# Patient Record
Sex: FEMALE | Race: BLACK OR AFRICAN AMERICAN | NOT HISPANIC OR LATINO | URBAN - METROPOLITAN AREA
[De-identification: names, ages, dates, MRNs, and addresses within clinical notes are randomized per-mention and may not be internally consistent; named-entity substitution may affect disease eponyms.]

---

## 2021-01-01 ENCOUNTER — INPATIENT (INPATIENT)
Facility: HOSPITAL | Age: 56
LOS: 10 days | Discharge: NURSING, REHAB OR LONG-TERM CARE | DRG: 190 | End: 2021-09-09
Admitting: STUDENT IN AN ORGANIZED HEALTH CARE EDUCATION/TRAINING PROGRAM
Payer: MEDICARE

## 2021-01-01 ENCOUNTER — INPATIENT (INPATIENT)
Facility: HOSPITAL | Age: 56
LOS: 5 days | Discharge: NURSING, REHAB OR LONG-TERM CARE | DRG: 974 | End: 2021-10-03
Admitting: HOSPITALIST
Payer: MEDICARE

## 2021-01-01 ENCOUNTER — INPATIENT (INPATIENT)
Facility: HOSPITAL | Age: 56
LOS: 7 days | DRG: 974 | End: 2021-10-16
Attending: INTERNAL MEDICINE | Admitting: INTERNAL MEDICINE
Payer: MEDICARE

## 2021-01-01 VITALS
HEIGHT: 62 IN | HEART RATE: 74 BPM | RESPIRATION RATE: 18 BRPM | TEMPERATURE: 98 F | DIASTOLIC BLOOD PRESSURE: 61 MMHG | WEIGHT: 89.95 LBS | OXYGEN SATURATION: 98 % | SYSTOLIC BLOOD PRESSURE: 96 MMHG

## 2021-01-01 VITALS
RESPIRATION RATE: 25 BRPM | HEIGHT: 67 IN | DIASTOLIC BLOOD PRESSURE: 53 MMHG | WEIGHT: 110.01 LBS | SYSTOLIC BLOOD PRESSURE: 83 MMHG | OXYGEN SATURATION: 96 % | TEMPERATURE: 100 F | HEART RATE: 120 BPM

## 2021-01-01 VITALS
HEIGHT: 67 IN | RESPIRATION RATE: 20 BRPM | OXYGEN SATURATION: 88 % | HEART RATE: 85 BPM | SYSTOLIC BLOOD PRESSURE: 85 MMHG | WEIGHT: 89.95 LBS | DIASTOLIC BLOOD PRESSURE: 60 MMHG | TEMPERATURE: 100 F

## 2021-01-01 VITALS
HEART RATE: 70 BPM | DIASTOLIC BLOOD PRESSURE: 61 MMHG | SYSTOLIC BLOOD PRESSURE: 104 MMHG | TEMPERATURE: 98 F | OXYGEN SATURATION: 96 % | RESPIRATION RATE: 18 BRPM

## 2021-01-01 VITALS
SYSTOLIC BLOOD PRESSURE: 105 MMHG | RESPIRATION RATE: 18 BRPM | HEART RATE: 81 BPM | OXYGEN SATURATION: 98 % | DIASTOLIC BLOOD PRESSURE: 67 MMHG | TEMPERATURE: 98 F

## 2021-01-01 VITALS
RESPIRATION RATE: 17 BRPM | HEART RATE: 87 BPM | OXYGEN SATURATION: 52 % | SYSTOLIC BLOOD PRESSURE: 79 MMHG | DIASTOLIC BLOOD PRESSURE: 41 MMHG

## 2021-01-01 DIAGNOSIS — Z71.89 OTHER SPECIFIED COUNSELING: ICD-10-CM

## 2021-01-01 DIAGNOSIS — Z51.5 ENCOUNTER FOR PALLIATIVE CARE: ICD-10-CM

## 2021-01-01 DIAGNOSIS — R53.1 WEAKNESS: ICD-10-CM

## 2021-01-01 DIAGNOSIS — B20 HUMAN IMMUNODEFICIENCY VIRUS [HIV] DISEASE: ICD-10-CM

## 2021-01-01 DIAGNOSIS — F31.9 BIPOLAR DISORDER, UNSPECIFIED: ICD-10-CM

## 2021-01-01 DIAGNOSIS — Z91.89 OTHER SPECIFIED PERSONAL RISK FACTORS, NOT ELSEWHERE CLASSIFIED: ICD-10-CM

## 2021-01-01 DIAGNOSIS — F17.200 NICOTINE DEPENDENCE, UNSPECIFIED, UNCOMPLICATED: ICD-10-CM

## 2021-01-01 DIAGNOSIS — A41.9 SEPSIS, UNSPECIFIED ORGANISM: ICD-10-CM

## 2021-01-01 DIAGNOSIS — Z87.898 PERSONAL HISTORY OF OTHER SPECIFIED CONDITIONS: ICD-10-CM

## 2021-01-01 DIAGNOSIS — R26.81 UNSTEADINESS ON FEET: ICD-10-CM

## 2021-01-01 DIAGNOSIS — B37.81 CANDIDAL ESOPHAGITIS: ICD-10-CM

## 2021-01-01 DIAGNOSIS — D64.9 ANEMIA, UNSPECIFIED: ICD-10-CM

## 2021-01-01 DIAGNOSIS — B37.0 CANDIDAL STOMATITIS: ICD-10-CM

## 2021-01-01 DIAGNOSIS — I95.9 HYPOTENSION, UNSPECIFIED: ICD-10-CM

## 2021-01-01 DIAGNOSIS — R63.8 OTHER SYMPTOMS AND SIGNS CONCERNING FOOD AND FLUID INTAKE: ICD-10-CM

## 2021-01-01 DIAGNOSIS — R64 CACHEXIA: ICD-10-CM

## 2021-01-01 DIAGNOSIS — F17.210 NICOTINE DEPENDENCE, CIGARETTES, UNCOMPLICATED: ICD-10-CM

## 2021-01-01 DIAGNOSIS — N39.0 URINARY TRACT INFECTION, SITE NOT SPECIFIED: ICD-10-CM

## 2021-01-01 DIAGNOSIS — L89.153 PRESSURE ULCER OF SACRAL REGION, STAGE 3: ICD-10-CM

## 2021-01-01 DIAGNOSIS — R13.10 DYSPHAGIA, UNSPECIFIED: ICD-10-CM

## 2021-01-01 DIAGNOSIS — J96.92 RESPIRATORY FAILURE, UNSPECIFIED WITH HYPERCAPNIA: ICD-10-CM

## 2021-01-01 DIAGNOSIS — J96.01 ACUTE RESPIRATORY FAILURE WITH HYPOXIA: ICD-10-CM

## 2021-01-01 DIAGNOSIS — L89.154 PRESSURE ULCER OF SACRAL REGION, STAGE 4: ICD-10-CM

## 2021-01-01 DIAGNOSIS — E86.0 DEHYDRATION: ICD-10-CM

## 2021-01-01 DIAGNOSIS — R26.2 DIFFICULTY IN WALKING, NOT ELSEWHERE CLASSIFIED: ICD-10-CM

## 2021-01-01 DIAGNOSIS — Z85.810 PERSONAL HISTORY OF MALIGNANT NEOPLASM OF TONGUE: ICD-10-CM

## 2021-01-01 DIAGNOSIS — R62.7 ADULT FAILURE TO THRIVE: ICD-10-CM

## 2021-01-01 DIAGNOSIS — C02.9 MALIGNANT NEOPLASM OF TONGUE, UNSPECIFIED: ICD-10-CM

## 2021-01-01 DIAGNOSIS — R06.00 DYSPNEA, UNSPECIFIED: ICD-10-CM

## 2021-01-01 DIAGNOSIS — J44.1 CHRONIC OBSTRUCTIVE PULMONARY DISEASE WITH (ACUTE) EXACERBATION: ICD-10-CM

## 2021-01-01 DIAGNOSIS — J44.9 CHRONIC OBSTRUCTIVE PULMONARY DISEASE, UNSPECIFIED: ICD-10-CM

## 2021-01-01 DIAGNOSIS — B18.1 CHRONIC VIRAL HEPATITIS B WITHOUT DELTA-AGENT: ICD-10-CM

## 2021-01-01 DIAGNOSIS — Z74.01 BED CONFINEMENT STATUS: ICD-10-CM

## 2021-01-01 DIAGNOSIS — R93.89 ABNORMAL FINDINGS ON DIAGNOSTIC IMAGING OF OTHER SPECIFIED BODY STRUCTURES: ICD-10-CM

## 2021-01-01 DIAGNOSIS — L98.429 NON-PRESSURE CHRONIC ULCER OF BACK WITH UNSPECIFIED SEVERITY: ICD-10-CM

## 2021-01-01 DIAGNOSIS — F45.8 OTHER SOMATOFORM DISORDERS: ICD-10-CM

## 2021-01-01 DIAGNOSIS — D63.8 ANEMIA IN OTHER CHRONIC DISEASES CLASSIFIED ELSEWHERE: ICD-10-CM

## 2021-01-01 DIAGNOSIS — M54.9 DORSALGIA, UNSPECIFIED: ICD-10-CM

## 2021-01-01 DIAGNOSIS — F14.99 COCAINE USE, UNSPECIFIED WITH UNSPECIFIED COCAINE-INDUCED DISORDER: ICD-10-CM

## 2021-01-01 DIAGNOSIS — Z92.3 PERSONAL HISTORY OF IRRADIATION: ICD-10-CM

## 2021-01-01 DIAGNOSIS — B59 PNEUMOCYSTOSIS: ICD-10-CM

## 2021-01-01 DIAGNOSIS — G89.29 OTHER CHRONIC PAIN: ICD-10-CM

## 2021-01-01 DIAGNOSIS — L89.90 PRESSURE ULCER OF UNSPECIFIED SITE, UNSPECIFIED STAGE: ICD-10-CM

## 2021-01-01 DIAGNOSIS — J05.10 ACUTE EPIGLOTTITIS WITHOUT OBSTRUCTION: ICD-10-CM

## 2021-01-01 DIAGNOSIS — Z91.14 PATIENT'S OTHER NONCOMPLIANCE WITH MEDICATION REGIMEN: ICD-10-CM

## 2021-01-01 DIAGNOSIS — B19.10 UNSPECIFIED VIRAL HEPATITIS B WITHOUT HEPATIC COMA: ICD-10-CM

## 2021-01-01 DIAGNOSIS — B18.2 CHRONIC VIRAL HEPATITIS C: ICD-10-CM

## 2021-01-01 DIAGNOSIS — R53.2 FUNCTIONAL QUADRIPLEGIA: ICD-10-CM

## 2021-01-01 DIAGNOSIS — J18.9 PNEUMONIA, UNSPECIFIED ORGANISM: ICD-10-CM

## 2021-01-01 DIAGNOSIS — J96.91 RESPIRATORY FAILURE, UNSPECIFIED WITH HYPOXIA: ICD-10-CM

## 2021-01-01 DIAGNOSIS — C20 MALIGNANT NEOPLASM OF RECTUM: ICD-10-CM

## 2021-01-01 DIAGNOSIS — B19.20 UNSPECIFIED VIRAL HEPATITIS C WITHOUT HEPATIC COMA: ICD-10-CM

## 2021-01-01 DIAGNOSIS — Z85.048 PERSONAL HISTORY OF OTHER MALIGNANT NEOPLASM OF RECTUM, RECTOSIGMOID JUNCTION, AND ANUS: ICD-10-CM

## 2021-01-01 DIAGNOSIS — Z20.822 CONTACT WITH AND (SUSPECTED) EXPOSURE TO COVID-19: ICD-10-CM

## 2021-01-01 DIAGNOSIS — R52 PAIN, UNSPECIFIED: ICD-10-CM

## 2021-01-01 DIAGNOSIS — F32.A DEPRESSION, UNSPECIFIED: ICD-10-CM

## 2021-01-01 DIAGNOSIS — E43 UNSPECIFIED SEVERE PROTEIN-CALORIE MALNUTRITION: ICD-10-CM

## 2021-01-01 DIAGNOSIS — J43.9 EMPHYSEMA, UNSPECIFIED: ICD-10-CM

## 2021-01-01 DIAGNOSIS — L29.9 PRURITUS, UNSPECIFIED: ICD-10-CM

## 2021-01-01 DIAGNOSIS — A59.9 TRICHOMONIASIS, UNSPECIFIED: ICD-10-CM

## 2021-01-01 DIAGNOSIS — J15.6 PNEUMONIA DUE TO OTHER GRAM-NEGATIVE BACTERIA: ICD-10-CM

## 2021-01-01 LAB
-  CEFAZOLIN: SIGNIFICANT CHANGE UP
-  CLINDAMYCIN: SIGNIFICANT CHANGE UP
-  DAPTOMYCIN: SIGNIFICANT CHANGE UP
-  ERYTHROMYCIN: SIGNIFICANT CHANGE UP
-  LINEZOLID: SIGNIFICANT CHANGE UP
-  OXACILLIN: SIGNIFICANT CHANGE UP
-  RIFAMPIN: SIGNIFICANT CHANGE UP
-  TETRACYCLINE: SIGNIFICANT CHANGE UP
-  TRIMETHOPRIM/SULFAMETHOXAZOLE: SIGNIFICANT CHANGE UP
-  VANCOMYCIN: SIGNIFICANT CHANGE UP
4/8 RATIO: 0.03 RATIO — LOW (ref 0.9–3.6)
4/8 RATIO: 0.04 RATIO — LOW (ref 0.9–3.6)
A FLAVUS AB FLD QL: NEGATIVE — SIGNIFICANT CHANGE UP
A NIGER AB FLD QL: NEGATIVE — SIGNIFICANT CHANGE UP
A NIGER AB FLD QL: NEGATIVE — SIGNIFICANT CHANGE UP
A1C WITH ESTIMATED AVERAGE GLUCOSE RESULT: 6.1 % — HIGH (ref 4–5.6)
ABS CD8: 1450 /UL — HIGH (ref 142–740)
ABS CD8: 1482 /UL — HIGH (ref 142–740)
ACANTHOCYTES BLD QL SMEAR: SLIGHT — SIGNIFICANT CHANGE UP
ALBUMIN SERPL ELPH-MCNC: 1.6 G/DL — LOW (ref 3.3–5)
ALBUMIN SERPL ELPH-MCNC: 2 G/DL — LOW (ref 3.3–5)
ALBUMIN SERPL ELPH-MCNC: 2.1 G/DL — LOW (ref 3.3–5)
ALBUMIN SERPL ELPH-MCNC: 2.1 G/DL — LOW (ref 3.3–5)
ALBUMIN SERPL ELPH-MCNC: 2.2 G/DL — LOW (ref 3.3–5)
ALBUMIN SERPL ELPH-MCNC: 2.5 G/DL — LOW (ref 3.3–5)
ALBUMIN SERPL ELPH-MCNC: 2.6 G/DL — LOW (ref 3.3–5)
ALBUMIN SERPL ELPH-MCNC: 2.6 G/DL — LOW (ref 3.3–5)
ALBUMIN SERPL ELPH-MCNC: 2.7 G/DL — LOW (ref 3.3–5)
ALBUMIN SERPL ELPH-MCNC: 2.8 G/DL — LOW (ref 3.3–5)
ALBUMIN SERPL ELPH-MCNC: 3.3 G/DL — SIGNIFICANT CHANGE UP (ref 3.3–5)
ALBUMIN SERPL ELPH-MCNC: 3.4 G/DL — SIGNIFICANT CHANGE UP (ref 3.3–5)
ALBUMIN SERPL ELPH-MCNC: 3.9 G/DL — SIGNIFICANT CHANGE UP (ref 3.3–5)
ALP SERPL-CCNC: 47 U/L — SIGNIFICANT CHANGE UP (ref 40–120)
ALP SERPL-CCNC: 54 U/L — SIGNIFICANT CHANGE UP (ref 40–120)
ALP SERPL-CCNC: 55 U/L — SIGNIFICANT CHANGE UP (ref 40–120)
ALP SERPL-CCNC: 60 U/L — SIGNIFICANT CHANGE UP (ref 40–120)
ALP SERPL-CCNC: 64 U/L — SIGNIFICANT CHANGE UP (ref 40–120)
ALP SERPL-CCNC: 66 U/L — SIGNIFICANT CHANGE UP (ref 40–120)
ALP SERPL-CCNC: 67 U/L — SIGNIFICANT CHANGE UP (ref 40–120)
ALP SERPL-CCNC: 68 U/L — SIGNIFICANT CHANGE UP (ref 40–120)
ALP SERPL-CCNC: 71 U/L — SIGNIFICANT CHANGE UP (ref 40–120)
ALP SERPL-CCNC: 71 U/L — SIGNIFICANT CHANGE UP (ref 40–120)
ALP SERPL-CCNC: 73 U/L — SIGNIFICANT CHANGE UP (ref 40–120)
ALP SERPL-CCNC: 78 U/L — SIGNIFICANT CHANGE UP (ref 40–120)
ALP SERPL-CCNC: 85 U/L — SIGNIFICANT CHANGE UP (ref 40–120)
ALT FLD-CCNC: 10 U/L — SIGNIFICANT CHANGE UP (ref 10–45)
ALT FLD-CCNC: 10 U/L — SIGNIFICANT CHANGE UP (ref 10–45)
ALT FLD-CCNC: 11 U/L — SIGNIFICANT CHANGE UP (ref 10–45)
ALT FLD-CCNC: 11 U/L — SIGNIFICANT CHANGE UP (ref 10–45)
ALT FLD-CCNC: 12 U/L — SIGNIFICANT CHANGE UP (ref 10–45)
ALT FLD-CCNC: 12 U/L — SIGNIFICANT CHANGE UP (ref 10–45)
ALT FLD-CCNC: 14 U/L — SIGNIFICANT CHANGE UP (ref 10–45)
ALT FLD-CCNC: 15 U/L — SIGNIFICANT CHANGE UP (ref 10–45)
ALT FLD-CCNC: 18 U/L — SIGNIFICANT CHANGE UP (ref 10–45)
ALT FLD-CCNC: 7 U/L — LOW (ref 10–45)
ALT FLD-CCNC: 7 U/L — LOW (ref 10–45)
ALT FLD-CCNC: 8 U/L — LOW (ref 10–45)
ALT FLD-CCNC: 9 U/L — LOW (ref 10–45)
AMPHET UR-MCNC: NEGATIVE — SIGNIFICANT CHANGE UP
ANION GAP SERPL CALC-SCNC: 10 MMOL/L — SIGNIFICANT CHANGE UP (ref 5–17)
ANION GAP SERPL CALC-SCNC: 11 MMOL/L — SIGNIFICANT CHANGE UP (ref 5–17)
ANION GAP SERPL CALC-SCNC: 14 MMOL/L — SIGNIFICANT CHANGE UP (ref 5–17)
ANION GAP SERPL CALC-SCNC: 6 MMOL/L — SIGNIFICANT CHANGE UP (ref 5–17)
ANION GAP SERPL CALC-SCNC: 7 MMOL/L — SIGNIFICANT CHANGE UP (ref 5–17)
ANION GAP SERPL CALC-SCNC: 7 MMOL/L — SIGNIFICANT CHANGE UP (ref 5–17)
ANION GAP SERPL CALC-SCNC: 8 MMOL/L — SIGNIFICANT CHANGE UP (ref 5–17)
ANION GAP SERPL CALC-SCNC: 9 MMOL/L — SIGNIFICANT CHANGE UP (ref 5–17)
ANION GAP SERPL CALC-SCNC: 9 MMOL/L — SIGNIFICANT CHANGE UP (ref 5–17)
ANISOCYTOSIS BLD QL: SIGNIFICANT CHANGE UP
ANISOCYTOSIS BLD QL: SLIGHT — SIGNIFICANT CHANGE UP
APPEARANCE UR: ABNORMAL
APPEARANCE UR: CLEAR — SIGNIFICANT CHANGE UP
APTT BLD: 24.6 SEC — LOW (ref 27.5–35.5)
APTT BLD: 31.8 SEC — SIGNIFICANT CHANGE UP (ref 27.5–35.5)
APTT BLD: 32.7 SEC — SIGNIFICANT CHANGE UP (ref 27.5–35.5)
APTT BLD: 33.9 SEC — SIGNIFICANT CHANGE UP (ref 27.5–35.5)
AST SERPL-CCNC: 25 U/L — SIGNIFICANT CHANGE UP (ref 10–40)
AST SERPL-CCNC: 28 U/L — SIGNIFICANT CHANGE UP (ref 10–40)
AST SERPL-CCNC: 34 U/L — SIGNIFICANT CHANGE UP (ref 10–40)
AST SERPL-CCNC: 37 U/L — SIGNIFICANT CHANGE UP (ref 10–40)
AST SERPL-CCNC: 43 U/L — HIGH (ref 10–40)
AST SERPL-CCNC: 43 U/L — HIGH (ref 10–40)
AST SERPL-CCNC: 47 U/L — HIGH (ref 10–40)
AST SERPL-CCNC: 47 U/L — HIGH (ref 10–40)
AST SERPL-CCNC: 55 U/L — HIGH (ref 10–40)
AST SERPL-CCNC: 67 U/L — HIGH (ref 10–40)
AST SERPL-CCNC: 68 U/L — HIGH (ref 10–40)
B PERT IGG+IGM PNL SER: SIGNIFICANT CHANGE UP
BACTERIA # UR AUTO: PRESENT /HPF
BACTERIA # UR AUTO: PRESENT /HPF
BARBITURATES UR SCN-MCNC: NEGATIVE — SIGNIFICANT CHANGE UP
BASE EXCESS BLDA CALC-SCNC: -3.2 MMOL/L — LOW (ref -2–3)
BASE EXCESS BLDA CALC-SCNC: 0.2 MMOL/L — SIGNIFICANT CHANGE UP (ref -2–3)
BASE EXCESS BLDV CALC-SCNC: -2.6 MMOL/L — LOW (ref -2–3)
BASOPHILS # BLD AUTO: 0 K/UL — SIGNIFICANT CHANGE UP (ref 0–0.2)
BASOPHILS # BLD AUTO: 0.02 K/UL — SIGNIFICANT CHANGE UP (ref 0–0.2)
BASOPHILS # BLD AUTO: 0.13 K/UL — SIGNIFICANT CHANGE UP (ref 0–0.2)
BASOPHILS NFR BLD AUTO: 0 % — SIGNIFICANT CHANGE UP (ref 0–2)
BASOPHILS NFR BLD AUTO: 0.4 % — SIGNIFICANT CHANGE UP (ref 0–2)
BASOPHILS NFR BLD AUTO: 0.9 % — SIGNIFICANT CHANGE UP (ref 0–2)
BENZODIAZ UR-MCNC: NEGATIVE — SIGNIFICANT CHANGE UP
BILIRUB SERPL-MCNC: 0.2 MG/DL — SIGNIFICANT CHANGE UP (ref 0.2–1.2)
BILIRUB SERPL-MCNC: 0.3 MG/DL — SIGNIFICANT CHANGE UP (ref 0.2–1.2)
BILIRUB SERPL-MCNC: 0.4 MG/DL — SIGNIFICANT CHANGE UP (ref 0.2–1.2)
BILIRUB SERPL-MCNC: 0.5 MG/DL — SIGNIFICANT CHANGE UP (ref 0.2–1.2)
BILIRUB SERPL-MCNC: 0.6 MG/DL — SIGNIFICANT CHANGE UP (ref 0.2–1.2)
BILIRUB SERPL-MCNC: 0.6 MG/DL — SIGNIFICANT CHANGE UP (ref 0.2–1.2)
BILIRUB UR-MCNC: ABNORMAL
BILIRUB UR-MCNC: ABNORMAL
BILIRUB UR-MCNC: NEGATIVE — SIGNIFICANT CHANGE UP
BILIRUB UR-MCNC: NEGATIVE — SIGNIFICANT CHANGE UP
BLD GP AB SCN SERPL QL: NEGATIVE — SIGNIFICANT CHANGE UP
BLD GP AB SCN SERPL QL: NEGATIVE — SIGNIFICANT CHANGE UP
BUN SERPL-MCNC: 15 MG/DL — SIGNIFICANT CHANGE UP (ref 7–23)
BUN SERPL-MCNC: 16 MG/DL — SIGNIFICANT CHANGE UP (ref 7–23)
BUN SERPL-MCNC: 17 MG/DL — SIGNIFICANT CHANGE UP (ref 7–23)
BUN SERPL-MCNC: 19 MG/DL — SIGNIFICANT CHANGE UP (ref 7–23)
BUN SERPL-MCNC: 21 MG/DL — SIGNIFICANT CHANGE UP (ref 7–23)
BUN SERPL-MCNC: 27 MG/DL — HIGH (ref 7–23)
BUN SERPL-MCNC: 27 MG/DL — HIGH (ref 7–23)
BUN SERPL-MCNC: 28 MG/DL — HIGH (ref 7–23)
BUN SERPL-MCNC: 28 MG/DL — HIGH (ref 7–23)
BUN SERPL-MCNC: 29 MG/DL — HIGH (ref 7–23)
BUN SERPL-MCNC: 32 MG/DL — HIGH (ref 7–23)
BUN SERPL-MCNC: 34 MG/DL — HIGH (ref 7–23)
BUN SERPL-MCNC: 35 MG/DL — HIGH (ref 7–23)
BUN SERPL-MCNC: 5 MG/DL — LOW (ref 7–23)
BUN SERPL-MCNC: 54 MG/DL — HIGH (ref 7–23)
BUN SERPL-MCNC: 6 MG/DL — LOW (ref 7–23)
BUN SERPL-MCNC: 9 MG/DL — SIGNIFICANT CHANGE UP (ref 7–23)
BURR CELLS BLD QL SMEAR: PRESENT — SIGNIFICANT CHANGE UP
C TRACH RRNA SPEC QL NAA+PROBE: SIGNIFICANT CHANGE UP
CA-I SERPL-SCNC: 1.2 MMOL/L — SIGNIFICANT CHANGE UP (ref 1.15–1.33)
CALCIUM SERPL-MCNC: 7.8 MG/DL — LOW (ref 8.4–10.5)
CALCIUM SERPL-MCNC: 7.9 MG/DL — LOW (ref 8.4–10.5)
CALCIUM SERPL-MCNC: 8.2 MG/DL — LOW (ref 8.4–10.5)
CALCIUM SERPL-MCNC: 8.3 MG/DL — LOW (ref 8.4–10.5)
CALCIUM SERPL-MCNC: 8.3 MG/DL — LOW (ref 8.4–10.5)
CALCIUM SERPL-MCNC: 8.5 MG/DL — SIGNIFICANT CHANGE UP (ref 8.4–10.5)
CALCIUM SERPL-MCNC: 8.6 MG/DL — SIGNIFICANT CHANGE UP (ref 8.4–10.5)
CALCIUM SERPL-MCNC: 8.7 MG/DL — SIGNIFICANT CHANGE UP (ref 8.4–10.5)
CALCIUM SERPL-MCNC: 8.7 MG/DL — SIGNIFICANT CHANGE UP (ref 8.4–10.5)
CALCIUM SERPL-MCNC: 8.8 MG/DL — SIGNIFICANT CHANGE UP (ref 8.4–10.5)
CALCIUM SERPL-MCNC: 8.8 MG/DL — SIGNIFICANT CHANGE UP (ref 8.4–10.5)
CALCIUM SERPL-MCNC: 9.3 MG/DL — SIGNIFICANT CHANGE UP (ref 8.4–10.5)
CALCIUM SERPL-MCNC: 9.9 MG/DL — SIGNIFICANT CHANGE UP (ref 8.4–10.5)
CD3 BLASTS SPEC-ACNC: 1544 /UL — SIGNIFICANT CHANGE UP (ref 672–1870)
CD3 BLASTS SPEC-ACNC: 1614 /UL — SIGNIFICANT CHANGE UP (ref 672–1870)
CD3 BLASTS SPEC-ACNC: 80 % — SIGNIFICANT CHANGE UP (ref 59–83)
CD3 BLASTS SPEC-ACNC: 85 % — HIGH (ref 59–83)
CD4 %: 2 % — LOW (ref 30–62)
CD4 %: 3 % — LOW (ref 30–62)
CD8 %: 74 % — HIGH (ref 12–36)
CD8 %: 80 % — HIGH (ref 12–36)
CHLORIDE SERPL-SCNC: 100 MMOL/L — SIGNIFICANT CHANGE UP (ref 96–108)
CHLORIDE SERPL-SCNC: 101 MMOL/L — SIGNIFICANT CHANGE UP (ref 96–108)
CHLORIDE SERPL-SCNC: 102 MMOL/L — SIGNIFICANT CHANGE UP (ref 96–108)
CHLORIDE SERPL-SCNC: 102 MMOL/L — SIGNIFICANT CHANGE UP (ref 96–108)
CHLORIDE SERPL-SCNC: 103 MMOL/L — SIGNIFICANT CHANGE UP (ref 96–108)
CHLORIDE SERPL-SCNC: 103 MMOL/L — SIGNIFICANT CHANGE UP (ref 96–108)
CHLORIDE SERPL-SCNC: 104 MMOL/L — SIGNIFICANT CHANGE UP (ref 96–108)
CHLORIDE SERPL-SCNC: 105 MMOL/L — SIGNIFICANT CHANGE UP (ref 96–108)
CHLORIDE SERPL-SCNC: 107 MMOL/L — SIGNIFICANT CHANGE UP (ref 96–108)
CHLORIDE SERPL-SCNC: 110 MMOL/L — HIGH (ref 96–108)
CHLORIDE SERPL-SCNC: 111 MMOL/L — HIGH (ref 96–108)
CHLORIDE SERPL-SCNC: 113 MMOL/L — HIGH (ref 96–108)
CHLORIDE SERPL-SCNC: 116 MMOL/L — HIGH (ref 96–108)
CHLORIDE SERPL-SCNC: 97 MMOL/L — SIGNIFICANT CHANGE UP (ref 96–108)
CHLORIDE SERPL-SCNC: 99 MMOL/L — SIGNIFICANT CHANGE UP (ref 96–108)
CHOLEST SERPL-MCNC: 81 MG/DL — SIGNIFICANT CHANGE UP
CMV DNA CSF QL NAA+PROBE: SIGNIFICANT CHANGE UP
CO2 BLDA-SCNC: 25 MMOL/L — HIGH (ref 19–24)
CO2 BLDA-SCNC: 28 MMOL/L — HIGH (ref 19–24)
CO2 BLDV-SCNC: 24.5 MMOL/L — SIGNIFICANT CHANGE UP (ref 22–26)
CO2 SERPL-SCNC: 15 MMOL/L — LOW (ref 22–31)
CO2 SERPL-SCNC: 19 MMOL/L — LOW (ref 22–31)
CO2 SERPL-SCNC: 20 MMOL/L — LOW (ref 22–31)
CO2 SERPL-SCNC: 21 MMOL/L — LOW (ref 22–31)
CO2 SERPL-SCNC: 22 MMOL/L — SIGNIFICANT CHANGE UP (ref 22–31)
CO2 SERPL-SCNC: 23 MMOL/L — SIGNIFICANT CHANGE UP (ref 22–31)
CO2 SERPL-SCNC: 23 MMOL/L — SIGNIFICANT CHANGE UP (ref 22–31)
CO2 SERPL-SCNC: 24 MMOL/L — SIGNIFICANT CHANGE UP (ref 22–31)
CO2 SERPL-SCNC: 25 MMOL/L — SIGNIFICANT CHANGE UP (ref 22–31)
CO2 SERPL-SCNC: 28 MMOL/L — SIGNIFICANT CHANGE UP (ref 22–31)
COCAINE METAB.OTHER UR-MCNC: NEGATIVE — SIGNIFICANT CHANGE UP
COLOR FLD: SIGNIFICANT CHANGE UP
COLOR SPEC: YELLOW — SIGNIFICANT CHANGE UP
COMMENT - FLUIDS: SIGNIFICANT CHANGE UP
COMMENT - FLUIDS: SIGNIFICANT CHANGE UP
COMMENT - URINE: SIGNIFICANT CHANGE UP
CORTIS AM PEAK SERPL-MCNC: 8.56 UG/DL — SIGNIFICANT CHANGE UP (ref 6.02–18.4)
COVID-19 SPIKE DOMAIN AB INTERP: POSITIVE
COVID-19 SPIKE DOMAIN AB INTERP: POSITIVE
COVID-19 SPIKE DOMAIN ANTIBODY RESULT: >250 U/ML — HIGH
COVID-19 SPIKE DOMAIN ANTIBODY RESULT: >250 U/ML — HIGH
CREAT SERPL-MCNC: 0.76 MG/DL — SIGNIFICANT CHANGE UP (ref 0.5–1.3)
CREAT SERPL-MCNC: 0.8 MG/DL — SIGNIFICANT CHANGE UP (ref 0.5–1.3)
CREAT SERPL-MCNC: 0.82 MG/DL — SIGNIFICANT CHANGE UP (ref 0.5–1.3)
CREAT SERPL-MCNC: 0.86 MG/DL — SIGNIFICANT CHANGE UP (ref 0.5–1.3)
CREAT SERPL-MCNC: 0.87 MG/DL — SIGNIFICANT CHANGE UP (ref 0.5–1.3)
CREAT SERPL-MCNC: 0.88 MG/DL — SIGNIFICANT CHANGE UP (ref 0.5–1.3)
CREAT SERPL-MCNC: 0.93 MG/DL — SIGNIFICANT CHANGE UP (ref 0.5–1.3)
CREAT SERPL-MCNC: 0.99 MG/DL — SIGNIFICANT CHANGE UP (ref 0.5–1.3)
CREAT SERPL-MCNC: 1 MG/DL — SIGNIFICANT CHANGE UP (ref 0.5–1.3)
CREAT SERPL-MCNC: 1.06 MG/DL — SIGNIFICANT CHANGE UP (ref 0.5–1.3)
CREAT SERPL-MCNC: 1.07 MG/DL — SIGNIFICANT CHANGE UP (ref 0.5–1.3)
CREAT SERPL-MCNC: 1.13 MG/DL — SIGNIFICANT CHANGE UP (ref 0.5–1.3)
CREAT SERPL-MCNC: 1.15 MG/DL — SIGNIFICANT CHANGE UP (ref 0.5–1.3)
CREAT SERPL-MCNC: 1.16 MG/DL — SIGNIFICANT CHANGE UP (ref 0.5–1.3)
CREAT SERPL-MCNC: 1.22 MG/DL — SIGNIFICANT CHANGE UP (ref 0.5–1.3)
CREAT SERPL-MCNC: 1.38 MG/DL — HIGH (ref 0.5–1.3)
CREAT SERPL-MCNC: 1.56 MG/DL — HIGH (ref 0.5–1.3)
CREAT SERPL-MCNC: 2.58 MG/DL — HIGH (ref 0.5–1.3)
CRYPTOC AG FLD QL: NEGATIVE — SIGNIFICANT CHANGE UP
CULTURE RESULTS: NO GROWTH — SIGNIFICANT CHANGE UP
CULTURE RESULTS: SIGNIFICANT CHANGE UP
DACRYOCYTES BLD QL SMEAR: SLIGHT — SIGNIFICANT CHANGE UP
DIFF PNL FLD: ABNORMAL
DIFF PNL FLD: NEGATIVE — SIGNIFICANT CHANGE UP
EOSINOPHIL # BLD AUTO: 0 K/UL — SIGNIFICANT CHANGE UP (ref 0–0.5)
EOSINOPHIL # BLD AUTO: 0.01 K/UL — SIGNIFICANT CHANGE UP (ref 0–0.5)
EOSINOPHIL # BLD AUTO: 0.05 K/UL — SIGNIFICANT CHANGE UP (ref 0–0.5)
EOSINOPHIL NFR BLD AUTO: 0 % — SIGNIFICANT CHANGE UP (ref 0–6)
EOSINOPHIL NFR BLD AUTO: 0.2 % — SIGNIFICANT CHANGE UP (ref 0–6)
EOSINOPHIL NFR BLD AUTO: 0.9 % — SIGNIFICANT CHANGE UP (ref 0–6)
EPI CELLS # UR: ABNORMAL /HPF (ref 0–5)
EPI CELLS # UR: SIGNIFICANT CHANGE UP /HPF (ref 0–5)
ESTIMATED AVERAGE GLUCOSE: 128 MG/DL — HIGH (ref 68–114)
FERRITIN SERPL-MCNC: 274 NG/ML — HIGH (ref 15–150)
FLUID INTAKE SUBSTANCE CLASS: SIGNIFICANT CHANGE UP
FLUID SEGMENTED GRANULOCYTES: SIGNIFICANT CHANGE UP %
FOLATE SERPL-MCNC: 18 NG/ML — SIGNIFICANT CHANGE UP
FOLATE SERPL-MCNC: 19.6 NG/ML — SIGNIFICANT CHANGE UP
FUNGITELL: 221 PG/ML — HIGH
FUNGITELL: 92 PG/ML — HIGH
GALACTOMANNAN AG SERPL-ACNC: 0.05 INDEX — SIGNIFICANT CHANGE UP (ref 0–0.49)
GAS PNL BLDA: SIGNIFICANT CHANGE UP
GAS PNL BLDV: 130 MMOL/L — LOW (ref 136–145)
GAS PNL BLDV: SIGNIFICANT CHANGE UP
GAS PNL BLDV: SIGNIFICANT CHANGE UP
GIANT PLATELETS BLD QL SMEAR: PRESENT — SIGNIFICANT CHANGE UP
GLUCOSE BLDC GLUCOMTR-MCNC: 101 MG/DL — HIGH (ref 70–99)
GLUCOSE BLDC GLUCOMTR-MCNC: 101 MG/DL — HIGH (ref 70–99)
GLUCOSE BLDC GLUCOMTR-MCNC: 102 MG/DL — HIGH (ref 70–99)
GLUCOSE BLDC GLUCOMTR-MCNC: 103 MG/DL — HIGH (ref 70–99)
GLUCOSE BLDC GLUCOMTR-MCNC: 106 MG/DL — HIGH (ref 70–99)
GLUCOSE BLDC GLUCOMTR-MCNC: 107 MG/DL — HIGH (ref 70–99)
GLUCOSE BLDC GLUCOMTR-MCNC: 108 MG/DL — HIGH (ref 70–99)
GLUCOSE BLDC GLUCOMTR-MCNC: 109 MG/DL — HIGH (ref 70–99)
GLUCOSE BLDC GLUCOMTR-MCNC: 111 MG/DL — HIGH (ref 70–99)
GLUCOSE BLDC GLUCOMTR-MCNC: 114 MG/DL — HIGH (ref 70–99)
GLUCOSE BLDC GLUCOMTR-MCNC: 115 MG/DL — HIGH (ref 70–99)
GLUCOSE BLDC GLUCOMTR-MCNC: 116 MG/DL — HIGH (ref 70–99)
GLUCOSE BLDC GLUCOMTR-MCNC: 117 MG/DL — HIGH (ref 70–99)
GLUCOSE BLDC GLUCOMTR-MCNC: 117 MG/DL — HIGH (ref 70–99)
GLUCOSE BLDC GLUCOMTR-MCNC: 118 MG/DL — HIGH (ref 70–99)
GLUCOSE BLDC GLUCOMTR-MCNC: 118 MG/DL — HIGH (ref 70–99)
GLUCOSE BLDC GLUCOMTR-MCNC: 120 MG/DL — HIGH (ref 70–99)
GLUCOSE BLDC GLUCOMTR-MCNC: 121 MG/DL — HIGH (ref 70–99)
GLUCOSE BLDC GLUCOMTR-MCNC: 121 MG/DL — HIGH (ref 70–99)
GLUCOSE BLDC GLUCOMTR-MCNC: 122 MG/DL — HIGH (ref 70–99)
GLUCOSE BLDC GLUCOMTR-MCNC: 124 MG/DL — HIGH (ref 70–99)
GLUCOSE BLDC GLUCOMTR-MCNC: 124 MG/DL — HIGH (ref 70–99)
GLUCOSE BLDC GLUCOMTR-MCNC: 125 MG/DL — HIGH (ref 70–99)
GLUCOSE BLDC GLUCOMTR-MCNC: 125 MG/DL — HIGH (ref 70–99)
GLUCOSE BLDC GLUCOMTR-MCNC: 126 MG/DL — HIGH (ref 70–99)
GLUCOSE BLDC GLUCOMTR-MCNC: 131 MG/DL — HIGH (ref 70–99)
GLUCOSE BLDC GLUCOMTR-MCNC: 131 MG/DL — HIGH (ref 70–99)
GLUCOSE BLDC GLUCOMTR-MCNC: 132 MG/DL — HIGH (ref 70–99)
GLUCOSE BLDC GLUCOMTR-MCNC: 132 MG/DL — HIGH (ref 70–99)
GLUCOSE BLDC GLUCOMTR-MCNC: 137 MG/DL — HIGH (ref 70–99)
GLUCOSE BLDC GLUCOMTR-MCNC: 137 MG/DL — HIGH (ref 70–99)
GLUCOSE BLDC GLUCOMTR-MCNC: 140 MG/DL — HIGH (ref 70–99)
GLUCOSE BLDC GLUCOMTR-MCNC: 141 MG/DL — HIGH (ref 70–99)
GLUCOSE BLDC GLUCOMTR-MCNC: 145 MG/DL — HIGH (ref 70–99)
GLUCOSE BLDC GLUCOMTR-MCNC: 150 MG/DL — HIGH (ref 70–99)
GLUCOSE BLDC GLUCOMTR-MCNC: 151 MG/DL — HIGH (ref 70–99)
GLUCOSE BLDC GLUCOMTR-MCNC: 151 MG/DL — HIGH (ref 70–99)
GLUCOSE BLDC GLUCOMTR-MCNC: 152 MG/DL — HIGH (ref 70–99)
GLUCOSE BLDC GLUCOMTR-MCNC: 154 MG/DL — HIGH (ref 70–99)
GLUCOSE BLDC GLUCOMTR-MCNC: 156 MG/DL — HIGH (ref 70–99)
GLUCOSE BLDC GLUCOMTR-MCNC: 156 MG/DL — HIGH (ref 70–99)
GLUCOSE BLDC GLUCOMTR-MCNC: 158 MG/DL — HIGH (ref 70–99)
GLUCOSE BLDC GLUCOMTR-MCNC: 162 MG/DL — HIGH (ref 70–99)
GLUCOSE BLDC GLUCOMTR-MCNC: 169 MG/DL — HIGH (ref 70–99)
GLUCOSE BLDC GLUCOMTR-MCNC: 175 MG/DL — HIGH (ref 70–99)
GLUCOSE BLDC GLUCOMTR-MCNC: 180 MG/DL — HIGH (ref 70–99)
GLUCOSE BLDC GLUCOMTR-MCNC: 186 MG/DL — HIGH (ref 70–99)
GLUCOSE BLDC GLUCOMTR-MCNC: 219 MG/DL — HIGH (ref 70–99)
GLUCOSE BLDC GLUCOMTR-MCNC: 252 MG/DL — HIGH (ref 70–99)
GLUCOSE BLDC GLUCOMTR-MCNC: 258 MG/DL — HIGH (ref 70–99)
GLUCOSE BLDC GLUCOMTR-MCNC: 268 MG/DL — HIGH (ref 70–99)
GLUCOSE BLDC GLUCOMTR-MCNC: 42 MG/DL — CRITICAL LOW (ref 70–99)
GLUCOSE BLDC GLUCOMTR-MCNC: 437 MG/DL — HIGH (ref 70–99)
GLUCOSE BLDC GLUCOMTR-MCNC: 51 MG/DL — CRITICAL LOW (ref 70–99)
GLUCOSE BLDC GLUCOMTR-MCNC: 67 MG/DL — LOW (ref 70–99)
GLUCOSE BLDC GLUCOMTR-MCNC: 78 MG/DL — SIGNIFICANT CHANGE UP (ref 70–99)
GLUCOSE BLDC GLUCOMTR-MCNC: 79 MG/DL — SIGNIFICANT CHANGE UP (ref 70–99)
GLUCOSE BLDC GLUCOMTR-MCNC: 80 MG/DL — SIGNIFICANT CHANGE UP (ref 70–99)
GLUCOSE BLDC GLUCOMTR-MCNC: 82 MG/DL — SIGNIFICANT CHANGE UP (ref 70–99)
GLUCOSE BLDC GLUCOMTR-MCNC: 83 MG/DL — SIGNIFICANT CHANGE UP (ref 70–99)
GLUCOSE BLDC GLUCOMTR-MCNC: 90 MG/DL — SIGNIFICANT CHANGE UP (ref 70–99)
GLUCOSE BLDC GLUCOMTR-MCNC: 94 MG/DL — SIGNIFICANT CHANGE UP (ref 70–99)
GLUCOSE BLDC GLUCOMTR-MCNC: 96 MG/DL — SIGNIFICANT CHANGE UP (ref 70–99)
GLUCOSE BLDC GLUCOMTR-MCNC: 96 MG/DL — SIGNIFICANT CHANGE UP (ref 70–99)
GLUCOSE BLDC GLUCOMTR-MCNC: 97 MG/DL — SIGNIFICANT CHANGE UP (ref 70–99)
GLUCOSE SERPL-MCNC: 100 MG/DL — HIGH (ref 70–99)
GLUCOSE SERPL-MCNC: 101 MG/DL — HIGH (ref 70–99)
GLUCOSE SERPL-MCNC: 120 MG/DL — HIGH (ref 70–99)
GLUCOSE SERPL-MCNC: 122 MG/DL — HIGH (ref 70–99)
GLUCOSE SERPL-MCNC: 124 MG/DL — HIGH (ref 70–99)
GLUCOSE SERPL-MCNC: 133 MG/DL — HIGH (ref 70–99)
GLUCOSE SERPL-MCNC: 164 MG/DL — HIGH (ref 70–99)
GLUCOSE SERPL-MCNC: 174 MG/DL — HIGH (ref 70–99)
GLUCOSE SERPL-MCNC: 182 MG/DL — HIGH (ref 70–99)
GLUCOSE SERPL-MCNC: 186 MG/DL — HIGH (ref 70–99)
GLUCOSE SERPL-MCNC: 207 MG/DL — HIGH (ref 70–99)
GLUCOSE SERPL-MCNC: 254 MG/DL — HIGH (ref 70–99)
GLUCOSE SERPL-MCNC: 394 MG/DL — HIGH (ref 70–99)
GLUCOSE SERPL-MCNC: 56 MG/DL — LOW (ref 70–99)
GLUCOSE SERPL-MCNC: 60 MG/DL — LOW (ref 70–99)
GLUCOSE SERPL-MCNC: 87 MG/DL — SIGNIFICANT CHANGE UP (ref 70–99)
GLUCOSE SERPL-MCNC: 98 MG/DL — SIGNIFICANT CHANGE UP (ref 70–99)
GLUCOSE UR QL: 250
GLUCOSE UR QL: NEGATIVE — SIGNIFICANT CHANGE UP
GRAM STN FLD: SIGNIFICANT CHANGE UP
HAPTOGLOB SERPL-MCNC: 315 MG/DL — HIGH (ref 34–200)
HCO3 BLDA-SCNC: 24 MMOL/L — SIGNIFICANT CHANGE UP (ref 21–28)
HCO3 BLDA-SCNC: 27 MMOL/L — SIGNIFICANT CHANGE UP (ref 21–28)
HCO3 BLDV-SCNC: 23 MMOL/L — SIGNIFICANT CHANGE UP (ref 22–29)
HCT VFR BLD CALC: 22 % — LOW (ref 34.5–45)
HCT VFR BLD CALC: 22.7 % — LOW (ref 34.5–45)
HCT VFR BLD CALC: 24.1 % — LOW (ref 34.5–45)
HCT VFR BLD CALC: 26.3 % — LOW (ref 34.5–45)
HCT VFR BLD CALC: 27.2 % — LOW (ref 34.5–45)
HCT VFR BLD CALC: 27.2 % — LOW (ref 34.5–45)
HCT VFR BLD CALC: 27.4 % — LOW (ref 34.5–45)
HCT VFR BLD CALC: 27.4 % — LOW (ref 34.5–45)
HCT VFR BLD CALC: 28 % — LOW (ref 34.5–45)
HCT VFR BLD CALC: 28.4 % — LOW (ref 34.5–45)
HCT VFR BLD CALC: 29.1 % — LOW (ref 34.5–45)
HCT VFR BLD CALC: 29.5 % — LOW (ref 34.5–45)
HCT VFR BLD CALC: 30.1 % — LOW (ref 34.5–45)
HCT VFR BLD CALC: 30.1 % — LOW (ref 34.5–45)
HCT VFR BLD CALC: 31 % — LOW (ref 34.5–45)
HCT VFR BLD CALC: 31.1 % — LOW (ref 34.5–45)
HCT VFR BLD CALC: 35.7 % — SIGNIFICANT CHANGE UP (ref 34.5–45)
HCT VFR BLD CALC: 39.5 % — SIGNIFICANT CHANGE UP (ref 34.5–45)
HCV AB S/CO SERPL IA: 56.16 S/CO — HIGH
HCV AB SERPL-IMP: REACTIVE
HCV RNA FLD QL NAA+PROBE: SIGNIFICANT CHANGE UP
HDLC SERPL-MCNC: 19 MG/DL — LOW
HGB BLD-MCNC: 10.2 G/DL — LOW (ref 11.5–15.5)
HGB BLD-MCNC: 11.8 G/DL — SIGNIFICANT CHANGE UP (ref 11.5–15.5)
HGB BLD-MCNC: 6.8 G/DL — CRITICAL LOW (ref 11.5–15.5)
HGB BLD-MCNC: 7.1 G/DL — LOW (ref 11.5–15.5)
HGB BLD-MCNC: 7.4 G/DL — LOW (ref 11.5–15.5)
HGB BLD-MCNC: 8.3 G/DL — LOW (ref 11.5–15.5)
HGB BLD-MCNC: 8.5 G/DL — LOW (ref 11.5–15.5)
HGB BLD-MCNC: 8.6 G/DL — LOW (ref 11.5–15.5)
HGB BLD-MCNC: 8.7 G/DL — LOW (ref 11.5–15.5)
HGB BLD-MCNC: 8.7 G/DL — LOW (ref 11.5–15.5)
HGB BLD-MCNC: 8.8 G/DL — LOW (ref 11.5–15.5)
HGB BLD-MCNC: 8.9 G/DL — LOW (ref 11.5–15.5)
HGB BLD-MCNC: 9 G/DL — LOW (ref 11.5–15.5)
HGB BLD-MCNC: 9 G/DL — LOW (ref 11.5–15.5)
HGB BLD-MCNC: 9.1 G/DL — LOW (ref 11.5–15.5)
HGB BLD-MCNC: 9.2 G/DL — LOW (ref 11.5–15.5)
HGB BLD-MCNC: 9.3 G/DL — LOW (ref 11.5–15.5)
HGB BLD-MCNC: 9.4 G/DL — LOW (ref 11.5–15.5)
HIV-1 VIRAL LOAD RESULT: ABNORMAL
HIV-1 VIRAL LOAD RESULT: ABNORMAL
HIV1 RNA # SERPL NAA+PROBE: 142 — SIGNIFICANT CHANGE UP
HIV1 RNA # SERPL NAA+PROBE: SIGNIFICANT CHANGE UP
HIV1 RNA SER-IMP: SIGNIFICANT CHANGE UP
HIV1 RNA SER-IMP: SIGNIFICANT CHANGE UP
HIV1 RNA SERPL NAA+PROBE-ACNC: ABNORMAL
HIV1 RNA SERPL NAA+PROBE-ACNC: ABNORMAL
HIV1 RNA SERPL NAA+PROBE-LOG#: 2.15 — SIGNIFICANT CHANGE UP
HIV1 RNA SERPL NAA+PROBE-LOG#: 5.3 — SIGNIFICANT CHANGE UP
HYPOCHROMIA BLD QL: SIGNIFICANT CHANGE UP
HYPOCHROMIA BLD QL: SIGNIFICANT CHANGE UP
HYPOCHROMIA BLD QL: SLIGHT — SIGNIFICANT CHANGE UP
IMM GRANULOCYTES NFR BLD AUTO: 5.3 % — HIGH (ref 0–1.5)
INR BLD: 0.83 — LOW (ref 0.88–1.16)
INR BLD: 1 — SIGNIFICANT CHANGE UP (ref 0.88–1.16)
INR BLD: 1.15 — SIGNIFICANT CHANGE UP (ref 0.88–1.16)
INR BLD: 1.24 — HIGH (ref 0.88–1.16)
INR BLD: 1.27 — HIGH (ref 0.88–1.16)
IRON SATN MFR SERPL: 17 % — SIGNIFICANT CHANGE UP (ref 14–50)
IRON SATN MFR SERPL: 29 UG/DL — LOW (ref 30–160)
KETONES UR-MCNC: ABNORMAL MG/DL
KETONES UR-MCNC: NEGATIVE — SIGNIFICANT CHANGE UP
LACTATE SERPL-SCNC: 1.7 MMOL/L — SIGNIFICANT CHANGE UP (ref 0.5–2)
LACTATE SERPL-SCNC: 1.7 MMOL/L — SIGNIFICANT CHANGE UP (ref 0.5–2)
LACTATE SERPL-SCNC: 1.9 MMOL/L — SIGNIFICANT CHANGE UP (ref 0.5–2)
LDH SERPL L TO P-CCNC: 396 U/L — HIGH (ref 50–242)
LEGIONELLA AG UR QL: NEGATIVE — SIGNIFICANT CHANGE UP
LEUKOCYTE ESTERASE UR-ACNC: ABNORMAL
LEUKOCYTE ESTERASE UR-ACNC: ABNORMAL
LEUKOCYTE ESTERASE UR-ACNC: NEGATIVE — SIGNIFICANT CHANGE UP
LEUKOCYTE ESTERASE UR-ACNC: NEGATIVE — SIGNIFICANT CHANGE UP
LG PLATELETS BLD QL AUTO: SLIGHT — SIGNIFICANT CHANGE UP
LIPID PNL WITH DIRECT LDL SERPL: 31 MG/DL — SIGNIFICANT CHANGE UP
LYMPHOCYTES # BLD AUTO: 0.14 K/UL — LOW (ref 1–3.3)
LYMPHOCYTES # BLD AUTO: 0.29 K/UL — LOW (ref 1–3.3)
LYMPHOCYTES # BLD AUTO: 0.37 K/UL — LOW (ref 1–3.3)
LYMPHOCYTES # BLD AUTO: 0.45 K/UL — LOW (ref 1–3.3)
LYMPHOCYTES # BLD AUTO: 0.53 K/UL — LOW (ref 1–3.3)
LYMPHOCYTES # BLD AUTO: 0.7 K/UL — LOW (ref 1–3.3)
LYMPHOCYTES # BLD AUTO: 0.97 K/UL — LOW (ref 1–3.3)
LYMPHOCYTES # BLD AUTO: 1.07 K/UL — SIGNIFICANT CHANGE UP (ref 1–3.3)
LYMPHOCYTES # BLD AUTO: 1.28 K/UL — SIGNIFICANT CHANGE UP (ref 1–3.3)
LYMPHOCYTES # BLD AUTO: 1.53 K/UL — SIGNIFICANT CHANGE UP (ref 1–3.3)
LYMPHOCYTES # BLD AUTO: 1.64 K/UL — SIGNIFICANT CHANGE UP (ref 1–3.3)
LYMPHOCYTES # BLD AUTO: 10.5 % — LOW (ref 13–44)
LYMPHOCYTES # BLD AUTO: 10.5 % — LOW (ref 13–44)
LYMPHOCYTES # BLD AUTO: 10.7 % — LOW (ref 13–44)
LYMPHOCYTES # BLD AUTO: 13 % — SIGNIFICANT CHANGE UP (ref 13–44)
LYMPHOCYTES # BLD AUTO: 13.1 % — SIGNIFICANT CHANGE UP (ref 13–44)
LYMPHOCYTES # BLD AUTO: 16.4 % — SIGNIFICANT CHANGE UP (ref 13–44)
LYMPHOCYTES # BLD AUTO: 2.19 K/UL — SIGNIFICANT CHANGE UP (ref 1–3.3)
LYMPHOCYTES # BLD AUTO: 2.28 K/UL — SIGNIFICANT CHANGE UP (ref 1–3.3)
LYMPHOCYTES # BLD AUTO: 2.47 K/UL — SIGNIFICANT CHANGE UP (ref 1–3.3)
LYMPHOCYTES # BLD AUTO: 2.77 K/UL — SIGNIFICANT CHANGE UP (ref 1–3.3)
LYMPHOCYTES # BLD AUTO: 2.8 % — LOW (ref 13–44)
LYMPHOCYTES # BLD AUTO: 21.7 % — SIGNIFICANT CHANGE UP (ref 13–44)
LYMPHOCYTES # BLD AUTO: 4.4 % — LOW (ref 13–44)
LYMPHOCYTES # BLD AUTO: 4.4 % — LOW (ref 13–44)
LYMPHOCYTES # BLD AUTO: 43.6 % — SIGNIFICANT CHANGE UP (ref 13–44)
LYMPHOCYTES # BLD AUTO: 45.4 % — HIGH (ref 13–44)
LYMPHOCYTES # BLD AUTO: 6.2 % — LOW (ref 13–44)
LYMPHOCYTES # BLD AUTO: 8 % — LOW (ref 13–44)
LYMPHOCYTES # BLD AUTO: 9.8 % — LOW (ref 13–44)
MACROCYTES BLD QL: SLIGHT — SIGNIFICANT CHANGE UP
MAGNESIUM SERPL-MCNC: 1.5 MG/DL — LOW (ref 1.6–2.6)
MAGNESIUM SERPL-MCNC: 1.6 MG/DL — SIGNIFICANT CHANGE UP (ref 1.6–2.6)
MAGNESIUM SERPL-MCNC: 1.9 MG/DL — SIGNIFICANT CHANGE UP (ref 1.6–2.6)
MAGNESIUM SERPL-MCNC: 2 MG/DL — SIGNIFICANT CHANGE UP (ref 1.6–2.6)
MAGNESIUM SERPL-MCNC: 2 MG/DL — SIGNIFICANT CHANGE UP (ref 1.6–2.6)
MAGNESIUM SERPL-MCNC: 2.1 MG/DL — SIGNIFICANT CHANGE UP (ref 1.6–2.6)
MAGNESIUM SERPL-MCNC: 2.2 MG/DL — SIGNIFICANT CHANGE UP (ref 1.6–2.6)
MAGNESIUM SERPL-MCNC: 2.4 MG/DL — SIGNIFICANT CHANGE UP (ref 1.6–2.6)
MAGNESIUM SERPL-MCNC: 2.4 MG/DL — SIGNIFICANT CHANGE UP (ref 1.6–2.6)
MAGNESIUM SERPL-MCNC: 2.8 MG/DL — HIGH (ref 1.6–2.6)
MANUAL SMEAR VERIFICATION: SIGNIFICANT CHANGE UP
MCHC RBC-ENTMCNC: 27 PG — SIGNIFICANT CHANGE UP (ref 27–34)
MCHC RBC-ENTMCNC: 27.3 PG — SIGNIFICANT CHANGE UP (ref 27–34)
MCHC RBC-ENTMCNC: 27.4 PG — SIGNIFICANT CHANGE UP (ref 27–34)
MCHC RBC-ENTMCNC: 27.4 PG — SIGNIFICANT CHANGE UP (ref 27–34)
MCHC RBC-ENTMCNC: 27.6 PG — SIGNIFICANT CHANGE UP (ref 27–34)
MCHC RBC-ENTMCNC: 27.7 PG — SIGNIFICANT CHANGE UP (ref 27–34)
MCHC RBC-ENTMCNC: 27.8 PG — SIGNIFICANT CHANGE UP (ref 27–34)
MCHC RBC-ENTMCNC: 27.9 PG — SIGNIFICANT CHANGE UP (ref 27–34)
MCHC RBC-ENTMCNC: 28 PG — SIGNIFICANT CHANGE UP (ref 27–34)
MCHC RBC-ENTMCNC: 28.1 PG — SIGNIFICANT CHANGE UP (ref 27–34)
MCHC RBC-ENTMCNC: 28.3 PG — SIGNIFICANT CHANGE UP (ref 27–34)
MCHC RBC-ENTMCNC: 28.3 PG — SIGNIFICANT CHANGE UP (ref 27–34)
MCHC RBC-ENTMCNC: 28.6 GM/DL — LOW (ref 32–36)
MCHC RBC-ENTMCNC: 28.6 PG — SIGNIFICANT CHANGE UP (ref 27–34)
MCHC RBC-ENTMCNC: 29.6 GM/DL — LOW (ref 32–36)
MCHC RBC-ENTMCNC: 29.9 GM/DL — LOW (ref 32–36)
MCHC RBC-ENTMCNC: 30 GM/DL — LOW (ref 32–36)
MCHC RBC-ENTMCNC: 30.2 GM/DL — LOW (ref 32–36)
MCHC RBC-ENTMCNC: 30.2 GM/DL — LOW (ref 32–36)
MCHC RBC-ENTMCNC: 30.3 GM/DL — LOW (ref 32–36)
MCHC RBC-ENTMCNC: 30.7 GM/DL — LOW (ref 32–36)
MCHC RBC-ENTMCNC: 30.9 GM/DL — LOW (ref 32–36)
MCHC RBC-ENTMCNC: 30.9 GM/DL — LOW (ref 32–36)
MCHC RBC-ENTMCNC: 31 GM/DL — LOW (ref 32–36)
MCHC RBC-ENTMCNC: 31.2 GM/DL — LOW (ref 32–36)
MCHC RBC-ENTMCNC: 31.3 GM/DL — LOW (ref 32–36)
MCHC RBC-ENTMCNC: 31.3 GM/DL — LOW (ref 32–36)
MCHC RBC-ENTMCNC: 31.4 GM/DL — LOW (ref 32–36)
MCHC RBC-ENTMCNC: 32 GM/DL — SIGNIFICANT CHANGE UP (ref 32–36)
MCHC RBC-ENTMCNC: 32.1 GM/DL — SIGNIFICANT CHANGE UP (ref 32–36)
MCHC RBC-ENTMCNC: 33.1 GM/DL — SIGNIFICANT CHANGE UP (ref 32–36)
MCV RBC AUTO: 86.4 FL — SIGNIFICANT CHANGE UP (ref 80–100)
MCV RBC AUTO: 86.5 FL — SIGNIFICANT CHANGE UP (ref 80–100)
MCV RBC AUTO: 87.5 FL — SIGNIFICANT CHANGE UP (ref 80–100)
MCV RBC AUTO: 88.5 FL — SIGNIFICANT CHANGE UP (ref 80–100)
MCV RBC AUTO: 88.6 FL — SIGNIFICANT CHANGE UP (ref 80–100)
MCV RBC AUTO: 88.7 FL — SIGNIFICANT CHANGE UP (ref 80–100)
MCV RBC AUTO: 88.7 FL — SIGNIFICANT CHANGE UP (ref 80–100)
MCV RBC AUTO: 88.9 FL — SIGNIFICANT CHANGE UP (ref 80–100)
MCV RBC AUTO: 89.9 FL — SIGNIFICANT CHANGE UP (ref 80–100)
MCV RBC AUTO: 89.9 FL — SIGNIFICANT CHANGE UP (ref 80–100)
MCV RBC AUTO: 90.5 FL — SIGNIFICANT CHANGE UP (ref 80–100)
MCV RBC AUTO: 90.7 FL — SIGNIFICANT CHANGE UP (ref 80–100)
MCV RBC AUTO: 90.9 FL — SIGNIFICANT CHANGE UP (ref 80–100)
MCV RBC AUTO: 91.9 FL — SIGNIFICANT CHANGE UP (ref 80–100)
MCV RBC AUTO: 92 FL — SIGNIFICANT CHANGE UP (ref 80–100)
MCV RBC AUTO: 93.8 FL — SIGNIFICANT CHANGE UP (ref 80–100)
MCV RBC AUTO: 94.7 FL — SIGNIFICANT CHANGE UP (ref 80–100)
MCV RBC AUTO: 97 FL — SIGNIFICANT CHANGE UP (ref 80–100)
MELD SCORE WITH DIALYSIS: 22 POINTS — SIGNIFICANT CHANGE UP
MELD SCORE WITHOUT DIALYSIS: 8 POINTS — SIGNIFICANT CHANGE UP
METAMYELOCYTES # FLD: 0.9 % — HIGH (ref 0–0)
METAMYELOCYTES # FLD: 0.9 % — HIGH (ref 0–0)
METAMYELOCYTES # FLD: 1.8 % — HIGH (ref 0–0)
METAMYELOCYTES # FLD: 2.7 % — HIGH (ref 0–0)
METAMYELOCYTES # FLD: 3 % — HIGH (ref 0–0)
METAMYELOCYTES # FLD: 5.3 % — HIGH (ref 0–0)
METAMYELOCYTES # FLD: 5.7 % — HIGH (ref 0–0)
METAMYELOCYTES # FLD: 7 % — HIGH (ref 0–0)
METAMYELOCYTES # FLD: 9.8 % — HIGH (ref 0–0)
METHADONE UR-MCNC: NEGATIVE — SIGNIFICANT CHANGE UP
METHOD TYPE: SIGNIFICANT CHANGE UP
MICROCYTES BLD QL: SLIGHT — SIGNIFICANT CHANGE UP
MONOCYTES # BLD AUTO: 0.04 K/UL — SIGNIFICANT CHANGE UP (ref 0–0.9)
MONOCYTES # BLD AUTO: 0.14 K/UL — SIGNIFICANT CHANGE UP (ref 0–0.9)
MONOCYTES # BLD AUTO: 0.17 K/UL — SIGNIFICANT CHANGE UP (ref 0–0.9)
MONOCYTES # BLD AUTO: 0.21 K/UL — SIGNIFICANT CHANGE UP (ref 0–0.9)
MONOCYTES # BLD AUTO: 0.29 K/UL — SIGNIFICANT CHANGE UP (ref 0–0.9)
MONOCYTES # BLD AUTO: 0.36 K/UL — SIGNIFICANT CHANGE UP (ref 0–0.9)
MONOCYTES # BLD AUTO: 0.45 K/UL — SIGNIFICANT CHANGE UP (ref 0–0.9)
MONOCYTES # BLD AUTO: 0.45 K/UL — SIGNIFICANT CHANGE UP (ref 0–0.9)
MONOCYTES # BLD AUTO: 0.46 K/UL — SIGNIFICANT CHANGE UP (ref 0–0.9)
MONOCYTES # BLD AUTO: 0.49 K/UL — SIGNIFICANT CHANGE UP (ref 0–0.9)
MONOCYTES # BLD AUTO: 0.49 K/UL — SIGNIFICANT CHANGE UP (ref 0–0.9)
MONOCYTES # BLD AUTO: 0.71 K/UL — SIGNIFICANT CHANGE UP (ref 0–0.9)
MONOCYTES # BLD AUTO: 0.85 K/UL — SIGNIFICANT CHANGE UP (ref 0–0.9)
MONOCYTES # BLD AUTO: 1.08 K/UL — HIGH (ref 0–0.9)
MONOCYTES # BLD AUTO: 1.66 K/UL — HIGH (ref 0–0.9)
MONOCYTES NFR BLD AUTO: 0.9 % — LOW (ref 2–14)
MONOCYTES NFR BLD AUTO: 0.9 % — LOW (ref 2–14)
MONOCYTES NFR BLD AUTO: 11.4 % — SIGNIFICANT CHANGE UP (ref 2–14)
MONOCYTES NFR BLD AUTO: 12.4 % — SIGNIFICANT CHANGE UP (ref 2–14)
MONOCYTES NFR BLD AUTO: 14 % — SIGNIFICANT CHANGE UP (ref 2–14)
MONOCYTES NFR BLD AUTO: 2.7 % — SIGNIFICANT CHANGE UP (ref 2–14)
MONOCYTES NFR BLD AUTO: 2.7 % — SIGNIFICANT CHANGE UP (ref 2–14)
MONOCYTES NFR BLD AUTO: 3.6 % — SIGNIFICANT CHANGE UP (ref 2–14)
MONOCYTES NFR BLD AUTO: 3.7 % — SIGNIFICANT CHANGE UP (ref 2–14)
MONOCYTES NFR BLD AUTO: 4.5 % — SIGNIFICANT CHANGE UP (ref 2–14)
MONOCYTES NFR BLD AUTO: 4.7 % — SIGNIFICANT CHANGE UP (ref 2–14)
MONOCYTES NFR BLD AUTO: 5 % — SIGNIFICANT CHANGE UP (ref 2–14)
MONOCYTES NFR BLD AUTO: 6.4 % — SIGNIFICANT CHANGE UP (ref 2–14)
MONOCYTES NFR BLD AUTO: 7.4 % — SIGNIFICANT CHANGE UP (ref 2–14)
MONOCYTES NFR BLD AUTO: 9.6 % — SIGNIFICANT CHANGE UP (ref 2–14)
MRSA PCR RESULT.: POSITIVE
MRSA SPEC QL CULT: SIGNIFICANT CHANGE UP
MYELOCYTES NFR BLD: 0.9 % — HIGH (ref 0–0)
MYELOCYTES NFR BLD: 2.6 % — HIGH (ref 0–0)
MYELOCYTES NFR BLD: 4.5 % — HIGH (ref 0–0)
MYELOCYTES NFR BLD: 7 % — HIGH (ref 0–0)
N GONORRHOEA RRNA SPEC QL NAA+PROBE: SIGNIFICANT CHANGE UP
NEUTROPHILS # BLD AUTO: 12.31 K/UL — HIGH (ref 1.8–7.4)
NEUTROPHILS # BLD AUTO: 12.34 K/UL — HIGH (ref 1.8–7.4)
NEUTROPHILS # BLD AUTO: 2.37 K/UL — SIGNIFICANT CHANGE UP (ref 1.8–7.4)
NEUTROPHILS # BLD AUTO: 2.66 K/UL — SIGNIFICANT CHANGE UP (ref 1.8–7.4)
NEUTROPHILS # BLD AUTO: 2.77 K/UL — SIGNIFICANT CHANGE UP (ref 1.8–7.4)
NEUTROPHILS # BLD AUTO: 3.82 K/UL — SIGNIFICANT CHANGE UP (ref 1.8–7.4)
NEUTROPHILS # BLD AUTO: 4.2 K/UL — SIGNIFICANT CHANGE UP (ref 1.8–7.4)
NEUTROPHILS # BLD AUTO: 5.58 K/UL — SIGNIFICANT CHANGE UP (ref 1.8–7.4)
NEUTROPHILS # BLD AUTO: 6.49 K/UL — SIGNIFICANT CHANGE UP (ref 1.8–7.4)
NEUTROPHILS # BLD AUTO: 6.83 K/UL — SIGNIFICANT CHANGE UP (ref 1.8–7.4)
NEUTROPHILS # BLD AUTO: 7.1 K/UL — SIGNIFICANT CHANGE UP (ref 1.8–7.4)
NEUTROPHILS # BLD AUTO: 9.08 K/UL — HIGH (ref 1.8–7.4)
NEUTROPHILS # BLD AUTO: 9.22 K/UL — HIGH (ref 1.8–7.4)
NEUTROPHILS # BLD AUTO: 9.55 K/UL — HIGH (ref 1.8–7.4)
NEUTROPHILS # BLD AUTO: 9.94 K/UL — HIGH (ref 1.8–7.4)
NEUTROPHILS NFR BLD AUTO: 45.4 % — SIGNIFICANT CHANGE UP (ref 43–77)
NEUTROPHILS NFR BLD AUTO: 46.8 % — SIGNIFICANT CHANGE UP (ref 43–77)
NEUTROPHILS NFR BLD AUTO: 57 % — SIGNIFICANT CHANGE UP (ref 43–77)
NEUTROPHILS NFR BLD AUTO: 61.6 % — SIGNIFICANT CHANGE UP (ref 43–77)
NEUTROPHILS NFR BLD AUTO: 64.2 % — SIGNIFICANT CHANGE UP (ref 43–77)
NEUTROPHILS NFR BLD AUTO: 66.7 % — SIGNIFICANT CHANGE UP (ref 43–77)
NEUTROPHILS NFR BLD AUTO: 67 % — SIGNIFICANT CHANGE UP (ref 43–77)
NEUTROPHILS NFR BLD AUTO: 69.1 % — SIGNIFICANT CHANGE UP (ref 43–77)
NEUTROPHILS NFR BLD AUTO: 71.7 % — SIGNIFICANT CHANGE UP (ref 43–77)
NEUTROPHILS NFR BLD AUTO: 74.6 % — SIGNIFICANT CHANGE UP (ref 43–77)
NEUTROPHILS NFR BLD AUTO: 77.2 % — HIGH (ref 43–77)
NEUTROPHILS NFR BLD AUTO: 84.8 % — HIGH (ref 43–77)
NEUTROPHILS NFR BLD AUTO: 85.7 % — HIGH (ref 43–77)
NEUTROPHILS NFR BLD AUTO: 87.5 % — HIGH (ref 43–77)
NEUTROPHILS NFR BLD AUTO: 91.8 % — HIGH (ref 43–77)
NEUTS BAND # BLD: 0.9 % — SIGNIFICANT CHANGE UP (ref 0–8)
NEUTS BAND # BLD: 0.9 % — SIGNIFICANT CHANGE UP (ref 0–8)
NEUTS BAND # BLD: 1.7 % — SIGNIFICANT CHANGE UP (ref 0–8)
NEUTS BAND # BLD: 1.8 % — SIGNIFICANT CHANGE UP (ref 0–8)
NEUTS BAND # BLD: 16.1 % — HIGH (ref 0–8)
NEUTS BAND # BLD: 17.6 % — HIGH (ref 0–8)
NEUTS BAND # BLD: 2.6 % — SIGNIFICANT CHANGE UP (ref 0–8)
NEUTS BAND # BLD: 3.6 % — SIGNIFICANT CHANGE UP (ref 0–8)
NEUTS BAND # BLD: 4.5 % — SIGNIFICANT CHANGE UP (ref 0–8)
NEUTS BAND # BLD: 5 % — SIGNIFICANT CHANGE UP (ref 0–8)
NIGHT BLUE STAIN TISS: SIGNIFICANT CHANGE UP
NITRITE UR-MCNC: NEGATIVE — SIGNIFICANT CHANGE UP
NON HDL CHOLESTEROL: 62 MG/DL — SIGNIFICANT CHANGE UP
NRBC # BLD: 0 /100 WBCS — SIGNIFICANT CHANGE UP (ref 0–0)
NRBC # BLD: 0 /100 — SIGNIFICANT CHANGE UP (ref 0–0)
NRBC # BLD: 1 /100 — HIGH (ref 0–0)
NRBC # BLD: 1 /100 — HIGH (ref 0–0)
NRBC # BLD: 2 /100 — HIGH (ref 0–0)
NRBC # BLD: 2 /100 — HIGH (ref 0–0)
NRBC # BLD: SIGNIFICANT CHANGE UP /100 WBCS (ref 0–0)
NT-PROBNP SERPL-SCNC: 3930 PG/ML — HIGH (ref 0–300)
OPIATES UR-MCNC: POSITIVE
ORGANISM # SPEC MICROSCOPIC CNT: SIGNIFICANT CHANGE UP
OVALOCYTES BLD QL SMEAR: SLIGHT — SIGNIFICANT CHANGE UP
P JIROVECII DNA L RESP QL NAA+NON-PROBE: NEGATIVE — SIGNIFICANT CHANGE UP
PCO2 BLDA: 48 MMHG — HIGH (ref 32–35)
PCO2 BLDA: 51 MMHG — HIGH (ref 32–35)
PCO2 BLDV: 43 MMHG — HIGH (ref 39–42)
PCP SPEC-MCNC: SIGNIFICANT CHANGE UP
PCP UR-MCNC: NEGATIVE — SIGNIFICANT CHANGE UP
PH BLDA: 7.3 — LOW (ref 7.35–7.45)
PH BLDA: 7.33 — LOW (ref 7.35–7.45)
PH BLDV: 7.34 — SIGNIFICANT CHANGE UP (ref 7.32–7.43)
PH UR: 5.5 — SIGNIFICANT CHANGE UP (ref 5–8)
PH UR: 6.5 — SIGNIFICANT CHANGE UP (ref 5–8)
PH UR: 7 — SIGNIFICANT CHANGE UP (ref 5–8)
PH UR: 7 — SIGNIFICANT CHANGE UP (ref 5–8)
PHOSPHATE SERPL-MCNC: 2.4 MG/DL — LOW (ref 2.5–4.5)
PHOSPHATE SERPL-MCNC: 2.4 MG/DL — LOW (ref 2.5–4.5)
PHOSPHATE SERPL-MCNC: 2.6 MG/DL — SIGNIFICANT CHANGE UP (ref 2.5–4.5)
PHOSPHATE SERPL-MCNC: 3.1 MG/DL — SIGNIFICANT CHANGE UP (ref 2.5–4.5)
PHOSPHATE SERPL-MCNC: 3.1 MG/DL — SIGNIFICANT CHANGE UP (ref 2.5–4.5)
PHOSPHATE SERPL-MCNC: 3.7 MG/DL — SIGNIFICANT CHANGE UP (ref 2.5–4.5)
PHOSPHATE SERPL-MCNC: 3.7 MG/DL — SIGNIFICANT CHANGE UP (ref 2.5–4.5)
PHOSPHATE SERPL-MCNC: 4 MG/DL — SIGNIFICANT CHANGE UP (ref 2.5–4.5)
PHOSPHATE SERPL-MCNC: 9 MG/DL — HIGH (ref 2.5–4.5)
PLAT MORPH BLD: ABNORMAL
PLATELET # BLD AUTO: 106 K/UL — LOW (ref 150–400)
PLATELET # BLD AUTO: 112 K/UL — LOW (ref 150–400)
PLATELET # BLD AUTO: 115 K/UL — LOW (ref 150–400)
PLATELET # BLD AUTO: 115 K/UL — LOW (ref 150–400)
PLATELET # BLD AUTO: 122 K/UL — LOW (ref 150–400)
PLATELET # BLD AUTO: 153 K/UL — SIGNIFICANT CHANGE UP (ref 150–400)
PLATELET # BLD AUTO: 170 K/UL — SIGNIFICANT CHANGE UP (ref 150–400)
PLATELET # BLD AUTO: 195 K/UL — SIGNIFICANT CHANGE UP (ref 150–400)
PLATELET # BLD AUTO: 32 K/UL — LOW (ref 150–400)
PLATELET # BLD AUTO: 35 K/UL — LOW (ref 150–400)
PLATELET # BLD AUTO: 42 K/UL — LOW (ref 150–400)
PLATELET # BLD AUTO: 51 K/UL — LOW (ref 150–400)
PLATELET # BLD AUTO: 51 K/UL — LOW (ref 150–400)
PLATELET # BLD AUTO: 63 K/UL — LOW (ref 150–400)
PLATELET # BLD AUTO: 70 K/UL — LOW (ref 150–400)
PLATELET # BLD AUTO: 76 K/UL — LOW (ref 150–400)
PLATELET # BLD AUTO: 81 K/UL — LOW (ref 150–400)
PLATELET # BLD AUTO: 86 K/UL — LOW (ref 150–400)
PO2 BLDA: 106 MMHG — SIGNIFICANT CHANGE UP (ref 83–108)
PO2 BLDA: 72 MMHG — LOW (ref 83–108)
PO2 BLDV: 167 MMHG — HIGH (ref 25–45)
POIKILOCYTOSIS BLD QL AUTO: SIGNIFICANT CHANGE UP
POIKILOCYTOSIS BLD QL AUTO: SIGNIFICANT CHANGE UP
POIKILOCYTOSIS BLD QL AUTO: SLIGHT — SIGNIFICANT CHANGE UP
POLYCHROMASIA BLD QL SMEAR: SLIGHT — SIGNIFICANT CHANGE UP
POTASSIUM BLDV-SCNC: 4.8 MMOL/L — SIGNIFICANT CHANGE UP (ref 3.5–5.1)
POTASSIUM SERPL-MCNC: 3.8 MMOL/L — SIGNIFICANT CHANGE UP (ref 3.5–5.3)
POTASSIUM SERPL-MCNC: 3.9 MMOL/L — SIGNIFICANT CHANGE UP (ref 3.5–5.3)
POTASSIUM SERPL-MCNC: 4 MMOL/L — SIGNIFICANT CHANGE UP (ref 3.5–5.3)
POTASSIUM SERPL-MCNC: 4 MMOL/L — SIGNIFICANT CHANGE UP (ref 3.5–5.3)
POTASSIUM SERPL-MCNC: 4.1 MMOL/L — SIGNIFICANT CHANGE UP (ref 3.5–5.3)
POTASSIUM SERPL-MCNC: 4.2 MMOL/L — SIGNIFICANT CHANGE UP (ref 3.5–5.3)
POTASSIUM SERPL-MCNC: 4.3 MMOL/L — SIGNIFICANT CHANGE UP (ref 3.5–5.3)
POTASSIUM SERPL-MCNC: 4.3 MMOL/L — SIGNIFICANT CHANGE UP (ref 3.5–5.3)
POTASSIUM SERPL-MCNC: 4.4 MMOL/L — SIGNIFICANT CHANGE UP (ref 3.5–5.3)
POTASSIUM SERPL-MCNC: 4.4 MMOL/L — SIGNIFICANT CHANGE UP (ref 3.5–5.3)
POTASSIUM SERPL-MCNC: 4.6 MMOL/L — SIGNIFICANT CHANGE UP (ref 3.5–5.3)
POTASSIUM SERPL-MCNC: 4.8 MMOL/L — SIGNIFICANT CHANGE UP (ref 3.5–5.3)
POTASSIUM SERPL-MCNC: 4.8 MMOL/L — SIGNIFICANT CHANGE UP (ref 3.5–5.3)
POTASSIUM SERPL-MCNC: 4.9 MMOL/L — SIGNIFICANT CHANGE UP (ref 3.5–5.3)
POTASSIUM SERPL-MCNC: 4.9 MMOL/L — SIGNIFICANT CHANGE UP (ref 3.5–5.3)
POTASSIUM SERPL-MCNC: 5.1 MMOL/L — SIGNIFICANT CHANGE UP (ref 3.5–5.3)
POTASSIUM SERPL-MCNC: 5.8 MMOL/L — HIGH (ref 3.5–5.3)
POTASSIUM SERPL-SCNC: 3.8 MMOL/L — SIGNIFICANT CHANGE UP (ref 3.5–5.3)
POTASSIUM SERPL-SCNC: 3.9 MMOL/L — SIGNIFICANT CHANGE UP (ref 3.5–5.3)
POTASSIUM SERPL-SCNC: 4 MMOL/L — SIGNIFICANT CHANGE UP (ref 3.5–5.3)
POTASSIUM SERPL-SCNC: 4 MMOL/L — SIGNIFICANT CHANGE UP (ref 3.5–5.3)
POTASSIUM SERPL-SCNC: 4.1 MMOL/L — SIGNIFICANT CHANGE UP (ref 3.5–5.3)
POTASSIUM SERPL-SCNC: 4.2 MMOL/L — SIGNIFICANT CHANGE UP (ref 3.5–5.3)
POTASSIUM SERPL-SCNC: 4.3 MMOL/L — SIGNIFICANT CHANGE UP (ref 3.5–5.3)
POTASSIUM SERPL-SCNC: 4.3 MMOL/L — SIGNIFICANT CHANGE UP (ref 3.5–5.3)
POTASSIUM SERPL-SCNC: 4.4 MMOL/L — SIGNIFICANT CHANGE UP (ref 3.5–5.3)
POTASSIUM SERPL-SCNC: 4.4 MMOL/L — SIGNIFICANT CHANGE UP (ref 3.5–5.3)
POTASSIUM SERPL-SCNC: 4.6 MMOL/L — SIGNIFICANT CHANGE UP (ref 3.5–5.3)
POTASSIUM SERPL-SCNC: 4.8 MMOL/L — SIGNIFICANT CHANGE UP (ref 3.5–5.3)
POTASSIUM SERPL-SCNC: 4.8 MMOL/L — SIGNIFICANT CHANGE UP (ref 3.5–5.3)
POTASSIUM SERPL-SCNC: 4.9 MMOL/L — SIGNIFICANT CHANGE UP (ref 3.5–5.3)
POTASSIUM SERPL-SCNC: 4.9 MMOL/L — SIGNIFICANT CHANGE UP (ref 3.5–5.3)
POTASSIUM SERPL-SCNC: 5.1 MMOL/L — SIGNIFICANT CHANGE UP (ref 3.5–5.3)
POTASSIUM SERPL-SCNC: 5.8 MMOL/L — HIGH (ref 3.5–5.3)
PROCALCITONIN SERPL-MCNC: 0.02 NG/ML — SIGNIFICANT CHANGE UP (ref 0.02–0.1)
PROMYELOCYTES # FLD: 0.9 % — HIGH (ref 0–0)
PROMYELOCYTES # FLD: 0.9 % — HIGH (ref 0–0)
PROT SERPL-MCNC: 6.7 G/DL — SIGNIFICANT CHANGE UP (ref 6–8.3)
PROT SERPL-MCNC: 7.1 G/DL — SIGNIFICANT CHANGE UP (ref 6–8.3)
PROT SERPL-MCNC: 7.2 G/DL — SIGNIFICANT CHANGE UP (ref 6–8.3)
PROT SERPL-MCNC: 7.4 G/DL — SIGNIFICANT CHANGE UP (ref 6–8.3)
PROT SERPL-MCNC: 7.5 G/DL — SIGNIFICANT CHANGE UP (ref 6–8.3)
PROT SERPL-MCNC: 7.6 G/DL — SIGNIFICANT CHANGE UP (ref 6–8.3)
PROT SERPL-MCNC: 7.7 G/DL — SIGNIFICANT CHANGE UP (ref 6–8.3)
PROT SERPL-MCNC: 7.8 G/DL — SIGNIFICANT CHANGE UP (ref 6–8.3)
PROT SERPL-MCNC: 8 G/DL — SIGNIFICANT CHANGE UP (ref 6–8.3)
PROT SERPL-MCNC: 8.5 G/DL — HIGH (ref 6–8.3)
PROT UR-MCNC: 100 MG/DL
PROT UR-MCNC: 100 MG/DL
PROT UR-MCNC: ABNORMAL MG/DL
PROT UR-MCNC: ABNORMAL MG/DL
PROTHROM AB SERPL-ACNC: 10 SEC — LOW (ref 10.6–13.6)
PROTHROM AB SERPL-ACNC: 12 SEC — SIGNIFICANT CHANGE UP (ref 10.6–13.6)
PROTHROM AB SERPL-ACNC: 13.7 SEC — HIGH (ref 10.6–13.6)
PROTHROM AB SERPL-ACNC: 14.7 SEC — HIGH (ref 10.6–13.6)
PROTHROM AB SERPL-ACNC: 15.1 SEC — HIGH (ref 10.6–13.6)
RAPID RVP RESULT: SIGNIFICANT CHANGE UP
RBC # BLD: 2.43 M/UL — LOW (ref 3.8–5.2)
RBC # BLD: 2.56 M/UL — LOW (ref 3.8–5.2)
RBC # BLD: 2.68 M/UL — LOW (ref 3.8–5.2)
RBC # BLD: 2.68 M/UL — LOW (ref 3.8–5.2)
RBC # BLD: 2.98 M/UL — LOW (ref 3.8–5.2)
RBC # BLD: 3.04 M/UL — LOW (ref 3.8–5.2)
RBC # BLD: 3.06 M/UL — LOW (ref 3.8–5.2)
RBC # BLD: 3.07 M/UL — LOW (ref 3.8–5.2)
RBC # BLD: 3.09 M/UL — LOW (ref 3.8–5.2)
RBC # BLD: 3.2 M/UL — LOW (ref 3.8–5.2)
RBC # BLD: 3.21 M/UL — LOW (ref 3.8–5.2)
RBC # BLD: 3.21 M/UL — LOW (ref 3.8–5.2)
RBC # BLD: 3.24 M/UL — LOW (ref 3.8–5.2)
RBC # BLD: 3.27 M/UL — LOW (ref 3.8–5.2)
RBC # BLD: 3.37 M/UL — LOW (ref 3.8–5.2)
RBC # BLD: 3.43 M/UL — LOW (ref 3.8–5.2)
RBC # BLD: 3.45 M/UL — LOW (ref 3.8–5.2)
RBC # BLD: 3.68 M/UL — LOW (ref 3.8–5.2)
RBC # BLD: 4.17 M/UL — SIGNIFICANT CHANGE UP (ref 3.8–5.2)
RBC # FLD: 16.9 % — HIGH (ref 10.3–14.5)
RBC # FLD: 17 % — HIGH (ref 10.3–14.5)
RBC # FLD: 17.2 % — HIGH (ref 10.3–14.5)
RBC # FLD: 17.2 % — HIGH (ref 10.3–14.5)
RBC # FLD: 17.3 % — HIGH (ref 10.3–14.5)
RBC # FLD: 17.6 % — HIGH (ref 10.3–14.5)
RBC # FLD: 17.7 % — HIGH (ref 10.3–14.5)
RBC # FLD: 17.7 % — HIGH (ref 10.3–14.5)
RBC # FLD: 17.8 % — HIGH (ref 10.3–14.5)
RBC # FLD: 17.8 % — HIGH (ref 10.3–14.5)
RBC # FLD: 17.9 % — HIGH (ref 10.3–14.5)
RBC # FLD: 18 % — HIGH (ref 10.3–14.5)
RBC # FLD: 18.2 % — HIGH (ref 10.3–14.5)
RBC # FLD: 18.6 % — HIGH (ref 10.3–14.5)
RBC BLD AUTO: ABNORMAL
RBC CASTS # UR COMP ASSIST: < 5 /HPF — SIGNIFICANT CHANGE UP
RBC CASTS # UR COMP ASSIST: < 5 /HPF — SIGNIFICANT CHANGE UP
RCV VOL RI: 389 /UL — HIGH (ref 0–0)
RETICS #: 28.4 K/UL — SIGNIFICANT CHANGE UP (ref 25–125)
RETICS/RBC NFR: 1.1 % — SIGNIFICANT CHANGE UP (ref 0.5–2.5)
RH IG SCN BLD-IMP: POSITIVE — SIGNIFICANT CHANGE UP
RH IG SCN BLD-IMP: POSITIVE — SIGNIFICANT CHANGE UP
S AUREUS DNA NOSE QL NAA+PROBE: POSITIVE
SAO2 % BLDA: 95.4 % — SIGNIFICANT CHANGE UP (ref 94–98)
SAO2 % BLDA: 99.6 % — HIGH (ref 94–98)
SAO2 % BLDV: 98.9 % — HIGH (ref 67–88)
SARS-COV-2 IGG+IGM SERPL QL IA: >250 U/ML — HIGH
SARS-COV-2 IGG+IGM SERPL QL IA: >250 U/ML — HIGH
SARS-COV-2 IGG+IGM SERPL QL IA: POSITIVE
SARS-COV-2 IGG+IGM SERPL QL IA: POSITIVE
SARS-COV-2 RNA SPEC QL NAA+PROBE: NEGATIVE — SIGNIFICANT CHANGE UP
SARS-COV-2 RNA SPEC QL NAA+PROBE: SIGNIFICANT CHANGE UP
SCHISTOCYTES BLD QL AUTO: SLIGHT — SIGNIFICANT CHANGE UP
SMUDGE CELLS # BLD: PRESENT — SIGNIFICANT CHANGE UP
SODIUM SERPL-SCNC: 131 MMOL/L — LOW (ref 135–145)
SODIUM SERPL-SCNC: 131 MMOL/L — LOW (ref 135–145)
SODIUM SERPL-SCNC: 132 MMOL/L — LOW (ref 135–145)
SODIUM SERPL-SCNC: 133 MMOL/L — LOW (ref 135–145)
SODIUM SERPL-SCNC: 134 MMOL/L — LOW (ref 135–145)
SODIUM SERPL-SCNC: 134 MMOL/L — LOW (ref 135–145)
SODIUM SERPL-SCNC: 135 MMOL/L — SIGNIFICANT CHANGE UP (ref 135–145)
SODIUM SERPL-SCNC: 136 MMOL/L — SIGNIFICANT CHANGE UP (ref 135–145)
SODIUM SERPL-SCNC: 138 MMOL/L — SIGNIFICANT CHANGE UP (ref 135–145)
SODIUM SERPL-SCNC: 139 MMOL/L — SIGNIFICANT CHANGE UP (ref 135–145)
SODIUM SERPL-SCNC: 140 MMOL/L — SIGNIFICANT CHANGE UP (ref 135–145)
SODIUM SERPL-SCNC: 142 MMOL/L — SIGNIFICANT CHANGE UP (ref 135–145)
SODIUM SERPL-SCNC: 142 MMOL/L — SIGNIFICANT CHANGE UP (ref 135–145)
SODIUM SERPL-SCNC: 144 MMOL/L — SIGNIFICANT CHANGE UP (ref 135–145)
SODIUM SERPL-SCNC: 145 MMOL/L — SIGNIFICANT CHANGE UP (ref 135–145)
SODIUM SERPL-SCNC: 146 MMOL/L — HIGH (ref 135–145)
SP GR SPEC: 1.01 — SIGNIFICANT CHANGE UP (ref 1–1.03)
SP GR SPEC: 1.02 — SIGNIFICANT CHANGE UP (ref 1–1.03)
SPECIMEN SOURCE FLD: SIGNIFICANT CHANGE UP
SPECIMEN SOURCE: SIGNIFICANT CHANGE UP
SPHEROCYTES BLD QL SMEAR: SLIGHT — SIGNIFICANT CHANGE UP
T-CELL CD4 SUBSET PNL BLD: 37 /UL — LOW (ref 489–1457)
T-CELL CD4 SUBSET PNL BLD: 59 /UL — LOW (ref 489–1457)
THC UR QL: NEGATIVE — SIGNIFICANT CHANGE UP
TIBC SERPL-MCNC: 166 UG/DL — LOW (ref 220–430)
TOTAL NUCLEATED CELL COUNT, BODY FLUID: 123 /UL — SIGNIFICANT CHANGE UP
TRANSFERRIN SERPL-MCNC: 140 MG/DL — LOW (ref 200–360)
TRIGL SERPL-MCNC: 154 MG/DL — HIGH
TROPONIN T SERPL-MCNC: 0.01 NG/ML — SIGNIFICANT CHANGE UP (ref 0–0.01)
TUBE TYPE: SIGNIFICANT CHANGE UP
UIBC SERPL-MCNC: 137 UG/DL — SIGNIFICANT CHANGE UP (ref 110–370)
UROBILINOGEN FLD QL: 0.2 E.U./DL — SIGNIFICANT CHANGE UP
UROBILINOGEN FLD QL: 2 E.U./DL
UROBILINOGEN FLD QL: 4 E.U./DL
UROBILINOGEN FLD QL: 4 E.U./DL
VANCOMYCIN FLD-MCNC: 13.7 UG/ML — SIGNIFICANT CHANGE UP
VANCOMYCIN FLD-MCNC: 25.6 UG/ML
VANCOMYCIN TROUGH SERPL-MCNC: 13.7 UG/ML — SIGNIFICANT CHANGE UP (ref 10–20)
VANCOMYCIN TROUGH SERPL-MCNC: 22.4 UG/ML — HIGH (ref 10–20)
VANCOMYCIN TROUGH SERPL-MCNC: 5.8 UG/ML — LOW (ref 10–20)
VANCOMYCIN TROUGH SERPL-MCNC: 7.1 UG/ML — LOW (ref 10–20)
VANCOMYCIN TROUGH SERPL-MCNC: 7.7 UG/ML — LOW (ref 10–20)
VARIANT LYMPHS # BLD: 0.9 % — SIGNIFICANT CHANGE UP (ref 0–6)
VARIANT LYMPHS # BLD: 0.9 % — SIGNIFICANT CHANGE UP (ref 0–6)
VARIANT LYMPHS # BLD: 1.8 % — SIGNIFICANT CHANGE UP (ref 0–6)
VARIANT LYMPHS # BLD: 2.6 % — SIGNIFICANT CHANGE UP (ref 0–6)
VARIANT LYMPHS # BLD: 2.8 % — SIGNIFICANT CHANGE UP (ref 0–6)
VIT B12 SERPL-MCNC: 1700 PG/ML — HIGH (ref 232–1245)
VIT B12 SERPL-MCNC: >2000 PG/ML — HIGH (ref 232–1245)
WBC # BLD: 10.17 K/UL — SIGNIFICANT CHANGE UP (ref 3.8–10.5)
WBC # BLD: 10.49 K/UL — SIGNIFICANT CHANGE UP (ref 3.8–10.5)
WBC # BLD: 10.91 K/UL — HIGH (ref 3.8–10.5)
WBC # BLD: 13.18 K/UL — HIGH (ref 3.8–10.5)
WBC # BLD: 13.34 K/UL — HIGH (ref 3.8–10.5)
WBC # BLD: 14.53 K/UL — HIGH (ref 3.8–10.5)
WBC # BLD: 15.6 K/UL — HIGH (ref 3.8–10.5)
WBC # BLD: 3.18 K/UL — LOW (ref 3.8–10.5)
WBC # BLD: 3.65 K/UL — LOW (ref 3.8–10.5)
WBC # BLD: 4.7 K/UL — SIGNIFICANT CHANGE UP (ref 3.8–10.5)
WBC # BLD: 4.92 K/UL — SIGNIFICANT CHANGE UP (ref 3.8–10.5)
WBC # BLD: 5.19 K/UL — SIGNIFICANT CHANGE UP (ref 3.8–10.5)
WBC # BLD: 5.67 K/UL — SIGNIFICANT CHANGE UP (ref 3.8–10.5)
WBC # BLD: 6.1 K/UL — SIGNIFICANT CHANGE UP (ref 3.8–10.5)
WBC # BLD: 7.39 K/UL — SIGNIFICANT CHANGE UP (ref 3.8–10.5)
WBC # BLD: 8.74 K/UL — SIGNIFICANT CHANGE UP (ref 3.8–10.5)
WBC # BLD: 9 K/UL — SIGNIFICANT CHANGE UP (ref 3.8–10.5)
WBC # BLD: 9.86 K/UL — SIGNIFICANT CHANGE UP (ref 3.8–10.5)
WBC # FLD AUTO: 10.17 K/UL — SIGNIFICANT CHANGE UP (ref 3.8–10.5)
WBC # FLD AUTO: 10.49 K/UL — SIGNIFICANT CHANGE UP (ref 3.8–10.5)
WBC # FLD AUTO: 10.91 K/UL — HIGH (ref 3.8–10.5)
WBC # FLD AUTO: 13.18 K/UL — HIGH (ref 3.8–10.5)
WBC # FLD AUTO: 13.34 K/UL — HIGH (ref 3.8–10.5)
WBC # FLD AUTO: 14.53 K/UL — HIGH (ref 3.8–10.5)
WBC # FLD AUTO: 15.6 K/UL — HIGH (ref 3.8–10.5)
WBC # FLD AUTO: 3.18 K/UL — LOW (ref 3.8–10.5)
WBC # FLD AUTO: 3.65 K/UL — LOW (ref 3.8–10.5)
WBC # FLD AUTO: 4.7 K/UL — SIGNIFICANT CHANGE UP (ref 3.8–10.5)
WBC # FLD AUTO: 4.92 K/UL — SIGNIFICANT CHANGE UP (ref 3.8–10.5)
WBC # FLD AUTO: 5.19 K/UL — SIGNIFICANT CHANGE UP (ref 3.8–10.5)
WBC # FLD AUTO: 5.67 K/UL — SIGNIFICANT CHANGE UP (ref 3.8–10.5)
WBC # FLD AUTO: 6.1 K/UL — SIGNIFICANT CHANGE UP (ref 3.8–10.5)
WBC # FLD AUTO: 7.39 K/UL — SIGNIFICANT CHANGE UP (ref 3.8–10.5)
WBC # FLD AUTO: 8.74 K/UL — SIGNIFICANT CHANGE UP (ref 3.8–10.5)
WBC # FLD AUTO: 9 K/UL — SIGNIFICANT CHANGE UP (ref 3.8–10.5)
WBC # FLD AUTO: 9.86 K/UL — SIGNIFICANT CHANGE UP (ref 3.8–10.5)
WBC UR QL: < 5 /HPF — SIGNIFICANT CHANGE UP
WBC UR QL: > 10 /HPF

## 2021-01-01 PROCEDURE — 76856 US EXAM PELVIC COMPLETE: CPT

## 2021-01-01 PROCEDURE — 87305 ASPERGILLUS AG IA: CPT

## 2021-01-01 PROCEDURE — 99239 HOSP IP/OBS DSCHRG MGMT >30: CPT

## 2021-01-01 PROCEDURE — 71045 X-RAY EXAM CHEST 1 VIEW: CPT

## 2021-01-01 PROCEDURE — 83605 ASSAY OF LACTIC ACID: CPT

## 2021-01-01 PROCEDURE — 92610 EVALUATE SWALLOWING FUNCTION: CPT

## 2021-01-01 PROCEDURE — 84145 PROCALCITONIN (PCT): CPT

## 2021-01-01 PROCEDURE — 93312 ECHO TRANSESOPHAGEAL: CPT | Mod: 26

## 2021-01-01 PROCEDURE — 71045 X-RAY EXAM CHEST 1 VIEW: CPT | Mod: 26

## 2021-01-01 PROCEDURE — 87040 BLOOD CULTURE FOR BACTERIA: CPT

## 2021-01-01 PROCEDURE — 99223 1ST HOSP IP/OBS HIGH 75: CPT

## 2021-01-01 PROCEDURE — 93005 ELECTROCARDIOGRAM TRACING: CPT

## 2021-01-01 PROCEDURE — 96374 THER/PROPH/DIAG INJ IV PUSH: CPT

## 2021-01-01 PROCEDURE — 83735 ASSAY OF MAGNESIUM: CPT

## 2021-01-01 PROCEDURE — 99285 EMERGENCY DEPT VISIT HI MDM: CPT | Mod: 25

## 2021-01-01 PROCEDURE — 96365 THER/PROPH/DIAG IV INF INIT: CPT

## 2021-01-01 PROCEDURE — 76856 US EXAM PELVIC COMPLETE: CPT | Mod: 26

## 2021-01-01 PROCEDURE — 87070 CULTURE OTHR SPECIMN AEROBIC: CPT

## 2021-01-01 PROCEDURE — 99232 SBSQ HOSP IP/OBS MODERATE 35: CPT

## 2021-01-01 PROCEDURE — 71045 X-RAY EXAM CHEST 1 VIEW: CPT | Mod: 26,77,76

## 2021-01-01 PROCEDURE — 99233 SBSQ HOSP IP/OBS HIGH 50: CPT | Mod: GC

## 2021-01-01 PROCEDURE — 99291 CRITICAL CARE FIRST HOUR: CPT

## 2021-01-01 PROCEDURE — 80307 DRUG TEST PRSMV CHEM ANLYZR: CPT

## 2021-01-01 PROCEDURE — 93320 DOPPLER ECHO COMPLETE: CPT | Mod: 26

## 2021-01-01 PROCEDURE — 87640 STAPH A DNA AMP PROBE: CPT

## 2021-01-01 PROCEDURE — 97530 THERAPEUTIC ACTIVITIES: CPT

## 2021-01-01 PROCEDURE — 94640 AIRWAY INHALATION TREATMENT: CPT

## 2021-01-01 PROCEDURE — 84484 ASSAY OF TROPONIN QUANT: CPT

## 2021-01-01 PROCEDURE — 82728 ASSAY OF FERRITIN: CPT

## 2021-01-01 PROCEDURE — 99497 ADVNCD CARE PLAN 30 MIN: CPT | Mod: 25

## 2021-01-01 PROCEDURE — 87635 SARS-COV-2 COVID-19 AMP PRB: CPT

## 2021-01-01 PROCEDURE — 31624 DX BRONCHOSCOPE/LAVAGE: CPT | Mod: GC

## 2021-01-01 PROCEDURE — 86403 PARTICLE AGGLUT ANTBDY SCRN: CPT

## 2021-01-01 PROCEDURE — 86360 T CELL ABSOLUTE COUNT/RATIO: CPT

## 2021-01-01 PROCEDURE — 99232 SBSQ HOSP IP/OBS MODERATE 35: CPT | Mod: GC

## 2021-01-01 PROCEDURE — 99222 1ST HOSP IP/OBS MODERATE 55: CPT

## 2021-01-01 PROCEDURE — 93010 ELECTROCARDIOGRAM REPORT: CPT

## 2021-01-01 PROCEDURE — 85610 PROTHROMBIN TIME: CPT

## 2021-01-01 PROCEDURE — 82803 BLOOD GASES ANY COMBINATION: CPT

## 2021-01-01 PROCEDURE — 74177 CT ABD & PELVIS W/CONTRAST: CPT | Mod: 26,MA

## 2021-01-01 PROCEDURE — 92612 ENDOSCOPY SWALLOW (FEES) VID: CPT

## 2021-01-01 PROCEDURE — U0003: CPT

## 2021-01-01 PROCEDURE — 97110 THERAPEUTIC EXERCISES: CPT

## 2021-01-01 PROCEDURE — 80202 ASSAY OF VANCOMYCIN: CPT

## 2021-01-01 PROCEDURE — 84466 ASSAY OF TRANSFERRIN: CPT

## 2021-01-01 PROCEDURE — 99358 PROLONG SERVICE W/O CONTACT: CPT

## 2021-01-01 PROCEDURE — 99221 1ST HOSP IP/OBS SF/LOW 40: CPT

## 2021-01-01 PROCEDURE — 87449 NOS EACH ORGANISM AG IA: CPT

## 2021-01-01 PROCEDURE — 83540 ASSAY OF IRON: CPT

## 2021-01-01 PROCEDURE — 96366 THER/PROPH/DIAG IV INF ADDON: CPT

## 2021-01-01 PROCEDURE — U0005: CPT

## 2021-01-01 PROCEDURE — 97116 GAIT TRAINING THERAPY: CPT

## 2021-01-01 PROCEDURE — 87491 CHLMYD TRACH DNA AMP PROBE: CPT

## 2021-01-01 PROCEDURE — 99223 1ST HOSP IP/OBS HIGH 75: CPT | Mod: GC

## 2021-01-01 PROCEDURE — 85730 THROMBOPLASTIN TIME PARTIAL: CPT

## 2021-01-01 PROCEDURE — 82533 TOTAL CORTISOL: CPT

## 2021-01-01 PROCEDURE — 81001 URINALYSIS AUTO W/SCOPE: CPT

## 2021-01-01 PROCEDURE — 0225U NFCT DS DNA&RNA 21 SARSCOV2: CPT

## 2021-01-01 PROCEDURE — 83550 IRON BINDING TEST: CPT

## 2021-01-01 PROCEDURE — 87086 URINE CULTURE/COLONY COUNT: CPT

## 2021-01-01 PROCEDURE — 96375 TX/PRO/DX INJ NEW DRUG ADDON: CPT

## 2021-01-01 PROCEDURE — 99233 SBSQ HOSP IP/OBS HIGH 50: CPT

## 2021-01-01 PROCEDURE — 99291 CRITICAL CARE FIRST HOUR: CPT | Mod: CS

## 2021-01-01 PROCEDURE — 86606 ASPERGILLUS ANTIBODY: CPT

## 2021-01-01 PROCEDURE — 76376 3D RENDER W/INTRP POSTPROCES: CPT | Mod: 26

## 2021-01-01 PROCEDURE — 99223 1ST HOSP IP/OBS HIGH 75: CPT | Mod: GC,25

## 2021-01-01 PROCEDURE — 87521 HEPATITIS C PROBE&RVRS TRNSC: CPT

## 2021-01-01 PROCEDURE — 80048 BASIC METABOLIC PNL TOTAL CA: CPT

## 2021-01-01 PROCEDURE — 86803 HEPATITIS C AB TEST: CPT

## 2021-01-01 PROCEDURE — 82746 ASSAY OF FOLIC ACID SERUM: CPT

## 2021-01-01 PROCEDURE — 36415 COLL VENOUS BLD VENIPUNCTURE: CPT

## 2021-01-01 PROCEDURE — 87641 MR-STAPH DNA AMP PROBE: CPT

## 2021-01-01 PROCEDURE — 82607 VITAMIN B-12: CPT

## 2021-01-01 PROCEDURE — 87536 HIV-1 QUANT&REVRSE TRNSCRPJ: CPT

## 2021-01-01 PROCEDURE — 80053 COMPREHEN METABOLIC PANEL: CPT

## 2021-01-01 PROCEDURE — 86769 SARS-COV-2 COVID-19 ANTIBODY: CPT

## 2021-01-01 PROCEDURE — 82553 CREATINE MB FRACTION: CPT

## 2021-01-01 PROCEDURE — 36556 INSERT NON-TUNNEL CV CATH: CPT | Mod: GC

## 2021-01-01 PROCEDURE — 84132 ASSAY OF SERUM POTASSIUM: CPT

## 2021-01-01 PROCEDURE — 92611 MOTION FLUOROSCOPY/SWALLOW: CPT

## 2021-01-01 PROCEDURE — 76830 TRANSVAGINAL US NON-OB: CPT | Mod: 26

## 2021-01-01 PROCEDURE — 85025 COMPLETE CBC W/AUTO DIFF WBC: CPT

## 2021-01-01 PROCEDURE — 97161 PT EVAL LOW COMPLEX 20 MIN: CPT

## 2021-01-01 PROCEDURE — 82330 ASSAY OF CALCIUM: CPT

## 2021-01-01 PROCEDURE — 74230 X-RAY XM SWLNG FUNCJ C+: CPT

## 2021-01-01 PROCEDURE — 71250 CT THORAX DX C-: CPT | Mod: 26

## 2021-01-01 PROCEDURE — 96376 TX/PRO/DX INJ SAME DRUG ADON: CPT

## 2021-01-01 PROCEDURE — 82962 GLUCOSE BLOOD TEST: CPT

## 2021-01-01 PROCEDURE — 99221 1ST HOSP IP/OBS SF/LOW 40: CPT | Mod: GC

## 2021-01-01 PROCEDURE — 36000 PLACE NEEDLE IN VEIN: CPT

## 2021-01-01 PROCEDURE — 87591 N.GONORRHOEAE DNA AMP PROB: CPT

## 2021-01-01 PROCEDURE — 84295 ASSAY OF SERUM SODIUM: CPT

## 2021-01-01 PROCEDURE — 74177 CT ABD & PELVIS W/CONTRAST: CPT | Mod: MA

## 2021-01-01 PROCEDURE — 82550 ASSAY OF CK (CPK): CPT

## 2021-01-01 PROCEDURE — 93970 EXTREMITY STUDY: CPT | Mod: 26

## 2021-01-01 PROCEDURE — 86359 T CELLS TOTAL COUNT: CPT

## 2021-01-01 PROCEDURE — 93306 TTE W/DOPPLER COMPLETE: CPT | Mod: 26

## 2021-01-01 PROCEDURE — 71275 CT ANGIOGRAPHY CHEST: CPT | Mod: 26,MA

## 2021-01-01 PROCEDURE — 74230 X-RAY XM SWLNG FUNCJ C+: CPT | Mod: 26

## 2021-01-01 PROCEDURE — 71275 CT ANGIOGRAPHY CHEST: CPT | Mod: MA

## 2021-01-01 PROCEDURE — 76830 TRANSVAGINAL US NON-OB: CPT

## 2021-01-01 RX ORDER — SODIUM CHLORIDE 9 MG/ML
1000 INJECTION INTRAMUSCULAR; INTRAVENOUS; SUBCUTANEOUS ONCE
Refills: 0 | Status: COMPLETED | OUTPATIENT
Start: 2021-01-01 | End: 2021-01-01

## 2021-01-01 RX ORDER — TIOTROPIUM BROMIDE AND OLODATEROL 3.124; 2.736 UG/1; UG/1
2 SPRAY, METERED RESPIRATORY (INHALATION) DAILY
Refills: 0 | Status: DISCONTINUED | OUTPATIENT
Start: 2021-01-01 | End: 2021-01-01

## 2021-01-01 RX ORDER — VANCOMYCIN HCL 1 G
1000 VIAL (EA) INTRAVENOUS ONCE
Refills: 0 | Status: DISCONTINUED | OUTPATIENT
Start: 2021-01-01 | End: 2021-01-01

## 2021-01-01 RX ORDER — ACETAMINOPHEN 500 MG
650 TABLET ORAL EVERY 6 HOURS
Refills: 0 | Status: DISCONTINUED | OUTPATIENT
Start: 2021-01-01 | End: 2021-01-01

## 2021-01-01 RX ORDER — LANOLIN ALCOHOL/MO/W.PET/CERES
1 CREAM (GRAM) TOPICAL
Qty: 0 | Refills: 0 | DISCHARGE
Start: 2021-01-01

## 2021-01-01 RX ORDER — DEXTROSE 50 % IN WATER 50 %
12.5 SYRINGE (ML) INTRAVENOUS ONCE
Refills: 0 | Status: DISCONTINUED | OUTPATIENT
Start: 2021-01-01 | End: 2021-01-01

## 2021-01-01 RX ORDER — METRONIDAZOLE 500 MG
500 TABLET ORAL EVERY 12 HOURS
Refills: 0 | Status: DISCONTINUED | OUTPATIENT
Start: 2021-01-01 | End: 2021-01-01

## 2021-01-01 RX ORDER — MAGNESIUM SULFATE 500 MG/ML
2 VIAL (ML) INJECTION ONCE
Refills: 0 | Status: COMPLETED | OUTPATIENT
Start: 2021-01-01 | End: 2021-01-01

## 2021-01-01 RX ORDER — PANTOPRAZOLE SODIUM 20 MG/1
40 TABLET, DELAYED RELEASE ORAL DAILY
Refills: 0 | Status: DISCONTINUED | OUTPATIENT
Start: 2021-01-01 | End: 2021-01-01

## 2021-01-01 RX ORDER — FLUCONAZOLE 150 MG/1
200 TABLET ORAL EVERY 24 HOURS
Refills: 0 | Status: DISCONTINUED | OUTPATIENT
Start: 2021-01-01 | End: 2021-01-01

## 2021-01-01 RX ORDER — DEXTROSE 50 % IN WATER 50 %
50 SYRINGE (ML) INTRAVENOUS ONCE
Refills: 0 | Status: COMPLETED | OUTPATIENT
Start: 2021-01-01 | End: 2021-01-01

## 2021-01-01 RX ORDER — FLUCONAZOLE 150 MG/1
1 TABLET ORAL
Qty: 0 | Refills: 0 | DISCHARGE
Start: 2021-01-01

## 2021-01-01 RX ORDER — DEXTROSE 50 % IN WATER 50 %
25 SYRINGE (ML) INTRAVENOUS ONCE
Refills: 0 | Status: DISCONTINUED | OUTPATIENT
Start: 2021-01-01 | End: 2021-01-01

## 2021-01-01 RX ORDER — MIRTAZAPINE 45 MG/1
1 TABLET, ORALLY DISINTEGRATING ORAL
Qty: 0 | Refills: 0 | DISCHARGE

## 2021-01-01 RX ORDER — SODIUM CHLORIDE 9 MG/ML
1000 INJECTION, SOLUTION INTRAVENOUS
Refills: 0 | Status: DISCONTINUED | OUTPATIENT
Start: 2021-01-01 | End: 2021-01-01

## 2021-01-01 RX ORDER — GLUCAGON INJECTION, SOLUTION 0.5 MG/.1ML
1 INJECTION, SOLUTION SUBCUTANEOUS ONCE
Refills: 0 | Status: DISCONTINUED | OUTPATIENT
Start: 2021-01-01 | End: 2021-01-01

## 2021-01-01 RX ORDER — HUMAN INSULIN 100 [IU]/ML
10 INJECTION, SUSPENSION SUBCUTANEOUS ONCE
Refills: 0 | Status: COMPLETED | OUTPATIENT
Start: 2021-01-01 | End: 2021-01-01

## 2021-01-01 RX ORDER — OXYCODONE HYDROCHLORIDE 5 MG/1
1 TABLET ORAL
Qty: 0 | Refills: 0 | DISCHARGE

## 2021-01-01 RX ORDER — VANCOMYCIN HCL 1 G
1000 VIAL (EA) INTRAVENOUS ONCE
Refills: 0 | Status: COMPLETED | OUTPATIENT
Start: 2021-01-01 | End: 2021-01-01

## 2021-01-01 RX ORDER — LANOLIN ALCOHOL/MO/W.PET/CERES
5 CREAM (GRAM) TOPICAL AT BEDTIME
Refills: 0 | Status: DISCONTINUED | OUTPATIENT
Start: 2021-01-01 | End: 2021-01-01

## 2021-01-01 RX ORDER — DEXTROSE 50 % IN WATER 50 %
15 SYRINGE (ML) INTRAVENOUS ONCE
Refills: 0 | Status: DISCONTINUED | OUTPATIENT
Start: 2021-01-01 | End: 2021-01-01

## 2021-01-01 RX ORDER — BUDESONIDE AND FORMOTEROL FUMARATE DIHYDRATE 160; 4.5 UG/1; UG/1
2 AEROSOL RESPIRATORY (INHALATION)
Refills: 0 | Status: DISCONTINUED | OUTPATIENT
Start: 2021-01-01 | End: 2021-01-01

## 2021-01-01 RX ORDER — ATORVASTATIN CALCIUM 80 MG/1
40 TABLET, FILM COATED ORAL AT BEDTIME
Refills: 0 | Status: DISCONTINUED | OUTPATIENT
Start: 2021-01-01 | End: 2021-01-01

## 2021-01-01 RX ORDER — CALCIUM CARBONATE 500(1250)
1 TABLET ORAL ONCE
Refills: 0 | Status: COMPLETED | OUTPATIENT
Start: 2021-01-01 | End: 2021-01-01

## 2021-01-01 RX ORDER — PHENYLEPHRINE HYDROCHLORIDE 10 MG/ML
50 INJECTION INTRAVENOUS ONCE
Refills: 0 | Status: COMPLETED | OUTPATIENT
Start: 2021-01-01 | End: 2021-01-01

## 2021-01-01 RX ORDER — SODIUM CHLORIDE 9 MG/ML
1250 INJECTION INTRAMUSCULAR; INTRAVENOUS; SUBCUTANEOUS ONCE
Refills: 0 | Status: COMPLETED | OUTPATIENT
Start: 2021-01-01 | End: 2021-01-01

## 2021-01-01 RX ORDER — PIPERACILLIN AND TAZOBACTAM 4; .5 G/20ML; G/20ML
3.38 INJECTION, POWDER, LYOPHILIZED, FOR SOLUTION INTRAVENOUS EVERY 6 HOURS
Refills: 0 | Status: DISCONTINUED | OUTPATIENT
Start: 2021-01-01 | End: 2021-01-01

## 2021-01-01 RX ORDER — HEPARIN SODIUM 5000 [USP'U]/ML
5000 INJECTION INTRAVENOUS; SUBCUTANEOUS EVERY 8 HOURS
Refills: 0 | Status: DISCONTINUED | OUTPATIENT
Start: 2021-01-01 | End: 2021-01-01

## 2021-01-01 RX ORDER — BICTEGRAVIR SODIUM, EMTRICITABINE, AND TENOFOVIR ALAFENAMIDE FUMARATE 30; 120; 15 MG/1; MG/1; MG/1
1 TABLET ORAL DAILY
Refills: 0 | Status: DISCONTINUED | OUTPATIENT
Start: 2021-01-01 | End: 2021-01-01

## 2021-01-01 RX ORDER — VANCOMYCIN HCL 1 G
750 VIAL (EA) INTRAVENOUS ONCE
Refills: 0 | Status: DISCONTINUED | OUTPATIENT
Start: 2021-01-01 | End: 2021-01-01

## 2021-01-01 RX ORDER — VANCOMYCIN HCL 1 G
750 VIAL (EA) INTRAVENOUS EVERY 24 HOURS
Refills: 0 | Status: COMPLETED | OUTPATIENT
Start: 2021-01-01 | End: 2021-01-01

## 2021-01-01 RX ORDER — ETOMIDATE 2 MG/ML
15 INJECTION INTRAVENOUS ONCE
Refills: 0 | Status: COMPLETED | OUTPATIENT
Start: 2021-01-01 | End: 2021-01-01

## 2021-01-01 RX ORDER — CHLORHEXIDINE GLUCONATE 213 G/1000ML
1 SOLUTION TOPICAL DAILY
Refills: 0 | Status: DISCONTINUED | OUTPATIENT
Start: 2021-01-01 | End: 2021-01-01

## 2021-01-01 RX ORDER — IPRATROPIUM/ALBUTEROL SULFATE 18-103MCG
3 AEROSOL WITH ADAPTER (GRAM) INHALATION EVERY 6 HOURS
Refills: 0 | Status: DISCONTINUED | OUTPATIENT
Start: 2021-01-01 | End: 2021-01-01

## 2021-01-01 RX ORDER — OXYCODONE HYDROCHLORIDE 5 MG/1
10 TABLET ORAL EVERY 6 HOURS
Refills: 0 | Status: DISCONTINUED | OUTPATIENT
Start: 2021-01-01 | End: 2021-01-01

## 2021-01-01 RX ORDER — FENTANYL CITRATE 50 UG/ML
50 INJECTION INTRAVENOUS ONCE
Refills: 0 | Status: DISCONTINUED | OUTPATIENT
Start: 2021-01-01 | End: 2021-01-01

## 2021-01-01 RX ORDER — PROPOFOL 10 MG/ML
10 INJECTION, EMULSION INTRAVENOUS
Qty: 1000 | Refills: 0 | Status: DISCONTINUED | OUTPATIENT
Start: 2021-01-01 | End: 2021-01-01

## 2021-01-01 RX ORDER — ENOXAPARIN SODIUM 100 MG/ML
30 INJECTION SUBCUTANEOUS EVERY 24 HOURS
Refills: 0 | Status: DISCONTINUED | OUTPATIENT
Start: 2021-01-01 | End: 2021-01-01

## 2021-01-01 RX ORDER — OLANZAPINE 15 MG/1
5 TABLET, FILM COATED ORAL DAILY
Refills: 0 | Status: DISCONTINUED | OUTPATIENT
Start: 2021-01-01 | End: 2021-01-01

## 2021-01-01 RX ORDER — OXYCODONE HYDROCHLORIDE 5 MG/1
5 TABLET ORAL ONCE
Refills: 0 | Status: DISCONTINUED | OUTPATIENT
Start: 2021-01-01 | End: 2021-01-01

## 2021-01-01 RX ORDER — MORPHINE SULFATE 50 MG/1
4 CAPSULE, EXTENDED RELEASE ORAL ONCE
Refills: 0 | Status: DISCONTINUED | OUTPATIENT
Start: 2021-01-01 | End: 2021-01-01

## 2021-01-01 RX ORDER — ENOXAPARIN SODIUM 100 MG/ML
40 INJECTION SUBCUTANEOUS EVERY 24 HOURS
Refills: 0 | Status: DISCONTINUED | OUTPATIENT
Start: 2021-01-01 | End: 2021-01-01

## 2021-01-01 RX ORDER — FENTANYL CITRATE 50 UG/ML
0.5 INJECTION INTRAVENOUS
Qty: 2500 | Refills: 0 | Status: DISCONTINUED | OUTPATIENT
Start: 2021-01-01 | End: 2021-01-01

## 2021-01-01 RX ORDER — INSULIN HUMAN 100 [IU]/ML
10 INJECTION, SOLUTION SUBCUTANEOUS ONCE
Refills: 0 | Status: COMPLETED | OUTPATIENT
Start: 2021-01-01 | End: 2021-01-01

## 2021-01-01 RX ORDER — HYDROMORPHONE HYDROCHLORIDE 2 MG/ML
1 INJECTION INTRAMUSCULAR; INTRAVENOUS; SUBCUTANEOUS EVERY 6 HOURS
Refills: 0 | Status: DISCONTINUED | OUTPATIENT
Start: 2021-01-01 | End: 2021-01-01

## 2021-01-01 RX ORDER — ENOXAPARIN SODIUM 100 MG/ML
40 INJECTION SUBCUTANEOUS DAILY
Refills: 0 | Status: DISCONTINUED | OUTPATIENT
Start: 2021-01-01 | End: 2021-01-01

## 2021-01-01 RX ORDER — FLUCONAZOLE 150 MG/1
400 TABLET ORAL EVERY 24 HOURS
Refills: 0 | Status: DISCONTINUED | OUTPATIENT
Start: 2021-01-01 | End: 2021-01-01

## 2021-01-01 RX ORDER — NICOTINE POLACRILEX 2 MG
1 GUM BUCCAL DAILY
Refills: 0 | Status: DISCONTINUED | OUTPATIENT
Start: 2021-01-01 | End: 2021-01-01

## 2021-01-01 RX ORDER — MORPHINE SULFATE 50 MG/1
2 CAPSULE, EXTENDED RELEASE ORAL ONCE
Refills: 0 | Status: DISCONTINUED | OUTPATIENT
Start: 2021-01-01 | End: 2021-01-01

## 2021-01-01 RX ORDER — VANCOMYCIN HCL 1 G
750 VIAL (EA) INTRAVENOUS ONCE
Refills: 0 | Status: COMPLETED | OUTPATIENT
Start: 2021-01-01 | End: 2021-01-01

## 2021-01-01 RX ORDER — CEFTRIAXONE 500 MG/1
1000 INJECTION, POWDER, FOR SOLUTION INTRAMUSCULAR; INTRAVENOUS ONCE
Refills: 0 | Status: COMPLETED | OUTPATIENT
Start: 2021-01-01 | End: 2021-01-01

## 2021-01-01 RX ORDER — NICOTINE POLACRILEX 2 MG
7 GUM BUCCAL
Qty: 0 | Refills: 0 | DISCHARGE
Start: 2021-01-01

## 2021-01-01 RX ORDER — MIRTAZAPINE 45 MG/1
15 TABLET, ORALLY DISINTEGRATING ORAL AT BEDTIME
Refills: 0 | Status: DISCONTINUED | OUTPATIENT
Start: 2021-01-01 | End: 2021-01-01

## 2021-01-01 RX ORDER — DIPHENHYDRAMINE HCL 50 MG
25 CAPSULE ORAL EVERY 4 HOURS
Refills: 0 | Status: DISCONTINUED | OUTPATIENT
Start: 2021-01-01 | End: 2021-01-01

## 2021-01-01 RX ORDER — VANCOMYCIN HCL 1 G
1250 VIAL (EA) INTRAVENOUS EVERY 12 HOURS
Refills: 0 | Status: COMPLETED | OUTPATIENT
Start: 2021-01-01 | End: 2021-01-01

## 2021-01-01 RX ORDER — LANOLIN ALCOHOL/MO/W.PET/CERES
3 CREAM (GRAM) TOPICAL AT BEDTIME
Refills: 0 | Status: DISCONTINUED | OUTPATIENT
Start: 2021-01-01 | End: 2021-01-01

## 2021-01-01 RX ORDER — SENNA PLUS 8.6 MG/1
2 TABLET ORAL AT BEDTIME
Refills: 0 | Status: DISCONTINUED | OUTPATIENT
Start: 2021-01-01 | End: 2021-01-01

## 2021-01-01 RX ORDER — OLANZAPINE 15 MG/1
1 TABLET, FILM COATED ORAL
Qty: 0 | Refills: 0 | DISCHARGE

## 2021-01-01 RX ORDER — INSULIN HUMAN 100 [IU]/ML
5 INJECTION, SOLUTION SUBCUTANEOUS EVERY 6 HOURS
Refills: 0 | Status: DISCONTINUED | OUTPATIENT
Start: 2021-01-01 | End: 2021-01-01

## 2021-01-01 RX ORDER — NOREPINEPHRINE BITARTRATE/D5W 8 MG/250ML
0.05 PLASTIC BAG, INJECTION (ML) INTRAVENOUS
Qty: 32 | Refills: 0 | Status: DISCONTINUED | OUTPATIENT
Start: 2021-01-01 | End: 2021-01-01

## 2021-01-01 RX ORDER — ENOXAPARIN SODIUM 100 MG/ML
0 INJECTION SUBCUTANEOUS
Qty: 0 | Refills: 0 | DISCHARGE
Start: 2021-01-01

## 2021-01-01 RX ORDER — SUCCINYLCHOLINE CHLORIDE 100 MG/5ML
75 SYRINGE (ML) INTRAVENOUS ONCE
Refills: 0 | Status: COMPLETED | OUTPATIENT
Start: 2021-01-01 | End: 2021-01-01

## 2021-01-01 RX ORDER — INSULIN LISPRO 100/ML
VIAL (ML) SUBCUTANEOUS EVERY 6 HOURS
Refills: 0 | Status: DISCONTINUED | OUTPATIENT
Start: 2021-01-01 | End: 2021-01-01

## 2021-01-01 RX ORDER — TIOTROPIUM BROMIDE AND OLODATEROL 3.124; 2.736 UG/1; UG/1
2 SPRAY, METERED RESPIRATORY (INHALATION)
Qty: 0 | Refills: 0 | DISCHARGE
Start: 2021-01-01

## 2021-01-01 RX ORDER — PIPERACILLIN AND TAZOBACTAM 4; .5 G/20ML; G/20ML
2.25 INJECTION, POWDER, LYOPHILIZED, FOR SOLUTION INTRAVENOUS EVERY 8 HOURS
Refills: 0 | Status: DISCONTINUED | OUTPATIENT
Start: 2021-01-01 | End: 2021-01-01

## 2021-01-01 RX ORDER — BICTEGRAVIR SODIUM, EMTRICITABINE, AND TENOFOVIR ALAFENAMIDE FUMARATE 30; 120; 15 MG/1; MG/1; MG/1
1 TABLET ORAL
Qty: 0 | Refills: 0 | DISCHARGE

## 2021-01-01 RX ORDER — PIPERACILLIN AND TAZOBACTAM 4; .5 G/20ML; G/20ML
3.38 INJECTION, POWDER, LYOPHILIZED, FOR SOLUTION INTRAVENOUS ONCE
Refills: 0 | Status: COMPLETED | OUTPATIENT
Start: 2021-01-01 | End: 2021-01-01

## 2021-01-01 RX ORDER — POLYETHYLENE GLYCOL 3350 17 G/17G
17 POWDER, FOR SOLUTION ORAL DAILY
Refills: 0 | Status: DISCONTINUED | OUTPATIENT
Start: 2021-01-01 | End: 2021-01-01

## 2021-01-01 RX ORDER — INSULIN LISPRO 100/ML
VIAL (ML) SUBCUTANEOUS
Refills: 0 | Status: DISCONTINUED | OUTPATIENT
Start: 2021-01-01 | End: 2021-01-01

## 2021-01-01 RX ORDER — OLANZAPINE 15 MG/1
5 TABLET, FILM COATED ORAL EVERY 24 HOURS
Refills: 0 | Status: DISCONTINUED | OUTPATIENT
Start: 2021-01-01 | End: 2021-01-01

## 2021-01-01 RX ORDER — ACETAMINOPHEN 500 MG
750 TABLET ORAL ONCE
Refills: 0 | Status: COMPLETED | OUTPATIENT
Start: 2021-01-01 | End: 2021-01-01

## 2021-01-01 RX ORDER — VANCOMYCIN HCL 1 G
750 VIAL (EA) INTRAVENOUS EVERY 24 HOURS
Refills: 0 | Status: DISCONTINUED | OUTPATIENT
Start: 2021-01-01 | End: 2021-01-01

## 2021-01-01 RX ORDER — HYDROMORPHONE HYDROCHLORIDE 2 MG/ML
4 INJECTION INTRAMUSCULAR; INTRAVENOUS; SUBCUTANEOUS EVERY 6 HOURS
Refills: 0 | Status: DISCONTINUED | OUTPATIENT
Start: 2021-01-01 | End: 2021-01-01

## 2021-01-01 RX ORDER — NOREPINEPHRINE BITARTRATE/D5W 8 MG/250ML
0.05 PLASTIC BAG, INJECTION (ML) INTRAVENOUS
Qty: 8 | Refills: 0 | Status: DISCONTINUED | OUTPATIENT
Start: 2021-01-01 | End: 2021-01-01

## 2021-01-01 RX ORDER — MIDODRINE HYDROCHLORIDE 2.5 MG/1
5 TABLET ORAL ONCE
Refills: 0 | Status: DISCONTINUED | OUTPATIENT
Start: 2021-01-01 | End: 2021-01-01

## 2021-01-01 RX ORDER — ATORVASTATIN CALCIUM 80 MG/1
1 TABLET, FILM COATED ORAL
Qty: 0 | Refills: 0 | DISCHARGE

## 2021-01-01 RX ORDER — METRONIDAZOLE 500 MG
1 TABLET ORAL
Qty: 0 | Refills: 0 | DISCHARGE
Start: 2021-01-01

## 2021-01-01 RX ORDER — DIPHENHYDRAMINE HCL 50 MG
1 CAPSULE ORAL
Qty: 0 | Refills: 0 | DISCHARGE
Start: 2021-01-01

## 2021-01-01 RX ORDER — SODIUM CHLORIDE 9 MG/ML
500 INJECTION INTRAMUSCULAR; INTRAVENOUS; SUBCUTANEOUS ONCE
Refills: 0 | Status: COMPLETED | OUTPATIENT
Start: 2021-01-01 | End: 2021-01-01

## 2021-01-01 RX ORDER — CHLORHEXIDINE GLUCONATE 213 G/1000ML
15 SOLUTION TOPICAL EVERY 12 HOURS
Refills: 0 | Status: DISCONTINUED | OUTPATIENT
Start: 2021-01-01 | End: 2021-01-01

## 2021-01-01 RX ORDER — DEXMEDETOMIDINE HYDROCHLORIDE IN 0.9% SODIUM CHLORIDE 4 UG/ML
0.2 INJECTION INTRAVENOUS
Qty: 200 | Refills: 0 | Status: DISCONTINUED | OUTPATIENT
Start: 2021-01-01 | End: 2021-01-01

## 2021-01-01 RX ORDER — DEXMEDETOMIDINE HYDROCHLORIDE IN 0.9% SODIUM CHLORIDE 4 UG/ML
0.2 INJECTION INTRAVENOUS
Qty: 400 | Refills: 0 | Status: DISCONTINUED | OUTPATIENT
Start: 2021-01-01 | End: 2021-01-01

## 2021-01-01 RX ORDER — PIPERACILLIN AND TAZOBACTAM 4; .5 G/20ML; G/20ML
2.25 INJECTION, POWDER, LYOPHILIZED, FOR SOLUTION INTRAVENOUS ONCE
Refills: 0 | Status: COMPLETED | OUTPATIENT
Start: 2021-01-01 | End: 2021-01-01

## 2021-01-01 RX ORDER — DEXTROSE 50 % IN WATER 50 %
25 SYRINGE (ML) INTRAVENOUS ONCE
Refills: 0 | Status: COMPLETED | OUTPATIENT
Start: 2021-01-01 | End: 2021-01-01

## 2021-01-01 RX ORDER — FENTANYL CITRATE 50 UG/ML
0.5 INJECTION INTRAVENOUS
Qty: 5000 | Refills: 0 | Status: DISCONTINUED | OUTPATIENT
Start: 2021-01-01 | End: 2021-01-01

## 2021-01-01 RX ORDER — SODIUM POLYSTYRENE SULFONATE 4.1 MEQ/G
30 POWDER, FOR SUSPENSION ORAL ONCE
Refills: 0 | Status: COMPLETED | OUTPATIENT
Start: 2021-01-01 | End: 2021-01-01

## 2021-01-01 RX ORDER — DIPHENHYDRAMINE HCL 50 MG
25 CAPSULE ORAL ONCE
Refills: 0 | Status: COMPLETED | OUTPATIENT
Start: 2021-01-01 | End: 2021-01-01

## 2021-01-01 RX ORDER — ALBUTEROL 90 UG/1
2.5 AEROSOL, METERED ORAL EVERY 6 HOURS
Refills: 0 | Status: DISCONTINUED | OUTPATIENT
Start: 2021-01-01 | End: 2021-01-01

## 2021-01-01 RX ORDER — NYSTATIN 500MM UNIT
500000 POWDER (EA) MISCELLANEOUS EVERY 6 HOURS
Refills: 0 | Status: DISCONTINUED | OUTPATIENT
Start: 2021-01-01 | End: 2021-01-01

## 2021-01-01 RX ORDER — BUDESONIDE AND FORMOTEROL FUMARATE DIHYDRATE 160; 4.5 UG/1; UG/1
2 AEROSOL RESPIRATORY (INHALATION)
Qty: 0 | Refills: 0 | DISCHARGE

## 2021-01-01 RX ORDER — VANCOMYCIN HCL 1 G
750 VIAL (EA) INTRAVENOUS EVERY 12 HOURS
Refills: 0 | Status: COMPLETED | OUTPATIENT
Start: 2021-01-01 | End: 2021-01-01

## 2021-01-01 RX ORDER — POLYETHYLENE GLYCOL 3350 17 G/17G
17 POWDER, FOR SOLUTION ORAL
Qty: 0 | Refills: 0 | DISCHARGE
Start: 2021-01-01

## 2021-01-01 RX ORDER — ACETAMINOPHEN 500 MG
650 TABLET ORAL ONCE
Refills: 0 | Status: COMPLETED | OUTPATIENT
Start: 2021-01-01 | End: 2021-01-01

## 2021-01-01 RX ORDER — MIDAZOLAM HYDROCHLORIDE 1 MG/ML
2 INJECTION, SOLUTION INTRAMUSCULAR; INTRAVENOUS ONCE
Refills: 0 | Status: DISCONTINUED | OUTPATIENT
Start: 2021-01-01 | End: 2021-01-01

## 2021-01-01 RX ORDER — OXYCODONE HYDROCHLORIDE 5 MG/1
15 TABLET ORAL EVERY 6 HOURS
Refills: 0 | Status: DISCONTINUED | OUTPATIENT
Start: 2021-01-01 | End: 2021-01-01

## 2021-01-01 RX ORDER — POTASSIUM PHOSPHATE, MONOBASIC POTASSIUM PHOSPHATE, DIBASIC 236; 224 MG/ML; MG/ML
30 INJECTION, SOLUTION INTRAVENOUS ONCE
Refills: 0 | Status: COMPLETED | OUTPATIENT
Start: 2021-01-01 | End: 2021-01-01

## 2021-01-01 RX ORDER — ACETAMINOPHEN 500 MG
1000 TABLET ORAL ONCE
Refills: 0 | Status: DISCONTINUED | OUTPATIENT
Start: 2021-01-01 | End: 2021-01-01

## 2021-01-01 RX ORDER — ALBUTEROL 90 UG/1
2 AEROSOL, METERED ORAL ONCE
Refills: 0 | Status: COMPLETED | OUTPATIENT
Start: 2021-01-01 | End: 2021-01-01

## 2021-01-01 RX ORDER — ENOXAPARIN SODIUM 100 MG/ML
40 INJECTION SUBCUTANEOUS
Qty: 0 | Refills: 0 | DISCHARGE
Start: 2021-01-01

## 2021-01-01 RX ORDER — SODIUM CHLORIDE 9 MG/ML
3 INJECTION INTRAMUSCULAR; INTRAVENOUS; SUBCUTANEOUS EVERY 6 HOURS
Refills: 0 | Status: DISCONTINUED | OUTPATIENT
Start: 2021-01-01 | End: 2021-01-01

## 2021-01-01 RX ORDER — SODIUM POLYSTYRENE SULFONATE 4.1 MEQ/G
30 POWDER, FOR SUSPENSION ORAL ONCE
Refills: 0 | Status: DISCONTINUED | OUTPATIENT
Start: 2021-01-01 | End: 2021-01-01

## 2021-01-01 RX ORDER — ALBUTEROL 90 UG/1
2 AEROSOL, METERED ORAL
Qty: 0 | Refills: 0 | DISCHARGE

## 2021-01-01 RX ORDER — DEXMEDETOMIDINE HYDROCHLORIDE IN 0.9% SODIUM CHLORIDE 4 UG/ML
0.08 INJECTION INTRAVENOUS
Qty: 400 | Refills: 0 | Status: DISCONTINUED | OUTPATIENT
Start: 2021-01-01 | End: 2021-01-01

## 2021-01-01 RX ORDER — AZITHROMYCIN 500 MG/1
500 TABLET, FILM COATED ORAL ONCE
Refills: 0 | Status: DISCONTINUED | OUTPATIENT
Start: 2021-01-01 | End: 2021-01-01

## 2021-01-01 RX ORDER — HYDROMORPHONE HYDROCHLORIDE 2 MG/ML
0.5 INJECTION INTRAMUSCULAR; INTRAVENOUS; SUBCUTANEOUS ONCE
Refills: 0 | Status: DISCONTINUED | OUTPATIENT
Start: 2021-01-01 | End: 2021-01-01

## 2021-01-01 RX ORDER — SENNA PLUS 8.6 MG/1
2 TABLET ORAL
Qty: 0 | Refills: 0 | DISCHARGE
Start: 2021-01-01

## 2021-01-01 RX ORDER — CEFTRIAXONE 500 MG/1
1000 INJECTION, POWDER, FOR SOLUTION INTRAMUSCULAR; INTRAVENOUS ONCE
Refills: 0 | Status: DISCONTINUED | OUTPATIENT
Start: 2021-01-01 | End: 2021-01-01

## 2021-01-01 RX ORDER — IPRATROPIUM/ALBUTEROL SULFATE 18-103MCG
3 AEROSOL WITH ADAPTER (GRAM) INHALATION
Refills: 0 | Status: COMPLETED | OUTPATIENT
Start: 2021-01-01 | End: 2021-01-01

## 2021-01-01 RX ORDER — FLUCONAZOLE 150 MG/1
400 TABLET ORAL ONCE
Refills: 0 | Status: COMPLETED | OUTPATIENT
Start: 2021-01-01 | End: 2021-01-01

## 2021-01-01 RX ORDER — ACETAMINOPHEN 500 MG
500 TABLET ORAL EVERY 4 HOURS
Refills: 0 | Status: DISCONTINUED | OUTPATIENT
Start: 2021-01-01 | End: 2021-01-01

## 2021-01-01 RX ORDER — PROPOFOL 10 MG/ML
5 INJECTION, EMULSION INTRAVENOUS
Qty: 1000 | Refills: 0 | Status: DISCONTINUED | OUTPATIENT
Start: 2021-01-01 | End: 2021-01-01

## 2021-01-01 RX ORDER — CHLORHEXIDINE GLUCONATE 213 G/1000ML
1 SOLUTION TOPICAL
Refills: 0 | Status: DISCONTINUED | OUTPATIENT
Start: 2021-01-01 | End: 2021-01-01

## 2021-01-01 RX ORDER — MIDODRINE HYDROCHLORIDE 2.5 MG/1
1 TABLET ORAL
Qty: 0 | Refills: 0 | DISCHARGE

## 2021-01-01 RX ADMIN — HYDROMORPHONE HYDROCHLORIDE 4 MILLIGRAM(S): 2 INJECTION INTRAMUSCULAR; INTRAVENOUS; SUBCUTANEOUS at 23:23

## 2021-01-01 RX ADMIN — MORPHINE SULFATE 4 MILLIGRAM(S): 50 CAPSULE, EXTENDED RELEASE ORAL at 15:23

## 2021-01-01 RX ADMIN — Medication 1 TABLET(S): at 11:51

## 2021-01-01 RX ADMIN — OXYCODONE HYDROCHLORIDE 15 MILLIGRAM(S): 5 TABLET ORAL at 09:57

## 2021-01-01 RX ADMIN — PIPERACILLIN AND TAZOBACTAM 200 GRAM(S): 4; .5 INJECTION, POWDER, LYOPHILIZED, FOR SOLUTION INTRAVENOUS at 23:24

## 2021-01-01 RX ADMIN — HYDROMORPHONE HYDROCHLORIDE 1 MILLIGRAM(S): 2 INJECTION INTRAMUSCULAR; INTRAVENOUS; SUBCUTANEOUS at 19:43

## 2021-01-01 RX ADMIN — Medication 1 PATCH: at 12:25

## 2021-01-01 RX ADMIN — MIRTAZAPINE 15 MILLIGRAM(S): 45 TABLET, ORALLY DISINTEGRATING ORAL at 21:15

## 2021-01-01 RX ADMIN — ATORVASTATIN CALCIUM 40 MILLIGRAM(S): 80 TABLET, FILM COATED ORAL at 21:57

## 2021-01-01 RX ADMIN — Medication 250 MILLIGRAM(S): at 21:45

## 2021-01-01 RX ADMIN — BUDESONIDE AND FORMOTEROL FUMARATE DIHYDRATE 2 PUFF(S): 160; 4.5 AEROSOL RESPIRATORY (INHALATION) at 09:50

## 2021-01-01 RX ADMIN — Medication 1 PATCH: at 09:51

## 2021-01-01 RX ADMIN — Medication 1 PATCH: at 12:06

## 2021-01-01 RX ADMIN — Medication 25 MILLIGRAM(S): at 10:10

## 2021-01-01 RX ADMIN — Medication 1 PATCH: at 11:02

## 2021-01-01 RX ADMIN — Medication 1 TABLET(S): at 11:26

## 2021-01-01 RX ADMIN — Medication 4.68 MICROGRAM(S)/KG/MIN: at 21:47

## 2021-01-01 RX ADMIN — PIPERACILLIN AND TAZOBACTAM 200 GRAM(S): 4; .5 INJECTION, POWDER, LYOPHILIZED, FOR SOLUTION INTRAVENOUS at 12:41

## 2021-01-01 RX ADMIN — PIPERACILLIN AND TAZOBACTAM 200 GRAM(S): 4; .5 INJECTION, POWDER, LYOPHILIZED, FOR SOLUTION INTRAVENOUS at 23:48

## 2021-01-01 RX ADMIN — HEPARIN SODIUM 5000 UNIT(S): 5000 INJECTION INTRAVENOUS; SUBCUTANEOUS at 21:24

## 2021-01-01 RX ADMIN — Medication 4.68 MICROGRAM(S)/KG/MIN: at 16:10

## 2021-01-01 RX ADMIN — HYDROMORPHONE HYDROCHLORIDE 4 MILLIGRAM(S): 2 INJECTION INTRAMUSCULAR; INTRAVENOUS; SUBCUTANEOUS at 06:49

## 2021-01-01 RX ADMIN — PROPOFOL 1.5 MICROGRAM(S)/KG/MIN: 10 INJECTION, EMULSION INTRAVENOUS at 17:57

## 2021-01-01 RX ADMIN — Medication 341.67 MILLIGRAM(S): at 06:01

## 2021-01-01 RX ADMIN — Medication 650 MILLIGRAM(S): at 09:44

## 2021-01-01 RX ADMIN — OXYCODONE HYDROCHLORIDE 10 MILLIGRAM(S): 5 TABLET ORAL at 11:22

## 2021-01-01 RX ADMIN — OXYCODONE HYDROCHLORIDE 15 MILLIGRAM(S): 5 TABLET ORAL at 14:42

## 2021-01-01 RX ADMIN — Medication 250 MILLIGRAM(S): at 17:11

## 2021-01-01 RX ADMIN — Medication 3 MILLILITER(S): at 11:34

## 2021-01-01 RX ADMIN — OXYCODONE HYDROCHLORIDE 15 MILLIGRAM(S): 5 TABLET ORAL at 10:37

## 2021-01-01 RX ADMIN — BUDESONIDE AND FORMOTEROL FUMARATE DIHYDRATE 2 PUFF(S): 160; 4.5 AEROSOL RESPIRATORY (INHALATION) at 21:33

## 2021-01-01 RX ADMIN — Medication 25 MILLIGRAM(S): at 09:36

## 2021-01-01 RX ADMIN — BICTEGRAVIR SODIUM, EMTRICITABINE, AND TENOFOVIR ALAFENAMIDE FUMARATE 1 TABLET(S): 30; 120; 15 TABLET ORAL at 17:47

## 2021-01-01 RX ADMIN — Medication 1 PATCH: at 08:27

## 2021-01-01 RX ADMIN — OXYCODONE HYDROCHLORIDE 15 MILLIGRAM(S): 5 TABLET ORAL at 04:30

## 2021-01-01 RX ADMIN — Medication 6: at 06:24

## 2021-01-01 RX ADMIN — FENTANYL CITRATE 50 MICROGRAM(S): 50 INJECTION INTRAVENOUS at 08:59

## 2021-01-01 RX ADMIN — MORPHINE SULFATE 2 MILLIGRAM(S): 50 CAPSULE, EXTENDED RELEASE ORAL at 17:18

## 2021-01-01 RX ADMIN — Medication 50 GRAM(S): at 12:08

## 2021-01-01 RX ADMIN — OXYCODONE HYDROCHLORIDE 10 MILLIGRAM(S): 5 TABLET ORAL at 17:38

## 2021-01-01 RX ADMIN — OXYCODONE HYDROCHLORIDE 10 MILLIGRAM(S): 5 TABLET ORAL at 21:08

## 2021-01-01 RX ADMIN — OXYCODONE HYDROCHLORIDE 15 MILLIGRAM(S): 5 TABLET ORAL at 09:03

## 2021-01-01 RX ADMIN — OLANZAPINE 5 MILLIGRAM(S): 15 TABLET, FILM COATED ORAL at 22:47

## 2021-01-01 RX ADMIN — Medication 100 MILLIGRAM(S): at 05:35

## 2021-01-01 RX ADMIN — Medication 250 MILLIGRAM(S): at 18:52

## 2021-01-01 RX ADMIN — BICTEGRAVIR SODIUM, EMTRICITABINE, AND TENOFOVIR ALAFENAMIDE FUMARATE 1 TABLET(S): 30; 120; 15 TABLET ORAL at 12:45

## 2021-01-01 RX ADMIN — Medication 3 MILLILITER(S): at 17:38

## 2021-01-01 RX ADMIN — PIPERACILLIN AND TAZOBACTAM 200 GRAM(S): 4; .5 INJECTION, POWDER, LYOPHILIZED, FOR SOLUTION INTRAVENOUS at 22:53

## 2021-01-01 RX ADMIN — Medication 2: at 06:29

## 2021-01-01 RX ADMIN — PANTOPRAZOLE SODIUM 40 MILLIGRAM(S): 20 TABLET, DELAYED RELEASE ORAL at 11:22

## 2021-01-01 RX ADMIN — HYDROMORPHONE HYDROCHLORIDE 1 MILLIGRAM(S): 2 INJECTION INTRAMUSCULAR; INTRAVENOUS; SUBCUTANEOUS at 19:16

## 2021-01-01 RX ADMIN — BUDESONIDE AND FORMOTEROL FUMARATE DIHYDRATE 2 PUFF(S): 160; 4.5 AEROSOL RESPIRATORY (INHALATION) at 00:59

## 2021-01-01 RX ADMIN — CHLORHEXIDINE GLUCONATE 1 APPLICATION(S): 213 SOLUTION TOPICAL at 12:10

## 2021-01-01 RX ADMIN — OXYCODONE HYDROCHLORIDE 15 MILLIGRAM(S): 5 TABLET ORAL at 10:10

## 2021-01-01 RX ADMIN — BICTEGRAVIR SODIUM, EMTRICITABINE, AND TENOFOVIR ALAFENAMIDE FUMARATE 1 TABLET(S): 30; 120; 15 TABLET ORAL at 11:56

## 2021-01-01 RX ADMIN — OXYCODONE HYDROCHLORIDE 15 MILLIGRAM(S): 5 TABLET ORAL at 17:20

## 2021-01-01 RX ADMIN — OXYCODONE HYDROCHLORIDE 15 MILLIGRAM(S): 5 TABLET ORAL at 06:36

## 2021-01-01 RX ADMIN — Medication 650 MILLIGRAM(S): at 04:36

## 2021-01-01 RX ADMIN — PIPERACILLIN AND TAZOBACTAM 200 GRAM(S): 4; .5 INJECTION, POWDER, LYOPHILIZED, FOR SOLUTION INTRAVENOUS at 15:45

## 2021-01-01 RX ADMIN — Medication 1 DROP(S): at 06:01

## 2021-01-01 RX ADMIN — BUDESONIDE AND FORMOTEROL FUMARATE DIHYDRATE 2 PUFF(S): 160; 4.5 AEROSOL RESPIRATORY (INHALATION) at 11:44

## 2021-01-01 RX ADMIN — FENTANYL CITRATE 2.5 MICROGRAM(S)/KG/HR: 50 INJECTION INTRAVENOUS at 18:53

## 2021-01-01 RX ADMIN — ENOXAPARIN SODIUM 40 MILLIGRAM(S): 100 INJECTION SUBCUTANEOUS at 09:03

## 2021-01-01 RX ADMIN — ENOXAPARIN SODIUM 40 MILLIGRAM(S): 100 INJECTION SUBCUTANEOUS at 09:08

## 2021-01-01 RX ADMIN — Medication 650 MILLIGRAM(S): at 11:41

## 2021-01-01 RX ADMIN — Medication 2.34 MICROGRAM(S)/KG/MIN: at 23:39

## 2021-01-01 RX ADMIN — Medication 1 PATCH: at 19:34

## 2021-01-01 RX ADMIN — OXYCODONE HYDROCHLORIDE 15 MILLIGRAM(S): 5 TABLET ORAL at 15:40

## 2021-01-01 RX ADMIN — OXYCODONE HYDROCHLORIDE 15 MILLIGRAM(S): 5 TABLET ORAL at 21:40

## 2021-01-01 RX ADMIN — OXYCODONE HYDROCHLORIDE 10 MILLIGRAM(S): 5 TABLET ORAL at 05:08

## 2021-01-01 RX ADMIN — Medication 1 TABLET(S): at 12:43

## 2021-01-01 RX ADMIN — ATORVASTATIN CALCIUM 40 MILLIGRAM(S): 80 TABLET, FILM COATED ORAL at 22:46

## 2021-01-01 RX ADMIN — Medication 1 PATCH: at 19:02

## 2021-01-01 RX ADMIN — HYDROMORPHONE HYDROCHLORIDE 4 MILLIGRAM(S): 2 INJECTION INTRAMUSCULAR; INTRAVENOUS; SUBCUTANEOUS at 04:00

## 2021-01-01 RX ADMIN — Medication 1 DROP(S): at 10:29

## 2021-01-01 RX ADMIN — Medication 650 MILLIGRAM(S): at 20:30

## 2021-01-01 RX ADMIN — HEPARIN SODIUM 5000 UNIT(S): 5000 INJECTION INTRAVENOUS; SUBCUTANEOUS at 13:50

## 2021-01-01 RX ADMIN — OXYCODONE HYDROCHLORIDE 10 MILLIGRAM(S): 5 TABLET ORAL at 09:30

## 2021-01-01 RX ADMIN — Medication 40 MILLIGRAM(S): at 21:30

## 2021-01-01 RX ADMIN — Medication 100 MILLIGRAM(S): at 17:30

## 2021-01-01 RX ADMIN — Medication 25 MILLIGRAM(S): at 12:44

## 2021-01-01 RX ADMIN — Medication 260 MILLIGRAM(S): at 06:31

## 2021-01-01 RX ADMIN — ENOXAPARIN SODIUM 40 MILLIGRAM(S): 100 INJECTION SUBCUTANEOUS at 09:23

## 2021-01-01 RX ADMIN — SODIUM CHLORIDE 1250 MILLILITER(S): 9 INJECTION INTRAMUSCULAR; INTRAVENOUS; SUBCUTANEOUS at 22:14

## 2021-01-01 RX ADMIN — BUDESONIDE AND FORMOTEROL FUMARATE DIHYDRATE 2 PUFF(S): 160; 4.5 AEROSOL RESPIRATORY (INHALATION) at 09:24

## 2021-01-01 RX ADMIN — Medication 1 DROP(S): at 18:26

## 2021-01-01 RX ADMIN — OLANZAPINE 5 MILLIGRAM(S): 15 TABLET, FILM COATED ORAL at 22:17

## 2021-01-01 RX ADMIN — ENOXAPARIN SODIUM 40 MILLIGRAM(S): 100 INJECTION SUBCUTANEOUS at 09:01

## 2021-01-01 RX ADMIN — CHLORHEXIDINE GLUCONATE 15 MILLILITER(S): 213 SOLUTION TOPICAL at 18:48

## 2021-01-01 RX ADMIN — HYDROMORPHONE HYDROCHLORIDE 1 MILLIGRAM(S): 2 INJECTION INTRAMUSCULAR; INTRAVENOUS; SUBCUTANEOUS at 12:45

## 2021-01-01 RX ADMIN — Medication 3 MILLILITER(S): at 23:21

## 2021-01-01 RX ADMIN — Medication 1 TABLET(S): at 11:36

## 2021-01-01 RX ADMIN — PIPERACILLIN AND TAZOBACTAM 200 GRAM(S): 4; .5 INJECTION, POWDER, LYOPHILIZED, FOR SOLUTION INTRAVENOUS at 17:31

## 2021-01-01 RX ADMIN — Medication 1 PATCH: at 11:55

## 2021-01-01 RX ADMIN — PIPERACILLIN AND TAZOBACTAM 200 GRAM(S): 4; .5 INJECTION, POWDER, LYOPHILIZED, FOR SOLUTION INTRAVENOUS at 14:07

## 2021-01-01 RX ADMIN — BUDESONIDE AND FORMOTEROL FUMARATE DIHYDRATE 2 PUFF(S): 160; 4.5 AEROSOL RESPIRATORY (INHALATION) at 21:09

## 2021-01-01 RX ADMIN — BICTEGRAVIR SODIUM, EMTRICITABINE, AND TENOFOVIR ALAFENAMIDE FUMARATE 1 TABLET(S): 30; 120; 15 TABLET ORAL at 12:49

## 2021-01-01 RX ADMIN — BICTEGRAVIR SODIUM, EMTRICITABINE, AND TENOFOVIR ALAFENAMIDE FUMARATE 1 TABLET(S): 30; 120; 15 TABLET ORAL at 11:51

## 2021-01-01 RX ADMIN — FENTANYL CITRATE 2.5 MICROGRAM(S)/KG/HR: 50 INJECTION INTRAVENOUS at 12:30

## 2021-01-01 RX ADMIN — FLUCONAZOLE 100 MILLIGRAM(S): 150 TABLET ORAL at 22:54

## 2021-01-01 RX ADMIN — Medication 5 MILLIGRAM(S): at 22:05

## 2021-01-01 RX ADMIN — OXYCODONE HYDROCHLORIDE 15 MILLIGRAM(S): 5 TABLET ORAL at 01:18

## 2021-01-01 RX ADMIN — CEFTRIAXONE 100 MILLIGRAM(S): 500 INJECTION, POWDER, FOR SOLUTION INTRAMUSCULAR; INTRAVENOUS at 00:10

## 2021-01-01 RX ADMIN — Medication 100 MILLIGRAM(S): at 17:49

## 2021-01-01 RX ADMIN — MIRTAZAPINE 15 MILLIGRAM(S): 45 TABLET, ORALLY DISINTEGRATING ORAL at 21:37

## 2021-01-01 RX ADMIN — HYDROMORPHONE HYDROCHLORIDE 1 MILLIGRAM(S): 2 INJECTION INTRAMUSCULAR; INTRAVENOUS; SUBCUTANEOUS at 15:12

## 2021-01-01 RX ADMIN — Medication 25 MILLIGRAM(S): at 17:47

## 2021-01-01 RX ADMIN — ENOXAPARIN SODIUM 30 MILLIGRAM(S): 100 INJECTION SUBCUTANEOUS at 21:50

## 2021-01-01 RX ADMIN — CHLORHEXIDINE GLUCONATE 15 MILLILITER(S): 213 SOLUTION TOPICAL at 06:04

## 2021-01-01 RX ADMIN — OXYCODONE HYDROCHLORIDE 15 MILLIGRAM(S): 5 TABLET ORAL at 10:11

## 2021-01-01 RX ADMIN — ENOXAPARIN SODIUM 30 MILLIGRAM(S): 100 INJECTION SUBCUTANEOUS at 21:45

## 2021-01-01 RX ADMIN — OLANZAPINE 5 MILLIGRAM(S): 15 TABLET, FILM COATED ORAL at 21:52

## 2021-01-01 RX ADMIN — Medication 100 MILLIGRAM(S): at 06:35

## 2021-01-01 RX ADMIN — ENOXAPARIN SODIUM 30 MILLIGRAM(S): 100 INJECTION SUBCUTANEOUS at 22:56

## 2021-01-01 RX ADMIN — INSULIN HUMAN 10 UNIT(S): 100 INJECTION, SOLUTION SUBCUTANEOUS at 11:51

## 2021-01-01 RX ADMIN — OXYCODONE HYDROCHLORIDE 15 MILLIGRAM(S): 5 TABLET ORAL at 07:56

## 2021-01-01 RX ADMIN — ENOXAPARIN SODIUM 40 MILLIGRAM(S): 100 INJECTION SUBCUTANEOUS at 09:50

## 2021-01-01 RX ADMIN — HYDROMORPHONE HYDROCHLORIDE 4 MILLIGRAM(S): 2 INJECTION INTRAMUSCULAR; INTRAVENOUS; SUBCUTANEOUS at 18:10

## 2021-01-01 RX ADMIN — CHLORHEXIDINE GLUCONATE 1 APPLICATION(S): 213 SOLUTION TOPICAL at 12:39

## 2021-01-01 RX ADMIN — Medication 1 PATCH: at 17:39

## 2021-01-01 RX ADMIN — Medication 25 MILLIGRAM(S): at 17:52

## 2021-01-01 RX ADMIN — PIPERACILLIN AND TAZOBACTAM 200 GRAM(S): 4; .5 INJECTION, POWDER, LYOPHILIZED, FOR SOLUTION INTRAVENOUS at 17:19

## 2021-01-01 RX ADMIN — HEPARIN SODIUM 5000 UNIT(S): 5000 INJECTION INTRAVENOUS; SUBCUTANEOUS at 06:32

## 2021-01-01 RX ADMIN — MORPHINE SULFATE 4 MILLIGRAM(S): 50 CAPSULE, EXTENDED RELEASE ORAL at 04:22

## 2021-01-01 RX ADMIN — Medication 25 MILLIGRAM(S): at 08:59

## 2021-01-01 RX ADMIN — Medication 2: at 12:26

## 2021-01-01 RX ADMIN — Medication 25 MILLIGRAM(S): at 04:36

## 2021-01-01 RX ADMIN — ENOXAPARIN SODIUM 40 MILLIGRAM(S): 100 INJECTION SUBCUTANEOUS at 17:31

## 2021-01-01 RX ADMIN — Medication 1 PATCH: at 11:26

## 2021-01-01 RX ADMIN — PANTOPRAZOLE SODIUM 40 MILLIGRAM(S): 20 TABLET, DELAYED RELEASE ORAL at 12:09

## 2021-01-01 RX ADMIN — Medication 250 MILLIGRAM(S): at 08:48

## 2021-01-01 RX ADMIN — PANTOPRAZOLE SODIUM 40 MILLIGRAM(S): 20 TABLET, DELAYED RELEASE ORAL at 12:23

## 2021-01-01 RX ADMIN — PIPERACILLIN AND TAZOBACTAM 200 GRAM(S): 4; .5 INJECTION, POWDER, LYOPHILIZED, FOR SOLUTION INTRAVENOUS at 17:17

## 2021-01-01 RX ADMIN — Medication 1 PATCH: at 07:05

## 2021-01-01 RX ADMIN — Medication 25 MILLIGRAM(S): at 06:36

## 2021-01-01 RX ADMIN — ATORVASTATIN CALCIUM 40 MILLIGRAM(S): 80 TABLET, FILM COATED ORAL at 21:05

## 2021-01-01 RX ADMIN — Medication 1 PATCH: at 06:43

## 2021-01-01 RX ADMIN — Medication 1 TABLET(S): at 12:09

## 2021-01-01 RX ADMIN — Medication 40 MILLIGRAM(S): at 21:16

## 2021-01-01 RX ADMIN — ENOXAPARIN SODIUM 40 MILLIGRAM(S): 100 INJECTION SUBCUTANEOUS at 13:28

## 2021-01-01 RX ADMIN — OXYCODONE HYDROCHLORIDE 10 MILLIGRAM(S): 5 TABLET ORAL at 13:47

## 2021-01-01 RX ADMIN — PROPOFOL 2.99 MICROGRAM(S)/KG/MIN: 10 INJECTION, EMULSION INTRAVENOUS at 21:48

## 2021-01-01 RX ADMIN — OXYCODONE HYDROCHLORIDE 15 MILLIGRAM(S): 5 TABLET ORAL at 19:36

## 2021-01-01 RX ADMIN — ATORVASTATIN CALCIUM 40 MILLIGRAM(S): 80 TABLET, FILM COATED ORAL at 21:09

## 2021-01-01 RX ADMIN — BUDESONIDE AND FORMOTEROL FUMARATE DIHYDRATE 2 PUFF(S): 160; 4.5 AEROSOL RESPIRATORY (INHALATION) at 10:03

## 2021-01-01 RX ADMIN — Medication 1 TABLET(S): at 12:12

## 2021-01-01 RX ADMIN — TIOTROPIUM BROMIDE AND OLODATEROL 2 PUFF(S): 3.124; 2.736 SPRAY, METERED RESPIRATORY (INHALATION) at 11:00

## 2021-01-01 RX ADMIN — Medication 1 PATCH: at 06:20

## 2021-01-01 RX ADMIN — PIPERACILLIN AND TAZOBACTAM 200 GRAM(S): 4; .5 INJECTION, POWDER, LYOPHILIZED, FOR SOLUTION INTRAVENOUS at 05:54

## 2021-01-01 RX ADMIN — Medication 3 MILLILITER(S): at 12:10

## 2021-01-01 RX ADMIN — OXYCODONE HYDROCHLORIDE 10 MILLIGRAM(S): 5 TABLET ORAL at 04:11

## 2021-01-01 RX ADMIN — HYDROMORPHONE HYDROCHLORIDE 1 MILLIGRAM(S): 2 INJECTION INTRAMUSCULAR; INTRAVENOUS; SUBCUTANEOUS at 14:11

## 2021-01-01 RX ADMIN — PIPERACILLIN AND TAZOBACTAM 200 GRAM(S): 4; .5 INJECTION, POWDER, LYOPHILIZED, FOR SOLUTION INTRAVENOUS at 12:06

## 2021-01-01 RX ADMIN — OXYCODONE HYDROCHLORIDE 10 MILLIGRAM(S): 5 TABLET ORAL at 10:22

## 2021-01-01 RX ADMIN — Medication 3 MILLILITER(S): at 00:15

## 2021-01-01 RX ADMIN — BUDESONIDE AND FORMOTEROL FUMARATE DIHYDRATE 2 PUFF(S): 160; 4.5 AEROSOL RESPIRATORY (INHALATION) at 03:32

## 2021-01-01 RX ADMIN — Medication 3 MILLILITER(S): at 11:56

## 2021-01-01 RX ADMIN — BICTEGRAVIR SODIUM, EMTRICITABINE, AND TENOFOVIR ALAFENAMIDE FUMARATE 1 TABLET(S): 30; 120; 15 TABLET ORAL at 11:05

## 2021-01-01 RX ADMIN — Medication 100 MILLIGRAM(S): at 05:55

## 2021-01-01 RX ADMIN — FLUCONAZOLE 100 MILLIGRAM(S): 150 TABLET ORAL at 21:48

## 2021-01-01 RX ADMIN — HYDROMORPHONE HYDROCHLORIDE 1 MILLIGRAM(S): 2 INJECTION INTRAMUSCULAR; INTRAVENOUS; SUBCUTANEOUS at 00:00

## 2021-01-01 RX ADMIN — ETOMIDATE 15 MILLIGRAM(S): 2 INJECTION INTRAVENOUS at 17:21

## 2021-01-01 RX ADMIN — Medication 3 MILLILITER(S): at 05:59

## 2021-01-01 RX ADMIN — Medication 650 MILLIGRAM(S): at 09:08

## 2021-01-01 RX ADMIN — TIOTROPIUM BROMIDE AND OLODATEROL 2 PUFF(S): 3.124; 2.736 SPRAY, METERED RESPIRATORY (INHALATION) at 12:10

## 2021-01-01 RX ADMIN — Medication 1 TABLET(S): at 12:33

## 2021-01-01 RX ADMIN — SENNA PLUS 2 TABLET(S): 8.6 TABLET ORAL at 21:08

## 2021-01-01 RX ADMIN — SENNA PLUS 2 TABLET(S): 8.6 TABLET ORAL at 22:45

## 2021-01-01 RX ADMIN — HYDROMORPHONE HYDROCHLORIDE 4 MILLIGRAM(S): 2 INJECTION INTRAMUSCULAR; INTRAVENOUS; SUBCUTANEOUS at 03:39

## 2021-01-01 RX ADMIN — BUDESONIDE AND FORMOTEROL FUMARATE DIHYDRATE 2 PUFF(S): 160; 4.5 AEROSOL RESPIRATORY (INHALATION) at 21:36

## 2021-01-01 RX ADMIN — OXYCODONE HYDROCHLORIDE 10 MILLIGRAM(S): 5 TABLET ORAL at 23:50

## 2021-01-01 RX ADMIN — OXYCODONE HYDROCHLORIDE 15 MILLIGRAM(S): 5 TABLET ORAL at 05:30

## 2021-01-01 RX ADMIN — BICTEGRAVIR SODIUM, EMTRICITABINE, AND TENOFOVIR ALAFENAMIDE FUMARATE 1 TABLET(S): 30; 120; 15 TABLET ORAL at 11:14

## 2021-01-01 RX ADMIN — HYDROMORPHONE HYDROCHLORIDE 4 MILLIGRAM(S): 2 INJECTION INTRAMUSCULAR; INTRAVENOUS; SUBCUTANEOUS at 09:34

## 2021-01-01 RX ADMIN — OLANZAPINE 5 MILLIGRAM(S): 15 TABLET, FILM COATED ORAL at 20:56

## 2021-01-01 RX ADMIN — Medication 1 PATCH: at 13:31

## 2021-01-01 RX ADMIN — Medication 1 PATCH: at 11:27

## 2021-01-01 RX ADMIN — OLANZAPINE 5 MILLIGRAM(S): 15 TABLET, FILM COATED ORAL at 21:17

## 2021-01-01 RX ADMIN — Medication 1 PATCH: at 07:23

## 2021-01-01 RX ADMIN — OXYCODONE HYDROCHLORIDE 10 MILLIGRAM(S): 5 TABLET ORAL at 23:45

## 2021-01-01 RX ADMIN — Medication 32 MILLIGRAM(S): at 14:49

## 2021-01-01 RX ADMIN — Medication 25 MILLIGRAM(S): at 03:59

## 2021-01-01 RX ADMIN — Medication 1 DROP(S): at 17:18

## 2021-01-01 RX ADMIN — PIPERACILLIN AND TAZOBACTAM 200 GRAM(S): 4; .5 INJECTION, POWDER, LYOPHILIZED, FOR SOLUTION INTRAVENOUS at 21:10

## 2021-01-01 RX ADMIN — Medication 50 MILLILITER(S): at 11:52

## 2021-01-01 RX ADMIN — PIPERACILLIN AND TAZOBACTAM 200 GRAM(S): 4; .5 INJECTION, POWDER, LYOPHILIZED, FOR SOLUTION INTRAVENOUS at 17:20

## 2021-01-01 RX ADMIN — OLANZAPINE 5 MILLIGRAM(S): 15 TABLET, FILM COATED ORAL at 21:08

## 2021-01-01 RX ADMIN — OXYCODONE HYDROCHLORIDE 10 MILLIGRAM(S): 5 TABLET ORAL at 15:10

## 2021-01-01 RX ADMIN — OXYCODONE HYDROCHLORIDE 15 MILLIGRAM(S): 5 TABLET ORAL at 13:42

## 2021-01-01 RX ADMIN — HYDROMORPHONE HYDROCHLORIDE 1 MILLIGRAM(S): 2 INJECTION INTRAMUSCULAR; INTRAVENOUS; SUBCUTANEOUS at 01:44

## 2021-01-01 RX ADMIN — POLYETHYLENE GLYCOL 3350 17 GRAM(S): 17 POWDER, FOR SOLUTION ORAL at 10:18

## 2021-01-01 RX ADMIN — BICTEGRAVIR SODIUM, EMTRICITABINE, AND TENOFOVIR ALAFENAMIDE FUMARATE 1 TABLET(S): 30; 120; 15 TABLET ORAL at 11:55

## 2021-01-01 RX ADMIN — Medication 2: at 05:33

## 2021-01-01 RX ADMIN — Medication 1 PATCH: at 11:28

## 2021-01-01 RX ADMIN — Medication 25 MILLIGRAM(S): at 21:07

## 2021-01-01 RX ADMIN — Medication 25 MILLIGRAM(S): at 04:18

## 2021-01-01 RX ADMIN — Medication 1 PATCH: at 08:23

## 2021-01-01 RX ADMIN — OXYCODONE HYDROCHLORIDE 15 MILLIGRAM(S): 5 TABLET ORAL at 15:18

## 2021-01-01 RX ADMIN — OXYCODONE HYDROCHLORIDE 10 MILLIGRAM(S): 5 TABLET ORAL at 10:05

## 2021-01-01 RX ADMIN — CHLORHEXIDINE GLUCONATE 15 MILLILITER(S): 213 SOLUTION TOPICAL at 18:33

## 2021-01-01 RX ADMIN — Medication 650 MILLIGRAM(S): at 17:46

## 2021-01-01 RX ADMIN — Medication 1 PATCH: at 06:58

## 2021-01-01 RX ADMIN — Medication 1 DROP(S): at 05:34

## 2021-01-01 RX ADMIN — Medication 650 MILLIGRAM(S): at 15:50

## 2021-01-01 RX ADMIN — FLUCONAZOLE 100 MILLIGRAM(S): 150 TABLET ORAL at 21:45

## 2021-01-01 RX ADMIN — OXYCODONE HYDROCHLORIDE 15 MILLIGRAM(S): 5 TABLET ORAL at 16:00

## 2021-01-01 RX ADMIN — DEXMEDETOMIDINE HYDROCHLORIDE IN 0.9% SODIUM CHLORIDE 2.5 MICROGRAM(S)/KG/HR: 4 INJECTION INTRAVENOUS at 08:47

## 2021-01-01 RX ADMIN — Medication 1 PATCH: at 12:12

## 2021-01-01 RX ADMIN — OXYCODONE HYDROCHLORIDE 10 MILLIGRAM(S): 5 TABLET ORAL at 21:16

## 2021-01-01 RX ADMIN — Medication 3 MILLILITER(S): at 03:59

## 2021-01-01 RX ADMIN — BICTEGRAVIR SODIUM, EMTRICITABINE, AND TENOFOVIR ALAFENAMIDE FUMARATE 1 TABLET(S): 30; 120; 15 TABLET ORAL at 12:03

## 2021-01-01 RX ADMIN — Medication 1 TABLET(S): at 17:48

## 2021-01-01 RX ADMIN — Medication 100 MILLIGRAM(S): at 18:05

## 2021-01-01 RX ADMIN — HYDROMORPHONE HYDROCHLORIDE 1 MILLIGRAM(S): 2 INJECTION INTRAMUSCULAR; INTRAVENOUS; SUBCUTANEOUS at 13:20

## 2021-01-01 RX ADMIN — OLANZAPINE 5 MILLIGRAM(S): 15 TABLET, FILM COATED ORAL at 19:05

## 2021-01-01 RX ADMIN — HEPARIN SODIUM 5000 UNIT(S): 5000 INJECTION INTRAVENOUS; SUBCUTANEOUS at 21:54

## 2021-01-01 RX ADMIN — HYDROMORPHONE HYDROCHLORIDE 1 MILLIGRAM(S): 2 INJECTION INTRAMUSCULAR; INTRAVENOUS; SUBCUTANEOUS at 07:53

## 2021-01-01 RX ADMIN — OXYCODONE HYDROCHLORIDE 15 MILLIGRAM(S): 5 TABLET ORAL at 21:57

## 2021-01-01 RX ADMIN — Medication 250 MILLIGRAM(S): at 21:17

## 2021-01-01 RX ADMIN — DEXMEDETOMIDINE HYDROCHLORIDE IN 0.9% SODIUM CHLORIDE 1 MICROGRAM(S)/KG/HR: 4 INJECTION INTRAVENOUS at 13:50

## 2021-01-01 RX ADMIN — BUDESONIDE AND FORMOTEROL FUMARATE DIHYDRATE 2 PUFF(S): 160; 4.5 AEROSOL RESPIRATORY (INHALATION) at 21:59

## 2021-01-01 RX ADMIN — Medication 100 MILLIGRAM(S): at 19:08

## 2021-01-01 RX ADMIN — DEXMEDETOMIDINE HYDROCHLORIDE IN 0.9% SODIUM CHLORIDE 2.5 MICROGRAM(S)/KG/HR: 4 INJECTION INTRAVENOUS at 06:30

## 2021-01-01 RX ADMIN — BICTEGRAVIR SODIUM, EMTRICITABINE, AND TENOFOVIR ALAFENAMIDE FUMARATE 1 TABLET(S): 30; 120; 15 TABLET ORAL at 12:10

## 2021-01-01 RX ADMIN — Medication 25 MILLIGRAM(S): at 09:41

## 2021-01-01 RX ADMIN — BICTEGRAVIR SODIUM, EMTRICITABINE, AND TENOFOVIR ALAFENAMIDE FUMARATE 1 TABLET(S): 30; 120; 15 TABLET ORAL at 12:43

## 2021-01-01 RX ADMIN — Medication 1 PATCH: at 11:06

## 2021-01-01 RX ADMIN — Medication 1 TABLET(S): at 23:04

## 2021-01-01 RX ADMIN — OXYCODONE HYDROCHLORIDE 10 MILLIGRAM(S): 5 TABLET ORAL at 11:10

## 2021-01-01 RX ADMIN — PROPOFOL 1.5 MICROGRAM(S)/KG/MIN: 10 INJECTION, EMULSION INTRAVENOUS at 02:52

## 2021-01-01 RX ADMIN — PROPOFOL 1.5 MICROGRAM(S)/KG/MIN: 10 INJECTION, EMULSION INTRAVENOUS at 00:45

## 2021-01-01 RX ADMIN — FENTANYL CITRATE 50 MICROGRAM(S): 50 INJECTION INTRAVENOUS at 08:58

## 2021-01-01 RX ADMIN — PIPERACILLIN AND TAZOBACTAM 200 GRAM(S): 4; .5 INJECTION, POWDER, LYOPHILIZED, FOR SOLUTION INTRAVENOUS at 00:25

## 2021-01-01 RX ADMIN — TIOTROPIUM BROMIDE AND OLODATEROL 2 PUFF(S): 3.124; 2.736 SPRAY, METERED RESPIRATORY (INHALATION) at 17:59

## 2021-01-01 RX ADMIN — OXYCODONE HYDROCHLORIDE 10 MILLIGRAM(S): 5 TABLET ORAL at 22:15

## 2021-01-01 RX ADMIN — HEPARIN SODIUM 5000 UNIT(S): 5000 INJECTION INTRAVENOUS; SUBCUTANEOUS at 05:13

## 2021-01-01 RX ADMIN — Medication 4.68 MICROGRAM(S)/KG/MIN: at 13:49

## 2021-01-01 RX ADMIN — Medication 1 TABLET(S): at 21:59

## 2021-01-01 RX ADMIN — CEFTRIAXONE 1000 MILLIGRAM(S): 500 INJECTION, POWDER, FOR SOLUTION INTRAMUSCULAR; INTRAVENOUS at 01:47

## 2021-01-01 RX ADMIN — INSULIN HUMAN 5 UNIT(S): 100 INJECTION, SOLUTION SUBCUTANEOUS at 23:31

## 2021-01-01 RX ADMIN — Medication 3 MILLILITER(S): at 00:49

## 2021-01-01 RX ADMIN — POLYETHYLENE GLYCOL 3350 17 GRAM(S): 17 POWDER, FOR SOLUTION ORAL at 12:09

## 2021-01-01 RX ADMIN — OXYCODONE HYDROCHLORIDE 10 MILLIGRAM(S): 5 TABLET ORAL at 14:31

## 2021-01-01 RX ADMIN — Medication 265.62 MILLIGRAM(S): at 21:37

## 2021-01-01 RX ADMIN — Medication 1 DROP(S): at 18:32

## 2021-01-01 RX ADMIN — Medication 650 MILLIGRAM(S): at 18:23

## 2021-01-01 RX ADMIN — Medication 1 PATCH: at 11:36

## 2021-01-01 RX ADMIN — SENNA PLUS 2 TABLET(S): 8.6 TABLET ORAL at 22:55

## 2021-01-01 RX ADMIN — Medication 1 PATCH: at 18:07

## 2021-01-01 RX ADMIN — OXYCODONE HYDROCHLORIDE 15 MILLIGRAM(S): 5 TABLET ORAL at 22:43

## 2021-01-01 RX ADMIN — Medication 1 PATCH: at 08:24

## 2021-01-01 RX ADMIN — Medication 40 MILLIGRAM(S): at 21:58

## 2021-01-01 RX ADMIN — PROPOFOL 2.99 MICROGRAM(S)/KG/MIN: 10 INJECTION, EMULSION INTRAVENOUS at 21:47

## 2021-01-01 RX ADMIN — Medication 1 TABLET(S): at 05:14

## 2021-01-01 RX ADMIN — SODIUM CHLORIDE 3 MILLILITER(S): 9 INJECTION INTRAMUSCULAR; INTRAVENOUS; SUBCUTANEOUS at 11:53

## 2021-01-01 RX ADMIN — Medication 250 MILLIGRAM(S): at 16:16

## 2021-01-01 RX ADMIN — OXYCODONE HYDROCHLORIDE 15 MILLIGRAM(S): 5 TABLET ORAL at 09:45

## 2021-01-01 RX ADMIN — FENTANYL CITRATE 50 MICROGRAM(S): 50 INJECTION INTRAVENOUS at 18:35

## 2021-01-01 RX ADMIN — HYDROMORPHONE HYDROCHLORIDE 1 MILLIGRAM(S): 2 INJECTION INTRAMUSCULAR; INTRAVENOUS; SUBCUTANEOUS at 00:09

## 2021-01-01 RX ADMIN — Medication 265.62 MILLIGRAM(S): at 06:02

## 2021-01-01 RX ADMIN — TIOTROPIUM BROMIDE AND OLODATEROL 2 PUFF(S): 3.124; 2.736 SPRAY, METERED RESPIRATORY (INHALATION) at 10:20

## 2021-01-01 RX ADMIN — Medication 4.68 MICROGRAM(S)/KG/MIN: at 14:41

## 2021-01-01 RX ADMIN — Medication 40 MILLIGRAM(S): at 22:46

## 2021-01-01 RX ADMIN — HYDROMORPHONE HYDROCHLORIDE 1 MILLIGRAM(S): 2 INJECTION INTRAMUSCULAR; INTRAVENOUS; SUBCUTANEOUS at 21:44

## 2021-01-01 RX ADMIN — CHLORHEXIDINE GLUCONATE 1 APPLICATION(S): 213 SOLUTION TOPICAL at 11:52

## 2021-01-01 RX ADMIN — Medication 650 MILLIGRAM(S): at 14:48

## 2021-01-01 RX ADMIN — CHLORHEXIDINE GLUCONATE 15 MILLILITER(S): 213 SOLUTION TOPICAL at 05:09

## 2021-01-01 RX ADMIN — MORPHINE SULFATE 2 MILLIGRAM(S): 50 CAPSULE, EXTENDED RELEASE ORAL at 21:30

## 2021-01-01 RX ADMIN — FENTANYL CITRATE 50 MICROGRAM(S): 50 INJECTION INTRAVENOUS at 17:28

## 2021-01-01 RX ADMIN — OLANZAPINE 5 MILLIGRAM(S): 15 TABLET, FILM COATED ORAL at 21:37

## 2021-01-01 RX ADMIN — SENNA PLUS 2 TABLET(S): 8.6 TABLET ORAL at 21:07

## 2021-01-01 RX ADMIN — BUDESONIDE AND FORMOTEROL FUMARATE DIHYDRATE 2 PUFF(S): 160; 4.5 AEROSOL RESPIRATORY (INHALATION) at 09:43

## 2021-01-01 RX ADMIN — OXYCODONE HYDROCHLORIDE 10 MILLIGRAM(S): 5 TABLET ORAL at 22:49

## 2021-01-01 RX ADMIN — Medication 1 PATCH: at 08:02

## 2021-01-01 RX ADMIN — HYDROMORPHONE HYDROCHLORIDE 4 MILLIGRAM(S): 2 INJECTION INTRAMUSCULAR; INTRAVENOUS; SUBCUTANEOUS at 05:14

## 2021-01-01 RX ADMIN — Medication 1 TABLET(S): at 22:34

## 2021-01-01 RX ADMIN — OLANZAPINE 5 MILLIGRAM(S): 15 TABLET, FILM COATED ORAL at 11:36

## 2021-01-01 RX ADMIN — Medication 2: at 12:51

## 2021-01-01 RX ADMIN — Medication 3 MILLILITER(S): at 18:04

## 2021-01-01 RX ADMIN — Medication 1 PATCH: at 17:44

## 2021-01-01 RX ADMIN — Medication 25 MILLIGRAM(S): at 23:10

## 2021-01-01 RX ADMIN — Medication 3 MILLILITER(S): at 10:02

## 2021-01-01 RX ADMIN — Medication 25 MILLIGRAM(S): at 04:10

## 2021-01-01 RX ADMIN — CHLORHEXIDINE GLUCONATE 15 MILLILITER(S): 213 SOLUTION TOPICAL at 05:34

## 2021-01-01 RX ADMIN — OXYCODONE HYDROCHLORIDE 10 MILLIGRAM(S): 5 TABLET ORAL at 12:57

## 2021-01-01 RX ADMIN — ATORVASTATIN CALCIUM 40 MILLIGRAM(S): 80 TABLET, FILM COATED ORAL at 21:13

## 2021-01-01 RX ADMIN — HYDROMORPHONE HYDROCHLORIDE 4 MILLIGRAM(S): 2 INJECTION INTRAMUSCULAR; INTRAVENOUS; SUBCUTANEOUS at 22:17

## 2021-01-01 RX ADMIN — OXYCODONE HYDROCHLORIDE 15 MILLIGRAM(S): 5 TABLET ORAL at 10:45

## 2021-01-01 RX ADMIN — Medication 1 PATCH: at 11:45

## 2021-01-01 RX ADMIN — Medication 1 TABLET(S): at 12:23

## 2021-01-01 RX ADMIN — Medication 1 PATCH: at 07:04

## 2021-01-01 RX ADMIN — Medication 3 MILLILITER(S): at 12:32

## 2021-01-01 RX ADMIN — BUDESONIDE AND FORMOTEROL FUMARATE DIHYDRATE 2 PUFF(S): 160; 4.5 AEROSOL RESPIRATORY (INHALATION) at 09:02

## 2021-01-01 RX ADMIN — PROPOFOL 1.5 MICROGRAM(S)/KG/MIN: 10 INJECTION, EMULSION INTRAVENOUS at 23:25

## 2021-01-01 RX ADMIN — Medication 3 MILLILITER(S): at 06:38

## 2021-01-01 RX ADMIN — ATORVASTATIN CALCIUM 40 MILLIGRAM(S): 80 TABLET, FILM COATED ORAL at 22:17

## 2021-01-01 RX ADMIN — MIRTAZAPINE 15 MILLIGRAM(S): 45 TABLET, ORALLY DISINTEGRATING ORAL at 22:56

## 2021-01-01 RX ADMIN — BICTEGRAVIR SODIUM, EMTRICITABINE, AND TENOFOVIR ALAFENAMIDE FUMARATE 1 TABLET(S): 30; 120; 15 TABLET ORAL at 11:26

## 2021-01-01 RX ADMIN — HYDROMORPHONE HYDROCHLORIDE 1 MILLIGRAM(S): 2 INJECTION INTRAMUSCULAR; INTRAVENOUS; SUBCUTANEOUS at 18:59

## 2021-01-01 RX ADMIN — HYDROMORPHONE HYDROCHLORIDE 1 MILLIGRAM(S): 2 INJECTION INTRAMUSCULAR; INTRAVENOUS; SUBCUTANEOUS at 02:00

## 2021-01-01 RX ADMIN — MIRTAZAPINE 15 MILLIGRAM(S): 45 TABLET, ORALLY DISINTEGRATING ORAL at 22:47

## 2021-01-01 RX ADMIN — Medication 1 PATCH: at 20:29

## 2021-01-01 RX ADMIN — Medication 3 MILLILITER(S): at 07:02

## 2021-01-01 RX ADMIN — Medication 1 PATCH: at 10:00

## 2021-01-01 RX ADMIN — MIRTAZAPINE 15 MILLIGRAM(S): 45 TABLET, ORALLY DISINTEGRATING ORAL at 22:37

## 2021-01-01 RX ADMIN — Medication 265.62 MILLIGRAM(S): at 14:15

## 2021-01-01 RX ADMIN — Medication 25 MILLIGRAM(S): at 23:20

## 2021-01-01 RX ADMIN — MIRTAZAPINE 15 MILLIGRAM(S): 45 TABLET, ORALLY DISINTEGRATING ORAL at 21:17

## 2021-01-01 RX ADMIN — Medication 25 MILLIGRAM(S): at 21:08

## 2021-01-01 RX ADMIN — OXYCODONE HYDROCHLORIDE 15 MILLIGRAM(S): 5 TABLET ORAL at 22:30

## 2021-01-01 RX ADMIN — Medication 1 PATCH: at 11:00

## 2021-01-01 RX ADMIN — SENNA PLUS 2 TABLET(S): 8.6 TABLET ORAL at 21:16

## 2021-01-01 RX ADMIN — Medication 1 DROP(S): at 17:32

## 2021-01-01 RX ADMIN — ENOXAPARIN SODIUM 40 MILLIGRAM(S): 100 INJECTION SUBCUTANEOUS at 10:28

## 2021-01-01 RX ADMIN — PANTOPRAZOLE SODIUM 40 MILLIGRAM(S): 20 TABLET, DELAYED RELEASE ORAL at 12:03

## 2021-01-01 RX ADMIN — Medication 1 PATCH: at 10:20

## 2021-01-01 RX ADMIN — ALBUTEROL 2 PUFF(S): 90 AEROSOL, METERED ORAL at 21:30

## 2021-01-01 RX ADMIN — Medication 4.68 MICROGRAM(S)/KG/MIN: at 18:24

## 2021-01-01 RX ADMIN — BUDESONIDE AND FORMOTEROL FUMARATE DIHYDRATE 2 PUFF(S): 160; 4.5 AEROSOL RESPIRATORY (INHALATION) at 10:10

## 2021-01-01 RX ADMIN — Medication 3 MILLILITER(S): at 23:05

## 2021-01-01 RX ADMIN — OXYCODONE HYDROCHLORIDE 10 MILLIGRAM(S): 5 TABLET ORAL at 04:54

## 2021-01-01 RX ADMIN — Medication 1 PATCH: at 12:09

## 2021-01-01 RX ADMIN — Medication 1 PATCH: at 11:54

## 2021-01-01 RX ADMIN — OXYCODONE HYDROCHLORIDE 10 MILLIGRAM(S): 5 TABLET ORAL at 22:34

## 2021-01-01 RX ADMIN — PANTOPRAZOLE SODIUM 40 MILLIGRAM(S): 20 TABLET, DELAYED RELEASE ORAL at 12:26

## 2021-01-01 RX ADMIN — Medication 1 PATCH: at 18:14

## 2021-01-01 RX ADMIN — MORPHINE SULFATE 4 MILLIGRAM(S): 50 CAPSULE, EXTENDED RELEASE ORAL at 17:54

## 2021-01-01 RX ADMIN — OXYCODONE HYDROCHLORIDE 15 MILLIGRAM(S): 5 TABLET ORAL at 17:43

## 2021-01-01 RX ADMIN — PROPOFOL 1.5 MICROGRAM(S)/KG/MIN: 10 INJECTION, EMULSION INTRAVENOUS at 14:41

## 2021-01-01 RX ADMIN — OXYCODONE HYDROCHLORIDE 10 MILLIGRAM(S): 5 TABLET ORAL at 05:57

## 2021-01-01 RX ADMIN — OXYCODONE HYDROCHLORIDE 15 MILLIGRAM(S): 5 TABLET ORAL at 03:26

## 2021-01-01 RX ADMIN — SENNA PLUS 2 TABLET(S): 8.6 TABLET ORAL at 21:36

## 2021-01-01 RX ADMIN — Medication 1 PATCH: at 18:03

## 2021-01-01 RX ADMIN — ENOXAPARIN SODIUM 40 MILLIGRAM(S): 100 INJECTION SUBCUTANEOUS at 09:59

## 2021-01-01 RX ADMIN — Medication 75 MILLIGRAM(S): at 17:22

## 2021-01-01 RX ADMIN — ATORVASTATIN CALCIUM 40 MILLIGRAM(S): 80 TABLET, FILM COATED ORAL at 21:07

## 2021-01-01 RX ADMIN — PIPERACILLIN AND TAZOBACTAM 200 GRAM(S): 4; .5 INJECTION, POWDER, LYOPHILIZED, FOR SOLUTION INTRAVENOUS at 05:43

## 2021-01-01 RX ADMIN — Medication 4.68 MICROGRAM(S)/KG/MIN: at 20:39

## 2021-01-01 RX ADMIN — OXYCODONE HYDROCHLORIDE 15 MILLIGRAM(S): 5 TABLET ORAL at 19:37

## 2021-01-01 RX ADMIN — OXYCODONE HYDROCHLORIDE 15 MILLIGRAM(S): 5 TABLET ORAL at 19:08

## 2021-01-01 RX ADMIN — Medication 1 PATCH: at 19:14

## 2021-01-01 RX ADMIN — PIPERACILLIN AND TAZOBACTAM 200 GRAM(S): 4; .5 INJECTION, POWDER, LYOPHILIZED, FOR SOLUTION INTRAVENOUS at 01:48

## 2021-01-01 RX ADMIN — PANTOPRAZOLE SODIUM 40 MILLIGRAM(S): 20 TABLET, DELAYED RELEASE ORAL at 12:49

## 2021-01-01 RX ADMIN — Medication 4.68 MICROGRAM(S)/KG/MIN: at 00:31

## 2021-01-01 RX ADMIN — FENTANYL CITRATE 50 MICROGRAM(S): 50 INJECTION INTRAVENOUS at 17:56

## 2021-01-01 RX ADMIN — HYDROMORPHONE HYDROCHLORIDE 4 MILLIGRAM(S): 2 INJECTION INTRAMUSCULAR; INTRAVENOUS; SUBCUTANEOUS at 11:55

## 2021-01-01 RX ADMIN — HYDROMORPHONE HYDROCHLORIDE 1 MILLIGRAM(S): 2 INJECTION INTRAMUSCULAR; INTRAVENOUS; SUBCUTANEOUS at 08:10

## 2021-01-01 RX ADMIN — OXYCODONE HYDROCHLORIDE 10 MILLIGRAM(S): 5 TABLET ORAL at 23:34

## 2021-01-01 RX ADMIN — OXYCODONE HYDROCHLORIDE 15 MILLIGRAM(S): 5 TABLET ORAL at 03:00

## 2021-01-01 RX ADMIN — OXYCODONE HYDROCHLORIDE 15 MILLIGRAM(S): 5 TABLET ORAL at 17:52

## 2021-01-01 RX ADMIN — OXYCODONE HYDROCHLORIDE 15 MILLIGRAM(S): 5 TABLET ORAL at 09:41

## 2021-01-01 RX ADMIN — Medication 25 MILLIGRAM(S): at 09:57

## 2021-01-01 RX ADMIN — PIPERACILLIN AND TAZOBACTAM 200 GRAM(S): 4; .5 INJECTION, POWDER, LYOPHILIZED, FOR SOLUTION INTRAVENOUS at 07:21

## 2021-01-01 RX ADMIN — Medication 1 PATCH: at 06:21

## 2021-01-01 RX ADMIN — MIRTAZAPINE 15 MILLIGRAM(S): 45 TABLET, ORALLY DISINTEGRATING ORAL at 22:02

## 2021-01-01 RX ADMIN — SODIUM CHLORIDE 1000 MILLILITER(S): 9 INJECTION INTRAMUSCULAR; INTRAVENOUS; SUBCUTANEOUS at 16:01

## 2021-01-01 RX ADMIN — Medication 1 PATCH: at 18:43

## 2021-01-01 RX ADMIN — Medication 1 DROP(S): at 05:11

## 2021-01-01 RX ADMIN — Medication 250 MILLIGRAM(S): at 06:29

## 2021-01-01 RX ADMIN — Medication 650 MILLIGRAM(S): at 16:46

## 2021-01-01 RX ADMIN — Medication 5 MILLIGRAM(S): at 22:36

## 2021-01-01 RX ADMIN — BUDESONIDE AND FORMOTEROL FUMARATE DIHYDRATE 2 PUFF(S): 160; 4.5 AEROSOL RESPIRATORY (INHALATION) at 09:03

## 2021-01-01 RX ADMIN — PIPERACILLIN AND TAZOBACTAM 200 GRAM(S): 4; .5 INJECTION, POWDER, LYOPHILIZED, FOR SOLUTION INTRAVENOUS at 05:00

## 2021-01-01 RX ADMIN — OXYCODONE HYDROCHLORIDE 10 MILLIGRAM(S): 5 TABLET ORAL at 22:04

## 2021-01-01 RX ADMIN — BUDESONIDE AND FORMOTEROL FUMARATE DIHYDRATE 2 PUFF(S): 160; 4.5 AEROSOL RESPIRATORY (INHALATION) at 09:55

## 2021-01-01 RX ADMIN — MIRTAZAPINE 15 MILLIGRAM(S): 45 TABLET, ORALLY DISINTEGRATING ORAL at 21:12

## 2021-01-01 RX ADMIN — ENOXAPARIN SODIUM 40 MILLIGRAM(S): 100 INJECTION SUBCUTANEOUS at 11:06

## 2021-01-01 RX ADMIN — Medication 1 TABLET(S): at 11:14

## 2021-01-01 RX ADMIN — OXYCODONE HYDROCHLORIDE 10 MILLIGRAM(S): 5 TABLET ORAL at 23:00

## 2021-01-01 RX ADMIN — PANTOPRAZOLE SODIUM 40 MILLIGRAM(S): 20 TABLET, DELAYED RELEASE ORAL at 12:08

## 2021-01-01 RX ADMIN — INSULIN HUMAN 5 UNIT(S): 100 INJECTION, SOLUTION SUBCUTANEOUS at 12:38

## 2021-01-01 RX ADMIN — MIRTAZAPINE 15 MILLIGRAM(S): 45 TABLET, ORALLY DISINTEGRATING ORAL at 21:58

## 2021-01-01 RX ADMIN — BICTEGRAVIR SODIUM, EMTRICITABINE, AND TENOFOVIR ALAFENAMIDE FUMARATE 1 TABLET(S): 30; 120; 15 TABLET ORAL at 12:23

## 2021-01-01 RX ADMIN — OXYCODONE HYDROCHLORIDE 15 MILLIGRAM(S): 5 TABLET ORAL at 10:03

## 2021-01-01 RX ADMIN — Medication 750 MILLIGRAM(S): at 14:00

## 2021-01-01 RX ADMIN — Medication 1 DROP(S): at 18:47

## 2021-01-01 RX ADMIN — Medication 100 MILLIGRAM(S): at 06:31

## 2021-01-01 RX ADMIN — HYDROMORPHONE HYDROCHLORIDE 1 MILLIGRAM(S): 2 INJECTION INTRAMUSCULAR; INTRAVENOUS; SUBCUTANEOUS at 14:02

## 2021-01-01 RX ADMIN — SENNA PLUS 2 TABLET(S): 8.6 TABLET ORAL at 22:06

## 2021-01-01 RX ADMIN — ENOXAPARIN SODIUM 40 MILLIGRAM(S): 100 INJECTION SUBCUTANEOUS at 10:11

## 2021-01-01 RX ADMIN — SODIUM CHLORIDE 1250 MILLILITER(S): 9 INJECTION INTRAMUSCULAR; INTRAVENOUS; SUBCUTANEOUS at 01:47

## 2021-01-01 RX ADMIN — ATORVASTATIN CALCIUM 40 MILLIGRAM(S): 80 TABLET, FILM COATED ORAL at 21:37

## 2021-01-01 RX ADMIN — Medication 1 TABLET(S): at 12:25

## 2021-01-01 RX ADMIN — Medication 1 DROP(S): at 05:46

## 2021-01-01 RX ADMIN — ENOXAPARIN SODIUM 40 MILLIGRAM(S): 100 INJECTION SUBCUTANEOUS at 14:07

## 2021-01-01 RX ADMIN — CHLORHEXIDINE GLUCONATE 15 MILLILITER(S): 213 SOLUTION TOPICAL at 05:14

## 2021-01-01 RX ADMIN — Medication 25 MILLIGRAM(S): at 21:54

## 2021-01-01 RX ADMIN — Medication 4.68 MICROGRAM(S)/KG/MIN: at 03:02

## 2021-01-01 RX ADMIN — FLUCONAZOLE 200 MILLIGRAM(S): 150 TABLET ORAL at 22:18

## 2021-01-01 RX ADMIN — Medication 1 PATCH: at 11:05

## 2021-01-01 RX ADMIN — SODIUM CHLORIDE 1000 MILLILITER(S): 9 INJECTION INTRAMUSCULAR; INTRAVENOUS; SUBCUTANEOUS at 14:44

## 2021-01-01 RX ADMIN — Medication 650 MILLIGRAM(S): at 19:23

## 2021-01-01 RX ADMIN — PIPERACILLIN AND TAZOBACTAM 200 GRAM(S): 4; .5 INJECTION, POWDER, LYOPHILIZED, FOR SOLUTION INTRAVENOUS at 23:44

## 2021-01-01 RX ADMIN — POLYETHYLENE GLYCOL 3350 17 GRAM(S): 17 POWDER, FOR SOLUTION ORAL at 12:32

## 2021-01-01 RX ADMIN — ENOXAPARIN SODIUM 40 MILLIGRAM(S): 100 INJECTION SUBCUTANEOUS at 09:55

## 2021-01-01 RX ADMIN — Medication 25 MILLILITER(S): at 06:46

## 2021-01-01 RX ADMIN — Medication 25 MILLIGRAM(S): at 18:25

## 2021-01-01 RX ADMIN — SODIUM CHLORIDE 80 MILLILITER(S): 9 INJECTION, SOLUTION INTRAVENOUS at 21:54

## 2021-01-01 RX ADMIN — OXYCODONE HYDROCHLORIDE 15 MILLIGRAM(S): 5 TABLET ORAL at 16:05

## 2021-01-01 RX ADMIN — OLANZAPINE 5 MILLIGRAM(S): 15 TABLET, FILM COATED ORAL at 21:58

## 2021-01-01 RX ADMIN — BUDESONIDE AND FORMOTEROL FUMARATE DIHYDRATE 2 PUFF(S): 160; 4.5 AEROSOL RESPIRATORY (INHALATION) at 22:57

## 2021-01-01 RX ADMIN — SODIUM CHLORIDE 500 MILLILITER(S): 9 INJECTION INTRAMUSCULAR; INTRAVENOUS; SUBCUTANEOUS at 17:20

## 2021-01-01 RX ADMIN — TIOTROPIUM BROMIDE AND OLODATEROL 2 PUFF(S): 3.124; 2.736 SPRAY, METERED RESPIRATORY (INHALATION) at 13:21

## 2021-01-01 RX ADMIN — Medication 1 TABLET(S): at 04:11

## 2021-01-01 RX ADMIN — ATORVASTATIN CALCIUM 40 MILLIGRAM(S): 80 TABLET, FILM COATED ORAL at 22:55

## 2021-01-01 RX ADMIN — Medication 1 PATCH: at 18:20

## 2021-01-01 RX ADMIN — OXYCODONE HYDROCHLORIDE 15 MILLIGRAM(S): 5 TABLET ORAL at 05:53

## 2021-01-01 RX ADMIN — OXYCODONE HYDROCHLORIDE 15 MILLIGRAM(S): 5 TABLET ORAL at 03:27

## 2021-01-01 RX ADMIN — CHLORHEXIDINE GLUCONATE 15 MILLILITER(S): 213 SOLUTION TOPICAL at 05:55

## 2021-01-01 RX ADMIN — Medication 4.68 MICROGRAM(S)/KG/MIN: at 17:34

## 2021-01-01 RX ADMIN — BUDESONIDE AND FORMOTEROL FUMARATE DIHYDRATE 2 PUFF(S): 160; 4.5 AEROSOL RESPIRATORY (INHALATION) at 11:56

## 2021-01-01 RX ADMIN — MORPHINE SULFATE 2 MILLIGRAM(S): 50 CAPSULE, EXTENDED RELEASE ORAL at 21:47

## 2021-01-01 RX ADMIN — Medication 1 PATCH: at 10:18

## 2021-01-01 RX ADMIN — OXYCODONE HYDROCHLORIDE 15 MILLIGRAM(S): 5 TABLET ORAL at 16:35

## 2021-01-01 RX ADMIN — CHLORHEXIDINE GLUCONATE 15 MILLILITER(S): 213 SOLUTION TOPICAL at 17:18

## 2021-01-01 RX ADMIN — Medication 2: at 08:51

## 2021-01-01 RX ADMIN — Medication 250 MILLIGRAM(S): at 18:48

## 2021-01-01 RX ADMIN — OLANZAPINE 5 MILLIGRAM(S): 15 TABLET, FILM COATED ORAL at 17:30

## 2021-01-01 RX ADMIN — ATORVASTATIN CALCIUM 40 MILLIGRAM(S): 80 TABLET, FILM COATED ORAL at 21:15

## 2021-01-01 RX ADMIN — OXYCODONE HYDROCHLORIDE 15 MILLIGRAM(S): 5 TABLET ORAL at 09:00

## 2021-01-01 RX ADMIN — FENTANYL CITRATE 2.5 MICROGRAM(S)/KG/HR: 50 INJECTION INTRAVENOUS at 11:26

## 2021-01-01 RX ADMIN — OXYCODONE HYDROCHLORIDE 15 MILLIGRAM(S): 5 TABLET ORAL at 04:20

## 2021-01-01 RX ADMIN — Medication 2: at 12:08

## 2021-01-01 RX ADMIN — Medication 3 MILLILITER(S): at 10:01

## 2021-01-01 RX ADMIN — MIRTAZAPINE 15 MILLIGRAM(S): 45 TABLET, ORALLY DISINTEGRATING ORAL at 22:16

## 2021-01-01 RX ADMIN — FLUCONAZOLE 100 MILLIGRAM(S): 150 TABLET ORAL at 00:50

## 2021-01-01 RX ADMIN — OLANZAPINE 5 MILLIGRAM(S): 15 TABLET, FILM COATED ORAL at 21:15

## 2021-01-01 RX ADMIN — OXYCODONE HYDROCHLORIDE 15 MILLIGRAM(S): 5 TABLET ORAL at 00:48

## 2021-01-01 RX ADMIN — Medication 1 TABLET(S): at 12:03

## 2021-01-01 RX ADMIN — HYDROMORPHONE HYDROCHLORIDE 4 MILLIGRAM(S): 2 INJECTION INTRAMUSCULAR; INTRAVENOUS; SUBCUTANEOUS at 22:37

## 2021-01-01 RX ADMIN — SODIUM CHLORIDE 80 MILLILITER(S): 9 INJECTION, SOLUTION INTRAVENOUS at 03:46

## 2021-01-01 RX ADMIN — ENOXAPARIN SODIUM 40 MILLIGRAM(S): 100 INJECTION SUBCUTANEOUS at 13:35

## 2021-01-01 RX ADMIN — PIPERACILLIN AND TAZOBACTAM 200 GRAM(S): 4; .5 INJECTION, POWDER, LYOPHILIZED, FOR SOLUTION INTRAVENOUS at 05:34

## 2021-01-01 RX ADMIN — Medication 1 DROP(S): at 21:42

## 2021-01-01 RX ADMIN — OXYCODONE HYDROCHLORIDE 15 MILLIGRAM(S): 5 TABLET ORAL at 17:47

## 2021-01-01 RX ADMIN — Medication 1 TABLET(S): at 12:49

## 2021-01-01 RX ADMIN — Medication 4.68 MICROGRAM(S)/KG/MIN: at 10:12

## 2021-01-01 RX ADMIN — Medication 100 GRAM(S): at 17:03

## 2021-01-01 RX ADMIN — Medication 650 MILLIGRAM(S): at 09:01

## 2021-01-01 RX ADMIN — FENTANYL CITRATE 2.5 MICROGRAM(S)/KG/HR: 50 INJECTION INTRAVENOUS at 19:17

## 2021-01-01 RX ADMIN — Medication 1 PATCH: at 11:13

## 2021-01-01 RX ADMIN — OXYCODONE HYDROCHLORIDE 15 MILLIGRAM(S): 5 TABLET ORAL at 00:00

## 2021-01-01 RX ADMIN — Medication 125 MILLIGRAM(S): at 15:03

## 2021-01-01 RX ADMIN — OXYCODONE HYDROCHLORIDE 15 MILLIGRAM(S): 5 TABLET ORAL at 06:00

## 2021-01-01 RX ADMIN — PIPERACILLIN AND TAZOBACTAM 200 GRAM(S): 4; .5 INJECTION, POWDER, LYOPHILIZED, FOR SOLUTION INTRAVENOUS at 11:56

## 2021-01-01 RX ADMIN — PROPOFOL 2.99 MICROGRAM(S)/KG/MIN: 10 INJECTION, EMULSION INTRAVENOUS at 23:30

## 2021-01-01 RX ADMIN — HYDROMORPHONE HYDROCHLORIDE 1 MILLIGRAM(S): 2 INJECTION INTRAMUSCULAR; INTRAVENOUS; SUBCUTANEOUS at 12:15

## 2021-01-01 RX ADMIN — POTASSIUM PHOSPHATE, MONOBASIC POTASSIUM PHOSPHATE, DIBASIC 83.33 MILLIMOLE(S): 236; 224 INJECTION, SOLUTION INTRAVENOUS at 09:26

## 2021-01-01 RX ADMIN — BUDESONIDE AND FORMOTEROL FUMARATE DIHYDRATE 2 PUFF(S): 160; 4.5 AEROSOL RESPIRATORY (INHALATION) at 08:28

## 2021-01-01 RX ADMIN — Medication 300 MILLIGRAM(S): at 13:47

## 2021-01-01 RX ADMIN — Medication 1 PATCH: at 12:32

## 2021-01-01 RX ADMIN — CHLORHEXIDINE GLUCONATE 15 MILLILITER(S): 213 SOLUTION TOPICAL at 05:32

## 2021-01-01 RX ADMIN — HYDROMORPHONE HYDROCHLORIDE 1 MILLIGRAM(S): 2 INJECTION INTRAMUSCULAR; INTRAVENOUS; SUBCUTANEOUS at 22:00

## 2021-01-01 RX ADMIN — OXYCODONE HYDROCHLORIDE 10 MILLIGRAM(S): 5 TABLET ORAL at 06:55

## 2021-01-01 RX ADMIN — Medication 50 MILLILITER(S): at 12:54

## 2021-01-01 RX ADMIN — Medication 1 PATCH: at 07:00

## 2021-01-01 RX ADMIN — POLYETHYLENE GLYCOL 3350 17 GRAM(S): 17 POWDER, FOR SOLUTION ORAL at 11:14

## 2021-01-01 RX ADMIN — CHLORHEXIDINE GLUCONATE 1 APPLICATION(S): 213 SOLUTION TOPICAL at 11:23

## 2021-01-01 RX ADMIN — SENNA PLUS 2 TABLET(S): 8.6 TABLET ORAL at 22:16

## 2021-01-01 RX ADMIN — Medication 3 MILLILITER(S): at 12:34

## 2021-01-01 RX ADMIN — PIPERACILLIN AND TAZOBACTAM 200 GRAM(S): 4; .5 INJECTION, POWDER, LYOPHILIZED, FOR SOLUTION INTRAVENOUS at 03:00

## 2021-01-01 RX ADMIN — Medication 2: at 17:39

## 2021-01-01 RX ADMIN — Medication 12: at 23:57

## 2021-01-01 RX ADMIN — HYDROMORPHONE HYDROCHLORIDE 4 MILLIGRAM(S): 2 INJECTION INTRAMUSCULAR; INTRAVENOUS; SUBCUTANEOUS at 12:25

## 2021-01-01 RX ADMIN — Medication 650 MILLIGRAM(S): at 05:06

## 2021-01-01 RX ADMIN — Medication 3 MILLILITER(S): at 05:33

## 2021-01-01 RX ADMIN — Medication 3 MILLILITER(S): at 06:44

## 2021-01-01 RX ADMIN — OXYCODONE HYDROCHLORIDE 15 MILLIGRAM(S): 5 TABLET ORAL at 23:20

## 2021-01-01 RX ADMIN — HYDROMORPHONE HYDROCHLORIDE 4 MILLIGRAM(S): 2 INJECTION INTRAMUSCULAR; INTRAVENOUS; SUBCUTANEOUS at 22:00

## 2021-01-01 RX ADMIN — Medication 250 MILLIGRAM(S): at 19:23

## 2021-01-01 RX ADMIN — Medication 1 TABLET(S): at 05:58

## 2021-01-01 RX ADMIN — Medication 3 MILLIGRAM(S): at 03:27

## 2021-01-01 RX ADMIN — PIPERACILLIN AND TAZOBACTAM 200 GRAM(S): 4; .5 INJECTION, POWDER, LYOPHILIZED, FOR SOLUTION INTRAVENOUS at 18:28

## 2021-01-01 RX ADMIN — OXYCODONE HYDROCHLORIDE 15 MILLIGRAM(S): 5 TABLET ORAL at 02:29

## 2021-01-01 RX ADMIN — Medication 3 MILLILITER(S): at 00:59

## 2021-01-01 RX ADMIN — Medication 1 PATCH: at 12:33

## 2021-01-01 RX ADMIN — OXYCODONE HYDROCHLORIDE 15 MILLIGRAM(S): 5 TABLET ORAL at 03:58

## 2021-01-01 RX ADMIN — OXYCODONE HYDROCHLORIDE 15 MILLIGRAM(S): 5 TABLET ORAL at 04:47

## 2021-01-01 RX ADMIN — BUDESONIDE AND FORMOTEROL FUMARATE DIHYDRATE 2 PUFF(S): 160; 4.5 AEROSOL RESPIRATORY (INHALATION) at 09:08

## 2021-01-01 RX ADMIN — BUDESONIDE AND FORMOTEROL FUMARATE DIHYDRATE 2 PUFF(S): 160; 4.5 AEROSOL RESPIRATORY (INHALATION) at 21:58

## 2021-01-01 RX ADMIN — Medication 250 MILLIGRAM(S): at 02:55

## 2021-01-01 RX ADMIN — Medication 1 PATCH: at 11:40

## 2021-01-01 RX ADMIN — OXYCODONE HYDROCHLORIDE 15 MILLIGRAM(S): 5 TABLET ORAL at 20:56

## 2021-01-01 RX ADMIN — HYDROMORPHONE HYDROCHLORIDE 1 MILLIGRAM(S): 2 INJECTION INTRAMUSCULAR; INTRAVENOUS; SUBCUTANEOUS at 06:57

## 2021-01-01 RX ADMIN — MIRTAZAPINE 15 MILLIGRAM(S): 45 TABLET, ORALLY DISINTEGRATING ORAL at 21:08

## 2021-01-01 RX ADMIN — Medication 1 TABLET(S): at 12:32

## 2021-01-01 RX ADMIN — PIPERACILLIN AND TAZOBACTAM 200 GRAM(S): 4; .5 INJECTION, POWDER, LYOPHILIZED, FOR SOLUTION INTRAVENOUS at 11:00

## 2021-01-01 RX ADMIN — Medication 650 MILLIGRAM(S): at 21:30

## 2021-01-01 RX ADMIN — Medication 250 MILLIGRAM(S): at 06:32

## 2021-01-01 RX ADMIN — OXYCODONE HYDROCHLORIDE 15 MILLIGRAM(S): 5 TABLET ORAL at 10:15

## 2021-01-01 RX ADMIN — PANTOPRAZOLE SODIUM 40 MILLIGRAM(S): 20 TABLET, DELAYED RELEASE ORAL at 11:32

## 2021-01-01 RX ADMIN — BICTEGRAVIR SODIUM, EMTRICITABINE, AND TENOFOVIR ALAFENAMIDE FUMARATE 1 TABLET(S): 30; 120; 15 TABLET ORAL at 12:33

## 2021-01-01 RX ADMIN — ATORVASTATIN CALCIUM 40 MILLIGRAM(S): 80 TABLET, FILM COATED ORAL at 22:36

## 2021-01-01 RX ADMIN — Medication 250 MILLIGRAM(S): at 02:19

## 2021-01-01 RX ADMIN — Medication 1 TABLET(S): at 11:40

## 2021-01-01 RX ADMIN — PROPOFOL 1.5 MICROGRAM(S)/KG/MIN: 10 INJECTION, EMULSION INTRAVENOUS at 13:49

## 2021-01-01 RX ADMIN — OXYCODONE HYDROCHLORIDE 15 MILLIGRAM(S): 5 TABLET ORAL at 07:58

## 2021-01-01 RX ADMIN — ENOXAPARIN SODIUM 30 MILLIGRAM(S): 100 INJECTION SUBCUTANEOUS at 22:05

## 2021-01-01 RX ADMIN — CHLORHEXIDINE GLUCONATE 1 APPLICATION(S): 213 SOLUTION TOPICAL at 12:50

## 2021-01-01 RX ADMIN — Medication 5 MILLIGRAM(S): at 22:02

## 2021-01-01 RX ADMIN — ENOXAPARIN SODIUM 30 MILLIGRAM(S): 100 INJECTION SUBCUTANEOUS at 22:15

## 2021-01-01 RX ADMIN — MIDAZOLAM HYDROCHLORIDE 2 MILLIGRAM(S): 1 INJECTION, SOLUTION INTRAMUSCULAR; INTRAVENOUS at 16:01

## 2021-01-01 RX ADMIN — OXYCODONE HYDROCHLORIDE 15 MILLIGRAM(S): 5 TABLET ORAL at 20:30

## 2021-01-01 RX ADMIN — BUDESONIDE AND FORMOTEROL FUMARATE DIHYDRATE 2 PUFF(S): 160; 4.5 AEROSOL RESPIRATORY (INHALATION) at 23:31

## 2021-01-01 RX ADMIN — Medication 1 TABLET(S): at 12:10

## 2021-01-01 RX ADMIN — HUMAN INSULIN 10 UNIT(S): 100 INJECTION, SUSPENSION SUBCUTANEOUS at 00:27

## 2021-01-01 RX ADMIN — PIPERACILLIN AND TAZOBACTAM 200 GRAM(S): 4; .5 INJECTION, POWDER, LYOPHILIZED, FOR SOLUTION INTRAVENOUS at 08:31

## 2021-01-01 RX ADMIN — Medication 100 GRAM(S): at 05:34

## 2021-01-01 RX ADMIN — PIPERACILLIN AND TAZOBACTAM 200 GRAM(S): 4; .5 INJECTION, POWDER, LYOPHILIZED, FOR SOLUTION INTRAVENOUS at 06:31

## 2021-01-01 RX ADMIN — BICTEGRAVIR SODIUM, EMTRICITABINE, AND TENOFOVIR ALAFENAMIDE FUMARATE 1 TABLET(S): 30; 120; 15 TABLET ORAL at 12:12

## 2021-01-01 RX ADMIN — OXYCODONE HYDROCHLORIDE 10 MILLIGRAM(S): 5 TABLET ORAL at 14:47

## 2021-01-01 RX ADMIN — Medication 1 DROP(S): at 05:14

## 2021-01-01 RX ADMIN — OLANZAPINE 5 MILLIGRAM(S): 15 TABLET, FILM COATED ORAL at 21:13

## 2021-01-01 RX ADMIN — ENOXAPARIN SODIUM 40 MILLIGRAM(S): 100 INJECTION SUBCUTANEOUS at 11:13

## 2021-01-01 RX ADMIN — Medication 1 DROP(S): at 18:18

## 2021-01-01 RX ADMIN — Medication 6: at 17:17

## 2021-01-01 RX ADMIN — Medication 1 TABLET(S): at 11:05

## 2021-01-01 RX ADMIN — Medication 1 PATCH: at 11:56

## 2021-01-01 RX ADMIN — INSULIN HUMAN 5 UNIT(S): 100 INJECTION, SOLUTION SUBCUTANEOUS at 18:06

## 2021-01-01 RX ADMIN — MORPHINE SULFATE 4 MILLIGRAM(S): 50 CAPSULE, EXTENDED RELEASE ORAL at 03:48

## 2021-01-01 RX ADMIN — PROPOFOL 1.5 MICROGRAM(S)/KG/MIN: 10 INJECTION, EMULSION INTRAVENOUS at 03:01

## 2021-01-01 RX ADMIN — CHLORHEXIDINE GLUCONATE 15 MILLILITER(S): 213 SOLUTION TOPICAL at 18:51

## 2021-01-01 RX ADMIN — DEXMEDETOMIDINE HYDROCHLORIDE IN 0.9% SODIUM CHLORIDE 2.5 MICROGRAM(S)/KG/HR: 4 INJECTION INTRAVENOUS at 17:35

## 2021-01-01 RX ADMIN — Medication 1 PATCH: at 12:30

## 2021-01-01 RX ADMIN — BICTEGRAVIR SODIUM, EMTRICITABINE, AND TENOFOVIR ALAFENAMIDE FUMARATE 1 TABLET(S): 30; 120; 15 TABLET ORAL at 12:07

## 2021-01-01 RX ADMIN — Medication 1 TABLET(S): at 23:27

## 2021-01-01 RX ADMIN — OXYCODONE HYDROCHLORIDE 10 MILLIGRAM(S): 5 TABLET ORAL at 07:15

## 2021-01-01 RX ADMIN — OXYCODONE HYDROCHLORIDE 15 MILLIGRAM(S): 5 TABLET ORAL at 16:43

## 2021-01-01 RX ADMIN — CHLORHEXIDINE GLUCONATE 15 MILLILITER(S): 213 SOLUTION TOPICAL at 18:32

## 2021-01-01 RX ADMIN — Medication 260 MILLIGRAM(S): at 06:04

## 2021-01-01 RX ADMIN — FENTANYL CITRATE 2.5 MICROGRAM(S)/KG/HR: 50 INJECTION INTRAVENOUS at 07:28

## 2021-01-01 RX ADMIN — Medication 1 PATCH: at 18:06

## 2021-01-01 RX ADMIN — ATORVASTATIN CALCIUM 40 MILLIGRAM(S): 80 TABLET, FILM COATED ORAL at 22:02

## 2021-01-01 RX ADMIN — CHLORHEXIDINE GLUCONATE 1 APPLICATION(S): 213 SOLUTION TOPICAL at 12:04

## 2021-01-01 RX ADMIN — MORPHINE SULFATE 2 MILLIGRAM(S): 50 CAPSULE, EXTENDED RELEASE ORAL at 14:58

## 2021-01-01 RX ADMIN — SODIUM POLYSTYRENE SULFONATE 30 GRAM(S): 4.1 POWDER, FOR SUSPENSION ORAL at 11:52

## 2021-01-01 RX ADMIN — Medication 3 MILLILITER(S): at 22:47

## 2021-01-01 RX ADMIN — HYDROMORPHONE HYDROCHLORIDE 1 MILLIGRAM(S): 2 INJECTION INTRAMUSCULAR; INTRAVENOUS; SUBCUTANEOUS at 13:05

## 2021-01-01 RX ADMIN — POLYETHYLENE GLYCOL 3350 17 GRAM(S): 17 POWDER, FOR SOLUTION ORAL at 12:33

## 2021-01-01 RX ADMIN — OLANZAPINE 5 MILLIGRAM(S): 15 TABLET, FILM COATED ORAL at 21:07

## 2021-01-01 RX ADMIN — Medication 1 PATCH: at 11:14

## 2021-01-01 RX ADMIN — OXYCODONE HYDROCHLORIDE 10 MILLIGRAM(S): 5 TABLET ORAL at 04:45

## 2021-01-01 RX ADMIN — SODIUM CHLORIDE 1000 MILLILITER(S): 9 INJECTION INTRAMUSCULAR; INTRAVENOUS; SUBCUTANEOUS at 14:58

## 2021-01-01 RX ADMIN — OXYCODONE HYDROCHLORIDE 10 MILLIGRAM(S): 5 TABLET ORAL at 22:54

## 2021-01-01 RX ADMIN — Medication 250 MILLIGRAM(S): at 20:21

## 2021-01-01 RX ADMIN — OXYCODONE HYDROCHLORIDE 10 MILLIGRAM(S): 5 TABLET ORAL at 21:35

## 2021-01-01 RX ADMIN — DEXMEDETOMIDINE HYDROCHLORIDE IN 0.9% SODIUM CHLORIDE 1 MICROGRAM(S)/KG/HR: 4 INJECTION INTRAVENOUS at 22:41

## 2021-01-01 RX ADMIN — MIRTAZAPINE 15 MILLIGRAM(S): 45 TABLET, ORALLY DISINTEGRATING ORAL at 21:04

## 2021-01-01 RX ADMIN — HYDROMORPHONE HYDROCHLORIDE 4 MILLIGRAM(S): 2 INJECTION INTRAMUSCULAR; INTRAVENOUS; SUBCUTANEOUS at 10:00

## 2021-01-01 RX ADMIN — Medication 250 MILLIGRAM(S): at 14:17

## 2021-01-01 RX ADMIN — Medication 1 TABLET(S): at 00:07

## 2021-01-01 RX ADMIN — POLYETHYLENE GLYCOL 3350 17 GRAM(S): 17 POWDER, FOR SOLUTION ORAL at 11:40

## 2021-01-01 RX ADMIN — HYDROMORPHONE HYDROCHLORIDE 1 MILLIGRAM(S): 2 INJECTION INTRAMUSCULAR; INTRAVENOUS; SUBCUTANEOUS at 19:15

## 2021-01-01 RX ADMIN — PHENYLEPHRINE HYDROCHLORIDE 50 MICROGRAM(S): 10 INJECTION INTRAVENOUS at 17:19

## 2021-01-01 RX ADMIN — HYDROMORPHONE HYDROCHLORIDE 0.5 MILLIGRAM(S): 2 INJECTION INTRAMUSCULAR; INTRAVENOUS; SUBCUTANEOUS at 15:44

## 2021-01-01 RX ADMIN — Medication 1 DROP(S): at 05:09

## 2021-01-01 RX ADMIN — OXYCODONE HYDROCHLORIDE 15 MILLIGRAM(S): 5 TABLET ORAL at 23:50

## 2021-01-01 RX ADMIN — OXYCODONE HYDROCHLORIDE 15 MILLIGRAM(S): 5 TABLET ORAL at 14:51

## 2021-01-01 RX ADMIN — Medication 25 MILLIGRAM(S): at 05:59

## 2021-01-01 RX ADMIN — OXYCODONE HYDROCHLORIDE 15 MILLIGRAM(S): 5 TABLET ORAL at 07:15

## 2021-01-01 RX ADMIN — Medication 3 MILLILITER(S): at 16:59

## 2021-01-01 RX ADMIN — ENOXAPARIN SODIUM 30 MILLIGRAM(S): 100 INJECTION SUBCUTANEOUS at 22:37

## 2021-01-01 RX ADMIN — Medication 1 DROP(S): at 18:53

## 2021-01-01 RX ADMIN — Medication 1 TABLET(S): at 11:56

## 2021-01-01 RX ADMIN — OXYCODONE HYDROCHLORIDE 15 MILLIGRAM(S): 5 TABLET ORAL at 05:19

## 2021-01-01 RX ADMIN — PROPOFOL 1.5 MICROGRAM(S)/KG/MIN: 10 INJECTION, EMULSION INTRAVENOUS at 20:31

## 2021-01-01 RX ADMIN — Medication 260 MILLIGRAM(S): at 07:58

## 2021-01-01 RX ADMIN — HYDROMORPHONE HYDROCHLORIDE 0.5 MILLIGRAM(S): 2 INJECTION INTRAMUSCULAR; INTRAVENOUS; SUBCUTANEOUS at 16:00

## 2021-01-01 RX ADMIN — PIPERACILLIN AND TAZOBACTAM 200 GRAM(S): 4; .5 INJECTION, POWDER, LYOPHILIZED, FOR SOLUTION INTRAVENOUS at 17:29

## 2021-01-01 RX ADMIN — SODIUM CHLORIDE 80 MILLILITER(S): 9 INJECTION, SOLUTION INTRAVENOUS at 13:40

## 2021-01-01 RX ADMIN — Medication 2: at 11:22

## 2021-01-01 RX ADMIN — BICTEGRAVIR SODIUM, EMTRICITABINE, AND TENOFOVIR ALAFENAMIDE FUMARATE 1 TABLET(S): 30; 120; 15 TABLET ORAL at 11:39

## 2021-01-01 RX ADMIN — Medication 40 MILLIGRAM(S): at 22:02

## 2021-01-01 RX ADMIN — OXYCODONE HYDROCHLORIDE 10 MILLIGRAM(S): 5 TABLET ORAL at 10:30

## 2021-01-01 RX ADMIN — ATORVASTATIN CALCIUM 40 MILLIGRAM(S): 80 TABLET, FILM COATED ORAL at 21:54

## 2021-01-01 RX ADMIN — Medication 50 MILLILITER(S): at 11:33

## 2021-01-01 RX ADMIN — ENOXAPARIN SODIUM 40 MILLIGRAM(S): 100 INJECTION SUBCUTANEOUS at 14:45

## 2021-01-01 RX ADMIN — OXYCODONE HYDROCHLORIDE 15 MILLIGRAM(S): 5 TABLET ORAL at 16:49

## 2021-01-01 RX ADMIN — ENOXAPARIN SODIUM 40 MILLIGRAM(S): 100 INJECTION SUBCUTANEOUS at 09:04

## 2021-01-01 RX ADMIN — Medication 3 MILLILITER(S): at 09:55

## 2021-01-01 RX ADMIN — SODIUM CHLORIDE 1000 MILLILITER(S): 9 INJECTION INTRAMUSCULAR; INTRAVENOUS; SUBCUTANEOUS at 17:45

## 2021-01-01 RX ADMIN — Medication 500 MILLIGRAM(S): at 05:01

## 2021-01-01 RX ADMIN — FENTANYL CITRATE 2.5 MICROGRAM(S)/KG/HR: 50 INJECTION INTRAVENOUS at 16:10

## 2021-01-01 RX ADMIN — OXYCODONE HYDROCHLORIDE 10 MILLIGRAM(S): 5 TABLET ORAL at 05:30

## 2021-01-01 RX ADMIN — MIRTAZAPINE 15 MILLIGRAM(S): 45 TABLET, ORALLY DISINTEGRATING ORAL at 23:19

## 2021-01-01 RX ADMIN — OXYCODONE HYDROCHLORIDE 15 MILLIGRAM(S): 5 TABLET ORAL at 08:40

## 2021-01-01 RX ADMIN — Medication 1 DROP(S): at 05:20

## 2021-01-01 RX ADMIN — Medication 250 MILLIGRAM(S): at 06:24

## 2021-01-01 RX ADMIN — BUDESONIDE AND FORMOTEROL FUMARATE DIHYDRATE 2 PUFF(S): 160; 4.5 AEROSOL RESPIRATORY (INHALATION) at 22:03

## 2021-01-01 RX ADMIN — PIPERACILLIN AND TAZOBACTAM 200 GRAM(S): 4; .5 INJECTION, POWDER, LYOPHILIZED, FOR SOLUTION INTRAVENOUS at 13:28

## 2021-01-01 RX ADMIN — BUDESONIDE AND FORMOTEROL FUMARATE DIHYDRATE 2 PUFF(S): 160; 4.5 AEROSOL RESPIRATORY (INHALATION) at 10:01

## 2021-01-01 RX ADMIN — OXYCODONE HYDROCHLORIDE 15 MILLIGRAM(S): 5 TABLET ORAL at 18:39

## 2021-01-01 RX ADMIN — Medication 5 MILLIGRAM(S): at 22:16

## 2021-01-01 RX ADMIN — Medication 1 PATCH: at 11:34

## 2021-01-01 RX ADMIN — DEXMEDETOMIDINE HYDROCHLORIDE IN 0.9% SODIUM CHLORIDE 2.5 MICROGRAM(S)/KG/HR: 4 INJECTION INTRAVENOUS at 10:48

## 2021-01-01 RX ADMIN — Medication 25 MILLIGRAM(S): at 00:06

## 2021-01-01 RX ADMIN — Medication 3 MILLILITER(S): at 21:58

## 2021-01-01 RX ADMIN — Medication 650 MILLIGRAM(S): at 21:09

## 2021-01-01 RX ADMIN — MIRTAZAPINE 15 MILLIGRAM(S): 45 TABLET, ORALLY DISINTEGRATING ORAL at 21:07

## 2021-01-01 RX ADMIN — OXYCODONE HYDROCHLORIDE 10 MILLIGRAM(S): 5 TABLET ORAL at 16:38

## 2021-01-01 RX ADMIN — Medication 3 MILLILITER(S): at 16:53

## 2021-01-01 RX ADMIN — CHLORHEXIDINE GLUCONATE 15 MILLILITER(S): 213 SOLUTION TOPICAL at 18:20

## 2021-01-01 RX ADMIN — Medication 1 PATCH: at 18:13

## 2021-01-01 RX ADMIN — Medication 3 MILLILITER(S): at 15:46

## 2021-01-01 RX ADMIN — HYDROMORPHONE HYDROCHLORIDE 1 MILLIGRAM(S): 2 INJECTION INTRAMUSCULAR; INTRAVENOUS; SUBCUTANEOUS at 14:25

## 2021-01-01 RX ADMIN — Medication 650 MILLIGRAM(S): at 18:57

## 2021-01-01 RX ADMIN — Medication 1 PATCH: at 07:32

## 2021-01-01 RX ADMIN — BUDESONIDE AND FORMOTEROL FUMARATE DIHYDRATE 2 PUFF(S): 160; 4.5 AEROSOL RESPIRATORY (INHALATION) at 23:18

## 2021-01-01 RX ADMIN — Medication 1 TABLET(S): at 21:37

## 2021-01-01 RX ADMIN — Medication 500 MILLIGRAM(S): at 09:11

## 2021-01-01 RX ADMIN — OXYCODONE HYDROCHLORIDE 15 MILLIGRAM(S): 5 TABLET ORAL at 18:00

## 2021-01-01 RX ADMIN — Medication 1 PATCH: at 12:43

## 2021-01-01 RX ADMIN — Medication 1 PATCH: at 10:26

## 2021-01-01 RX ADMIN — CHLORHEXIDINE GLUCONATE 15 MILLILITER(S): 213 SOLUTION TOPICAL at 10:29

## 2021-01-01 RX ADMIN — CHLORHEXIDINE GLUCONATE 15 MILLILITER(S): 213 SOLUTION TOPICAL at 17:31

## 2021-08-28 NOTE — ED PROVIDER NOTE - CARE PLAN
1 Principal Discharge DX:	COPD exacerbation  Secondary Diagnosis:	Pressure sore  Secondary Diagnosis:	Abdominal pain   Principal Discharge DX:	COPD exacerbation  Secondary Diagnosis:	Pressure sore  Secondary Diagnosis:	Abdominal pain  Secondary Diagnosis:	Urinary tract infection

## 2021-08-28 NOTE — ED PROVIDER NOTE - NSICDXPASTMEDICALHX_GEN_ALL_CORE_FT
PAST MEDICAL HISTORY:  Advanced COPD     Hepatitis B     Hepatitis C     HIV disease     Rectal cancer     Tongue cancer

## 2021-08-28 NOTE — ED PROVIDER NOTE - MUSCULOSKELETAL, MLM
Spine appears normal, range of motion is not limited, no muscle or joint tenderness. 3cm stage 2 pressure sore without drainage or surrounding erythema

## 2021-08-28 NOTE — ED PROVIDER NOTE - CLINICAL SUMMARY MEDICAL DECISION MAKING FREE TEXT BOX
aids/cancer/ copd/ multiple comorbidities, with weakness, cough/ sob, abd pain, decreased po intake. appears chronically ill. has pressure sore on sacrum that doesn't appear infected. bp low on arrival, afebrile. labs/cultures/lactate done to eval infection, dehydration, anemia, acs, electrolyte abnormality, abdominal pathology. cxr to eval pneumonia. given albuterol/ solumedrol for suspected component of copd exacerbation. ct a/p ordered to eval reported abdominal pain, history of cancer. given ivf for dehydration, morphine for pain. signed out to Dr. Anne/ JEWELS Guo pending results and reassessment. anticipate admit aids/cancer/ copd/ multiple comorbidities, with weakness, cough/ sob, abd pain, decreased po intake. appears chronically ill. has pressure sore on sacrum that doesn't appear infected. bp low on arrival, afebrile. labs/cultures/lactate done to eval infection, dehydration, anemia, acs, electrolyte abnormality, abdominal pathology. cxr to eval pneumonia. given albuterol/ solumedrol for suspected component of copd exacerbation. ct a/p ordered to eval reported abdominal pain, history of cancer. cta chest to r/o pe given sob/ pain/ cancer history/ recently bed bound. given ivf for dehydration, morphine for pain. signed out to Dr. Anne/ JEWELS Guo pending results and reassessment. anticipate admit

## 2021-08-28 NOTE — ED ADULT NURSE NOTE - NURSING SKIN WOUND TYPE #1
observed beefy red colored outer vaginal lips, tender to touch. Pt states complications secondary to radiation for rectal CA

## 2021-08-28 NOTE — ED PROVIDER NOTE - OBJECTIVE STATEMENT
history of aids (unsure of vl/ cd4), hep b/c, copd, rectal cancer treated with radiation complicated by chronic vaginal pain, tongue cancer treated with surgery, here with generalized weakness, cough/ shortness of breath, sore on buttock, abdominal pain. Reports has been going on a while but worse recently. [place] and having difficulty caring for self, eating, has been losing weight. Notes some chills, denies fever. Pain in buttock where pressure sore is located.

## 2021-08-28 NOTE — ED PROVIDER NOTE - CONSTITUTIONAL, MLM
normal... thin appearing, awake, alert, oriented to person, place, time/situation and in no apparent distress.

## 2021-08-28 NOTE — ED ADULT NURSE NOTE - NSIMPLEMENTINTERV_GEN_ALL_ED
Implemented All Fall Risk Interventions:  [place] to call system. Call bell, personal items and telephone within reach. Instruct patient to call for assistance. Room bathroom lighting operational. Non-slip footwear when patient is off stretcher. Physically safe environment: no spills, clutter or unnecessary equipment. Stretcher in lowest position, wheels locked, appropriate side rails in place. Provide visual cue, wrist band, yellow gown, etc. Monitor gait and stability. Monitor for mental status changes and reorient to person, place, and time. Review medications for side effects contributing to fall risk. Reinforce activity limits and safety measures with patient and family.

## 2021-08-29 NOTE — H&P ADULT - HISTORY OF PRESENT ILLNESS
55F PMH AIDS (nonadherent with Biktarvy and Bactrim, unknown CD4 and VL), hep B/C, COPD, active smoker, crack use, rectal Ca s/p RT, tongue ca s/p resection, BPD presenting with inability to ambulate for 2 weeks. Pt is chronically ill, with cough productive of green sputum with EDOUARD that makes it so she is only able to walk half a block for the past several months. No acute worsening. Pt also reports that she has been unsteady on her feet for the past several months and feels like she will fall over. Pt has been spending most of the past 2 weeks in bed, only getting out of bed to use the bathroom or to grab something out of the fridge. Pt reports good appetite but says that she is unable to cook due to her [place] only having a microwave. Reports 50lb weight loss over the past several years. Denies fevers, chills, chest pain, nausea, vomiting, diarrhea, constipation. 55F PMH AIDS (nonadherent with Biktarvy and Bactrim, unknown CD4 and VL), hep B/C, COPD, active smoker, crack use, rectal Ca s/p RT, tongue ca s/p resection, BPD presenting with worsening shortness of breath. Pt is chronically ill at baseline, with cough productive of green sputum, EDOUARD that makes it so she is only able to walk half a block, and unsteadiness on her feet that makes it so that she spends most of her time in bed, only getting out of bed to use the bathroom or to grab something out of the fridge. Recently pt has been feeling more short of breath, with occasional wheezing. She felt like it was becoming difficult to talk and so she came to the ED. No worsening of her cough. Pt reports that she is not very adherent to her multiple medications, including inhalers. Pt is a current smoker, smoking 4 cigarettes per day. Pt reports good appetite but says that she is unable to cook due to her [place] only having a microwave. Reports 50lb weight loss over the past several years. Denies fevers, chills, chest pain, nausea, vomiting, diarrhea, constipation, dysuria.    ED Course:  VS: T 99.3, HR 74, BP 96/61, O2 98% on 2L, 96% on RA  Labs s/f:  UA with LEs and >10 WBCs  CT PE: no PE, chronic airspace disease and/or atelectasis  CT AP: ?gastritis and colitis vs nonobstructing mass c/w rectal ca hx. endometrial thickening vs fluid  Treatment: Ceftriaxone 1g x1, solumedrol 40mg, albuterol, IV morphine 2mg, IV morphine 4mg, 1.25L NS 55F PMH AIDS (nonadherent with Biktarvy and Bactrim, unknown CD4 and VL), hep B/C, COPD, active smoker, crack use, rectal Ca s/p RT, tongue ca s/p resection, BPD presenting with worsening shortness of breath for 2 days. Pt is chronically ill at baseline, with cough productive of green sputum, EDOUARD that makes it so she is only able to walk half a block, and unsteadiness on her feet that makes it so that she spends most of her time in bed, only getting out of bed to use the bathroom or to grab something out of the fridge. Pt has been feeling more short of breath for 2 days, with occasional wheezing. She felt like it was becoming difficult to talk and so she came to the ED. No worsening of her chronic cough, no change in sputum, no rhinorrhea or sore throat. Pt reports that she is not very adherent to her multiple medications, including inhalers. Pt is a current smoker, smoking 4 cigarettes per day. Pt reports good appetite but says that she is unable to cook due to her [place] only having a microwave. Reports 50lb weight loss over the past several years. Denies fevers, chills, chest pain, nausea, vomiting, diarrhea, constipation, dysuria.    ED Course:  VS: T 99.3, HR 74, BP 96/61, O2 98% on 2L, 96% on RA  Labs s/f:  UA with LEs and >10 WBCs  CT PE: no PE, chronic airspace disease and/or atelectasis  CT AP: ?gastritis and colitis vs nonobstructing mass c/w rectal ca hx. endometrial thickening vs fluid  Treatment: Ceftriaxone 1g x1, solumedrol 40mg, albuterol, IV morphine 2mg, IV morphine 4mg, 1.25L NS

## 2021-08-29 NOTE — H&P ADULT - PROBLEM SELECTOR PLAN 1
Pt with hx of COPD with chronic cough productive of green sputum. EDOUARD so that she can only walk .5 a block before stopping. For the past 2 days has been having worsening shortness of breath and difficulty talking.  -current smoker  -not compliant with home symbicort and albuterol  -denies URI sx such as fever, rhinorrhea, sore throat  Plan:  -duonebs prn  -prednisone 40mg x5 days  -not currently requiring O2 Pt with hx of COPD with chronic cough productive of green sputum. EDOUARD so that she can only walk .5 a block before stopping. For the past 2 days has been having worsening shortness of breath and difficulty talking.  -current smoker  -not compliant with home symbicort and albuterol  -denies URI sx such as fever, rhinorrhea, sore throat  -likely 2/2 smoking and poor medication compliance  Plan:  -duonebs prn  -prednisone 40mg x5 days (8/28-)  -cont home symbicort 80-4.5 2 puffs BID  -not currently requiring O2 Pt with hx of COPD with chronic cough productive of green sputum. EDOUARD so that she can only walk .5 a block before stopping. For the past 2 days has been having worsening shortness of breath and difficulty talking.  -current smoker  -not compliant with home symbicort and albuterol  -denies URI sx such as fever, rhinorrhea, sore throat  -likely 2/2 smoking and poor medication compliance  Plan:  -duonebs q6 standing  -prednisone 40mg x5 days (8/28-)  -cont home symbicort 80-4.5 2 puffs BID  -not currently requiring O2  -hold off on Abx as no change in chronic cough or sputum  -ISS while on steroids  -f/u procal and RVP

## 2021-08-29 NOTE — H&P ADULT - NSHPPHYSICALEXAM_GEN_ALL_CORE
VITAL SIGNS:  T(C): 37.4 (08-28-21 @ 20:53), Max: 37.4 (08-28-21 @ 20:53)  T(F): 99.3 (08-28-21 @ 20:53), Max: 99.3 (08-28-21 @ 20:53)  HR: 70 (08-29-21 @ 03:49) (65 - 74)  BP: 100/57 (08-29-21 @ 03:49) (96/61 - 111/64)  BP(mean): --  RR: 18 (08-29-21 @ 03:49) (18 - 18)  SpO2: 96% (08-29-21 @ 03:49) (96% - 98%)  Wt(kg): --    PHYSICAL EXAM:    Constitutional: NAD  Head: NC/AT  ENT: no nasal discharge; MMM  Neck: supple  Respiratory: CTA B/L; no W/R/R, no retractions  Cardiac: +S1/S2; RRR; no M/R/G; PMI non-displaced  Gastrointestinal: soft, NT/ND; no rebound or guarding; +BSx4  Extremities: WWP, no clubbing or cyanosis; no peripheral edema  Musculoskeletal: NROM x4; no joint swelling, tenderness or erythema  Vascular: 2+ radial, DP/PT pulses B/L  Dermatologic: stage 3 sacral ulcer without purulent discharge  Neurologic: AAOx3; CN II-12 intact; strength 5/5 x4, light sensation intact x4  Psychiatric: affect and characteristics of appearance, verbalizations, behaviors are appropriate VITAL SIGNS:  T(C): 37.4 (08-28-21 @ 20:53), Max: 37.4 (08-28-21 @ 20:53)  T(F): 99.3 (08-28-21 @ 20:53), Max: 99.3 (08-28-21 @ 20:53)  HR: 70 (08-29-21 @ 03:49) (65 - 74)  BP: 100/57 (08-29-21 @ 03:49) (96/61 - 111/64)  BP(mean): --  RR: 18 (08-29-21 @ 03:49) (18 - 18)  SpO2: 96% (08-29-21 @ 03:49) (96% - 98%)  Wt(kg): --    PHYSICAL EXAM:    Constitutional: NAD  Head: NC/AT  ENT: no nasal discharge; MMM  Neck: supple  Respiratory: CTA B/L; no W/R/R, no retractions  Cardiac: +S1/S2; RRR; no M/R/G; PMI non-displaced  Gastrointestinal: soft, NT/ND; no rebound or guarding; +BSx4  Extremities: WWP, no clubbing or cyanosis; no peripheral edema  Musculoskeletal: NROM x4; no joint swelling, tenderness or erythema  Vascular: 2+ radial, DP/PT pulses B/L  Dermatologic: stage 3 sacral ulcer without purulent discharge  Neurologic: AAOx3; CN II-12 intact; strength 5/5 x4, light sensation intact x4; coordination intact on finger to nose and heel to shin b/l  Psychiatric: affect and characteristics of appearance, verbalizations, behaviors are appropriate

## 2021-08-29 NOTE — H&P ADULT - NSHPLABSRESULTS_GEN_ALL_CORE
.  LABS:                         11.8   6.10  )-----------( 153      ( 28 Aug 2021 21:29 )             39.5         146<H>  |  111<H>  |  15  ----------------------------<  87  4.3   |  25  |  1.00    Ca    9.3      28 Aug 2021 21:29    TPro  8.0  /  Alb  3.9  /  TBili  0.2  /  DBili  x   /  AST  28  /  ALT  14  /  AlkPhos  78      PT/INR - ( 28 Aug 2021 21:29 )   PT: 12.0 sec;   INR: 1.00          PTT - ( 28 Aug 2021 21:29 )  PTT:32.7 sec  Urinalysis Basic - ( 28 Aug 2021 22:28 )    Color: Yellow / Appearance: SL Cloudy / S.025 / pH: x  Gluc: x / Ketone: NEGATIVE  / Bili: Small / Urobili: 4.0 E.U./dL   Blood: x / Protein: 100 mg/dL / Nitrite: NEGATIVE   Leuk Esterase: Moderate / RBC: 5-10 /HPF / WBC > 10 /HPF   Sq Epi: x / Non Sq Epi: 0-5 /HPF / Bacteria: Present /HPF      CARDIAC MARKERS ( 28 Aug 2021 21:29 )  x     / 0.01 ng/mL / 44 U/L / x     / 1.3 ng/mL        Lactate, Blood: 1.7 mmol/L ( @ 21:31)      RADIOLOGY, EKG & ADDITIONAL TESTS: Reviewed.

## 2021-08-29 NOTE — H&P ADULT - ASSESSMENT
55F PMH AIDS (nonadherent with Biktarvy and Bactrim, unknown CD4 and VL), hep B/C, COPD, active smoker, crack use, rectal Ca s/p RT, tongue ca s/p resection, BPD presenting with worsening shortness of breath for 2 days a/f COPD exacerbation in the setting of current smoker and medication noncompliance.

## 2021-08-29 NOTE — H&P ADULT - PROBLEM SELECTOR PLAN 3
Hx of rectal ca s/p RT in 2002 Hx of AIDS with unknown VL or CD4. Not compliant with home biktarvy or bactrim. Follows with Dr. Gavin at [facility]:  -HIV consult in AM  -f/u CD4 and VL Hx of AIDS with unknown VL or CD4. Not compliant with home biktarvy or bactrim. Follows with Dr. Gavin at [facility]:  -HIV consult in AM  -f/u CD4 and VL  -cont bactrim 1 normal strength tab OD for ppx Hx of AIDS with unknown VL or CD4. Not compliant with home biktarvy or bactrim. Follows with Dr. Gavin at [facility]:  -HIV consult on Monday  -f/u CD4 and VL  -cont bactrim 1 normal strength tab OD for ppx

## 2021-08-29 NOTE — PHYSICAL THERAPY INITIAL EVALUATION ADULT - ADDITIONAL COMMENTS
[place] a [place], denies stairs. Prior to admission, pt ambulated independently without AD. Pt stated that she only tolerates to walk for short distance due to poor endurance.

## 2021-08-29 NOTE — H&P ADULT - PROBLEM SELECTOR PLAN 4
Hx of rectal ca s/p RT in 2002.   -CT AP: Wall thickening with surrounding inflammatory change at the sigmoid and rectosigmoid possibly a nonspecific colitis. Nonobstructing mass is also a consideration given history of neoplasm.  -no change in bowel habits recently  -f/u with o/p provider Hx of rectal ca s/p RT in 2002.   -CT AP: Wall thickening with surrounding inflammatory change at the sigmoid and rectosigmoid possibly a nonspecific colitis. Nonobstructing mass is also a consideration given history of neoplasm.  -no change in bowel habits recently  -pt reports that she takes oxycodone 20mg for pain relief, which is seen on outpatient med list  Plan:  -f/u ISTOP  -f/u with o/p provider CT AP: Low-attenuation at the uterus suggestive of endometrial thickening versus fluid at the endometrial canal, up to 12 mm thick. Further evaluation with ultrasound recommended.  -no vaginal discharge recently but chronic vaginal pain since radiation therapy years ago  Plan:  -f/u gyn o/p

## 2021-08-29 NOTE — PHYSICAL THERAPY INITIAL EVALUATION ADULT - GENERAL OBSERVATIONS, REHAB EVAL
Pt received semi supine in bed with +hep-lock, NAD. Pt left as found, NAD, call brand in reach, GEORGINA hills.

## 2021-08-29 NOTE — PHYSICAL THERAPY INITIAL EVALUATION ADULT - BRACE/ORTHOTICS
1 standing break (10 seconds)secondary pt complains feeling mild SOB, SpO2:100 % on room air post ambulation.

## 2021-08-29 NOTE — H&P ADULT - PROBLEM SELECTOR PLAN 2
Hx of AIDS with unknown VL or CD4. Not compliant with home biktarvy or bactrim. Follows with Dr. Gavin at [facility]:  -HIV consult in AM  -f/u CD4 and VL Pt reports unsteadiness on feet for months, causing her to be mostly bedbound except to go to the bathroom or get something to eat  -50lb weight loss over the past several years  -neuro exam with full strength, sensation, and coordination  -likely 2/2 multiple comorbidities   Plan:  -f/u PT  -palliative care consult on Monday Pt reports unsteadiness on feet for months, causing her to be mostly bedbound except to go to the bathroom or get something to eat  -stage 3 sacral ulcer on exam  -50lb weight loss over the past several years  -neuro exam with full strength, sensation, and coordination  -likely 2/2 multiple comorbidities   Plan:  -f/u PT  -f/u woundcarect  -palliative care consult on Monday Pt reports unsteadiness on feet for months, causing her to be mostly bedbound except to go to the bathroom or get something to eat  -stage 3 sacral ulcer on exam  -50lb weight loss over the past several years  -neuro exam with full strength, sensation, and coordination  -likely 2/2 multiple comorbidities   Plan:  -f/u PT  -f/u wound care  -palliative care consult on Monday

## 2021-08-29 NOTE — ED ADULT NURSE REASSESSMENT NOTE - COMFORT CARE
Pericare given after each void./assisted to bedpan/darkened lights/meal provided/plan of care explained/po fluids offered/warm blanket provided

## 2021-08-29 NOTE — PHYSICAL THERAPY INITIAL EVALUATION ADULT - SITTING BALANCE: STATIC
Last appointment 6/17/2021  Next appointment   Future Appointments   Date Time Provider Jenny Ana Maria   7/14/2021  5:40 PM Candy Wilhelm, PT [facility] PT [facility]   8/19/2021 10:00 AM MISAEL Christensen CNP [facility]      Last refill 7/7/2021  DOS: 04/29/2021       Patient called in requesting refill of HYDROcodone-acetaminophen (Jona Bush) 7.5-325 MG per tablet MISAEL Christensen - CNP]     Preferred pharmacy: [facility] [phone] - F [phone] good balance

## 2021-08-29 NOTE — PHYSICAL THERAPY INITIAL EVALUATION ADULT - PERTINENT HX OF CURRENT PROBLEM, REHAB EVAL
Pt is a 56 yo female presenting with worsening shortness of breath for 2 days a/f COPD exacerbation in the setting of current smoker and medication noncompliance.

## 2021-08-29 NOTE — H&P ADULT - PROBLEM SELECTOR PLAN 7
F: none  E: replete prn  N: regular diet    DVT ppx: lovenox    Code status: full code -cont home zyprexa 5mg OD, remeron 15mg qhs

## 2021-08-29 NOTE — PHYSICAL THERAPY INITIAL EVALUATION ADULT - GAIT DEVIATIONS NOTED, PT EVAL
fairly steady gait, no lose of balance, decreased heel strike and hip flexion bilaterally,/decreased baltazar/increased time in double stance/decreased weight-shifting ability

## 2021-08-29 NOTE — H&P ADULT - PROBLEM SELECTOR PLAN 6
-cont home zyprexa 5mg OD, remeron 15mg qhs -s/p resection in 2014  -pt wishes to have regular diet and acknowledges risk of aspiration

## 2021-08-29 NOTE — H&P ADULT - PROBLEM SELECTOR PLAN 5
-s/p resection in 2014  -pt wishes to have regular diet and acknowledges risk of aspiration Hx of rectal ca s/p RT in 2002.   -CT AP: Wall thickening with surrounding inflammatory change at the sigmoid and rectosigmoid possibly a nonspecific colitis. Nonobstructing mass is also a consideration given history of neoplasm.  -no change in bowel habits recently  -pt reports that she takes oxycodone 20mg for pain relief, which is seen on outpatient med list  Plan:  -f/u ISTOP  -f/u with o/p provider

## 2021-08-29 NOTE — H&P ADULT - NSHPSOCIALHISTORY_GEN_ALL_CORE
Lives in [facility] and is unsatisfied with living condition. Currently smokes 4 cigs/day, smokes crack. Hx of remote IVDU but not in years. Denies alcohol.

## 2021-08-29 NOTE — H&P ADULT - ATTENDING COMMENTS
55F with HIV, COPD, previous cancers who presented with SOB. Outpatient scripts reveal recent scripts for prednisone and azithromycin. She states she is poorly adherent to home regimen given number of medications she must take. Also very cachetic on exam, stage IV pressure ulcer. Consulting Wound Care, Nutrition and providing pain control. While patient prognosis not poor given extent of medical comorbidities she may also benefit from palliative consult. Will attempt to provide as few pills as possible to patient at time of discharge aiming for as many combination pills as possible.

## 2021-08-30 NOTE — DIETITIAN INITIAL EVALUATION ADULT. - MALNUTRITION
Suspect severe PCM Per ASPEN guidelines, pt meets criteria for severe PCM 2/2 NFPE findings, wt loss

## 2021-08-30 NOTE — CHART NOTE - NSCHARTNOTEFT_GEN_A_CORE
CHIEF COMPLAINT:  Patient is a 55y old  Female who presents with a chief complaint of inability to ambulate (30 Aug 2021 10:44)    Admission H&P, Progress Notes and Consultant Notes reviewed in detail.    HPI:  INEZ WOLF is a 55y Female with Hx of   55F PMH AIDS (nonadherent with Biktarvy and Bactrim, unknown CD4 and VL), hep B/C, COPD, active smoker, crack use, rectal Ca s/p RT, tongue ca s/p resection, BPD presenting with acute of chronic shortness of breath for 2 days. Patient has an extensive smoking history and continues to smoke 4 cigarettes a day. She is on inhalers at home and states to use them; however, she states that they do not resolve her symptoms. She has been short of breath for the last few months, and now comes to [facility] due to acutely worsening SOB that made it difficult for her to speak. HIV team consulted for management of AIDS. On ROS, the patient states to mildly sob but with significant improvement relative to her presentation. She denies headaches, vision changes, CP, palpitations, n/v/d/c, fever/chills.       REVIEW OF SYSTEMS: As above    Outpatient HIV Provider: Dr. Gavin  Year of HIV Diagnosis:   T cell joanna: Unknown, but current level at 37  Highest Viral Load: unknown, pending   Current ARV regimen: biktarvy   ARV adherence: has not been taking  Previous ARV regimens: patient unsure   Hx of Past Oppurtunistic Infections: unknown     PAST MEDICAL & SURGICAL HISTORY:  HIV disease    Advanced COPD    Tongue cancer    Rectal cancer    Hepatitis B    Hepatitis C    No significant past surgical history        Social Hx:    Sexual History: Last sexual activity in   Sexual Activity: Since   Number of Current Partners: unknown  Number of Lifetime Partners: Unknown     STI Hx: Unknown     PHYSICAL EXAM:    Vital Signs Last 24 Hrs  T(C): 36.4 (30 Aug 2021 12:09), Max: 36.6 (29 Aug 2021 21:01)  T(F): 97.6 (30 Aug 2021 12:09), Max: 97.9 (29 Aug 2021 21:01)  HR: 57 (30 Aug 2021 12:09) (57 - 70)  BP: 94/55 (30 Aug 2021 12:09) (94/55 - 110/66)  BP(mean): 68 (29 Aug 2021 21:01) (68 - 68)  RR: 17 (30 Aug 2021 12:09) (14 - 17)  SpO2: 99% (30 Aug 2021 12:09) (98% - 100%)    Constitutional: NAD, comfortable in bed.  HEENT: NC/AT, PERRLA, EOMI, no conjunctival pallor or scleral icterus, MMM; surgical intervention noted in tongue and jaw  Neck: Supple, no JVD  Respiratory: CTA B/L. No w/r/r.   Cardiovascular: RRR, normal S1 and S2, no m/r/g.   Gastrointestinal: +BS, soft NTND, no guarding or rebound tenderness, no palpable masses   Extremities: wwp; no cyanosis, clubbing or edema.   Vascular: Pulses equal and strong throughout.   Neurological: AAOx3, no CN deficits, strength and sensation intact throughout.     MEDICATIONS  (STANDING):  albuterol/ipratropium for Nebulization 3 milliLiter(s) Nebulizer every 6 hours  atorvastatin 40 milliGRAM(s) Oral at bedtime  budesonide  80 MICROgram(s)/formoterol 4.5 MICROgram(s) Inhaler 2 Puff(s) Inhalation two times a day  dextrose 40% Gel 15 Gram(s) Oral once  dextrose 5%. 1000 milliLiter(s) (50 mL/Hr) IV Continuous <Continuous>  dextrose 5%. 1000 milliLiter(s) (100 mL/Hr) IV Continuous <Continuous>  dextrose 50% Injectable 25 Gram(s) IV Push once  dextrose 50% Injectable 12.5 Gram(s) IV Push once  dextrose 50% Injectable 25 Gram(s) IV Push once  enoxaparin Injectable 40 milliGRAM(s) SubCutaneous every 24 hours  glucagon  Injectable 1 milliGRAM(s) IntraMuscular once  insulin lispro (ADMELOG) corrective regimen sliding scale   SubCutaneous Before meals and at bedtime  midodrine. 5 milliGRAM(s) Oral once  mirtazapine 15 milliGRAM(s) Oral at bedtime  nicotine -   7 mG/24Hr(s) Patch 1 Patch Transdermal daily  OLANZapine 5 milliGRAM(s) Oral daily  predniSONE   Tablet 40 milliGRAM(s) Oral every 24 hours  trimethoprim   80 mG/sulfamethoxazole 400 mG 1 Tablet(s) Oral daily    MEDICATIONS  (PRN):  acetaminophen   Tablet .. 650 milliGRAM(s) Oral every 6 hours PRN Temp greater or equal to 38.5C (101.3F), Mild Pain (1 - 3)  melatonin 3 milliGRAM(s) Oral at bedtime PRN Insomnia  oxyCODONE    IR 15 milliGRAM(s) Oral every 6 hours PRN Severe Pain (7 - 10)  oxyCODONE    IR 10 milliGRAM(s) Oral every 6 hours PRN Moderate Pain (4 - 6)      Allergies    No Known Allergies    Intolerances      LABS: REVIEWED                        10.2     )-----------( 115      ( 29 Aug 2021 10:21 )             35.7                           10.2   .  )-----------( 115      ( 29 Aug 2021 10:21 )             35.7     Neutrophils:46.8   Bands:--   Lymphocytes:43.6   Monocytes:3.7   Eosinophils:0.2   Basophils:0.4    @ 10:21        140  |  110<H>  |  16  ----------------------------<  182<H>  4.0   |  19<L>  |  0.82    Ca    8.6      29 Aug 2021 10:21    TPro  7.1  /  Alb  3.4  /  TBili  0.2  /  DBili  x   /  AST  25  /  ALT  11  /  AlkPhos  64      PT/INR - ( 28 Aug 2021 21:29 )   PT: 12.0 sec;   INR: 1.00          PTT - ( 28 Aug 2021 21:29 )  PTT:32.7 sec  Urinalysis Basic - ( 28 Aug 2021 22:28 )    Color: Yellow / Appearance: SL Cloudy / S.025 / pH: x  Gluc: x / Ketone: NEGATIVE  / Bili: Small / Urobili: 4.0 E.U./dL   Blood: x / Protein: 100 mg/dL / Nitrite: NEGATIVE   Leuk Esterase: Moderate / RBC: 5-10 /HPF / WBC > 10 /HPF   Sq Epi: x / Non Sq Epi: 0-5 /HPF / Bacteria: Present /HPF        2 %  37 /uL      MICROBIOLOGY: REVIEWED    RADIOLOGY: REVIEWED    Assessment and Plan:  Incomplete note     55F PMH AIDS (nonadherent with Biktarvy and Bactrim, unknown CD4 and VL), hep B/C, COPD, active smoker, crack use, rectal Ca s/p RT, tongue ca s/p resection, BPD presenting with acute of chronic shortness of breath for 2 days. Admitted for COPD exacerbation.     #AIDS. Patient with cd4 count of 37 with VL pending, though most likely high in the setting of of medication noncompliance. She was diagnosed with HIV in  and has been on several medications for HIV management. She follows at [facility] and was recently transitioned to biktarvy and bactrim. She also states to need azithromycin but does not take.   - will need collateral from outpatient HIV provider at [facility]   - c/w bactrim for time being   - will need to follow-up with VL  - CHIEF COMPLAINT:  Patient is a 55y old  Female who presents with a chief complaint of inability to ambulate (30 Aug 2021 10:44)    Admission H&P, Progress Notes and Consultant Notes reviewed in detail.    HPI:  INEZ WOLF is a 55y Female with Hx of   55F PMH AIDS (nonadherent with Biktarvy and Bactrim, unknown CD4 and VL), hep B/C, COPD, active smoker, crack use, rectal Ca s/p RT, tongue ca s/p resection, BPD presenting with acute of chronic shortness of breath for 2 days. Patient has an extensive smoking history and continues to smoke 4 cigarettes a day. She is on inhalers at home and states to use them; however, she states that they do not resolve her symptoms. She has been short of breath for the last few months, and now comes to [facility] due to acutely worsening SOB that made it difficult for her to speak. HIV team consulted for management of AIDS. On ROS, the patient states to mildly sob but with significant improvement relative to her presentation. She denies headaches, vision changes, CP, palpitations, n/v/d/c, fever/chills.       REVIEW OF SYSTEMS: As above    Outpatient HIV Provider: Dr. Gavin  Year of HIV Diagnosis:   T cell joanna: Unknown, but current level at 37  Highest Viral Load: unknown, pending   Current ARV regimen: biktarvy   ARV adherence: has not been taking  Previous ARV regimens: patient unsure   Hx of Past Oppurtunistic Infections: unknown     PAST MEDICAL & SURGICAL HISTORY:  HIV disease    Advanced COPD    Tongue cancer    Rectal cancer    Hepatitis B    Hepatitis C    No significant past surgical history        Social Hx:    Sexual History: Last sexual activity in   Sexual Activity: Since   Number of Current Partners: unknown  Number of Lifetime Partners: Unknown     STI Hx: Unknown     PHYSICAL EXAM:    Vital Signs Last 24 Hrs  T(C): 36.4 (30 Aug 2021 12:09), Max: 36.6 (29 Aug 2021 21:01)  T(F): 97.6 (30 Aug 2021 12:09), Max: 97.9 (29 Aug 2021 21:01)  HR: 57 (30 Aug 2021 12:09) (57 - 70)  BP: 94/55 (30 Aug 2021 12:09) (94/55 - 110/66)  BP(mean): 68 (29 Aug 2021 21:01) (68 - 68)  RR: 17 (30 Aug 2021 12:09) (14 - 17)  SpO2: 99% (30 Aug 2021 12:09) (98% - 100%)    Constitutional: NAD, comfortable in bed.  HEENT: NC/AT, PERRLA, EOMI, no conjunctival pallor or scleral icterus, MMM; surgical intervention noted in tongue and jaw  Neck: Supple, no JVD  Respiratory: CTA B/L. No w/r/r.   Cardiovascular: RRR, normal S1 and S2, no m/r/g.   Gastrointestinal: +BS, soft NTND, no guarding or rebound tenderness, no palpable masses   Extremities: wwp; no cyanosis, clubbing or edema.   Vascular: Pulses equal and strong throughout.   Neurological: AAOx3, no CN deficits, strength and sensation intact throughout.     MEDICATIONS  (STANDING):  albuterol/ipratropium for Nebulization 3 milliLiter(s) Nebulizer every 6 hours  atorvastatin 40 milliGRAM(s) Oral at bedtime  budesonide  80 MICROgram(s)/formoterol 4.5 MICROgram(s) Inhaler 2 Puff(s) Inhalation two times a day  dextrose 40% Gel 15 Gram(s) Oral once  dextrose 5%. 1000 milliLiter(s) (50 mL/Hr) IV Continuous <Continuous>  dextrose 5%. 1000 milliLiter(s) (100 mL/Hr) IV Continuous <Continuous>  dextrose 50% Injectable 25 Gram(s) IV Push once  dextrose 50% Injectable 12.5 Gram(s) IV Push once  dextrose 50% Injectable 25 Gram(s) IV Push once  enoxaparin Injectable 40 milliGRAM(s) SubCutaneous every 24 hours  glucagon  Injectable 1 milliGRAM(s) IntraMuscular once  insulin lispro (ADMELOG) corrective regimen sliding scale   SubCutaneous Before meals and at bedtime  midodrine. 5 milliGRAM(s) Oral once  mirtazapine 15 milliGRAM(s) Oral at bedtime  nicotine -   7 mG/24Hr(s) Patch 1 Patch Transdermal daily  OLANZapine 5 milliGRAM(s) Oral daily  predniSONE   Tablet 40 milliGRAM(s) Oral every 24 hours  trimethoprim   80 mG/sulfamethoxazole 400 mG 1 Tablet(s) Oral daily    MEDICATIONS  (PRN):  acetaminophen   Tablet .. 650 milliGRAM(s) Oral every 6 hours PRN Temp greater or equal to 38.5C (101.3F), Mild Pain (1 - 3)  melatonin 3 milliGRAM(s) Oral at bedtime PRN Insomnia  oxyCODONE    IR 15 milliGRAM(s) Oral every 6 hours PRN Severe Pain (7 - 10)  oxyCODONE    IR 10 milliGRAM(s) Oral every 6 hours PRN Moderate Pain (4 - 6)      Allergies    No Known Allergies    Intolerances      LABS: REVIEWED                        10.2     )-----------( 115      ( 29 Aug 2021 10:21 )             35.7                           10.2   .  )-----------( 115      ( 29 Aug 2021 10:21 )             35.7     Neutrophils:46.8   Bands:--   Lymphocytes:43.6   Monocytes:3.7   Eosinophils:0.2   Basophils:0.4    @ 10:21        140  |  110<H>  |  16  ----------------------------<  182<H>  4.0   |  19<L>  |  0.82    Ca    8.6      29 Aug 2021 10:21    TPro  7.1  /  Alb  3.4  /  TBili  0.2  /  DBili  x   /  AST  25  /  ALT  11  /  AlkPhos  64      PT/INR - ( 28 Aug 2021 21:29 )   PT: 12.0 sec;   INR: 1.00          PTT - ( 28 Aug 2021 21:29 )  PTT:32.7 sec  Urinalysis Basic - ( 28 Aug 2021 22:28 )    Color: Yellow / Appearance: SL Cloudy / S.025 / pH: x  Gluc: x / Ketone: NEGATIVE  / Bili: Small / Urobili: 4.0 E.U./dL   Blood: x / Protein: 100 mg/dL / Nitrite: NEGATIVE   Leuk Esterase: Moderate / RBC: 5-10 /HPF / WBC > 10 /HPF   Sq Epi: x / Non Sq Epi: 0-5 /HPF / Bacteria: Present /HPF        2 %  37 /uL      MICROBIOLOGY: REVIEWED    RADIOLOGY: REVIEWED    Assessment and Plan:  Incomplete note     55F PMH AIDS (nonadherent with Biktarvy and Bactrim, unknown CD4 and VL), hep B/C, COPD, active smoker, crack use, rectal Ca s/p RT, tongue ca s/p resection, BPD presenting with acute of chronic shortness of breath for 2 days. Admitted for COPD exacerbation.     #AIDS. Patient with cd4 count of 37 with VL pending, though most likely high in the setting of of medication noncompliance. She was diagnosed with HIV in  and has been on several medications for HIV management. She follows at [facility] and was recently transitioned to biktarvy and bactrim. She also states to need azithromycin but does not take.   - will need collateral from outpatient HIV provider at [facility]   - follow-up VL  - hold ARV treatment for now, will need to see viral load before restarting   - in setting of AIDS, will recommend sending galactomannin, fungitell, CMV pcr, and cryptococcal ag   - obtain AFB and fungal cultures   - For PCP ppx, would recommend increasing to bactrim DS daily, if using to treat for UTI would need BID dosing     HIV team will continue to follow CHIEF COMPLAINT:  Patient is a 55y old  Female who presents with a chief complaint of inability to ambulate (30 Aug 2021 10:44)    Admission H&P, Progress Notes and Consultant Notes reviewed in detail.    HPI:  INEZ WOLF is a 55y Female with Hx of   55F PMH AIDS (nonadherent with Biktarvy and Bactrim, unknown CD4 and VL), hep B/C, COPD, active smoker, crack use, rectal Ca s/p RT, tongue ca s/p resection, BPD presenting with acute of chronic shortness of breath for 2 days. Patient has an extensive smoking history and continues to smoke 4 cigarettes a day. She is on inhalers at home and states to use them; however, she states that they do not resolve her symptoms. She has been short of breath for the last few months, and now comes to [facility] due to acutely worsening SOB that made it difficult for her to speak. HIV team consulted for management of AIDS. On ROS, the patient states to mildly sob but with significant improvement relative to her presentation. She denies headaches, vision changes, CP, palpitations, n/v/d/c, fever/chills.       REVIEW OF SYSTEMS: As above    Outpatient HIV Provider: Dr. Gavin  Year of HIV Diagnosis:   T cell joanna: Unknown, but current level at 37  Highest Viral Load: unknown, pending   Current ARV regimen: biktarvy   ARV adherence: has not been taking  Previous ARV regimens: patient unsure   Hx of Past Oppurtunistic Infections: unknown     PAST MEDICAL & SURGICAL HISTORY:  HIV disease    Advanced COPD    Tongue cancer    Rectal cancer    Hepatitis B    Hepatitis C    No significant past surgical history        Social Hx:    Sexual History: Last sexual activity in   Sexual Activity: Since   Number of Current Partners: unknown  Number of Lifetime Partners: Unknown     STI Hx: Unknown     PHYSICAL EXAM:    Vital Signs Last 24 Hrs  T(C): 36.4 (30 Aug 2021 12:09), Max: 36.6 (29 Aug 2021 21:01)  T(F): 97.6 (30 Aug 2021 12:09), Max: 97.9 (29 Aug 2021 21:01)  HR: 57 (30 Aug 2021 12:09) (57 - 70)  BP: 94/55 (30 Aug 2021 12:09) (94/55 - 110/66)  BP(mean): 68 (29 Aug 2021 21:01) (68 - 68)  RR: 17 (30 Aug 2021 12:09) (14 - 17)  SpO2: 99% (30 Aug 2021 12:09) (98% - 100%)    Constitutional: NAD, comfortable in bed.  HEENT: NC/AT, PERRLA, EOMI, no conjunctival pallor or scleral icterus, MMM; surgical intervention noted in tongue and jaw  Neck: Supple, no JVD  Respiratory: CTA B/L. No w/r/r.   Cardiovascular: RRR, normal S1 and S2, no m/r/g.   Gastrointestinal: +BS, soft NTND, no guarding or rebound tenderness, no palpable masses   Extremities: wwp; no cyanosis, clubbing or edema.   Vascular: Pulses equal and strong throughout.   Neurological: AAOx3, no CN deficits, strength and sensation intact throughout.     MEDICATIONS  (STANDING):  albuterol/ipratropium for Nebulization 3 milliLiter(s) Nebulizer every 6 hours  atorvastatin 40 milliGRAM(s) Oral at bedtime  budesonide  80 MICROgram(s)/formoterol 4.5 MICROgram(s) Inhaler 2 Puff(s) Inhalation two times a day  dextrose 40% Gel 15 Gram(s) Oral once  dextrose 5%. 1000 milliLiter(s) (50 mL/Hr) IV Continuous <Continuous>  dextrose 5%. 1000 milliLiter(s) (100 mL/Hr) IV Continuous <Continuous>  dextrose 50% Injectable 25 Gram(s) IV Push once  dextrose 50% Injectable 12.5 Gram(s) IV Push once  dextrose 50% Injectable 25 Gram(s) IV Push once  enoxaparin Injectable 40 milliGRAM(s) SubCutaneous every 24 hours  glucagon  Injectable 1 milliGRAM(s) IntraMuscular once  insulin lispro (ADMELOG) corrective regimen sliding scale   SubCutaneous Before meals and at bedtime  midodrine. 5 milliGRAM(s) Oral once  mirtazapine 15 milliGRAM(s) Oral at bedtime  nicotine -   7 mG/24Hr(s) Patch 1 Patch Transdermal daily  OLANZapine 5 milliGRAM(s) Oral daily  predniSONE   Tablet 40 milliGRAM(s) Oral every 24 hours  trimethoprim   80 mG/sulfamethoxazole 400 mG 1 Tablet(s) Oral daily    MEDICATIONS  (PRN):  acetaminophen   Tablet .. 650 milliGRAM(s) Oral every 6 hours PRN Temp greater or equal to 38.5C (101.3F), Mild Pain (1 - 3)  melatonin 3 milliGRAM(s) Oral at bedtime PRN Insomnia  oxyCODONE    IR 15 milliGRAM(s) Oral every 6 hours PRN Severe Pain (7 - 10)  oxyCODONE    IR 10 milliGRAM(s) Oral every 6 hours PRN Moderate Pain (4 - 6)      Allergies    No Known Allergies    Intolerances      LABS: REVIEWED                        10.2     )-----------( 115      ( 29 Aug 2021 10:21 )             35.7                           10.2   .  )-----------( 115      ( 29 Aug 2021 10:21 )             35.7     Neutrophils:46.8   Bands:--   Lymphocytes:43.6   Monocytes:3.7   Eosinophils:0.2   Basophils:0.4    @ 10:21        140  |  110<H>  |  16  ----------------------------<  182<H>  4.0   |  19<L>  |  0.82    Ca    8.6      29 Aug 2021 10:21    TPro  7.1  /  Alb  3.4  /  TBili  0.2  /  DBili  x   /  AST  25  /  ALT  11  /  AlkPhos  64      PT/INR - ( 28 Aug 2021 21:29 )   PT: 12.0 sec;   INR: 1.00          PTT - ( 28 Aug 2021 21:29 )  PTT:32.7 sec  Urinalysis Basic - ( 28 Aug 2021 22:28 )    Color: Yellow / Appearance: SL Cloudy / S.025 / pH: x  Gluc: x / Ketone: NEGATIVE  / Bili: Small / Urobili: 4.0 E.U./dL   Blood: x / Protein: 100 mg/dL / Nitrite: NEGATIVE   Leuk Esterase: Moderate / RBC: 5-10 /HPF / WBC > 10 /HPF   Sq Epi: x / Non Sq Epi: 0-5 /HPF / Bacteria: Present /HPF        2 %  37 /uL      MICROBIOLOGY: REVIEWED    RADIOLOGY: REVIEWED    Assessment and Plan:  Incomplete note     55F PMH AIDS (nonadherent with Biktarvy and Bactrim, unknown CD4 and VL), hep B/C, COPD, active smoker, crack use, rectal Ca s/p RT, tongue ca s/p resection, BPD presenting with acute of chronic shortness of breath for 2 days. Admitted for COPD exacerbation.     #AIDS. Patient with cd4 count of 37 with VL pending, though most likely high in the setting of of medication noncompliance. She was diagnosed with HIV in  and has been on several medications for HIV management. She follows at [facility] and was recently transitioned to biktarvy and bactrim. She also states to need azithromycin but does not take.   - will need collateral from outpatient HIV provider at [facility]   - follow-up VL  - hold ARV treatment for now, will need to see viral load before restarting   - in setting of AIDS, will recommend sending galactomannin, fungitell, CMV pcr, and cryptococcal ag   - obtain AFB and fungal cultures   - For PCP ppx, would recommend increasing to bactrim DS daily, if using to treat for UTI would need BID dosing of bactrim DS    HIV team will continue to follow CHIEF COMPLAINT:  Patient is a 55y old  Female who presents with a chief complaint of inability to ambulate (30 Aug 2021 10:44)    Admission H&P, Progress Notes and Consultant Notes reviewed in detail.    HPI:  INEZ WOLF is a 55y Female with Hx of   55F PMH AIDS (nonadherent with Biktarvy and Bactrim, unknown CD4 and VL), hep B/C, COPD, active smoker, crack use, rectal Ca s/p RT, tongue ca s/p resection, BPD presenting with acute of chronic shortness of breath for 2 days. Patient has an extensive smoking history and continues to smoke 4 cigarettes a day. She is on inhalers at home and states to use them; however, she states that they do not resolve her symptoms. She has been short of breath for the last few months, and now comes to [facility] due to acutely worsening SOB that made it difficult for her to speak. HIV team consulted for management of AIDS. On ROS, the patient states to mildly sob but with significant improvement relative to her presentation. She denies headaches, vision changes, CP, palpitations, n/v/d/c, fever/chills.       REVIEW OF SYSTEMS: As above    Outpatient HIV Provider: Dr. Gavin  Year of HIV Diagnosis:   T cell joanna: Unknown, but current level at 37  Highest Viral Load: unknown, pending   Current ARV regimen: biktarvy   ARV adherence: has not been taking  Previous ARV regimens: patient unsure   Hx of Past Oppurtunistic Infections: unknown     PAST MEDICAL & SURGICAL HISTORY:  HIV disease    Advanced COPD    Tongue cancer    Rectal cancer    Hepatitis B    Hepatitis C    No significant past surgical history        Social Hx:    Sexual History: Last sexual activity in   Sexual Activity: Since   Number of Current Partners: unknown  Number of Lifetime Partners: Unknown     STI Hx: Unknown     PHYSICAL EXAM:    Vital Signs Last 24 Hrs  T(C): 36.4 (30 Aug 2021 12:09), Max: 36.6 (29 Aug 2021 21:01)  T(F): 97.6 (30 Aug 2021 12:09), Max: 97.9 (29 Aug 2021 21:01)  HR: 57 (30 Aug 2021 12:09) (57 - 70)  BP: 94/55 (30 Aug 2021 12:09) (94/55 - 110/66)  BP(mean): 68 (29 Aug 2021 21:01) (68 - 68)  RR: 17 (30 Aug 2021 12:09) (14 - 17)  SpO2: 99% (30 Aug 2021 12:09) (98% - 100%)    Constitutional: NAD, comfortable in bed.  HEENT: NC/AT, PERRLA, EOMI, no conjunctival pallor or scleral icterus, MMM; surgical intervention noted in tongue and jaw  Neck: Supple, no JVD  Respiratory: CTA B/L. No w/r/r.   Cardiovascular: RRR, normal S1 and S2, no m/r/g.   Gastrointestinal: +BS, soft NTND, no guarding or rebound tenderness, no palpable masses   Extremities: wwp; no cyanosis, clubbing or edema.   Vascular: Pulses equal and strong throughout.   Neurological: AAOx3, no CN deficits, strength and sensation intact throughout.     MEDICATIONS  (STANDING):  albuterol/ipratropium for Nebulization 3 milliLiter(s) Nebulizer every 6 hours  atorvastatin 40 milliGRAM(s) Oral at bedtime  budesonide  80 MICROgram(s)/formoterol 4.5 MICROgram(s) Inhaler 2 Puff(s) Inhalation two times a day  dextrose 40% Gel 15 Gram(s) Oral once  dextrose 5%. 1000 milliLiter(s) (50 mL/Hr) IV Continuous <Continuous>  dextrose 5%. 1000 milliLiter(s) (100 mL/Hr) IV Continuous <Continuous>  dextrose 50% Injectable 25 Gram(s) IV Push once  dextrose 50% Injectable 12.5 Gram(s) IV Push once  dextrose 50% Injectable 25 Gram(s) IV Push once  enoxaparin Injectable 40 milliGRAM(s) SubCutaneous every 24 hours  glucagon  Injectable 1 milliGRAM(s) IntraMuscular once  insulin lispro (ADMELOG) corrective regimen sliding scale   SubCutaneous Before meals and at bedtime  midodrine. 5 milliGRAM(s) Oral once  mirtazapine 15 milliGRAM(s) Oral at bedtime  nicotine -   7 mG/24Hr(s) Patch 1 Patch Transdermal daily  OLANZapine 5 milliGRAM(s) Oral daily  predniSONE   Tablet 40 milliGRAM(s) Oral every 24 hours  trimethoprim   80 mG/sulfamethoxazole 400 mG 1 Tablet(s) Oral daily    MEDICATIONS  (PRN):  acetaminophen   Tablet .. 650 milliGRAM(s) Oral every 6 hours PRN Temp greater or equal to 38.5C (101.3F), Mild Pain (1 - 3)  melatonin 3 milliGRAM(s) Oral at bedtime PRN Insomnia  oxyCODONE    IR 15 milliGRAM(s) Oral every 6 hours PRN Severe Pain (7 - 10)  oxyCODONE    IR 10 milliGRAM(s) Oral every 6 hours PRN Moderate Pain (4 - 6)      Allergies    No Known Allergies    Intolerances      LABS: REVIEWED                        10.2     )-----------( 115      ( 29 Aug 2021 10:21 )             35.7                           10.2   .  )-----------( 115      ( 29 Aug 2021 10:21 )             35.7     Neutrophils:46.8   Bands:--   Lymphocytes:43.6   Monocytes:3.7   Eosinophils:0.2   Basophils:0.4    @ 10:21        140  |  110<H>  |  16  ----------------------------<  182<H>  4.0   |  19<L>  |  0.82    Ca    8.6      29 Aug 2021 10:21    TPro  7.1  /  Alb  3.4  /  TBili  0.2  /  DBili  x   /  AST  25  /  ALT  11  /  AlkPhos  64      PT/INR - ( 28 Aug 2021 21:29 )   PT: 12.0 sec;   INR: 1.00          PTT - ( 28 Aug 2021 21:29 )  PTT:32.7 sec  Urinalysis Basic - ( 28 Aug 2021 22:28 )    Color: Yellow / Appearance: SL Cloudy / S.025 / pH: x  Gluc: x / Ketone: NEGATIVE  / Bili: Small / Urobili: 4.0 E.U./dL   Blood: x / Protein: 100 mg/dL / Nitrite: NEGATIVE   Leuk Esterase: Moderate / RBC: 5-10 /HPF / WBC > 10 /HPF   Sq Epi: x / Non Sq Epi: 0-5 /HPF / Bacteria: Present /HPF        2 %  37 /uL      MICROBIOLOGY: REVIEWED    RADIOLOGY: REVIEWED    Assessment and Plan:  Incomplete note     55F PMH AIDS (nonadherent with Biktarvy and Bactrim, unknown CD4 and VL), hep B/C, COPD, active smoker, crack use, rectal Ca s/p RT, tongue ca s/p resection, BPD presenting with acute of chronic shortness of breath for 2 days. Admitted for COPD exacerbation.     #AIDS. Patient with cd4 count of 37 with VL pending, though most likely high in the setting of of medication noncompliance. She was diagnosed with HIV in  and has been on several medications for HIV management. She follows at [facility] and was recently transitioned to biktarvy and bactrim. She also states to need azithromycin but does not take.   - will need collateral from outpatient HIV provider at [facility]   - follow-up VL  - hold ARV treatment for now, will need to see viral load before restarting   - in setting of AIDS, will recommend sending galactomannin, fungitell, CMV pcr, and cryptococcal ag   - obtain AFB and fungal cultures   - For PCP ppx, would recommend increasing to bactrim DS daily, if using to treat for UTI would need BID dosing of bactrim DS    HIV team will continue to follow    Attending Attestation:  Patient w/ COPD exacerbation.  Now on isolation due to COVID exposure.   SOB unlikely due to OI.  Patient is intermittently adherent to Bikarvy which put her at risk for resistances.  Last saw provider at [place] 2 weeks ago.    Skin lesions on back are small but could be crypt / KS.  Agree w/ above OI screening.  PCP ppx.  Will plan to restart ARVs pending OI w/u.    HIV team will continue to follow.

## 2021-08-30 NOTE — DIETITIAN INITIAL EVALUATION ADULT. - PROBLEM SELECTOR PLAN 4
CT AP: Low-attenuation at the uterus suggestive of endometrial thickening versus fluid at the endometrial canal, up to 12 mm thick. Further evaluation with ultrasound recommended.  -no vaginal discharge recently but chronic vaginal pain since radiation therapy years ago  Plan:  -f/u gyn o/p

## 2021-08-30 NOTE — DIETITIAN INITIAL EVALUATION ADULT. - PROBLEM SELECTOR PLAN 3
Hx of AIDS with unknown VL or CD4. Not compliant with home biktarvy or bactrim. Follows with Dr. Gavin at [facility]:  -HIV consult on Monday  -f/u CD4 and VL  -cont bactrim 1 normal strength tab OD for ppx

## 2021-08-30 NOTE — DIETITIAN INITIAL EVALUATION ADULT. - PROBLEM SELECTOR PLAN 5
Hx of rectal ca s/p RT in 2002.   -CT AP: Wall thickening with surrounding inflammatory change at the sigmoid and rectosigmoid possibly a nonspecific colitis. Nonobstructing mass is also a consideration given history of neoplasm.  -no change in bowel habits recently  -pt reports that she takes oxycodone 20mg for pain relief, which is seen on outpatient med list  Plan:  -f/u ISTOP  -f/u with o/p provider

## 2021-08-30 NOTE — PROGRESS NOTE ADULT - SUBJECTIVE AND OBJECTIVE BOX
INTERVAL HPI/OVERNIGHT EVENTS:  Patient was seen and examined at bedside. OVernight, the patient endorsed pruritis shortly after receiving her oxycodone, says she is still prurutic in the morning but it isnt really bothering her. Patient denies: fever, chills, dizziness, weakness, HA, Changes in vision, CP, palpitations, SOB, cough, N/V/D/C, dysuria, changes in bowel movements, LE edema. ROS otherwise negative.    VITAL SIGNS:  T(F): 97.3 (21 @ 05:27)  HR: 69 (21 @ 05:27)  BP: 103/69 (21 @ 05:27)  RR: 16 (21 @ 05:27)  SpO2: 98% (21 @ :27)  Wt(kg): --    PHYSICAL EXAM:    Constitutional: NAD  Head: NC/AT  ENT: no nasal discharge; MMM  Neck: supple  Respiratory: CTA B/L; no W/R/R, no retractions  Cardiac: +S1/S2; RRR; no M/R/G; PMI non-displaced  Gastrointestinal: soft, NT/ND; no rebound or guarding; +BSx4  Extremities: WWP, no clubbing or cyanosis; no peripheral edema  Musculoskeletal: NROM x4; no joint swelling, tenderness or erythema  Vascular: 2+ radial, DP/PT pulses B/L  Dermatologic: stage 3 sacral ulcer without purulent discharge  Neurologic: AAOx3; CN II-12 intact; strength 5/5 x4, light sensation intact x4; coordination intact on finger to nose and heel to shin b/l  Psychiatric: affect and characteristics of appearance, verbalizations, behaviors are appropriate    MEDICATIONS  (STANDING):  albuterol/ipratropium for Nebulization 3 milliLiter(s) Nebulizer every 6 hours  atorvastatin 40 milliGRAM(s) Oral at bedtime  budesonide  80 MICROgram(s)/formoterol 4.5 MICROgram(s) Inhaler 2 Puff(s) Inhalation two times a day  dextrose 40% Gel 15 Gram(s) Oral once  dextrose 5%. 1000 milliLiter(s) (50 mL/Hr) IV Continuous <Continuous>  dextrose 5%. 1000 milliLiter(s) (100 mL/Hr) IV Continuous <Continuous>  dextrose 50% Injectable 25 Gram(s) IV Push once  dextrose 50% Injectable 12.5 Gram(s) IV Push once  dextrose 50% Injectable 25 Gram(s) IV Push once  enoxaparin Injectable 40 milliGRAM(s) SubCutaneous every 24 hours  glucagon  Injectable 1 milliGRAM(s) IntraMuscular once  insulin lispro (ADMELOG) corrective regimen sliding scale   SubCutaneous Before meals and at bedtime  midodrine. 5 milliGRAM(s) Oral once  mirtazapine 15 milliGRAM(s) Oral at bedtime  nicotine -   7 mG/24Hr(s) Patch 1 Patch Transdermal daily  OLANZapine 5 milliGRAM(s) Oral daily  predniSONE   Tablet 40 milliGRAM(s) Oral every 24 hours  trimethoprim   80 mG/sulfamethoxazole 400 mG 1 Tablet(s) Oral daily    MEDICATIONS  (PRN):  acetaminophen   Tablet .. 650 milliGRAM(s) Oral every 6 hours PRN Temp greater or equal to 38.5C (101.3F), Mild Pain (1 - 3)  melatonin 3 milliGRAM(s) Oral at bedtime PRN Insomnia  oxyCODONE    IR 15 milliGRAM(s) Oral every 6 hours PRN Severe Pain (7 - 10)  oxyCODONE    IR 10 milliGRAM(s) Oral every 6 hours PRN Moderate Pain (4 - 6)      Allergies    No Known Allergies    Intolerances        LABS:                        10.2   5.67  )-----------( 115      ( 29 Aug 2021 10:21 )             35.7     08-    140  |  110<H>  |  16  ----------------------------<  182<H>  4.0   |  19<L>  |  0.82    Ca    8.6      29 Aug 2021 10:21    TPro  7.1  /  Alb  3.4  /  TBili  0.2  /  DBili  x   /  AST  25  /  ALT  11  /  AlkPhos  64      PT/INR - ( 28 Aug 2021 21:29 )   PT: 12.0 sec;   INR: 1.00          PTT - ( 28 Aug 2021 21:29 )  PTT:32.7 sec  Urinalysis Basic - ( 28 Aug 2021 22:28 )    Color: Yellow / Appearance: SL Cloudy / S.025 / pH: x  Gluc: x / Ketone: NEGATIVE  / Bili: Small / Urobili: 4.0 E.U./dL   Blood: x / Protein: 100 mg/dL / Nitrite: NEGATIVE   Leuk Esterase: Moderate / RBC: 5-10 /HPF / WBC > 10 /HPF   Sq Epi: x / Non Sq Epi: 0-5 /HPF / Bacteria: Present /HPF        RADIOLOGY & ADDITIONAL TESTS:  Reviewed

## 2021-08-30 NOTE — PROGRESS NOTE ADULT - PROBLEM SELECTOR PLAN 5
CT AP: Low-attenuation at the uterus suggestive of endometrial thickening versus fluid at the endometrial canal, up to 12 mm thick. Further evaluation with ultrasound recommended.  -no vaginal discharge recently but chronic vaginal pain since radiation therapy years ago  Plan:  -TVUS today Stage 3-4 ulcer on sacrum. No purulent discharge, does not appear infected. Wound care consulted  -f/u wound care consult

## 2021-08-30 NOTE — CONSULT NOTE ADULT - SUBJECTIVE AND OBJECTIVE BOX
HPI:  55F PMH AIDS (nonadherent with Biktarvy and Bactrim, unknown CD4 and VL), hep B/C, COPD, active smoker, crack use, rectal Ca s/p RT, tongue ca s/p resection, BPD presenting with worsening shortness of breath for 2 days. Pt is chronically ill at baseline, with cough productive of green sputum, EDOUARD that makes it so she is only able to walk half a block, and unsteadiness on her feet that makes it so that she spends most of her time in bed, only getting out of bed to use the bathroom or to grab something out of the fridge. Pt has been feeling more short of breath for 2 days, with occasional wheezing. She felt like it was becoming difficult to talk and so she came to the ED. No worsening of her chronic cough, no change in sputum, no rhinorrhea or sore throat. Pt reports that she is not very adherent to her multiple medications, including inhalers. Pt is a current smoker, smoking 4 cigarettes per day. Pt reports good appetite but says that she is unable to cook due to her [facility] only having a microwave. Reports 50lb weight loss over the past several years. Denies fevers, chills, chest pain, nausea, vomiting, diarrhea, constipation, dysuria.    ED Course:  VS: T 99.3, HR 74, BP 96/61, O2 98% on 2L, 96% on RA  Labs s/f:  UA with LEs and >10 WBCs  CT PE: no PE, chronic airspace disease and/or atelectasis  CT AP: ?gastritis and colitis vs nonobstructing mass c/w rectal ca hx. endometrial thickening vs fluid  Treatment: Ceftriaxone 1g x1, solumedrol 40mg, albuterol, IV morphine 2mg, IV morphine 4mg, 1.25L NS (29 Aug 2021 05:04)      PERTINENT PMH REVIEWED:  [ ] YES [ ] NO           SOCIAL HISTORY:  Significant other/partner:  [ ] YES  [ ] NO            Children:  [ ] YES  [ ] NO                   Scientologist/Spirituality:  Substance hx:  [ ] YES   [ ] NO           Tobacco hx:  [ ] YES  [ ] NO             Alcohol hx: [ ] YES  [ ] NO        Home Opioid hx:  [ ] YES  [ ] NO   Living Situation: [ ] Home  [ ] Long term care  [ ] Rehab    REFERRALS:   [ ] Chaplaincy  [ ] Hospice  [ ] Child Life  [ ] Social Work  [ ] Case management [ ] Holistic Therapy     FAMILY HISTORY:    [ ] Family history non contributory     BASELINE ADLs (prior to admission):  Independent [ ] moderately [ ] fully   Dependent   [ ] moderately [ ] fully    ADVANCE DIRECTIVES:  [ ] YES [ ] NO   DNR [ ] YES [ ] NO                      MOLST  [ ] YES [ ] NO    Living Will  [ ] YES [ ] NO    Health Care Proxy [ ] YES  [ ] NO      [ ] Surrogate  [ ] HCP  [ ] Guardian:                                                                  Phone#:    Allergies    No Known Allergies    Intolerances        MEDICATIONS  (STANDING):  albuterol/ipratropium for Nebulization 3 milliLiter(s) Nebulizer every 6 hours  atorvastatin 40 milliGRAM(s) Oral at bedtime  budesonide  80 MICROgram(s)/formoterol 4.5 MICROgram(s) Inhaler 2 Puff(s) Inhalation two times a day  dextrose 40% Gel 15 Gram(s) Oral once  dextrose 5%. 1000 milliLiter(s) (50 mL/Hr) IV Continuous <Continuous>  dextrose 5%. 1000 milliLiter(s) (100 mL/Hr) IV Continuous <Continuous>  dextrose 50% Injectable 25 Gram(s) IV Push once  dextrose 50% Injectable 12.5 Gram(s) IV Push once  dextrose 50% Injectable 25 Gram(s) IV Push once  enoxaparin Injectable 40 milliGRAM(s) SubCutaneous every 24 hours  glucagon  Injectable 1 milliGRAM(s) IntraMuscular once  insulin lispro (ADMELOG) corrective regimen sliding scale   SubCutaneous Before meals and at bedtime  midodrine. 5 milliGRAM(s) Oral once  mirtazapine 15 milliGRAM(s) Oral at bedtime  nicotine -   7 mG/24Hr(s) Patch 1 Patch Transdermal daily  OLANZapine 5 milliGRAM(s) Oral daily  predniSONE   Tablet 40 milliGRAM(s) Oral every 24 hours  trimethoprim  160 mG/sulfamethoxazole 800 mG 1 Tablet(s) Oral every 12 hours    MEDICATIONS  (PRN):  acetaminophen   Tablet .. 650 milliGRAM(s) Oral every 6 hours PRN Temp greater or equal to 38.5C (101.3F), Mild Pain (1 - 3)  melatonin 3 milliGRAM(s) Oral at bedtime PRN Insomnia  oxyCODONE    IR 15 milliGRAM(s) Oral every 6 hours PRN Severe Pain (7 - 10)  oxyCODONE    IR 10 milliGRAM(s) Oral every 6 hours PRN Moderate Pain (4 - 6)      PRESENT SYMPTOMS:  Source: [ ] Patient   [ ] Family   [ ] Team     Pain: [ ] YES [ ] NO  OLDCARTS:     Dyspnea: [ ] YES [ ] NO   Anxiety: [ ] YES [ ] NO  Fatigue: [ ] YES [ ] NO   Nausea: [ ] YES [ ] NO  Loss of appetite: [ ] YES [ ] NO   Constipation: [ ] YES [ ] NO     Other Symptoms:  [ ] All other review of systems negative   [ ] Unable to obtain due to poor mentation     Karnofsky Performance Score/Palliative Performance Status Version 2:         30%  Protein Calorie Malutrition:  [ ] Mild   [ ] Moderate   [ ] Severe     Vital Signs Last 24 Hrs  T(C): 36.4 (30 Aug 2021 12:09), Max: 36.6 (29 Aug 2021 21:01)  T(F): 97.6 (30 Aug 2021 12:09), Max: 97.9 (29 Aug 2021 21:01)  HR: 57 (30 Aug 2021 12:09) (57 - 69)  BP: 94/55 (30 Aug 2021 12:09) (94/55 - 110/66)  BP(mean): 68 (29 Aug 2021 21:01) (68 - 68)  RR: 17 (30 Aug 2021 12:09) (14 - 17)  SpO2: 99% (30 Aug 2021 12:09) (98% - 99%)    Physical Exam:    General: [ ] Alert,  A&O x     [ ] lethargic   [ ] Agitated   [ ] Cachexia   HEENT: [ ] Normal   [ ] Dry mouth   [ ] ET Tube    [ ] Trach   Lungs: [ ] Clear [ ] Rhonchi  [ ] Crackles [ ] Wheezing [ ] Tachypnea  [ ] Audible excessive secretions   Cardiovascular:  [ ] Regular rate and rhythm  [ ] Irregular [ ] Tachycardia   [ ] Bradycardia   Abdomen: [ ] Soft  [ ] Distended  [ ]  [ ] +BS  [ ] Non tender [ ] Tender  [ ]PEG   [ ] NGT   Last BM:     Genitourinary: [ ] Normal [ ] Incontinent   [ ] Oliguria/Anuria   [ ] Jeter  Musculoskeletal:  [ ] Normal   [ ] Generalized weakness  [ ] Bedbound   Neurological: [ ] No focal deficits  [ ] Cognitive impairment     Skin: [ ] Normal   [ ] Pressure ulcers     LABS:                        10.2   5.67  )-----------( 115      ( 29 Aug 2021 10:21 )             35.7         140  |  110<H>  |  16  ----------------------------<  182<H>  4.0   |  19<L>  |  0.82    Ca    8.6      29 Aug 2021 10:21    TPro  7.1  /  Alb  3.4  /  TBili  0.2  /  DBili  x   /  AST  25  /  ALT  11  /  AlkPhos  64      PT/INR - ( 28 Aug 2021 21:29 )   PT: 12.0 sec;   INR: 1.00          PTT - ( 28 Aug 2021 21:29 )  PTT:32.7 sec  Urinalysis Basic - ( 28 Aug 2021 22:28 )    Color: Yellow / Appearance: SL Cloudy / S.025 / pH: x  Gluc: x / Ketone: NEGATIVE  / Bili: Small / Urobili: 4.0 E.U./dL   Blood: x / Protein: 100 mg/dL / Nitrite: NEGATIVE   Leuk Esterase: Moderate / RBC: 5-10 /HPF / WBC > 10 /HPF   Sq Epi: x / Non Sq Epi: 0-5 /HPF / Bacteria: Present /HPF      I&O's Summary      RADIOLOGY & ADDITIONAL STUDIES: HPI:  55F PMH AIDS (nonadherent with Biktarvy and Bactrim, unknown CD4 and VL), hep B/C, COPD, active smoker, crack use, rectal Ca s/p RT, tongue ca s/p resection, BPD presenting with worsening shortness of breath for 2 days. Pt is chronically ill at baseline, with cough productive of green sputum, EDOUARD that makes it so she is only able to walk half a block, and unsteadiness on her feet that makes it so that she spends most of her time in bed, only getting out of bed to use the bathroom or to grab something out of the fridge. Pt has been feeling more short of breath for 2 days, with occasional wheezing. She felt like it was becoming difficult to talk and so she came to the ED. No worsening of her chronic cough, no change in sputum, no rhinorrhea or sore throat. Pt reports that she is not very adherent to her multiple medications, including inhalers. Pt is a current smoker, smoking 4 cigarettes per day. Pt reports good appetite but says that she is unable to cook due to her [facility] only having a microwave. Reports 50lb weight loss over the past several years. Denies fevers, chills, chest pain, nausea, vomiting, diarrhea, constipation, dysuria.    ED Course:  VS: T 99.3, HR 74, BP 96/61, O2 98% on 2L, 96% on RA  Labs s/f: UA with LEs and >10 WBCs  CT PE: no PE, chronic airspace disease and/or atelectasis  CT AP: ?gastritis and colitis vs nonobstructing mass c/w rectal ca hx. endometrial thickening vs fluid  Treatment: Ceftriaxone 1g x1, solumedrol 40mg, albuterol, IV morphine 2mg, IV morphine 4mg, 1.25L NS (29 Aug 2021 05:04)      PERTINENT PMH REVIEWED:  [x] YES [ ] NO           SOCIAL HISTORY:  Significant other/partner:  [ ] YES  [ ] NO            Children:  [ ] YES  [ ] NO                   Mandaeism/Spirituality:  Substance hx:  [x] YES   [ ] NO           Tobacco hx:  [x] YES  [ ] NO             Alcohol hx: [ ] YES  [ ] NO        Home Opioid hx:  [x] YES  [ ] NO   Living Situation: [ ] Home  [ ] Long term care  [ ] Rehab [X] [facility]    REFERRALS:   [ ] Chaplaincy  [ ] Hospice  [ ] Child Life  [x] Social Work  [x] Case management [ ] Holistic Therapy     FAMILY HISTORY:    [x] Family history non contributory     BASELINE ADLs (prior to admission):  Independent [ ] moderately [ ] fully   Dependent   [ ] moderately [ ] fully    ADVANCE DIRECTIVES:  [ ] YES [x] NO   DNR [ ] YES [x] NO                      MOLST  [ ] YES [x] NO    Living Will  [ ] YES [ ] NO    Health Care Proxy [ ] YES  [ ] NO  - needs to fill out form - verbally stated son  Tere Devlin is her HCP    [ ] Surrogate  [ ] HCP  [ ] Guardian:   Tere Devlin     Phone#: [phone]    Allergies    No Known Allergies    Intolerances    MEDICATIONS  (STANDING):  albuterol/ipratropium for Nebulization 3 milliLiter(s) Nebulizer every 6 hours  atorvastatin 40 milliGRAM(s) Oral at bedtime  budesonide  80 MICROgram(s)/formoterol 4.5 MICROgram(s) Inhaler 2 Puff(s) Inhalation two times a day  dextrose 40% Gel 15 Gram(s) Oral once  dextrose 5%. 1000 milliLiter(s) (50 mL/Hr) IV Continuous <Continuous>  dextrose 5%. 1000 milliLiter(s) (100 mL/Hr) IV Continuous <Continuous>  dextrose 50% Injectable 25 Gram(s) IV Push once  dextrose 50% Injectable 12.5 Gram(s) IV Push once  dextrose 50% Injectable 25 Gram(s) IV Push once  enoxaparin Injectable 40 milliGRAM(s) SubCutaneous every 24 hours  glucagon  Injectable 1 milliGRAM(s) IntraMuscular once  insulin lispro (ADMELOG) corrective regimen sliding scale   SubCutaneous Before meals and at bedtime  midodrine. 5 milliGRAM(s) Oral once  mirtazapine 15 milliGRAM(s) Oral at bedtime  nicotine -   7 mG/24Hr(s) Patch 1 Patch Transdermal daily  OLANZapine 5 milliGRAM(s) Oral daily  predniSONE   Tablet 40 milliGRAM(s) Oral every 24 hours  trimethoprim  160 mG/sulfamethoxazole 800 mG 1 Tablet(s) Oral every 12 hours    MEDICATIONS  (PRN):  acetaminophen   Tablet .. 650 milliGRAM(s) Oral every 6 hours PRN Temp greater or equal to 38.5C (101.3F), Mild Pain (1 - 3)  melatonin 3 milliGRAM(s) Oral at bedtime PRN Insomnia  oxyCODONE    IR 15 milliGRAM(s) Oral every 6 hours PRN Severe Pain (7 - 10)  oxyCODONE    IR 10 milliGRAM(s) Oral every 6 hours PRN Moderate Pain (4 - 6)      PRESENT SYMPTOMS:  Source: [ ] Patient   [ ] Family   [ ] Team     Pain: [ ] YES [ ] NO  OLDCARTS:     Dyspnea: [ ] YES [ ] NO   Anxiety: [ ] YES [ ] NO  Fatigue: [ ] YES [ ] NO   Nausea: [ ] YES [ ] NO  Loss of appetite: [ ] YES [ ] NO   Constipation: [ ] YES [ ] NO     Other Symptoms:  [ ] All other review of systems negative   [ ] Unable to obtain due to poor mentation     Karnofsky Performance Score/Palliative Performance Status Version 2:  40-50%     Protein Calorie Malutrition:  [ ] Mild   [ ] Moderate   [ ] Severe     Vital Signs Last 24 Hrs  T(C): 36.4 (30 Aug 2021 12:09), Max: 36.6 (29 Aug 2021 21:01)  T(F): 97.6 (30 Aug 2021 12:09), Max: 97.9 (29 Aug 2021 21:01)  HR: 57 (30 Aug 2021 12:09) (57 - 69)  BP: 94/55 (30 Aug 2021 12:09) (94/55 - 110/66)  BP(mean): 68 (29 Aug 2021 21:01) (68 - 68)  RR: 17 (30 Aug 2021 12:09) (14 - 17)  SpO2: 99% (30 Aug 2021 12:09) (98% - 99%)    Physical Exam:    General: [x] Alert,  A&O x     [ ] lethargic   [ ] Agitated   [x] Cachexia   HEENT: [ ] Normal   [x] Dry mouth   [ ] ET Tube    [ ] Trach   Lungs: [x] Clear [ ] Rhonchi  [ ] Crackles [ ] Wheezing [ ] Tachypnea  [ ] Audible excessive secretions   Cardiovascular:  [x] Regular rate and rhythm  [ ] Irregular [ ] Tachycardia   [ ] Bradycardia   Abdomen: [x] Soft  [ ] Distended  [ ]  [ ] +BS  [ ] Non tender [ ] Tender  [ ]PEG   [ ] NGT   Last BM:     Genitourinary: [ ] Normal [ ] Incontinent   [ ] Oliguria/Anuria   [ ] Jeter  Musculoskeletal:  [ ] Normal   [x] Generalized weakness  [ ] Bedbound   Neurological: [x] No focal deficits  [ ] Cognitive impairment     Skin: [ ] Normal   [x] Pressure ulcers     LABS:                        10.2   5.67  )-----------( 115      ( 29 Aug 2021 10:21 )             35.7         140  |  110<H>  |  16  ----------------------------<  182<H>  4.0   |  19<L>  |  0.82    Ca    8.6      29 Aug 2021 10:21    TPro  7.1  /  Alb  3.4  /  TBili  0.2  /  DBili  x   /  AST  25  /  ALT  11  /  AlkPhos  64      PT/INR - ( 28 Aug 2021 21:29 )   PT: 12.0 sec;   INR: 1.00          PTT - ( 28 Aug 2021 21:29 )  PTT:32.7 sec  Urinalysis Basic - ( 28 Aug 2021 22:28 )    Color: Yellow / Appearance: SL Cloudy / S.025 / pH: x  Gluc: x / Ketone: NEGATIVE  / Bili: Small / Urobili: 4.0 E.U./dL   Blood: x / Protein: 100 mg/dL / Nitrite: NEGATIVE   Leuk Esterase: Moderate / RBC: 5-10 /HPF / WBC > 10 /HPF   Sq Epi: x / Non Sq Epi: 0-5 /HPF / Bacteria: Present /HPF      I&O's Summary      RADIOLOGY & ADDITIONAL STUDIES: HPI:  55F PMH AIDS (nonadherent with Biktarvy and Bactrim, unknown CD4 and VL), hep B/C, COPD, active smoker, crack use, rectal Ca s/p RT, tongue ca s/p resection, BPD presenting with worsening shortness of breath for 2 days. Pt is chronically ill at baseline, with cough productive of green sputum, EDOUARD that makes it so she is only able to walk half a block, and unsteadiness on her feet that makes it so that she spends most of her time in bed, only getting out of bed to use the bathroom or to grab something out of the fridge. Pt has been feeling more short of breath for 2 days, with occasional wheezing. She felt like it was becoming difficult to talk and so she came to the ED. No worsening of her chronic cough, no change in sputum, no rhinorrhea or sore throat. Pt reports that she is not very adherent to her multiple medications, including inhalers. Pt is a current smoker, smoking 4 cigarettes per day. Pt reports good appetite but says that she is unable to cook due to her [facility] only having a microwave. Reports 50lb weight loss over the past several years. Denies fevers, chills, chest pain, nausea, vomiting, diarrhea, constipation, dysuria. (29 Aug 2021 05:04)    Patient seen and examined at bedside. Reports pain by her pressure sore, further pain assessment noted below. Also endorses dyspnea on exertion, can only walk half a block. Feels as if she has no support.       PERTINENT PMH REVIEWED:  [x] YES [ ] NO           SOCIAL HISTORY:  Significant other/partner:  [ ] YES  [ ] NO            Children:  [ ] YES  [ ] NO                   Rastafari/Spirituality:  Substance hx:  [x] YES   [ ] NO           Tobacco hx:  [x] YES  [ ] NO             Alcohol hx: [ ] YES  [ ] NO        Home Opioid hx:  [x] YES  [ ] NO   Living Situation: [ ] Home  [ ] Long term care  [ ] Rehab [X] [facility]    REFERRALS:   [ ] Chaplaincy  [ ] Hospice  [ ] Child Life  [x] Social Work  [x] Case management [ ] Holistic Therapy     FAMILY HISTORY:  [x] Family history non contributory     BASELINE ADLs (prior to admission):  Independent [ ] moderately [ ] fully   Dependent   [ ] moderately [ ] fully    ADVANCE DIRECTIVES:  [ ] YES [x] NO   DNR [ ] YES [x] NO                      MOLST  [ ] YES [x] NO    Living Will  [ ] YES [ ] NO    Health Care Proxy [ ] YES  [ ] NO  - needs to fill out form - verbally stated son  Tere Devlin is her HCP    [ ] Surrogate  [ ] HCP  [ ] Guardian:   Tere Devlin     Phone#: [phone]    Allergies    No Known Allergies    Intolerances    MEDICATIONS  (STANDING):  albuterol/ipratropium for Nebulization 3 milliLiter(s) Nebulizer every 6 hours  atorvastatin 40 milliGRAM(s) Oral at bedtime  budesonide  80 MICROgram(s)/formoterol 4.5 MICROgram(s) Inhaler 2 Puff(s) Inhalation two times a day  dextrose 40% Gel 15 Gram(s) Oral once  dextrose 5%. 1000 milliLiter(s) (50 mL/Hr) IV Continuous <Continuous>  dextrose 5%. 1000 milliLiter(s) (100 mL/Hr) IV Continuous <Continuous>  dextrose 50% Injectable 25 Gram(s) IV Push once  dextrose 50% Injectable 12.5 Gram(s) IV Push once  dextrose 50% Injectable 25 Gram(s) IV Push once  enoxaparin Injectable 40 milliGRAM(s) SubCutaneous every 24 hours  glucagon  Injectable 1 milliGRAM(s) IntraMuscular once  insulin lispro (ADMELOG) corrective regimen sliding scale   SubCutaneous Before meals and at bedtime  midodrine. 5 milliGRAM(s) Oral once  mirtazapine 15 milliGRAM(s) Oral at bedtime  nicotine -   7 mG/24Hr(s) Patch 1 Patch Transdermal daily  OLANZapine 5 milliGRAM(s) Oral daily  predniSONE   Tablet 40 milliGRAM(s) Oral every 24 hours  trimethoprim  160 mG/sulfamethoxazole 800 mG 1 Tablet(s) Oral every 12 hours    MEDICATIONS  (PRN):  acetaminophen   Tablet .. 650 milliGRAM(s) Oral every 6 hours PRN Temp greater or equal to 38.5C (101.3F), Mild Pain (1 - 3)  melatonin 3 milliGRAM(s) Oral at bedtime PRN Insomnia  oxyCODONE    IR 15 milliGRAM(s) Oral every 6 hours PRN Severe Pain (7 - 10)  oxyCODONE    IR 10 milliGRAM(s) Oral every 6 hours PRN Moderate Pain (4 - 6)      PRESENT SYMPTOMS:  Source: [ ] Patient   [ ] Family   [ ] Team     Pain: [ ] YES [ ] NO  OLDCARTS:     Dyspnea: [ ] YES [ ] NO   Anxiety: [ ] YES [ ] NO  Fatigue: [ ] YES [ ] NO   Nausea: [ ] YES [ ] NO  Loss of appetite: [ ] YES [ ] NO   Constipation: [ ] YES [ ] NO     Other Symptoms:  [ ] All other review of systems negative   [ ] Unable to obtain due to poor mentation     Karnofsky Performance Score/Palliative Performance Status Version 2:  40-50%     Protein Calorie Malutrition:  [ ] Mild   [ ] Moderate   [ ] Severe     Vital Signs Last 24 Hrs  T(C): 36.4 (30 Aug 2021 12:09), Max: 36.6 (29 Aug 2021 21:01)  T(F): 97.6 (30 Aug 2021 12:09), Max: 97.9 (29 Aug 2021 21:01)  HR: 57 (30 Aug 2021 12:09) (57 - 69)  BP: 94/55 (30 Aug 2021 12:09) (94/55 - 110/66)  BP(mean): 68 (29 Aug 2021 21:01) (68 - 68)  RR: 17 (30 Aug 2021 12:09) (14 - 17)  SpO2: 99% (30 Aug 2021 12:09) (98% - 99%)    Physical Exam:    General: [x] Alert,  A&O x     [ ] lethargic   [ ] Agitated   [x] Cachexia   HEENT: [ ] Normal   [x] Dry mouth   [ ] ET Tube    [ ] Trach   Lungs: [x] Clear [ ] Rhonchi  [ ] Crackles [ ] Wheezing [ ] Tachypnea  [ ] Audible excessive secretions   Cardiovascular:  [x] Regular rate and rhythm  [ ] Irregular [ ] Tachycardia   [ ] Bradycardia   Abdomen: [x] Soft  [ ] Distended  [ ]  [ ] +BS  [ ] Non tender [ ] Tender  [ ]PEG   [ ] NGT   Last BM:     Genitourinary: [ ] Normal [ ] Incontinent   [ ] Oliguria/Anuria   [ ] Jeter  Musculoskeletal:  [ ] Normal   [x] Generalized weakness  [ ] Bedbound   Neurological: [x] No focal deficits  [ ] Cognitive impairment     Skin: [ ] Normal   [x] Pressure ulcers     LABS:                        10.2   5.67  )-----------( 115      ( 29 Aug 2021 10:21 )             35.7     08-29    140  |  110<H>  |  16  ----------------------------<  182<H>  4.0   |  19<L>  |  0.82    Ca    8.6      29 Aug 2021 10:21    TPro  7.1  /  Alb  3.4  /  TBili  0.2  /  DBili  x   /  AST  25  /  ALT  11  /  AlkPhos  64      PT/INR - ( 28 Aug 2021 21:29 )   PT: 12.0 sec;   INR: 1.00          PTT - ( 28 Aug 2021 21:29 )  PTT:32.7 sec  Urinalysis Basic - ( 28 Aug 2021 22:28 )    Color: Yellow / Appearance: SL Cloudy / S.025 / pH: x  Gluc: x / Ketone: NEGATIVE  / Bili: Small / Urobili: 4.0 E.U./dL   Blood: x / Protein: 100 mg/dL / Nitrite: NEGATIVE   Leuk Esterase: Moderate / RBC: 5-10 /HPF / WBC > 10 /HPF   Sq Epi: x / Non Sq Epi: 0-5 /HPF / Bacteria: Present /HPF      I&O's Summary      RADIOLOGY & ADDITIONAL STUDIES: HPI:  55F PMH AIDS (nonadherent with Biktarvy and Bactrim, unknown CD4 and VL), hep B/C, COPD, active smoker, crack use, rectal Ca s/p RT, tongue ca s/p resection, BPD presenting with worsening shortness of breath for 2 days. Pt is chronically ill at baseline, with cough productive of green sputum, EDOUARD that makes it so she is only able to walk half a block, and unsteadiness on her feet that makes it so that she spends most of her time in bed, only getting out of bed to use the bathroom or to grab something out of the fridge. Pt has been feeling more short of breath for 2 days, with occasional wheezing. She felt like it was becoming difficult to talk and so she came to the ED. No worsening of her chronic cough, no change in sputum, no rhinorrhea or sore throat. Pt reports that she is not very adherent to her multiple medications, including inhalers. Pt is a current smoker, smoking 4 cigarettes per day. Pt reports good appetite but says that she is unable to cook due to her [facility] only having a microwave. Reports 50lb weight loss over the past several years. Denies fevers, chills, chest pain, nausea, vomiting, diarrhea, constipation, dysuria. (29 Aug 2021 05:04)    Patient seen and examined at bedside. Reports pain by her pressure sore, further pain assessment noted below. Also endorses dyspnea on exertion, can only walk half a block. Feels as if she has no support.       PERTINENT PMH REVIEWED:  [x] YES [ ] NO           SOCIAL HISTORY:  Significant other/partner:  [ ] YES  [ ] NO            Children:  [ ] YES  [ ] NO                   Confucianist/Spirituality:  Substance hx:  [x] YES   [ ] NO           Tobacco hx:  [x] YES  [ ] NO             Alcohol hx: [ ] YES  [ ] NO        Home Opioid hx:  [x] YES  [ ] NO   Living Situation: [ ] Home  [ ] Long term care  [ ] Rehab [X] [facility]    REFERRALS:   [ ] Chaplaincy  [ ] Hospice  [ ] Child Life  [x] Social Work  [x] Case management [ ] Holistic Therapy     FAMILY HISTORY:  [x] Family history non contributory     BASELINE ADLs (prior to admission):  Independent [ ] moderately [ ] fully   Dependent   [x] moderately [ ] fully    ADVANCE DIRECTIVES:  [ ] YES [x] NO   DNR [ ] YES [x] NO                      MOLST  [ ] YES [x] NO    Living Will  [ ] YES [ ] NO    Health Care Proxy [ ] YES  [ ] NO  - needs to fill out form - verbally stated son  Tere Devlin is her HCP    [ ] Surrogate  [ ] HCP  [ ] Guardian:   Tere Devlin     Phone#: [phone]    Allergies  No Known Allergies    MEDICATIONS  (STANDING):  albuterol/ipratropium for Nebulization 3 milliLiter(s) Nebulizer every 6 hours  atorvastatin 40 milliGRAM(s) Oral at bedtime  budesonide  80 MICROgram(s)/formoterol 4.5 MICROgram(s) Inhaler 2 Puff(s) Inhalation two times a day  dextrose 40% Gel 15 Gram(s) Oral once  dextrose 5%. 1000 milliLiter(s) (50 mL/Hr) IV Continuous <Continuous>  dextrose 5%. 1000 milliLiter(s) (100 mL/Hr) IV Continuous <Continuous>  dextrose 50% Injectable 25 Gram(s) IV Push once  dextrose 50% Injectable 12.5 Gram(s) IV Push once  dextrose 50% Injectable 25 Gram(s) IV Push once  enoxaparin Injectable 40 milliGRAM(s) SubCutaneous every 24 hours  glucagon  Injectable 1 milliGRAM(s) IntraMuscular once  insulin lispro (ADMELOG) corrective regimen sliding scale   SubCutaneous Before meals and at bedtime  midodrine. 5 milliGRAM(s) Oral once  mirtazapine 15 milliGRAM(s) Oral at bedtime  nicotine -   7 mG/24Hr(s) Patch 1 Patch Transdermal daily  OLANZapine 5 milliGRAM(s) Oral daily  predniSONE   Tablet 40 milliGRAM(s) Oral every 24 hours  trimethoprim  160 mG/sulfamethoxazole 800 mG 1 Tablet(s) Oral every 12 hours    MEDICATIONS  (PRN):  acetaminophen   Tablet .. 650 milliGRAM(s) Oral every 6 hours PRN Temp greater or equal to 38.5C (101.3F), Mild Pain (1 - 3)  melatonin 3 milliGRAM(s) Oral at bedtime PRN Insomnia  oxyCODONE    IR 15 milliGRAM(s) Oral every 6 hours PRN Severe Pain (7 - 10)  oxyCODONE    IR 10 milliGRAM(s) Oral every 6 hours PRN Moderate Pain (4 - 6)      PRESENT SYMPTOMS:  Source: [x] Patient   [ ] Family   [ ] Team     Pain: [x] YES [ ] NO  lower back, near pressure sore, aching, constant    Dyspnea: [x] YES [ ] NO   Anxiety: [ ] YES [ ] NO  Fatigue: [x] YES [ ] NO   Nausea: [ ] YES [ ] NO  Loss of appetite: [ ] YES [ ] NO   Constipation: [ ] YES [ ] NO     Other Symptoms:  [x] All other review of systems negative   [ ] Unable to obtain due to poor mentation     Karnofsky Performance Score/Palliative Performance Status Version 2:  40-50%     Protein Calorie Malutrition:  [ ] Mild   [ ] Moderate   [x] Severe     Vital Signs Last 24 Hrs  T(C): 36.4 (30 Aug 2021 12:09), Max: 36.6 (29 Aug 2021 21:01)  T(F): 97.6 (30 Aug 2021 12:09), Max: 97.9 (29 Aug 2021 21:01)  HR: 57 (30 Aug 2021 12:09) (57 - 69)  BP: 94/55 (30 Aug 2021 12:09) (94/55 - 110/66)  BP(mean): 68 (29 Aug 2021 21:01) (68 - 68)  RR: 17 (30 Aug 2021 12:09) (14 - 17)  SpO2: 99% (30 Aug 2021 12:09) (98% - 99%)    Physical Exam:    General: [x] Alert,  A&O x3     [ ] lethargic   [ ] Agitated   [x] Cachexia   HEENT: [x] Normal   [x] Dry mouth   [ ] ET Tube    [ ] Trach   Lungs: [x] Clear [ ] Rhonchi  [ ] Crackles [ ] Wheezing [ ] Tachypnea  [ ] Audible excessive secretions   Cardiovascular:  [x] Regular rate and rhythm  [ ] Irregular [ ] Tachycardia   [ ] Bradycardia   Abdomen: [x] Soft  [ ] Distended  [ ]  [ ] +BS  [ ] Non tender [ ] Tender  [ ]PEG   [ ] NGT   Last BM:     Genitourinary: [ ] Normal [ ] Incontinent   [ ] Oliguria/Anuria   [ ] Jeter  Musculoskeletal:  [ ] Normal   [x] Generalized weakness  [ ] Bedbound   Neurological: [x] No focal deficits  [ ] Cognitive impairment     Skin: [ ] Normal   [x] Pressure ulcers     LABS:                        10.2   5.67  )-----------( 115      ( 29 Aug 2021 10:21 )             35.7         140  |  110<H>  |  16  ----------------------------<  182<H>  4.0   |  19<L>  |  0.82    Ca    8.6      29 Aug 2021 10:21    TPro  7.1  /  Alb  3.4  /  TBili  0.2  /  DBili  x   /  AST  25  /  ALT  11  /  AlkPhos  64    PT/INR - ( 28 Aug 2021 21:29 )   PT: 12.0 sec;   INR: 1.00     PTT - ( 28 Aug 2021 21:29 )  PTT:32.7 sec    Urinalysis Basic - ( 28 Aug 2021 22:28 )  Color: Yellow / Appearance: SL Cloudy / S.025 / pH: x  Gluc: x / Ketone: NEGATIVE  / Bili: Small / Urobili: 4.0 E.U./dL   Blood: x / Protein: 100 mg/dL / Nitrite: NEGATIVE   Leuk Esterase: Moderate / RBC: 5-10 /HPF / WBC > 10 /HPF   Sq Epi: x / Non Sq Epi: 0-5 /HPF / Bacteria: Present /HPF    RADIOLOGY & ADDITIONAL STUDIES:  < from: US Pelvis Complete (21 @ 11:12) >  Uterus: 7.3 x 3.5 x 3.7 cm. Within normal limits.  Endometrium: 0.5 cm.  The left ovary was visualized on transabdominal exam only.  Right ovary: Not  visualized on this exam  Left ovary: 1.8 cm x 1.2 cm x 1.6 cm, calculated volume 1.9 cc. Withinnormal limits. Normal arterial and venous waveforms.  Fluid: None in the cul-de-sac.    IMPRESSION:  Limited study -only transabdominal exam. Transvaginal exam terminated prematurely secondary to patient discomfort.  1.  No demonstrated endometrial thickening. No demonstrated hydro metria..  2.  Nonvisualization of the right ovary    PALLIATIVE MEDICINE COORDINATION OF CARE DOCUMENTATION: [x] Inpatient Consult  Non-Face-to-Face prolonged service provided that relates to (face-to-face) care that has or will occur and ongoing patient management, including one or more of the following: - Reviewed documentation from other physicians and other health care professional services - Reviewed medical records and diagnostic / radiology study results - Coordination with patient's support system  ************************************************************************  MEDICATION REVIEW:  - See Medication List Above    ISTOP REFERENCE:   - no active Rx's / see ISTOP Chart Note  - PRN usage: NO PRN'S  ------------------------------------------------------------------------  COORDINATION OF CARE:  - Palliative Care consulted for: GOC / Symptom Management  - Patient (to be) assessed:  - Patient previously seen by Palliative Care service: NO    ADVANCE CARE PLANNING  - Code status: FULL  - MOLST reviewed in chart: NONE; None found on Alpha  - HCP/Surrogate:  - GOC documents: NONE found on [place]  - HCP/Living will/Other Advanced Directives in Alpha: NONE found on Alpha  ------------------------------------------------------------------------  CARE PROVIDER DOCUMENTATION:  - SW/CM notes: Remains medically active  -   -   -     PLAN OF CARE  - Known admissions in past year:  - Current admit date:  - LOS:  - LACE score:   - Current dispo plan: TO BE DETERMINED    21 (2d)  ------------------------------------------------------------------------  - Time Spent/Chart reviewed: 31 Minutes [including time used to gather, review and transfer data]  - Start: 10:32AM  - End: 11:03AM    Prolonged services rendered, as part of this patient's care provided by Palliative Medicine, include: i. chart review for provider and ancillary service documentation, ii. pertinent diagnostics including laboratory and imaging studies, iii. medication review including PRN use, iv. admission history including previous palliative care encounters and GOC notes, v. advance care planning documents including HCP and MOLST forms in Alpha. Part of Palliative Medicine extended evaluation and management also involves coordination of care with our IDT, the primary and consulting teams, and unit CM/SW and Hospice if eligible. Recommendations based on the information gathered and discussed are outlined in the A/P of Palliative notes. HPI:  55F PMH AIDS (nonadherent with Biktarvy and Bactrim, unknown CD4 and VL), hep B/C, COPD, active smoker, crack use, rectal Ca s/p RT, tongue ca s/p resection, BPD presenting with worsening shortness of breath for 2 days. Pt is chronically ill at baseline, with cough productive of green sputum, EDOUARD that makes it so she is only able to walk half a block, and unsteadiness on her feet that makes it so that she spends most of her time in bed, only getting out of bed to use the bathroom or to grab something out of the fridge. Pt has been feeling more short of breath for 2 days, with occasional wheezing. She felt like it was becoming difficult to talk and so she came to the ED. No worsening of her chronic cough, no change in sputum, no rhinorrhea or sore throat. Pt reports that she is not very adherent to her multiple medications, including inhalers. Pt is a current smoker, smoking 4 cigarettes per day. Pt reports good appetite but says that she is unable to cook due to her SRO only having a microwave. Reports 50lb weight loss over the past several years. Denies fevers, chills, chest pain, nausea, vomiting, diarrhea, constipation, dysuria. (29 Aug 2021 05:04)    Patient seen and examined at bedside. Reports pain by her pressure sore, further pain assessment noted below. Also endorses dyspnea on exertion, can only walk half a block. Feels as if she has no support.       PERTINENT PMH REVIEWED:  [x] YES [ ] NO           SOCIAL HISTORY:  Significant other/partner:  [ ] YES  [ ] NO            Children:  [ ] YES  [ ] NO                   Latter day/Spirituality:  Substance hx:  [x] YES   [ ] NO           Tobacco hx:  [x] YES  [ ] NO             Alcohol hx: [ ] YES  [ ] NO        Home Opioid hx:  [x] YES  [ ] NO   Living Situation: [ ] Home  [ ] Long term care  [ ] Rehab [X] SRO    REFERRALS:   [ ] Chaplaincy  [ ] Hospice  [ ] Child Life  [x] Social Work  [x] Case management [ ] Holistic Therapy     FAMILY HISTORY:  [x] Family history non contributory     BASELINE ADLs (prior to admission):  Independent [ ] moderately [ ] fully   Dependent   [x] moderately [ ] fully    ADVANCE DIRECTIVES:  [ ] YES [x] NO   DNR [ ] YES [x] NO                      MOLST  [ ] YES [x] NO    Living Will  [ ] YES [ ] NO    Health Care Proxy [ ] YES  [ ] NO  - needs to fill out form - verbally stated son  Tere Devlin is her HCP    [ ] Surrogate  [ ] HCP  [ ] Guardian:   Tere Devlin     Phone#: [phone]    Allergies  No Known Allergies    MEDICATIONS  (STANDING):  albuterol/ipratropium for Nebulization 3 milliLiter(s) Nebulizer every 6 hours  atorvastatin 40 milliGRAM(s) Oral at bedtime  budesonide  80 MICROgram(s)/formoterol 4.5 MICROgram(s) Inhaler 2 Puff(s) Inhalation two times a day  dextrose 40% Gel 15 Gram(s) Oral once  dextrose 5%. 1000 milliLiter(s) (50 mL/Hr) IV Continuous <Continuous>  dextrose 5%. 1000 milliLiter(s) (100 mL/Hr) IV Continuous <Continuous>  dextrose 50% Injectable 25 Gram(s) IV Push once  dextrose 50% Injectable 12.5 Gram(s) IV Push once  dextrose 50% Injectable 25 Gram(s) IV Push once  enoxaparin Injectable 40 milliGRAM(s) SubCutaneous every 24 hours  glucagon  Injectable 1 milliGRAM(s) IntraMuscular once  insulin lispro (ADMELOG) corrective regimen sliding scale   SubCutaneous Before meals and at bedtime  midodrine. 5 milliGRAM(s) Oral once  mirtazapine 15 milliGRAM(s) Oral at bedtime  nicotine -   7 mG/24Hr(s) Patch 1 Patch Transdermal daily  OLANZapine 5 milliGRAM(s) Oral daily  predniSONE   Tablet 40 milliGRAM(s) Oral every 24 hours  trimethoprim  160 mG/sulfamethoxazole 800 mG 1 Tablet(s) Oral every 12 hours    MEDICATIONS  (PRN):  acetaminophen   Tablet .. 650 milliGRAM(s) Oral every 6 hours PRN Temp greater or equal to 38.5C (101.3F), Mild Pain (1 - 3)  melatonin 3 milliGRAM(s) Oral at bedtime PRN Insomnia  oxyCODONE    IR 15 milliGRAM(s) Oral every 6 hours PRN Severe Pain (7 - 10)  oxyCODONE    IR 10 milliGRAM(s) Oral every 6 hours PRN Moderate Pain (4 - 6)      PRESENT SYMPTOMS:  Source: [x] Patient   [ ] Family   [ ] Team     Pain: [x] YES [ ] NO  lower back, near pressure sore, aching, constant    Dyspnea: [x] YES [ ] NO   Anxiety: [ ] YES [ ] NO  Fatigue: [x] YES [ ] NO   Nausea: [ ] YES [ ] NO  Loss of appetite: [ ] YES [ ] NO   Constipation: [ ] YES [ ] NO     Other Symptoms:  [x] All other review of systems negative   [ ] Unable to obtain due to poor mentation     Karnofsky Performance Score/Palliative Performance Status Version 2:  40-50%     Protein Calorie Malutrition:  [ ] Mild   [ ] Moderate   [x] Severe     Vital Signs Last 24 Hrs  T(C): 36.4 (30 Aug 2021 12:09), Max: 36.6 (29 Aug 2021 21:01)  T(F): 97.6 (30 Aug 2021 12:09), Max: 97.9 (29 Aug 2021 21:01)  HR: 57 (30 Aug 2021 12:09) (57 - 69)  BP: 94/55 (30 Aug 2021 12:09) (94/55 - 110/66)  BP(mean): 68 (29 Aug 2021 21:01) (68 - 68)  RR: 17 (30 Aug 2021 12:09) (14 - 17)  SpO2: 99% (30 Aug 2021 12:09) (98% - 99%)    Physical Exam:    General: [x] Alert,  A&O x3     [ ] lethargic   [ ] Agitated   [x] Cachexia   HEENT: [x] Normal   [x] Dry mouth   [ ] ET Tube    [ ] Trach   Lungs: [x] Clear [ ] Rhonchi  [ ] Crackles [ ] Wheezing [ ] Tachypnea  [ ] Audible excessive secretions   Cardiovascular:  [x] Regular rate and rhythm  [ ] Irregular [ ] Tachycardia   [ ] Bradycardia   Abdomen: [x] Soft  [ ] Distended  [ ]  [ ] +BS  [ ] Non tender [ ] Tender  [ ]PEG   [ ] NGT   Last BM:     Genitourinary: [ ] Normal [ ] Incontinent   [ ] Oliguria/Anuria   [ ] Jeter  Musculoskeletal:  [ ] Normal   [x] Generalized weakness  [ ] Bedbound   Neurological: [x] No focal deficits  [ ] Cognitive impairment     Skin: [ ] Normal   [x] Pressure ulcers     LABS:                        10.2   5.67  )-----------( 115      ( 29 Aug 2021 10:21 )             35.7         140  |  110<H>  |  16  ----------------------------<  182<H>  4.0   |  19<L>  |  0.82    Ca    8.6      29 Aug 2021 10:21    TPro  7.1  /  Alb  3.4  /  TBili  0.2  /  DBili  x   /  AST  25  /  ALT  11  /  AlkPhos  64    PT/INR - ( 28 Aug 2021 21:29 )   PT: 12.0 sec;   INR: 1.00     PTT - ( 28 Aug 2021 21:29 )  PTT:32.7 sec    Urinalysis Basic - ( 28 Aug 2021 22:28 )  Color: Yellow / Appearance: SL Cloudy / S.025 / pH: x  Gluc: x / Ketone: NEGATIVE  / Bili: Small / Urobili: 4.0 E.U./dL   Blood: x / Protein: 100 mg/dL / Nitrite: NEGATIVE   Leuk Esterase: Moderate / RBC: 5-10 /HPF / WBC > 10 /HPF   Sq Epi: x / Non Sq Epi: 0-5 /HPF / Bacteria: Present /HPF    RADIOLOGY & ADDITIONAL STUDIES:  < from: US Pelvis Complete (21 @ 11:12) >  Uterus: 7.3 x 3.5 x 3.7 cm. Within normal limits.  Endometrium: 0.5 cm.  The left ovary was visualized on transabdominal exam only.  Right ovary: Not  visualized on this exam  Left ovary: 1.8 cm x 1.2 cm x 1.6 cm, calculated volume 1.9 cc. Withinnormal limits. Normal arterial and venous waveforms.  Fluid: None in the cul-de-sac.    IMPRESSION:  Limited study -only transabdominal exam. Transvaginal exam terminated prematurely secondary to patient discomfort.  1.  No demonstrated endometrial thickening. No demonstrated hydro metria..  2.  Nonvisualization of the right ovary    PALLIATIVE MEDICINE COORDINATION OF CARE DOCUMENTATION: [x] Inpatient Consult  Non-Face-to-Face prolonged service provided that relates to (face-to-face) care that has or will occur and ongoing patient management, including one or more of the following: - Reviewed documentation from other physicians and other health care professional services - Reviewed medical records and diagnostic / radiology study results - Coordination with patient's support system  ************************************************************************  MEDICATION REVIEW:  - See Medication List Above    ISTOP REFERENCE: [id number]  - see ISTOP Chart Note  - PRN usage: Oxy x4  ------------------------------------------------------------------------  COORDINATION OF CARE:  - Palliative Care consulted for: GOC  - Patient (to be) assessed: 21  - Patient previously seen by Palliative Care service: NO    ADVANCE CARE PLANNING  - Code status: FULL  - MOLST reviewed in chart: NONE; None found on Alpha  - HCP/Surrogate: None  - GO documents: NONE found on [place]  - HCP/Living will/Other Advanced Directives in Alpha: NONE found on Alpha  ------------------------------------------------------------------------  CARE PROVIDER DOCUMENTATION:  - SW/CM notes: Remains medically active  - Wound Care notes: stage 4 pressure injury to sacrum measuring 4x2x0.2 cm, pink, non-granulating base of wound, no undermining. Encouraged patient to turn and reposition self at least every 2 hours to prevent pressure injury from worsening.  - ID notes:  hold ARV treatment for now, will need to see viral load before restarting. in setting of AIDS, will recommend sending galactomannin, fungitell, CMV pcr, and cryptococcal ag . obtain AFB and fungal cultures. for PCP ppx, would recommend increasing to bactrim DS daily, if using to treat for UTI would need BID dosing of bactrim DS  - Medicine notes: would benefit from placement in a facility for wound care and continued nutritional support    PLAN OF CARE  - Known admissions in past year: 0  - Current admit date: 21  - LOS: 1  - LACE score: 10  - Current dispo plan: TO BE DETERMINED  ------------------------------------------------------------------------  - Time Spent/Chart reviewed: 31 Minutes [including time used to gather, review and transfer data]  - Start: 10:32AM  - End: 11:03AM    Prolonged services rendered, as part of this patient's care provided by Palliative Medicine, include: i. chart review for provider and ancillary service documentation, ii. pertinent diagnostics including laboratory and imaging studies, iii. medication review including PRN use, iv. admission history including previous palliative care encounters and GOC notes, v. advance care planning documents including HCP and MOLST forms in Alpha. Part of Palliative Medicine extended evaluation and management also involves coordination of care with our IDT, the primary and consulting teams, and unit CM/SW and Hospice if eligible. Recommendations based on the information gathered and discussed are outlined in the A/P of Palliative notes.

## 2021-08-30 NOTE — CHART NOTE - NSCHARTNOTEFT_GEN_A_CORE
[facility] Reference #: [id number]    Rx Written	Rx Dispensed	Drug	Quantity	Days Supply	Prescriber Name	Prescriber Jenny #	Payment Method	Dispenser  08/18/2021	08/18/2021	oxycodone hcl 20 mg tablet	120	30	Romaine Nava	[id number]	[facility]  07/19/2021	07/19/2021	oxycodone-acetaminophen  mg tab	120	30	Romaine Nava	[id number]	Insurance	[facility]  06/01/2021	06/01/2021	oxycodone-acetaminophen  mg tab	120	30	Romaine Nava	[id number]	Insurance	[facility]  04/29/2021	04/29/2021	oxycodone-acetaminophen  mg tab	120	30	Nikoyntrip, Romaine	[id number]	Insurance	[facility]  03/30/2021	03/30/2021	oxycodone-acetaminophen  mg tab	120	30	Martynownatividad, Romaine	[id number]	Insurance	[facility]  02/26/2021	02/26/2021	oxycodone-acetaminophen  mg tab	120	30	NikoynowRomaine henson	[id number]	Insurance	[facility]  01/29/2021	01/29/2021	oxycodone-acetaminophen  mg tab	120	30	NikoynRomaine dominique	[id number]	Insurance	[facility]  12/30/2020	12/30/2020	oxycodone-acetaminophen  mg tab	90	30	MartynRomaine dominique	[id number]	Insurance	[facility]  11/30/2020	11/30/2020	oxycodone-acetaminophen 5-325 mg tab	90	30	NikoynRomaine dominique	[id number]	Insurance	[facility]  09/22/2020	09/22/2020	oxycodone-acetaminophen 5-325 mg tab	180	30	MartynRomaine dominique	[id number]	[facility]

## 2021-08-30 NOTE — ADVANCED PRACTICE NURSE CONSULT - RECOMMEDATIONS
Recommend Aquacel Extra, cut piece to fit over wound, cover with foam dressing. spoke with GEORGINA Oquendo and house staff.

## 2021-08-30 NOTE — DIETITIAN INITIAL EVALUATION ADULT. - PROBLEM SELECTOR PLAN 1
Pt with hx of COPD with chronic cough productive of green sputum. EDOUARD so that she can only walk .5 a block before stopping. For the past 2 days has been having worsening shortness of breath and difficulty talking.  -current smoker  -not compliant with home symbicort and albuterol  -denies URI sx such as fever, rhinorrhea, sore throat  -likely 2/2 smoking and poor medication compliance  Plan:  -duonebs q6 standing  -prednisone 40mg x5 days (8/28-)  -cont home symbicort 80-4.5 2 puffs BID  -not currently requiring O2  -hold off on Abx as no change in chronic cough or sputum  -ISS while on steroids  -f/u procal and RVP

## 2021-08-30 NOTE — DIETITIAN INITIAL EVALUATION ADULT. - ADD RECOMMEND
1. Continue regular diet +Ensure Enlive TID (each 350kcal/20gpro) 2. Trend wts 3. Monitor lytes and replete 4. Provide education prn

## 2021-08-30 NOTE — CONSULT NOTE ADULT - PROBLEM SELECTOR RECOMMENDATION 8
Will continue to follow for ongoing GOC discussion and dispo  Emotional support provided, questions answered.    For new or uncontrolled symptoms, please call Palliative Care at [phone]. The service is available 24/7 (including nights & weekends) to provide symptom management recommendations over the phone as appropriate.

## 2021-08-30 NOTE — CONSULT NOTE ADULT - ATTENDING COMMENTS
Complex medical decision making / symptom management in the setting of advanced HIV/AIDS and multiple co-morbidities.    Patient with significant physical decline and chronic pain. Began discussing advanced directives, advised patient to consider code status in the context of multiple serious co-morbidities. Patient is requesting to be transitioned to a SNF for better support, will explore if [facility] is an option to help with wound care. If not, the patient will likely need AKASH to SNF transition.    Will continue to follow for ongoing GOC discussion.   Emotional support provided, questions answered.    Patient seen and evaluated with Dr. Blackman, Internal Medicine Resident on Palliative Care rotation, during bedside rounds. Agree with above findings and recommendations, edited documentation as appropriate to reflect the plan of care discussed with patient and myself.

## 2021-08-30 NOTE — CONSULT NOTE ADULT - PROBLEM SELECTOR RECOMMENDATION 2
-CT AP: Wall thickening with surrounding inflammatory change at the sigmoid and rectosigmoid possibly a nonspecific colitis. Nonobstructing mass is also a consideration given history of neoplasm.  -no change in bowel habits recently Hx of AIDS with unknown VL or CD4. Not compliant with home biktarvy or bactrim. Follows with Dr. Gavin at [facility]. CD4 37 Hx of AIDS. She was diagnosed with HIV in 1993 and has been on several medications for HIV management. She follows at [facility] and was recently transitioned to biktarvy and bactrim.  Not compliant with home biktarvy or bactrim. Follows with Dr. Gavin at [facility]. CD4 37 VL unknown but likely elevated in setting of noncompliance Pt reports unsteadiness on feet for months, causing her to be mostly bedbound except to go to the bathroom or get something to eat  -stage 3 sacral ulcer on exam, no purulent discharge, does not appear infected  -50lb weight loss over the past several years  - wound care following  - ongoing dispo discussion for adequate wound care mgmt

## 2021-08-30 NOTE — PHYSICAL THERAPY INITIAL EVALUATION ADULT - AMBULATION SKILLS, REHAB EVAL
Quality 110: Preventive Care And Screening: Influenza Immunization: Influenza Immunization Administered during Influenza season Detail Level: Detailed independent

## 2021-08-30 NOTE — CONSULT NOTE ADULT - PROBLEM SELECTOR RECOMMENDATION 5
Stage 3-4 ulcer on sacrum. No purulent discharge, does not appear infected  - wound care following  - potentially dispo to [facility] for wound care mgmt -CT AP: Wall thickening with surrounding inflammatory change at the sigmoid and rectosigmoid possibly a nonspecific colitis. Nonobstructing mass is also a consideration given history of neoplasm.  -no change in bowel habits recently

## 2021-08-30 NOTE — ADVANCED PRACTICE NURSE CONSULT - ASSESSMENT
55F PMH AIDS (nonadherent with Biktarvy and Bactrim, unknown CD4 and VL), hep B/C, COPD, active smoker, crack use, rectal Ca s/p RT, tongue ca s/p resection, BPD presenting with worsening shortness of breath for 2 days a/f COPD exacerbation in the setting of current smoker and medication noncompliance. Pt with stage 4 pressure injury to sacrum measuring 4x2x0.2 cm, pink, non-granulating base of wound, no undermining. Encouraged patient to turn and reposition self at least every 2 hours to prevent pressure injury from worsening.

## 2021-08-30 NOTE — CONSULT NOTE ADULT - PROBLEM SELECTOR RECOMMENDATION 4
Will continue to follow for ongoing GOC discussion and dispo  Emotional support provided, questions answered.    For new or uncontrolled symptoms, please call Palliative Care at [phone]. The service is available 24/7 (including nights & weekends) to provide symptom management recommendations over the phone as appropriate. Pt with hx of COPD with chronic cough productive of green sputum. EDOUARD so that she can only walk .5 a block before stopping. For the past 2 days has been having worsening shortness of breath and difficulty talking. current smoker and not compliant with home symbicort and albuterol.   - pt reports recently being admitted to [facility] 2 weeks ago and being admitted for SOB [id number] at [facility] this year

## 2021-08-30 NOTE — DIETITIAN INITIAL EVALUATION ADULT. - PROBLEM SELECTOR PLAN 2
Pt reports unsteadiness on feet for months, causing her to be mostly bedbound except to go to the bathroom or get something to eat  -stage 3 sacral ulcer on exam  -50lb weight loss over the past several years  -neuro exam with full strength, sensation, and coordination  -likely 2/2 multiple comorbidities   Plan:  -f/u PT  -f/u wound care  -palliative care consult on Monday

## 2021-08-30 NOTE — PROGRESS NOTE ADULT - PROBLEM SELECTOR PLAN 3
Hx of AIDS with unknown VL or CD4. Not compliant with home biktarvy or bactrim. Follows with Dr. Gavin at [facility]. CD4 37  Plan:  -HIV consult on Monday    -cont bactrim 1 normal strength tab OD for ppx Hx of AIDS with unknown VL or CD4. Not compliant with home biktarvy or bactrim. Follows with Dr. Gavin at [facility]. CD4 37  Plan:  -HIV consult on Monday  ---f/u viral load  ---f/u rpr, fungal/acid fast cultures, cryptococcal antigen, fungitell, CMV, galactomanin  -cont bactrim 1 strength tab OD for ppx

## 2021-08-30 NOTE — PROGRESS NOTE ADULT - PROBLEM SELECTOR PLAN 6
Hx of rectal ca s/p RT in 2002.   -CT AP: Wall thickening with surrounding inflammatory change at the sigmoid and rectosigmoid possibly a nonspecific colitis. Nonobstructing mass is also a consideration given history of neoplasm.  -no change in bowel habits recently  -pt reports that she takes oxycodone 20mg for pain relief, which is seen on outpatient med list  Plan:  -f/u ISTOP  -f/u with o/p provider CT AP: Low-attenuation at the uterus suggestive of endometrial thickening versus fluid at the endometrial canal, up to 12 mm thick. Further evaluation with ultrasound recommended.  -no vaginal discharge recently but chronic vaginal pain since radiation therapy years ago  Plan:  -TVUS today

## 2021-08-30 NOTE — CONSULT NOTE ADULT - PROBLEM SELECTOR RECOMMENDATION 9
Hx of AIDS with unknown VL or CD4. Not compliant with home biktarvy or bactrim. Follows with Dr. Gavin at [facility]. CD4 37 Pt reports unsteadiness on feet for months, causing her to be mostly bedbound except to go to the bathroom or get something to eat  -stage 3 sacral ulcer on exam, no purulent discharge, does not appear infected  -50lb weight loss over the past several years  - wound care following  - ongoing dispo discussion for adequate wound care mgmt Chronically prescribed opiates for pain  - ISTOP reference noted, last Rx was for Oxy 20mg PO QID PRN  - c/w Oxy IR 15mg PO q6h PRN for Severe Pain, can escalate to 20mg PRNs if necessary  - patient has an outpatient doctor for pain medicine follow-up

## 2021-08-30 NOTE — DIETITIAN INITIAL EVALUATION ADULT. - OTHER INFO
55F PMH AIDS (nonadherent with Biktarvy and Bactrim, unknown CD4 and VL), hep B/C, COPD, active smoker, crack use, rectal Ca s/p RT, tongue ca s/p resection, BPD presenting with worsening shortness of breath for 2 days a/f COPD exacerbation in the setting of current smoker and medication noncompliance.    Pt seen in room, sitting up in bed. Pt reports she has a "very good" appetite. Pt reports living in [facility] that only had a microwave and small freezer, no stove which limited her food options. Pt reports getting food from grocery store, mostly pre packaged food items. Pt reports 50lb wt loss over past few years (144-94lbs reflects 34.7% wt loss). Per ASPEN guidelines, pt meets criteria for severe PCM 2/2 NFPE findings, wt loss. Pt states she is going to NH after discharge and is not interested in setting up meal delivery service at this time. Will monitor dispo plans and work with SW to assess food access and needs. Pt reports tolerating regular diet well, likes Ensures. Pt denies N/V/D/C at this time. Skin with stage 3 pressure injury to sacrum. Please see full recs below. Will continue to follow per RD protocol.

## 2021-08-30 NOTE — PROGRESS NOTE ADULT - PROBLEM SELECTOR PLAN 1
Pt with hx of COPD with chronic cough productive of green sputum. EDOUARD so that she can only walk .5 a block before stopping. For the past 2 days has been having worsening shortness of breath and difficulty talking. current smoker and not compliant with home symbicort and albuterol. denies URI sx such as fever, rhinorrhea, sore throat. likely 2/2 smoking and poor medication compliance. Procal negative  Plan:  -duonebs q6 standing  -prednisone 40mg x4 days (8/28-)  -cont home symbicort 80-4.5 2 puffs BID  -not currently requiring O2  -hold off on Abx as no change in chronic cough or sputum  -ISS while on steroids  -f/u  RVP

## 2021-08-30 NOTE — PROGRESS NOTE ADULT - PROBLEM SELECTOR PLAN 4
Stage 3-4 ulcer on sacrum. No purulent discharge, does not appear infected. Wound care consulted  -f/u wound care consult Asymptomatic UTI, UA was positive.  -treat with bactrim BID for 5 days, then resume AIDS prophylactic dose once daily

## 2021-08-30 NOTE — CONSULT NOTE ADULT - TIME BILLING
Emotional Support/Supportive Care  Discharge Facilitation  Exploration of GOC  Clarifying Potential Disease Trajectories  Education and Monitoring of Opiates

## 2021-08-30 NOTE — CONSULT NOTE ADULT - PROBLEM SELECTOR RECOMMENDATION 7
Patient is currently Full Code  - began discussing code status, patient is fearful of ending up on life support but for now is willing to accept CPR/Intubation if it gives her a chance to live  - will return to complete HCP, patient wants to designate her eldest son, Tere as alternate decision maker

## 2021-08-31 NOTE — PROGRESS NOTE ADULT - ATTENDING COMMENTS
Patient was seen and examined with the resident team today.  I agree with Dr. Kelley's assessment and plan with the following exceptions/additions:     Briefly, this is a 56yo woman, active smoker, with a PMH of AIDs c/b medication non-adherence (1993, CD4/VL unknown, previously on Biktarvy, OI's unknown), chronic HBV/HCV, rectal CA s/p RT c/b a chronic stage 3 sacral ulcer, tongue CA s/p resection, COPD and BPD who p/w SOB, found to have an acute emphysematous COPD exacerbation 2/2 active tobacco use and medication non-adherence.     #Emphysematous COPD with an Acute Exacerbation - on room air, complete Pred x 5 days, c/w duonebs and Symbicort  #AIDS - HIV consult following, c/w Bactrim; f/u OI's work-up for now as may help w/re-initiation of ARVs, f/u CD4/VL as well   #Rectal CA s/p RT c/b chronic stage 3 sacral ulcer - c/w pain control and bowel regimen (if ulcer tolerates); wound care following, appreciate assistance   #Tongue CA s/p resection - no active issues, encourage PO intake   #Chronic HCV and HBV - no active issues, will monitor LFT's  #Severe protein calorie malnutrition - likely part of her overall failure to thrive presentation; encourage PO intake as much as able and should improve w/treatment of her chronic infections   #Bipolar Disorder - c/w Olanzapine   #COVID Exposure - clarify vaccination history, will address timing of iso with Epi  #DVT PPx - Lovenox  #Dispo - TBD but likely long-term care    Nat Valdes  [phone]

## 2021-08-31 NOTE — PROGRESS NOTE ADULT - PROBLEM SELECTOR PLAN 4
Asymptomatic UTI, UA was positive.  -treat with bactrim BID for 5 days, then resume AIDS prophylactic dose once daily Chronically prescribed opiates for pain  ISTOP reference noted, last Rx was for Oxy 20mg PO QID PRN    - c/w Oxy IR 15mg PO q6h PRN for Severe Pain, can escalate to 20mg PRNs if necessary  - patient has an outpatient doctor for pain medicine follow-up  - Now on bowel regimen

## 2021-08-31 NOTE — PROGRESS NOTE ADULT - SUBJECTIVE AND OBJECTIVE BOX
INTERVAL HPI/OVERNIGHT EVENTS:  O/N: Patients roommate was covid positive and patient was placed in isolation and we got covid swab  This morning: Patient was seen and examined at bedside. No complaints     VITAL SIGNS:  T(F): 97.7 (08-31-21 @ 13:11)  HR: 62 (08-31-21 @ 13:11)  BP: 99/61 (08-31-21 @ 13:11)  RR: 18 (08-31-21 @ 13:11)  SpO2: 100% (08-31-21 @ 13:11)    PHYSICAL EXAM:    Constitutional: NAD  HEENT: PERRL, EOMI, sclera non-icteric, neck supple, trachea midline, no masses, no JVD, MMM, good dentition  Respiratory: CTA b/l, good air entry b/l, no wheezing, no rhonchi, no rales, without accessory muscle use and no intercostal retractions  Cardiovascular: RRR, normal S1S2, no M/R/G  Gastrointestinal: abdomen soft, NTND, no masses palpable, BS normal  Extremities: Warm, well perfused, pulses equal bilateral upper and lower extremities, no edema, no clubbing  Neurological: AAOx3, CN Grossly intact  Skin: Normal temperature, warm, dry    MEDICATIONS  (STANDING):  albuterol/ipratropium for Nebulization 3 milliLiter(s) Nebulizer every 6 hours  atorvastatin 40 milliGRAM(s) Oral at bedtime  budesonide  80 MICROgram(s)/formoterol 4.5 MICROgram(s) Inhaler 2 Puff(s) Inhalation two times a day  dextrose 40% Gel 15 Gram(s) Oral once  dextrose 5%. 1000 milliLiter(s) (50 mL/Hr) IV Continuous <Continuous>  dextrose 5%. 1000 milliLiter(s) (100 mL/Hr) IV Continuous <Continuous>  dextrose 50% Injectable 25 Gram(s) IV Push once  dextrose 50% Injectable 12.5 Gram(s) IV Push once  dextrose 50% Injectable 25 Gram(s) IV Push once  enoxaparin Injectable 40 milliGRAM(s) SubCutaneous every 24 hours  glucagon  Injectable 1 milliGRAM(s) IntraMuscular once  insulin lispro (ADMELOG) corrective regimen sliding scale   SubCutaneous Before meals and at bedtime  mirtazapine 15 milliGRAM(s) Oral at bedtime  nicotine -   7 mG/24Hr(s) Patch 1 Patch Transdermal daily  OLANZapine 5 milliGRAM(s) Oral daily  polyethylene glycol 3350 17 Gram(s) Oral daily  predniSONE   Tablet 40 milliGRAM(s) Oral every 24 hours  senna 2 Tablet(s) Oral at bedtime  trimethoprim  160 mG/sulfamethoxazole 800 mG 1 Tablet(s) Oral every 12 hours    MEDICATIONS  (PRN):  acetaminophen   Tablet .. 650 milliGRAM(s) Oral every 6 hours PRN Temp greater or equal to 38.5C (101.3F), Mild Pain (1 - 3)  diphenhydrAMINE 25 milliGRAM(s) Oral every 4 hours PRN Rash and/or Itching  melatonin 3 milliGRAM(s) Oral at bedtime PRN Insomnia  oxyCODONE    IR 15 milliGRAM(s) Oral every 6 hours PRN Severe Pain (7 - 10)  oxyCODONE    IR 10 milliGRAM(s) Oral every 6 hours PRN Moderate Pain (4 - 6)    Allergies    No Known Allergies    Intolerances

## 2021-08-31 NOTE — PROGRESS NOTE ADULT - PROBLEM SELECTOR PLAN 2
Pt reports unsteadiness on feet for months, causing her to be mostly bedbound except to go to the bathroom or get something to eat  -stage 3 sacral ulcer on exam  -50lb weight loss over the past several years  -neuro exam with full strength, sensation, and coordination  -likely 2/2 multiple comorbidities   Plan:  -f/u PT  -f/u wound care  -palliative care consult on Monday  -nutrition consult Patient is cachectic 2/2 to AIDS and medication non-compliance  Patient is very unsteady on her feet, making it difficult to walk- she has been bedbound for past several months   She has a problem caring for herself and appears malnurishion  Patient currently lives in [facility], however it is unlikely she can go back given her condition  She has a large chronic nonhealing sacral ulcer- stage III  - Wound care consulted for sacral ulcer  - PT consulted   - Nutrition consulted   - Palliative care has been consulted  - Patient is awaiting dispo to a facility where she can receive more comprehensive care.   - We need to consult Epidemeology given the covid exposure prior to her transfer  - If Crypt antigen is negative we can restart antiretroviral therapy  - Patient is still FULL CODE AT THIS TIME-  patient is fearful of ending up on life support but for now is willing to accept CPR/Intubation if it gives her a chance to live. HCP form filled out: HCP is her eldest son, Reed as alternate decision maker.

## 2021-08-31 NOTE — PROGRESS NOTE ADULT - ATTENDING COMMENTS
Complex medical decision making / symptom management in the setting of advanced HIV/AIDS and multiple co-morbidities.    HCP completed as noted above. Per Wound Care, patient would not be appropriate for [facility] transfer. Pain is adequately controlled on current regimen.    Palliative Care will SIGN OFF.  Emotional support provided, questions answered.    Patient seen and evaluated with Dr. Blackman, Internal Medicine Resident on Palliative Care rotation, during bedside rounds. Agree with above findings and recommendations, edited documentation as appropriate to reflect the plan of care discussed with patient and myself Complex medical decision making / symptom management in the setting of advanced HIV/AIDS and multiple co-morbidities.    HCP completed as noted above. Per Wound Care, patient would not be appropriate for [place] transfer. Pain is adequately controlled on current regimen.    In addition to the E/M visit, an advance care planning meeting was performed. Start time: 11:00AM; End time: 11:16AM; Total time: 16min. A face to face meeting to discuss advance care planning was held today regarding: INEZ WOLF. Primary decision maker: Patient is able to participate in decision making. Discussed advance directives including, but not limited to, healthcare proxy and code status. Decision regarding code status: FULL CODE; Documentation completed today: HCP [facility] note    Palliative Care will SIGN OFF.  Emotional support provided, questions answered.    Patient seen and evaluated with Dr. Blackman, Internal Medicine Resident on Palliative Care rotation, during bedside rounds. Agree with above findings and recommendations, edited documentation as appropriate to reflect the plan of care discussed with patient and myself

## 2021-08-31 NOTE — PROGRESS NOTE ADULT - PROBLEM SELECTOR PLAN 1
Pt with hx of COPD with chronic cough productive of green sputum. EDOUARD so that she can only walk .5 a block before stopping. For the past 2 days has been having worsening shortness of breath and difficulty talking. current smoker and not compliant with home symbicort and albuterol. denies URI sx such as fever, rhinorrhea, sore throat. likely 2/2 smoking and poor medication compliance. Procal negative  Plan:  -duonebs q6 standing  -prednisone 40mg x4 days (8/28-)  -cont home symbicort 80-4.5 2 puffs BID  -not currently requiring O2  -hold off on Abx as no change in chronic cough or sputum  -ISS while on steroids  -f/u  RVP Pt with hx of COPD with chronic cough productive of green sputum  Patients exacerbation brought on by current smoking and med noncompliance   Improving from on oxygenation standpoint, not currently coughing, no wheezing, no supplmental O2    Plan:  -duonebs q6 standing  -prednisone 40mg x4 days (8/29- 9/1)  -cont home symbicort 80-4.5 2 puffs BID  -ISS while on steroids

## 2021-08-31 NOTE — PROGRESS NOTE ADULT - PROBLEM SELECTOR PLAN 8
-s/p resection in 2014  -pt wishes to have regular diet and acknowledges risk of aspiration -cont home zyprexa 5mg OD, remeron 15mg qhs

## 2021-08-31 NOTE — PROGRESS NOTE ADULT - SUBJECTIVE AND OBJECTIVE BOX
HPI:  55F PMH AIDS (nonadherent with Biktarvy and Bactrim, unknown CD4 and VL), hep B/C, COPD, active smoker, crack use, rectal Ca s/p RT, tongue ca s/p resection, BPD presenting with worsening shortness of breath for 2 days. Pt is chronically ill at baseline, with cough productive of green sputum, EDOUARD that makes it so she is only able to walk half a block, and unsteadiness on her feet that makes it so that she spends most of her time in bed, only getting out of bed to use the bathroom or to grab something out of the fridge. Pt has been feeling more short of breath for 2 days, with occasional wheezing. She felt like it was becoming difficult to talk and so she came to the ED. No worsening of her chronic cough, no change in sputum, no rhinorrhea or sore throat. Pt reports that she is not very adherent to her multiple medications, including inhalers. Pt is a current smoker, smoking 4 cigarettes per day. Pt reports good appetite but says that she is unable to cook due to her [facility] only having a microwave. Reports 50lb weight loss over the past several years. Denies fevers, chills, chest pain, nausea, vomiting, diarrhea, constipation, dysuria.    ED Course:  VS: T 99.3, HR 74, BP 96/61, O2 98% on 2L, 96% on RA  Labs s/f:  UA with LEs and >10 WBCs  CT PE: no PE, chronic airspace disease and/or atelectasis  CT AP: ?gastritis and colitis vs nonobstructing mass c/w rectal ca hx. endometrial thickening vs fluid  Treatment: Ceftriaxone 1g x1, solumedrol 40mg, albuterol, IV morphine 2mg, IV morphine 4mg, 1.25L NS (29 Aug 2021 05:04)      PERTINENT PMH REVIEWED:  [ ] YES [ ] NO           SOCIAL HISTORY:  Significant other/partner:  [ ] YES  [ ] NO            Children:  [ ] YES  [ ] NO                   Anglican/Spirituality:  Substance hx:  [ ] YES   [ ] NO           Tobacco hx:  [ ] YES  [ ] NO             Alcohol hx: [ ] YES  [ ] NO        Home Opioid hx:  [ ] YES  [ ] NO   Living Situation: [ ] Home  [ ] Long term care  [ ] Rehab    REFERRALS:   [ ] Chaplaincy  [ ] Hospice  [ ] Child Life  [ ] Social Work  [ ] Case management [ ] Holistic Therapy     FAMILY HISTORY:    [ ] Family history non contributory     BASELINE ADLs (prior to admission):  Independent [ ] moderately [ ] fully   Dependent   [ ] moderately [ ] fully    ADVANCE DIRECTIVES:  [ ] YES [ ] NO   DNR [ ] YES [ ] NO                      MOLST  [ ] YES [ ] NO    Living Will  [ ] YES [ ] NO    Health Care Proxy [ ] YES  [ ] NO      [ ] Surrogate  [ ] HCP  [ ] Guardian:                                                                  Phone#:    Allergies    No Known Allergies    Intolerances        MEDICATIONS  (STANDING):  albuterol/ipratropium for Nebulization 3 milliLiter(s) Nebulizer every 6 hours  atorvastatin 40 milliGRAM(s) Oral at bedtime  budesonide  80 MICROgram(s)/formoterol 4.5 MICROgram(s) Inhaler 2 Puff(s) Inhalation two times a day  dextrose 40% Gel 15 Gram(s) Oral once  dextrose 5%. 1000 milliLiter(s) (50 mL/Hr) IV Continuous <Continuous>  dextrose 5%. 1000 milliLiter(s) (100 mL/Hr) IV Continuous <Continuous>  dextrose 50% Injectable 25 Gram(s) IV Push once  dextrose 50% Injectable 12.5 Gram(s) IV Push once  dextrose 50% Injectable 25 Gram(s) IV Push once  enoxaparin Injectable 40 milliGRAM(s) SubCutaneous every 24 hours  glucagon  Injectable 1 milliGRAM(s) IntraMuscular once  insulin lispro (ADMELOG) corrective regimen sliding scale   SubCutaneous Before meals and at bedtime  mirtazapine 15 milliGRAM(s) Oral at bedtime  nicotine -   7 mG/24Hr(s) Patch 1 Patch Transdermal daily  OLANZapine 5 milliGRAM(s) Oral daily  predniSONE   Tablet 40 milliGRAM(s) Oral every 24 hours  trimethoprim  160 mG/sulfamethoxazole 800 mG 1 Tablet(s) Oral every 12 hours    MEDICATIONS  (PRN):  acetaminophen   Tablet .. 650 milliGRAM(s) Oral every 6 hours PRN Temp greater or equal to 38.5C (101.3F), Mild Pain (1 - 3)  diphenhydrAMINE 25 milliGRAM(s) Oral every 4 hours PRN Rash and/or Itching  melatonin 3 milliGRAM(s) Oral at bedtime PRN Insomnia  oxyCODONE    IR 15 milliGRAM(s) Oral every 6 hours PRN Severe Pain (7 - 10)  oxyCODONE    IR 10 milliGRAM(s) Oral every 6 hours PRN Moderate Pain (4 - 6)      PRESENT SYMPTOMS:  Source: [ ] Patient   [ ] Family   [ ] Team     Pain: [ ] YES [ ] NO  OLDCARTS:     Dyspnea: [ ] YES [ ] NO   Anxiety: [ ] YES [ ] NO  Fatigue: [ ] YES [ ] NO   Nausea: [ ] YES [ ] NO  Loss of appetite: [ ] YES [ ] NO   Constipation: [ ] YES [ ] NO     Other Symptoms:  [ ] All other review of systems negative   [ ] Unable to obtain due to poor mentation     Karnofsky Performance Score/Palliative Performance Status Version 2:         %  Protein Calorie Malutrition:  [ ] Mild   [ ] Moderate   [ ] Severe     Vital Signs Last 24 Hrs  T(C): 36.5 (31 Aug 2021 13:11), Max: 36.7 (31 Aug 2021 05:25)  T(F): 97.7 (31 Aug 2021 13:11), Max: 98 (31 Aug 2021 05:25)  HR: 62 (31 Aug 2021 13:11) (62 - 78)  BP: 99/61 (31 Aug 2021 13:11) (99/61 - 126/60)  BP(mean): --  RR: 18 (31 Aug 2021 13:11) (16 - 18)  SpO2: 100% (31 Aug 2021 13:11) (99% - 100%)    Physical Exam:    General: [ ] Alert,  A&O x     [ ] lethargic   [ ] Agitated   [ ] Cachexia   HEENT: [ ] Normal   [ ] Dry mouth   [ ] ET Tube    [ ] Trach   Lungs: [ ] Clear [ ] Rhonchi  [ ] Crackles [ ] Wheezing [ ] Tachypnea  [ ] Audible excessive secretions   Cardiovascular:  [ ] Regular rate and rhythm  [ ] Irregular [ ] Tachycardia   [ ] Bradycardia   Abdomen: [ ] Soft  [ ] Distended  [ ]  [ ] +BS  [ ] Non tender [ ] Tender  [ ]PEG   [ ] NGT   Last BM:     Genitourinary: [ ] Normal [ ] Incontinent   [ ] Oliguria/Anuria   [ ] Jeter  Musculoskeletal:  [ ] Normal   [ ] Generalized weakness  [ ] Bedbound   Neurological: [ ] No focal deficits  [ ] Cognitive impairment     Skin: [ ] Normal   [ ] Pressure ulcers     LABS:              I&O's Summary      RADIOLOGY & ADDITIONAL STUDIES: 55F PMH AIDS (nonadherent with Biktarvy and Bactrim, unknown CD4 and VL), hep B/C, COPD, active smoker, crack use, rectal Ca s/p RT, tongue ca s/p resection, BPD presenting with worsening shortness of breath for 2 days. Pt is chronically ill at baseline, with cough productive of green sputum, EDOUARD that makes it so she is only able to walk half a block, and unsteadiness on her feet that makes it so that she spends most of her time in bed, only getting out of bed to use the bathroom or to grab something out of the fridge. Pt has been feeling more short of breath for 2 days, with occasional wheezing. She felt like it was becoming difficult to talk and so she came to the ED. No worsening of her chronic cough, no change in sputum, no rhinorrhea or sore throat. Pt reports that she is not very adherent to her multiple medications, including inhalers. Pt is a current smoker, smoking 4 cigarettes per day. Pt reports good appetite but says that she is unable to cook due to her [facility] only having a microwave. Reports 50lb weight loss over the past several years. Denies fevers, chills, chest pain, nausea, vomiting, diarrhea, constipation, dysuria. (29 Aug 2021 05:04)    Patient seen and examined at bedside. Reports pain by her pressure sore, further pain assessment noted below. Also endorses dyspnea on exertion, can only walk half a block. Feels as if she has no support.       PERTINENT PMH REVIEWED:  [x] YES [ ] NO           SOCIAL HISTORY:  Significant other/partner:  [ ] YES  [ ] NO            Children:  [x] YES  [ ] NO                   Mormonism/Spirituality:  Substance hx:  [x] YES   [ ] NO           Tobacco hx:  [x] YES  [ ] NO             Alcohol hx: [ ] YES  [ ] NO        Home Opioid hx:  [x] YES  [ ] NO   Living Situation: [ ] Home  [ ] Long term care  [ ] Rehab [X] [facility]    REFERRALS:   [ ] Chaplaincy  [ ] Hospice  [ ] Child Life  [x] Social Work  [x] Case management [ ] Holistic Therapy     FAMILY HISTORY:  [x] Family history non contributory     BASELINE ADLs (prior to admission):  Independent [ ] moderately [ ] fully   Dependent   [x] moderately [ ] fully    ADVANCE DIRECTIVES:  [ ] YES [x] NO   DNR [ ] YES [x] NO                      MOLST  [ ] YES [x] NO    Living Will  [ ] YES [ ] NO    Health Care Proxy [x] YES  [ ] NO  son  Tere Devlin     [ ] Surrogate  [x] HCP  [ ] Guardian:   Tere Devlin     Phone#: [phone] paper form in chart      Allergies    No Known Allergies    Intolerances        MEDICATIONS  (STANDING):  albuterol/ipratropium for Nebulization 3 milliLiter(s) Nebulizer every 6 hours  atorvastatin 40 milliGRAM(s) Oral at bedtime  budesonide  80 MICROgram(s)/formoterol 4.5 MICROgram(s) Inhaler 2 Puff(s) Inhalation two times a day  dextrose 40% Gel 15 Gram(s) Oral once  dextrose 5%. 1000 milliLiter(s) (50 mL/Hr) IV Continuous <Continuous>  dextrose 5%. 1000 milliLiter(s) (100 mL/Hr) IV Continuous <Continuous>  dextrose 50% Injectable 25 Gram(s) IV Push once  dextrose 50% Injectable 12.5 Gram(s) IV Push once  dextrose 50% Injectable 25 Gram(s) IV Push once  enoxaparin Injectable 40 milliGRAM(s) SubCutaneous every 24 hours  glucagon  Injectable 1 milliGRAM(s) IntraMuscular once  insulin lispro (ADMELOG) corrective regimen sliding scale   SubCutaneous Before meals and at bedtime  mirtazapine 15 milliGRAM(s) Oral at bedtime  nicotine -   7 mG/24Hr(s) Patch 1 Patch Transdermal daily  OLANZapine 5 milliGRAM(s) Oral daily  predniSONE   Tablet 40 milliGRAM(s) Oral every 24 hours  trimethoprim  160 mG/sulfamethoxazole 800 mG 1 Tablet(s) Oral every 12 hours    MEDICATIONS  (PRN):  acetaminophen   Tablet .. 650 milliGRAM(s) Oral every 6 hours PRN Temp greater or equal to 38.5C (101.3F), Mild Pain (1 - 3)  diphenhydrAMINE 25 milliGRAM(s) Oral every 4 hours PRN Rash and/or Itching  melatonin 3 milliGRAM(s) Oral at bedtime PRN Insomnia  oxyCODONE    IR 15 milliGRAM(s) Oral every 6 hours PRN Severe Pain (7 - 10)  oxyCODONE    IR 10 milliGRAM(s) Oral every 6 hours PRN Moderate Pain (4 - 6)      PRESENT SYMPTOMS:  Source: [x] Patient   [ ] Family   [ ] Team     Pain: [ ] YES [x] NO controlled on current regimen  OLDCARTS:     Dyspnea: [ ] YES [x] NO   Anxiety: [ ] YES [x] NO  Fatigue: [ ] YES [x] NO   Nausea: [ ] YES [x] NO  Loss of appetite: [ ] YES [x] NO   Constipation: [ ] YES [x] NO     Other Symptoms:  [x] All other review of systems negative   [ ] Unable to obtain due to poor mentation     Karnofsky Performance Score/Palliative Performance Status Version 2:   40 %  Protein Calorie Malutrition:  [ ] Mild   [ ] Moderate   [ ] Severe     Vital Signs Last 24 Hrs  T(C): 36.5 (31 Aug 2021 13:11), Max: 36.7 (31 Aug 2021 05:25)  T(F): 97.7 (31 Aug 2021 13:11), Max: 98 (31 Aug 2021 05:25)  HR: 62 (31 Aug 2021 13:11) (62 - 78)  BP: 99/61 (31 Aug 2021 13:11) (99/61 - 126/60)  BP(mean): --  RR: 18 (31 Aug 2021 13:11) (16 - 18)  SpO2: 100% (31 Aug 2021 13:11) (99% - 100%)    Physical Exam:    General: [x] Alert,  A&O x3     [ ] lethargic   [ ] Agitated   [x] Cachexia   HEENT: [x] Normal   [x] Dry mouth   [ ] ET Tube    [ ] Trach   Lungs: [x] Clear [ ] Rhonchi  [ ] Crackles [ ] Wheezing [ ] Tachypnea  [ ] Audible excessive secretions   Cardiovascular:  [x] Regular rate and rhythm  [ ] Irregular [ ] Tachycardia   [ ] Bradycardia   Abdomen: [x] Soft  [ ] Distended  [ ]  [ ] +BS  [ ] Non tender [ ] Tender  [ ]PEG   [ ] NGT   Last BM:     Genitourinary: [ ] Normal [ ] Incontinent   [ ] Oliguria/Anuria   [ ] Jeter  Musculoskeletal:  [ ] Normal   [x] Generalized weakness  [ ] Bedbound   Neurological: [x] No focal deficits  [ ] Cognitive impairment     Skin: [ ] Normal   [x] Pressure ulcers     LABS:              I&O's Summary      RADIOLOGY & ADDITIONAL STUDIES: Palliative Care Follow-Up    SUBJECTIVE:  Patient seen and examined at bedside. getting pain relief with Oxy PRNs, further pain assessment noted below. Discussed discharge options, [place] is not appropriate based on wound care assessment.      PERTINENT PMH REVIEWED:  [x] YES [ ] NO           SOCIAL HISTORY:  Significant other/partner:  [ ] YES  [ ] NO            Children:  [x] YES  [ ] NO                   Catholic/Spirituality:  Substance hx:  [x] YES   [ ] NO           Tobacco hx:  [x] YES  [ ] NO             Alcohol hx: [ ] YES  [ ] NO        Home Opioid hx:  [x] YES  [ ] NO   Living Situation: [ ] Home  [ ] Long term care  [ ] Rehab [X] [facility]    REFERRALS:   [ ] Chaplaincy  [ ] Hospice  [ ] Child Life  [x] Social Work  [x] Case management [ ] Holistic Therapy     FAMILY HISTORY:  [x] Family history non contributory     BASELINE ADLs (prior to admission):  Independent [ ] moderately [ ] fully   Dependent   [x] moderately [ ] fully    ADVANCE DIRECTIVES:  [x] YES [ ] NO   DNR [ ] YES [x] NO                      MOLST  [ ] YES [x] NO    Living Will  [ ] YES [ ] NO    Health Care Proxy [x] YES  [ ] NO  [ ] Surrogate  [x] HCP  [ ] Guardian:   Tere Devlin     Phone#: [phone] paper form in chart      Allergies  No Known Allergies    MEDICATIONS  (STANDING):  albuterol/ipratropium for Nebulization 3 milliLiter(s) Nebulizer every 6 hours  atorvastatin 40 milliGRAM(s) Oral at bedtime  budesonide  80 MICROgram(s)/formoterol 4.5 MICROgram(s) Inhaler 2 Puff(s) Inhalation two times a day  dextrose 40% Gel 15 Gram(s) Oral once  dextrose 5%. 1000 milliLiter(s) (50 mL/Hr) IV Continuous <Continuous>  dextrose 5%. 1000 milliLiter(s) (100 mL/Hr) IV Continuous <Continuous>  dextrose 50% Injectable 25 Gram(s) IV Push once  dextrose 50% Injectable 12.5 Gram(s) IV Push once  dextrose 50% Injectable 25 Gram(s) IV Push once  enoxaparin Injectable 40 milliGRAM(s) SubCutaneous every 24 hours  glucagon  Injectable 1 milliGRAM(s) IntraMuscular once  insulin lispro (ADMELOG) corrective regimen sliding scale   SubCutaneous Before meals and at bedtime  mirtazapine 15 milliGRAM(s) Oral at bedtime  nicotine -   7 mG/24Hr(s) Patch 1 Patch Transdermal daily  OLANZapine 5 milliGRAM(s) Oral daily  predniSONE   Tablet 40 milliGRAM(s) Oral every 24 hours  trimethoprim  160 mG/sulfamethoxazole 800 mG 1 Tablet(s) Oral every 12 hours    MEDICATIONS  (PRN):  acetaminophen   Tablet .. 650 milliGRAM(s) Oral every 6 hours PRN Temp greater or equal to 38.5C (101.3F), Mild Pain (1 - 3)  diphenhydrAMINE 25 milliGRAM(s) Oral every 4 hours PRN Rash and/or Itching  melatonin 3 milliGRAM(s) Oral at bedtime PRN Insomnia  oxyCODONE    IR 15 milliGRAM(s) Oral every 6 hours PRN Severe Pain (7 - 10)  oxyCODONE    IR 10 milliGRAM(s) Oral every 6 hours PRN Moderate Pain (4 - 6)      PRESENT SYMPTOMS:  Source: [x] Patient   [ ] Family   [ ] Team     Pain: [x] YES [ ] NO controlled on current regimen  OLDCARTS: pressure sore, no radiation, aching, currently 0/10    Dyspnea: [ ] YES [x] NO   Anxiety: [ ] YES [x] NO  Fatigue: [ ] YES [x] NO   Nausea: [ ] YES [x] NO  Loss of appetite: [ ] YES [x] NO   Constipation: [ ] YES [x] NO     Other Symptoms:  [x] All other review of systems negative   [ ] Unable to obtain due to poor mentation     Karnofsky Performance Score/Palliative Performance Status Version 2:   40 %  Protein Calorie Malutrition:  [ ] Mild   [ ] Moderate   [ ] Severe     Vital Signs Last 24 Hrs  T(C): 36.5 (31 Aug 2021 13:11), Max: 36.7 (31 Aug 2021 05:25)  T(F): 97.7 (31 Aug 2021 13:11), Max: 98 (31 Aug 2021 05:25)  HR: 62 (31 Aug 2021 13:11) (62 - 78)  BP: 99/61 (31 Aug 2021 13:11) (99/61 - 126/60)  BP(mean): --  RR: 18 (31 Aug 2021 13:11) (16 - 18)  SpO2: 100% (31 Aug 2021 13:11) (99% - 100%)    Physical Exam:    General: [x] Alert,  A&O x3     [ ] lethargic   [ ] Agitated   [x] Cachexia   HEENT: [x] Normal   [x] Dry mouth   [ ] ET Tube    [ ] Trach   Lungs: [x] Clear [ ] Rhonchi  [ ] Crackles [ ] Wheezing [ ] Tachypnea  [ ] Audible excessive secretions   Cardiovascular:  [x] Regular rate and rhythm  [ ] Irregular [ ] Tachycardia   [ ] Bradycardia   Abdomen: [x] Soft  [ ] Distended  [ ]  [ ] +BS  [ ] Non tender [ ] Tender  [ ]PEG   [ ] NGT   Last BM:     Genitourinary: [ ] Normal [ ] Incontinent   [ ] Oliguria/Anuria   [ ] Jeter  Musculoskeletal:  [ ] Normal   [x] Generalized weakness  [ ] Bedbound   Neurological: [x] No focal deficits  [ ] Cognitive impairment     Skin: [ ] Normal   [x] Pressure ulcers     LABS: None new  RADIOLOGY & ADDITIONAL STUDIES: None new

## 2021-08-31 NOTE — PROGRESS NOTE ADULT - PROBLEM SELECTOR PLAN 7
Patient is currently Full Code  - patient is fearful of ending up on life support but for now is willing to accept CPR/Intubation if it gives her a chance to live  - HCP form filled out: HCP is her eldest son, Reed as alternate decision maker.

## 2021-08-31 NOTE — PROGRESS NOTE ADULT - PROBLEM SELECTOR PLAN 8
Will continue to follow for ongoing GOC discussion and dispo  Emotional support provided, questions answered.    For new or uncontrolled symptoms, please call Palliative Care at [phone]. The service is available 24/7 (including nights & weekends) to provide symptom management recommendations over the phone as appropriate.

## 2021-08-31 NOTE — PROGRESS NOTE ADULT - PROBLEM SELECTOR PLAN 1
Chronically prescribed opiates for pain  - ISTOP reference noted, last Rx was for Oxy 20mg PO QID PRN  - c/w Oxy IR 15mg PO q6h PRN for Severe Pain, can escalate to 20mg PRNs if necessary  - patient has an outpatient doctor for pain medicine follow-up.

## 2021-08-31 NOTE — PROGRESS NOTE ADULT - CONVERSATION DETAILS
Discussed role and completion of Healthcare Proxy form. Patient designating her son as her alternate. Reviewed the situations in which the HCP form would come to be in effect. Copy placed in the chart and original returned to patient.    Primary Agent:  Tere Devlin, [phone]

## 2021-08-31 NOTE — PROGRESS NOTE ADULT - PROBLEM SELECTOR PLAN 2
Pt with stage 4 pressure injury to sacrum measuring 4x2x0.2 cm, pink, non-granulating base of wound, no undermining. Encouraged patient to turn and reposition self at least every 2 hours to prevent pressure injury from worsening.   - managed by wound care  - not candidate for inpatient [place] wound care

## 2021-08-31 NOTE — PROGRESS NOTE ADULT - PROBLEM SELECTOR PLAN 3
Hx of AIDS with unknown VL or CD4. Not compliant with home biktarvy or bactrim. Follows with Dr. Gavin at [facility]. CD4 37  Plan:  -HIV consult on Monday  ---f/u viral load  ---f/u rpr, fungal/acid fast cultures, cryptococcal antigen, fungitell, CMV, galactomanin  -cont bactrim 1 strength tab OD for ppx Hx of AIDS, n ot compliant with home biktarvy or bactrim.   Follows with Dr. Gavin at [facility].   CD4 37, viral load of 198K     Plan:  - HIV consulted    ---f/u rpr, fungal/acid fast cultures, cryptococcal antigen, fungitell, CMV, galactomanin  -cont bactrim 1 strength tab OD for ppx

## 2021-08-31 NOTE — PROGRESS NOTE ADULT - ASSESSMENT
54yo woman, active smoker and crack-cocaine user, with a PMH of AIDS complicated by medication non-adherence (1993, CD4 37, previously on Biktarvy, OI's unknown), chronic HBV/HCV, rectal CA s/p RT c/b a chronic stage 3 sacral ulcer, tongue CA s/p resection, COPD and BPD who p/w SOB, found to have an acute emphysematous COPD exacerbation 2/2 active tobacco use and medication non-adherence. Patients respiratory status improving however due to concerns of her deteriorating functional status, inability to care for herself, med noncompliance, worsening disease course patient was transferred to  and is currently pending transfer to [facility].  54yo woman, active smoker and crack-cocaine user, with a PMH of AIDS complicated by medication non-adherence (1993, CD4 37, previously on Biktarvy, OI's unknown), chronic HBV/HCV, rectal CA s/p RT c/b a chronic stage 3 sacral ulcer, tongue CA s/p resection, COPD and BPD who p/w SOB, found to have an acute emphysematous COPD exacerbation 2/2 active tobacco use and medication non-adherence. Patients respiratory status improving however due to concerns of her deteriorating functional status, inability to care for herself, med noncompliance, worsening disease course patient was transferred to  and is currently pending placement and LTCF.

## 2021-08-31 NOTE — PROGRESS NOTE ADULT - PROBLEM SELECTOR PLAN 4
Pt with hx of COPD with chronic cough productive of green sputum. EDOUARD so that she can only walk .5 a block before stopping. For the past 2 days has been having worsening shortness of breath and difficulty talking. current smoker and not compliant with home symbicort and albuterol.   - pt reports recently being admitted to [facility] 2 weeks ago and being admitted for SOB [id number] at [facility] this year.

## 2021-08-31 NOTE — PROGRESS NOTE ADULT - PROBLEM SELECTOR PLAN 9
-cont home zyprexa 5mg OD, remeron 15mg qhs F: None   E: Replete as necessary K>4 Mg>2  N: Regular diet   DVT Prophylaxis: Lovenox 40mg daily   GI prophylaxis: None   CODE STATUS: FULL  D: Pending placement

## 2021-08-31 NOTE — PROGRESS NOTE ADULT - PROBLEM SELECTOR PLAN 7
Hx of rectal ca s/p RT in 2002.   -CT AP: Wall thickening with surrounding inflammatory change at the sigmoid and rectosigmoid possibly a nonspecific colitis. Nonobstructing mass is also a consideration given history of neoplasm.  -no change in bowel habits recently  -pt reports that she takes oxycodone 20mg for pain relief, which is seen on outpatient med list  Plan:  -f/u ISTOP  -f/u with o/p provider -s/p resection in 2014  -pt wishes to have regular diet and acknowledges risk of aspiration

## 2021-08-31 NOTE — PROGRESS NOTE ADULT - PROBLEM SELECTOR PLAN 3
Hx of AIDS. She was diagnosed with HIV in 1993 and has been on several medications for HIV management. She follows at [facility] and was recently transitioned to biktarvy and bactrim.  Not compliant with home biktarvy or bactrim. Follows with Dr. Gavin at [facility]. CD4 37 VL unknown but likely elevated in setting of noncompliance.

## 2021-08-31 NOTE — PROGRESS NOTE ADULT - PROBLEM SELECTOR PLAN 6
CT AP: Low-attenuation at the uterus suggestive of endometrial thickening versus fluid at the endometrial canal, up to 12 mm thick. Further evaluation with ultrasound recommended.  -no vaginal discharge recently but chronic vaginal pain since radiation therapy years ago  Plan:  -TVUS today Hx of rectal ca s/p RT in 2002.   -CT AP: Wall thickening with surrounding inflammatory change at the sigmoid and rectosigmoid possibly a nonspecific colitis. Nonobstructing mass is also a consideration given history of neoplasm.  -no change in bowel habits recently  - pain control as above

## 2021-08-31 NOTE — PROGRESS NOTE ADULT - PROBLEM SELECTOR PLAN 5
CT AP: Wall thickening with surrounding inflammatory change at the sigmoid and rectosigmoid possibly a nonspecific colitis. Nonobstructing mass is also a consideration given history of neoplasm.  -no change in bowel habits recently.

## 2021-09-01 NOTE — PROGRESS NOTE ADULT - SUBJECTIVE AND OBJECTIVE BOX
INTERVAL HPI/OVERNIGHT EVENTS:  Patient was seen and examined at bedside. As per nurse and patient, no o/n events, patient resting comfortably. No complaints at this time. Patient denies: fever, chills, dizziness, weakness, HA, Changes in vision, CP, palpitations, SOB, cough, N/V/D/C, dysuria, changes in bowel movements, LE edema. ROS otherwise negative.    VITAL SIGNS:  T(F): 98.3 (09-01-21 @ 06:33)  HR: 67 (09-01-21 @ 06:33)  BP: 96/62 (09-01-21 @ 06:33)  RR: 16 (09-01-21 @ 06:33)  SpO2: 99% (09-01-21 @ 06:33)  Wt(kg): --    PHYSICAL EXAM:    Constitutional: WDWN, NAD  HEENT: PERRL, EOMI, sclera non-icteric, neck supple, trachea midline, no masses, no JVD, MMM, good dentition  Respiratory: CTA b/l, good air entry b/l, no wheezing, no rhonchi, no rales, without accessory muscle use and no intercostal retractions  Cardiovascular: RRR, normal S1S2, no M/R/G  Gastrointestinal: soft, NTND, no masses palpable, BS normal  Extremities: Warm, well perfused, pulses equal bilateral upper and lower extremities, no edema, no clubbing  Neurological: AAOx3, CN Grossly intact  Skin: Normal temperature, warm, dry    MEDICATIONS  (STANDING):  albuterol/ipratropium for Nebulization 3 milliLiter(s) Nebulizer every 6 hours  atorvastatin 40 milliGRAM(s) Oral at bedtime  bictegravir 50 mG/emtricitabine 200 mG/tenofovir alafenamide 25 mG (BIKTARVY) 1 Tablet(s) Oral daily  budesonide  80 MICROgram(s)/formoterol 4.5 MICROgram(s) Inhaler 2 Puff(s) Inhalation two times a day  dextrose 40% Gel 15 Gram(s) Oral once  dextrose 5%. 1000 milliLiter(s) (50 mL/Hr) IV Continuous <Continuous>  dextrose 5%. 1000 milliLiter(s) (100 mL/Hr) IV Continuous <Continuous>  dextrose 50% Injectable 25 Gram(s) IV Push once  dextrose 50% Injectable 12.5 Gram(s) IV Push once  dextrose 50% Injectable 25 Gram(s) IV Push once  enoxaparin Injectable 40 milliGRAM(s) SubCutaneous every 24 hours  glucagon  Injectable 1 milliGRAM(s) IntraMuscular once  insulin lispro (ADMELOG) corrective regimen sliding scale   SubCutaneous Before meals and at bedtime  mirtazapine 15 milliGRAM(s) Oral at bedtime  nicotine -   7 mG/24Hr(s) Patch 1 Patch Transdermal daily  OLANZapine 5 milliGRAM(s) Oral daily  polyethylene glycol 3350 17 Gram(s) Oral daily  predniSONE   Tablet 40 milliGRAM(s) Oral every 24 hours  senna 2 Tablet(s) Oral at bedtime  trimethoprim  160 mG/sulfamethoxazole 800 mG 1 Tablet(s) Oral every 12 hours    MEDICATIONS  (PRN):  acetaminophen   Tablet .. 650 milliGRAM(s) Oral every 6 hours PRN Temp greater or equal to 38.5C (101.3F), Mild Pain (1 - 3)  diphenhydrAMINE 25 milliGRAM(s) Oral every 4 hours PRN Rash and/or Itching  melatonin 3 milliGRAM(s) Oral at bedtime PRN Insomnia  oxyCODONE    IR 15 milliGRAM(s) Oral every 6 hours PRN Severe Pain (7 - 10)  oxyCODONE    IR 10 milliGRAM(s) Oral every 6 hours PRN Moderate Pain (4 - 6)      Allergies    No Known Allergies    Intolerances        LABS:                RADIOLOGY & ADDITIONAL TESTS:  Reviewed INTERVAL HPI/OVERNIGHT EVENTS:  Patient was seen and examined at bedside. DALI overnight. Pt is afebrile. Pt was concerned about right pelvic bump, discussed with her that she has a hernia in the area. Pt noted she her breathing is okay as long as she's resting. Her COVID test (8/31) was negative.    VITAL SIGNS:  T(F): 98.3 (09-01-21 @ 06:33)  HR: 67 (09-01-21 @ 06:33)  BP: 96/62 (09-01-21 @ 06:33)  RR: 16 (09-01-21 @ 06:33)  SpO2: 99% (09-01-21 @ 06:33)  Wt(kg): --    PHYSICAL EXAM:    Constitutional: WDWN, NAD  HEENT: PERRL, EOMI, sclera non-icteric, neck supple, trachea midline, no masses, no JVD, MMM, good dentition  Respiratory: CTA b/l, good air entry b/l, no wheezing, no rhonchi, no rales, without accessory muscle use and no intercostal retractions  Cardiovascular: RRR, normal S1S2, no M/R/G  Gastrointestinal: soft, NTND, no masses palpable, BS normal  Extremities: Warm, well perfused, pulses equal bilateral upper and lower extremities, no edema, no clubbing  Neurological: AAOx3, CN Grossly intact  Skin: Normal temperature, warm, dry    MEDICATIONS  (STANDING):  albuterol/ipratropium for Nebulization 3 milliLiter(s) Nebulizer every 6 hours  atorvastatin 40 milliGRAM(s) Oral at bedtime  bictegravir 50 mG/emtricitabine 200 mG/tenofovir alafenamide 25 mG (BIKTARVY) 1 Tablet(s) Oral daily  budesonide  80 MICROgram(s)/formoterol 4.5 MICROgram(s) Inhaler 2 Puff(s) Inhalation two times a day  dextrose 40% Gel 15 Gram(s) Oral once  dextrose 5%. 1000 milliLiter(s) (50 mL/Hr) IV Continuous <Continuous>  dextrose 5%. 1000 milliLiter(s) (100 mL/Hr) IV Continuous <Continuous>  dextrose 50% Injectable 25 Gram(s) IV Push once  dextrose 50% Injectable 12.5 Gram(s) IV Push once  dextrose 50% Injectable 25 Gram(s) IV Push once  enoxaparin Injectable 40 milliGRAM(s) SubCutaneous every 24 hours  glucagon  Injectable 1 milliGRAM(s) IntraMuscular once  insulin lispro (ADMELOG) corrective regimen sliding scale   SubCutaneous Before meals and at bedtime  mirtazapine 15 milliGRAM(s) Oral at bedtime  nicotine -   7 mG/24Hr(s) Patch 1 Patch Transdermal daily  OLANZapine 5 milliGRAM(s) Oral daily  polyethylene glycol 3350 17 Gram(s) Oral daily  predniSONE   Tablet 40 milliGRAM(s) Oral every 24 hours  senna 2 Tablet(s) Oral at bedtime  trimethoprim  160 mG/sulfamethoxazole 800 mG 1 Tablet(s) Oral every 12 hours    MEDICATIONS  (PRN):  acetaminophen   Tablet .. 650 milliGRAM(s) Oral every 6 hours PRN Temp greater or equal to 38.5C (101.3F), Mild Pain (1 - 3)  diphenhydrAMINE 25 milliGRAM(s) Oral every 4 hours PRN Rash and/or Itching  melatonin 3 milliGRAM(s) Oral at bedtime PRN Insomnia  oxyCODONE    IR 15 milliGRAM(s) Oral every 6 hours PRN Severe Pain (7 - 10)  oxyCODONE    IR 10 milliGRAM(s) Oral every 6 hours PRN Moderate Pain (4 - 6)      Allergies    No Known Allergies    Intolerances       INTERVAL HPI/OVERNIGHT EVENTS:  Patient was seen and examined at bedside. DALI overnight. Pt is afebrile. Pt was concerned about right pelvic bump, discussed with her that she has a hernia in the area. Pt noted her breathing is okay as long as she's resting. Her COVID test (8/31) was negative.    VITAL SIGNS:  T(F): 98.3 (09-01-21 @ 06:33)  HR: 67 (09-01-21 @ 06:33)  BP: 96/62 (09-01-21 @ 06:33)  RR: 16 (09-01-21 @ 06:33)  SpO2: 99% (09-01-21 @ 06:33)  Wt(kg): --    PHYSICAL EXAM:    Constitutional: WDWN, NAD  HEENT: PERRL, EOMI, sclera non-icteric, neck supple, trachea midline, no masses, no JVD, MMM, good dentition  Respiratory: CTA b/l, good air entry b/l, no wheezing, no rhonchi, no rales, without accessory muscle use and no intercostal retractions  Cardiovascular: RRR, normal S1S2, no M/R/G  Gastrointestinal: soft, NTND, no masses palpable, BS normal  Extremities: Warm, well perfused, pulses equal bilateral upper and lower extremities, no edema, no clubbing  Neurological: AAOx3, CN Grossly intact  Skin: Normal temperature, warm, dry    MEDICATIONS  (STANDING):  albuterol/ipratropium for Nebulization 3 milliLiter(s) Nebulizer every 6 hours  atorvastatin 40 milliGRAM(s) Oral at bedtime  bictegravir 50 mG/emtricitabine 200 mG/tenofovir alafenamide 25 mG (BIKTARVY) 1 Tablet(s) Oral daily  budesonide  80 MICROgram(s)/formoterol 4.5 MICROgram(s) Inhaler 2 Puff(s) Inhalation two times a day  dextrose 40% Gel 15 Gram(s) Oral once  dextrose 5%. 1000 milliLiter(s) (50 mL/Hr) IV Continuous <Continuous>  dextrose 5%. 1000 milliLiter(s) (100 mL/Hr) IV Continuous <Continuous>  dextrose 50% Injectable 25 Gram(s) IV Push once  dextrose 50% Injectable 12.5 Gram(s) IV Push once  dextrose 50% Injectable 25 Gram(s) IV Push once  enoxaparin Injectable 40 milliGRAM(s) SubCutaneous every 24 hours  glucagon  Injectable 1 milliGRAM(s) IntraMuscular once  insulin lispro (ADMELOG) corrective regimen sliding scale   SubCutaneous Before meals and at bedtime  mirtazapine 15 milliGRAM(s) Oral at bedtime  nicotine -   7 mG/24Hr(s) Patch 1 Patch Transdermal daily  OLANZapine 5 milliGRAM(s) Oral daily  polyethylene glycol 3350 17 Gram(s) Oral daily  predniSONE   Tablet 40 milliGRAM(s) Oral every 24 hours  senna 2 Tablet(s) Oral at bedtime  trimethoprim  160 mG/sulfamethoxazole 800 mG 1 Tablet(s) Oral every 12 hours    MEDICATIONS  (PRN):  acetaminophen   Tablet .. 650 milliGRAM(s) Oral every 6 hours PRN Temp greater or equal to 38.5C (101.3F), Mild Pain (1 - 3)  diphenhydrAMINE 25 milliGRAM(s) Oral every 4 hours PRN Rash and/or Itching  melatonin 3 milliGRAM(s) Oral at bedtime PRN Insomnia  oxyCODONE    IR 15 milliGRAM(s) Oral every 6 hours PRN Severe Pain (7 - 10)  oxyCODONE    IR 10 milliGRAM(s) Oral every 6 hours PRN Moderate Pain (4 - 6)      Allergies    No Known Allergies    Intolerances

## 2021-09-01 NOTE — PROGRESS NOTE ADULT - PROBLEM SELECTOR PLAN 3
Hx of AIDS, n ot compliant with home biktarvy or bactrim.   Follows with Dr. Gavin at [facility].   CD4 37, viral load of 198K     Plan:  - HIV consulted    -f/u rpr, fungal/acid fast cultures, cryptococcal antigen, fungitell, CMV, galactomanin  -cont bactrim 1 strength tab OD for ppx

## 2021-09-01 NOTE — PROGRESS NOTE ADULT - PROBLEM SELECTOR PLAN 6
Hx of rectal ca s/p RT in 2002.   -CT AP: Wall thickening with surrounding inflammatory change at the sigmoid and rectosigmoid possibly a nonspecific colitis. Nonobstructing mass is also a consideration given history of neoplasm.  -no change in bowel habits recently  -pain control as above.

## 2021-09-01 NOTE — PROGRESS NOTE ADULT - PROBLEM SELECTOR PLAN 4
Asymptomatic UTI, UA was positive.    -treat with bactrim BID for 5 days, then resume AIDS prophylactic dose once daily.

## 2021-09-01 NOTE — PROGRESS NOTE ADULT - PROBLEM SELECTOR PLAN 2
Patient is cachectic 2/2 to AIDS and medication non-compliance  Patient is very unsteady on her feet, making it difficult to walk- she has been bedbound for past several months   She has a problem caring for herself and appears malnurishion  Patient currently lives in [facility], however it is unlikely she can go back given her condition  She has a large chronic nonhealing sacral ulcer- stage III  - Wound care consulted for sacral ulcer  - PT consulted   - Nutrition consulted   - Palliative care has been consulted  - Patient is awaiting dispo to a facility where she can receive more comprehensive care.   - Epidemiology noted she needs to be COVID negative 10 days after exposure, plan for COVID test 9/9/21  - If Crypt antigen is negative we can restart antiretroviral therapy  - Patient is still FULL CODE AT THIS TIME-  patient is fearful of ending up on life support but for now is willing to accept CPR/Intubation if it gives her a chance to live. HCP form filled out: HCP is her eldest son, Reed as alternate decision maker.

## 2021-09-01 NOTE — PROGRESS NOTE ADULT - ATTENDING COMMENTS
Patient was seen and examined with the resident team today.  I agree with Dr. Kelley's assessment and plan with the following exceptions/additions:     Briefly, this is a 56yo woman, active smoker, with a PMH of AIDs c/b medication non-adherence (1993, CD4/VL unknown, previously on Biktarvy, OI's unknown), chronic HBV/HCV, rectal CA s/p RT c/b a chronic stage 3 sacral ulcer, tongue CA s/p resection, COPD and BPD who p/w SOB, found to have an acute emphysematous COPD exacerbation 2/2 active tobacco use and medication non-adherence.  Medically cleared for discharge to [facility]; however, she was exposed to COVID-19 on 8/30 and unable to isolate at [facility].  Will remain in-house until 10 days of quarantine.      #Emphysematous COPD with an Acute Exacerbation - on room air, complete Pred x 5 days, c/w duonebs and Symbicort  #AIDS - HIV consult following, c/w Bactrim (switch to ppx dose once UTI tx completed) and Biktarvy, f/u outstanding labs for OI's   #Acute cystitis - c/w Bactrim treatment dose  #Rectal CA s/p RT c/b chronic stage 3 sacral ulcer - c/w pain control and bowel regimen (if ulcer tolerates); wound care following, appreciate assistance   #Tongue CA s/p resection - no active issues, encourage PO intake   #Chronic HCV and HBV - no active issues, will monitor LFT's  #Severe protein calorie malnutrition - likely part of her overall failure to thrive presentation; encourage PO intake as much as able and should improve w/treatment of her chronic infections   #Bipolar Disorder - c/w Olanzapine   #COVID Exposure - exposed on 8/30, re-check on day 8 of quarantine   #DVT PPx - Lovenox  #Dispo - [facility] after she completes quarantine     Nat Valdes  [phone]

## 2021-09-01 NOTE — CHART NOTE - NSCHARTNOTEFT_GEN_A_CORE
O/N Events: DALI    Subjective/ROS: Denies HA, CP, SOB, n/v, changes in bowel/urinary habits.  12pt ROS otherwise negative.  Resp status improved.  Restarted on Biktarvy this AM after serum crypt was negative.  In isolation for COVID exposure.    VITALS  Vital Signs Last 24 Hrs  T(C): 36.9 (01 Sep 2021 13:04), Max: 36.9 (01 Sep 2021 13:04)  T(F): 98.5 (01 Sep 2021 13:04), Max: 98.5 (01 Sep 2021 13:04)  HR: 80 (01 Sep 2021 13:04) (65 - 80)  BP: 111/76 (01 Sep 2021 13:04) (92/52 - 111/76)  BP(mean): --  RR: 18 (01 Sep 2021 13:04) (16 - 18)  SpO2: 99% (01 Sep 2021 13:04) (98% - 99%)    CAPILLARY BLOOD GLUCOSE      POCT Blood Glucose.: 94 mg/dL (01 Sep 2021 12:16)  POCT Blood Glucose.: 156 mg/dL (01 Sep 2021 08:25)  POCT Blood Glucose.: 137 mg/dL (31 Aug 2021 21:30)  POCT Blood Glucose.: 83 mg/dL (31 Aug 2021 17:31)      PHYSICAL EXAM  General: A&Ox3; NAD  Head: NC/AT; MMM; PERRL; EOMI;  Neck: Supple; no JVD  Respiratory: CTA B/L; no wheezes/crackles   Cardiovascular: Regular rhythm/rate; S1/S2   Gastrointestinal: Soft; NTND; normoactive BS  Extremities: WWP; no edema/cyanosis  Skin: Small, scattered skin lesions on back  Neurological:  CNII-XII grossly intact; no obvious focal deficits    MEDICATIONS  (STANDING):  albuterol/ipratropium for Nebulization 3 milliLiter(s) Nebulizer every 6 hours  atorvastatin 40 milliGRAM(s) Oral at bedtime  bictegravir 50 mG/emtricitabine 200 mG/tenofovir alafenamide 25 mG (BIKTARVY) 1 Tablet(s) Oral daily  budesonide  80 MICROgram(s)/formoterol 4.5 MICROgram(s) Inhaler 2 Puff(s) Inhalation two times a day  dextrose 40% Gel 15 Gram(s) Oral once  dextrose 5%. 1000 milliLiter(s) (50 mL/Hr) IV Continuous <Continuous>  dextrose 5%. 1000 milliLiter(s) (100 mL/Hr) IV Continuous <Continuous>  dextrose 50% Injectable 25 Gram(s) IV Push once  dextrose 50% Injectable 12.5 Gram(s) IV Push once  dextrose 50% Injectable 25 Gram(s) IV Push once  enoxaparin Injectable 40 milliGRAM(s) SubCutaneous every 24 hours  glucagon  Injectable 1 milliGRAM(s) IntraMuscular once  insulin lispro (ADMELOG) corrective regimen sliding scale   SubCutaneous Before meals and at bedtime  mirtazapine 15 milliGRAM(s) Oral at bedtime  nicotine -   7 mG/24Hr(s) Patch 1 Patch Transdermal daily  OLANZapine 5 milliGRAM(s) Oral daily  polyethylene glycol 3350 17 Gram(s) Oral daily  predniSONE   Tablet 40 milliGRAM(s) Oral every 24 hours  senna 2 Tablet(s) Oral at bedtime  trimethoprim  160 mG/sulfamethoxazole 800 mG 1 Tablet(s) Oral every 12 hours    MEDICATIONS  (PRN):  acetaminophen   Tablet .. 650 milliGRAM(s) Oral every 6 hours PRN Temp greater or equal to 38.5C (101.3F), Mild Pain (1 - 3)  diphenhydrAMINE 25 milliGRAM(s) Oral every 4 hours PRN Rash and/or Itching  melatonin 3 milliGRAM(s) Oral at bedtime PRN Insomnia  oxyCODONE    IR 15 milliGRAM(s) Oral every 6 hours PRN Severe Pain (7 - 10)  oxyCODONE    IR 10 milliGRAM(s) Oral every 6 hours PRN Moderate Pain (4 - 6)      No Known Allergies      LABS: Reviewed    IMAGING/EKG/ETC: Reviewed    Assessment/Recs:  55F PMH AIDS (nonadherent with Biktarvy and Bactrim, unknown CD4 and VL), hep B/C, COPD, active smoker, crack use, rectal Ca s/p RT, tongue ca s/p resection, BPD presenting with acute of chronic shortness of breath for 2 days. Admitted for COPD exacerbation.     #AIDS. Patient with cd4 count of 37 with VL ~200k.  She follows w/ provider at [facility] whom she saw 2 weeks ago.  She is not adherent to [facility].    OI screening negative so far.  Can restart Biktarvy as she was already taking intermittently.    SOB unlikely PCP PNA given that she improved w/ COPD treatment.  CT chest unremarkable for groundglass.  She is now awaiting placement in the setting of COVID exposure (will have to quarantine) but otherwise HD stable and D/C ready.  C/w bactrim for PCP ppx.    Will f/u remaining OI labs while admitted.  Patient to follow up w/ Virginie Rand upon discharge.  Encouraged adherence to [facility].    HIV team to sign off.  Please reach out w/ additional questions.

## 2021-09-01 NOTE — PROGRESS NOTE ADULT - PROBLEM SELECTOR PLAN 9
F: None   E: Replete as necessary K>4 Mg>2  N: Regular diet   DVT Prophylaxis: Lovenox 40mg daily   GI prophylaxis: None   CODE STATUS: FULL  D: Pending placement.

## 2021-09-01 NOTE — PROGRESS NOTE ADULT - PROBLEM SELECTOR PLAN 5
Chronically prescribed opiates for pain  [facility] reference noted, last Rx was for Oxy 20mg PO QID PRN    - c/w Oxy IR 15mg PO q6h PRN for Severe Pain, can escalate to 20mg PRNs if necessary  - patient has an outpatient doctor for pain medicine follow-up  - Now on bowel regimen.

## 2021-09-01 NOTE — PROGRESS NOTE ADULT - PROBLEM SELECTOR PLAN 1
Pt with hx of COPD with chronic cough productive of green sputum  Patients exacerbation brought on by current smoking and med noncompliance   Improving from on oxygenation standpoint, not currently coughing, no wheezing, no supplmental O2    Plan:  -duonebs q6 standing  -prednisone 40mg x4 days (8/29- 9/1)  -cont home symbicort 80-4.5 2 puffs BID  -ISS while on steroids.

## 2021-09-02 NOTE — PROGRESS NOTE ADULT - PROBLEM SELECTOR PLAN 3
Hx of AIDS, n ot compliant with home biktarvy or bactrim.   Follows with Dr. Gavin at [facility].   CD4 37, viral load of 198K     Plan:  - HIV consulted    -f/u rpr, fungal/acid fast cultures, cryptococcal antigen, fungitell, CMV, galactomanin  -cont bactrim 1 strength tab OD for ppx

## 2021-09-02 NOTE — PROGRESS NOTE ADULT - SUBJECTIVE AND OBJECTIVE BOX
INTERVAL HPI/OVERNIGHT EVENTS:  Patient was seen and examined at bedside. As per nurse and patient, no o/n events, patient resting comfortably. Pt noted some difficulty with sleeping due to having nightmares. No new complaints at this time. Her COVID test (8/31) was negative, discussed with her she will need to wait the 10 days since exposure for her next test.       VITAL SIGNS:  T(F): 97.7 (09-02-21 @ 06:09)  HR: 71 (09-02-21 @ 06:09)  BP: 97/67 (09-02-21 @ 06:09)  RR: 18 (09-02-21 @ 06:09)  SpO2: 99% (09-02-21 @ 06:09)  Wt(kg): --    PHYSICAL EXAM:    Constitutional: WDWN, NAD  HEENT: PERRL, EOMI, sclera non-icteric, neck supple, trachea midline, no masses, no JVD, MMM, good dentition  Respiratory: CTA b/l, good air entry b/l, no wheezing, no rhonchi, no rales, without accessory muscle use and no intercostal retractions  Cardiovascular: RRR, normal S1S2, no M/R/G  Gastrointestinal: soft, NTND, no masses palpable, BS normal  Extremities: Warm, well perfused, pulses equal bilateral upper and lower extremities, no edema, no clubbing  Neurological: AAOx3, CN Grossly intact  Skin: Normal temperature, warm, dry    MEDICATIONS  (STANDING):  albuterol/ipratropium for Nebulization 3 milliLiter(s) Nebulizer every 6 hours  atorvastatin 40 milliGRAM(s) Oral at bedtime  bictegravir 50 mG/emtricitabine 200 mG/tenofovir alafenamide 25 mG (BIKTARVY) 1 Tablet(s) Oral daily  budesonide  80 MICROgram(s)/formoterol 4.5 MICROgram(s) Inhaler 2 Puff(s) Inhalation two times a day  dextrose 40% Gel 15 Gram(s) Oral once  dextrose 5%. 1000 milliLiter(s) (50 mL/Hr) IV Continuous <Continuous>  dextrose 5%. 1000 milliLiter(s) (100 mL/Hr) IV Continuous <Continuous>  dextrose 50% Injectable 25 Gram(s) IV Push once  dextrose 50% Injectable 12.5 Gram(s) IV Push once  dextrose 50% Injectable 25 Gram(s) IV Push once  enoxaparin Injectable 40 milliGRAM(s) SubCutaneous every 24 hours  glucagon  Injectable 1 milliGRAM(s) IntraMuscular once  insulin lispro (ADMELOG) corrective regimen sliding scale   SubCutaneous Before meals and at bedtime  mirtazapine 15 milliGRAM(s) Oral at bedtime  nicotine -   7 mG/24Hr(s) Patch 1 Patch Transdermal daily  OLANZapine 5 milliGRAM(s) Oral daily  polyethylene glycol 3350 17 Gram(s) Oral daily  senna 2 Tablet(s) Oral at bedtime  trimethoprim  160 mG/sulfamethoxazole 800 mG 1 Tablet(s) Oral every 12 hours    MEDICATIONS  (PRN):  acetaminophen   Tablet .. 650 milliGRAM(s) Oral every 6 hours PRN Temp greater or equal to 38.5C (101.3F), Mild Pain (1 - 3)  diphenhydrAMINE 25 milliGRAM(s) Oral every 4 hours PRN Rash and/or Itching  melatonin 3 milliGRAM(s) Oral at bedtime PRN Insomnia  oxyCODONE    IR 15 milliGRAM(s) Oral every 6 hours PRN Severe Pain (7 - 10)  oxyCODONE    IR 10 milliGRAM(s) Oral every 6 hours PRN Moderate Pain (4 - 6)      Allergies    No Known Allergies    Intolerances        LABS:                RADIOLOGY & ADDITIONAL TESTS:  Reviewed

## 2021-09-02 NOTE — PROGRESS NOTE ADULT - PROBLEM SELECTOR PLAN 2
Patient is cachectic 2/2 to AIDS and medication non-compliance  Patient is very unsteady on her feet, making it difficult to walk- she has been bedbound for past several months   She has a problem caring for herself and appears malnurishion  Patient currently lives in [facility], however it is unlikely she can go back given her condition  She has a large chronic nonhealing sacral ulcer- stage III  - Wound care consulted for sacral ulcer  - PT consulted   - Nutrition consulted   - Palliative care has been consulted  - Patient is awaiting dispo to a facility where she can receive more comprehensive care.   - Epidemiology noted she needs to be COVID negative 10 days after exposure, plan for COVID test 9/9/21  - If Crypt antigen is negative we can restart antiretroviral therapy  - Patient is still FULL CODE AT THIS TIME-  patient is fearful of ending up on life support but for now is willing to accept CPR/Intubation if it gives her a chance to live. HCP form filled out: HCP is her eldest son, Reed as alternate decision maker.

## 2021-09-02 NOTE — CHART NOTE - NSCHARTNOTEFT_GEN_A_CORE
Admitting Diagnosis:   Patient is a 55y old  Female who presents with a chief complaint of inability to ambulate (02 Sep 2021 06:30)      PAST MEDICAL & SURGICAL HISTORY:  HIV disease    Advanced COPD    Tongue cancer    Rectal cancer    Hepatitis B    Hepatitis C    No significant past surgical history        Current Nutrition Order :regular and 3 Ensure Enlive(1050kcal and 60gmprotein)       PO Intake: Good (%) [ x  ]  Fair (50-75%) [   ] Poor (<25%) [   ]    GI Issues: semi-loose BM's    Pain :none reported    Skin Integrit y:Stage IV sacrum    Labs:         CAPILLARY BLOOD GLUCOSE      POCT Blood Glucose.: 120 mg/dL (02 Sep 2021 08:42)  POCT Blood Glucose.: 169 mg/dL (02 Sep 2021 06:06)  POCT Blood Glucose.: 82 mg/dL (01 Sep 2021 22:28)  POCT Blood Glucose.: 131 mg/dL (01 Sep 2021 17:06)  POCT Blood Glucose.: 94 mg/dL (01 Sep 2021 12:16)      Medications:  MEDICATIONS  (STANDING):  albuterol/ipratropium for Nebulization 3 milliLiter(s) Nebulizer every 6 hours  atorvastatin 40 milliGRAM(s) Oral at bedtime  bictegravir 50 mG/emtricitabine 200 mG/tenofovir alafenamide 25 mG (BIKTARVY) 1 Tablet(s) Oral daily  budesonide  80 MICROgram(s)/formoterol 4.5 MICROgram(s) Inhaler 2 Puff(s) Inhalation two times a day  dextrose 40% Gel 15 Gram(s) Oral once  dextrose 5%. 1000 milliLiter(s) (100 mL/Hr) IV Continuous <Continuous>  dextrose 5%. 1000 milliLiter(s) (50 mL/Hr) IV Continuous <Continuous>  dextrose 50% Injectable 25 Gram(s) IV Push once  dextrose 50% Injectable 12.5 Gram(s) IV Push once  dextrose 50% Injectable 25 Gram(s) IV Push once  enoxaparin Injectable 40 milliGRAM(s) SubCutaneous every 24 hours  glucagon  Injectable 1 milliGRAM(s) IntraMuscular once  insulin lispro (ADMELOG) corrective regimen sliding scale   SubCutaneous Before meals and at bedtime  mirtazapine 15 milliGRAM(s) Oral at bedtime  nicotine -   7 mG/24Hr(s) Patch 1 Patch Transdermal daily  OLANZapine 5 milliGRAM(s) Oral daily  polyethylene glycol 3350 17 Gram(s) Oral daily  senna 2 Tablet(s) Oral at bedtime  trimethoprim  160 mG/sulfamethoxazole 800 mG 1 Tablet(s) Oral every 12 hours    MEDICATIONS  (PRN):  acetaminophen   Tablet .. 650 milliGRAM(s) Oral every 6 hours PRN Temp greater or equal to 38.5C (101.3F), Mild Pain (1 - 3)  diphenhydrAMINE 25 milliGRAM(s) Oral every 4 hours PRN Rash and/or Itching  melatonin 3 milliGRAM(s) Oral at bedtime PRN Insomnia  oxyCODONE    IR 15 milliGRAM(s) Oral every 6 hours PRN Severe Pain (7 - 10)  oxyCODONE    IR 10 milliGRAM(s) Oral every 6 hours PRN Moderate Pain (4 - 6)      Weight:42.8kg  Daily     Daily     Weight Change: no updated weights. Noted 50lb weight loss PTA.    Estimated energy needs: IBW used due to below 70% of IBW.IBW:61.4kg(135lbs)[id number]:2149-2456kcal and 1.5-2.0gmprotein:92-123gm protein(Increased needs due to AIDS/PU and COVID exposure)Fluids j93-18xi fluids:1842-2149cc.  Subjective: 55F PMH AIDS (nonadherent with Biktarvy and Bactrim, unknown CD4 and VL), hep B/C, COPD, active smoker, crack use, rectal Ca s/p RT, tongue ca s/p resection, BPD presenting with worsening shortness of breath for 2 days a/f COPD exacerbation in the setting of current smoker and medication noncompliance .Patient eating 100% of meals and 2-3 Ensure Enlive. RD encouraged ongoing nutrient dense sources.Patient is identified at severe PCM due to weight loss/NFPE findings    Previous Nutrition Diagnosis :Severe PCM r/t inadequate nutrient intake AEB: NFPE and weight loss     Active [ x  ]  Resolved [   ]    If resolved, new PES:     Goal :Meet 80% of EER consistently    Recommendations:1.Please trend weights to access changes 2.Add MVI,ZnSO4 and Vitamin C for wound healing     Education: completed    Risk Level: High [   ] Moderate [ x  ] Low [   ]

## 2021-09-03 NOTE — PROGRESS NOTE ADULT - PROBLEM SELECTOR PLAN 3
Hx of AIDS, n ot compliant with home biktarvy or bactrim.   Follows with Dr. Gavin at [facility].   CD4 37, viral load of 198K     Plan:  - Restarted on ART   -cont bactrim 1 strength tab OD for ppx

## 2021-09-03 NOTE — PROGRESS NOTE ADULT - PROBLEM SELECTOR PLAN 4
Asymptomatic UTI, UA was positive.    -treat with bactrim BID for 5 days (course completed on 9/3)   -then resume AIDS prophylactic dose once daily on 9/4

## 2021-09-03 NOTE — PROGRESS NOTE ADULT - PROBLEM SELECTOR PLAN 2
Patient is cachectic 2/2 to AIDS and medication non-compliance  Patient is very unsteady on her feet, making it difficult to walk- she has been bedbound for past several months, however seen walking in room today   She has a problem caring for herself and appears malnutrition  Patient currently lives in [facility], however it is unlikely she can go back given her condition  She has a large chronic nonhealing sacral ulcer- stage III  - Wound care consulted for sacral ulcer  - PT consulted   - Nutrition consulted   - Palliative care has been consulted  - Patient is awaiting dispo to a facility where she can receive more comprehensive care.   - Epidemiology noted she needs to be COVID negative 10 days after exposure, plan for COVID test 9/9/21  - Restarted on ATY  - Patient is still FULL CODE AT THIS TIME-  patient is fearful of ending up on life support but for now is willing to accept CPR/Intubation if it gives her a chance to live. HCP form filled out: HCP is her eldest son, Reed as alternate decision maker.

## 2021-09-03 NOTE — PROGRESS NOTE ADULT - PROBLEM SELECTOR PLAN 1
Pt with hx of COPD with chronic cough productive of green sputum- RESOLVED  Patients exacerbation brought on by current smoking and med noncompliance   Improving from on oxygenation standpoint, not currently coughing, no wheezing, no supplmental O2    Plan:  -duonebs q6 standing  -cont home symbicort 80-4.5 2 puffs BID

## 2021-09-03 NOTE — PROGRESS NOTE ADULT - PROBLEM SELECTOR PROBLEM 1
3/16/2020       RE: Adrian Alvarado  [address] [place] [address]     Dear Colleague,    Thank you for referring your patient, Adrian Alvarado, to the [facility] at [facility]. Please see a copy of my visit note below.        [facility], Orthopaedic Oncology Surgery Consultation  by Fer Crow M.D.    Adrian Alvarado MRN# [id number]   Age: 53 year old YOB: 1966     Requesting physician: Dr. Kolton Olivares DPM     Background history:  DX:  1. Left quadratus plantar muscle lipoma    TREATMENTS:  1. None          Assessment and Plan:   Assessment:  Left heel pain. I do not see evidence of a mass or lipoma on review of the patient's MRI. Differential diagnosis includes plantar fasciitis vs tarsal tunnel syndrome. ***     Plan:  I advised the patient to follow-up with podiatry. He can consider a diagnostic block of the tarsal tunnel or a corticosteroid injection. He can continue to use orthotics or a heel cup. ***            History of Present Illness:   Adrian Alvarado is a 53 year old male who presents today for evaluation of a left foot mass. The patient was seen recently on 3/4/2020 at the [facility] by Dr. Olivares for chronic left heel pain that has been worsening with time. He had undergone an MRI of the left heel on 11/21/2019, which was suggestive of plantar fasciitis but also showed an intrinsic muscular lipoma in the quadratus plantar muscle. Given the presence of the lipoma, he was referred here for further evaluation.     Here, the patient reports that the pain has been going on for 5-6 years, progressively worsening with time. It is present consistently but worsens throughout the day with walking. It is worse with weightbearing. He has tried orthotics, nighttime splints, and a boot, but the pain has only gotten worse. No tingling. Pain does not radiate to the toes. Patient says he has no prior history  "of plantar fasciitis.          Physical Exam:     EXAMINATION pertinent findings:   PSYCH: Pleasant, healthy-appearing, alert, oriented x3, cooperative. Normal mood and affect.  VITAL SIGNS: Blood pressure (!) 138/93, pulse 85, temperature 98  F (36.7  C), height 1.822 m (5' 11.75\"), weight 98.4 kg (217 lb)..  Reviewed nursing intake notes.   Body mass index is 29.64 kg/m .  RESP: non labored breathing   SKIN: grossly normal   NEURO: grossly normal , no motor deficits  VASCULAR: satisfactory perfusion of the left foot.   MUSCULOSKELETAL: Normal gait.   Tenderness on plantar medial surface of left heel. Negative Tinel's sign over the tarsal tunnel. Normal hind foot, mid foot, and tibial talar motion. No palpable mass appreciated. No asymmetry clinically with the contralateral foot.          Data:   All laboratory data reviewed  All imaging studies reviewed by me    MR Left Heel (11/20/2019):  Review of MRI images show small adipose nodule is not in close proximity to the tibial nerve. No evidence of tarsal tunnel syndrome or nerve compression.     Per radiologist's read:  1.  Plantar fasciitis with doris-facial edema, thickening of proximal plantar fascia and mild plantar calcaneal bone marrow edema.  2. 1.2 cm intramuscular lipoma within the proximal quadratus plantae muscle.  3. Minor Achilles tendinosis. Mild edema within Kager s triangle.  4. The ankle joint space and ligaments are intact.  5. Os peroneum. Slight marrow edema within the distal aspect of the ossicle. No fracture of the ossicle. The peroneus longus tendon is intact.        Attending MD (Dr. Fer Crow) Attestation :  This patient was seen and evaluated by me including a history, exam, and interpretation of all imaging and/or lab data.  Either a training physician (resident/fellow), who also saw the patient, or scribe has documented the clinic visit in the attached note.    Fer Crow MD  [facility] Professor  Oncology and Adult " Reconstructive Surgery  Dept Orthopaedic Surgery, [facility]  [phone] office, [phone] pager  [email or web address]      DATA for DOCUMENTATION:         Past Medical History:   There is no problem list on file for this patient.    No past medical history on file.    Also see scanned health assessment forms.       Past Surgical History:   No past surgical history on file.         Social History:     Social History     Socioeconomic History     Marital status:      Spouse name: Not on file     Number of children: Not on file     Years of education: Not on file     Highest education level: Not on file   Occupational History     Not on file   Social Needs     Financial resource strain: Not on file     Food insecurity     Worry: Not on file     Inability: Not on file     Transportation needs     Medical: Not on file     Non-medical: Not on file   Tobacco Use     Smoking status: Never Smoker     Smokeless tobacco: Never Used   Substance and Sexual Activity     Alcohol use: Not on file     Drug use: Not on file     Sexual activity: Not on file   Lifestyle     Physical activity     Days per week: Not on file     Minutes per session: Not on file     Stress: Not on file   Relationships     Social connections     Talks on phone: Not on file     Gets together: Not on file     Attends Sabianist service: Not on file     Active member of club or organization: Not on file     Attends meetings of clubs or organizations: Not on file     Relationship status: Not on file     Intimate partner violence     Fear of current or ex partner: Not on file     Emotionally abused: Not on file     Physically abused: Not on file     Forced sexual activity: Not on file   Other Topics Concern     Not on file   Social History Narrative     Not on file            Family History:     No family history on file.         Medications:     No current outpatient medications on file.     No current facility-administered medications for this  visit.               Review of Systems:   A comprehensive 10 point review of systems (constitutional, ENT, cardiac, peripheral vascular, lymphatic, respiratory, GI, , Musculoskeletal, skin, Neurological) was performed and found to be negative except as described in this note.     See intake form completed by patient     Scribe Disclosure:  I, Andreas Alaniz, am serving as a scribe to document services personally performed by Fer Crow MD at this visit, based upon the provider's statements to me. All documentation has been reviewed by the aforementioned provider prior to being entered into the official medical record.     Again, thank you for allowing me to participate in the care of your patient.      Sincerely,    Fer Crow MD     COPD exacerbation

## 2021-09-03 NOTE — PROGRESS NOTE ADULT - ASSESSMENT
55F PMH AIDS (nonadherent with Biktarvy and Bactrim, unknown CD4 and VL), hep B/C, COPD, active smoker, crack use, rectal Ca s/p RT, tongue ca s/p resection, BPD presenting with worsening shortness of breath for 2 days a/f COPD exacerbation in the setting of current smoker and medication noncompliance. Patient is here for placement in long term care facility due to inability to care for herself, patient currently in day 4 of quarantine due to accidental covid exposure from her roommate.

## 2021-09-03 NOTE — PROGRESS NOTE ADULT - SUBJECTIVE AND OBJECTIVE BOX
INTERVAL HPI/OVERNIGHT EVENTS:  O/N: Nothing overnight  This morning: Patient was seen and examined at bedside. Patient is very comfortable, feels well, and relaxed in her room.     VITAL SIGNS:  T(F): 97.8 (09-03-21 @ 06:04)  HR: 89 (09-03-21 @ 06:04)  BP: 112/72 (09-03-21 @ 06:04)  RR: 18 (09-03-21 @ 06:04)  SpO2: 98% (09-03-21 @ 06:04)    PHYSICAL EXAM:    Constitutional: WDWN, NAD  HEENT: PERRL, EOMI, neck supple, no masses, no JVD, MMM  Respiratory: CTA b/l, good air entry b/l, no wheezing, no rhonchi, no rales, without accessory muscle use and no intercostal retractions  Cardiovascular: RRR, normal S1S2, no M/R/G  Gastrointestinal: soft, NTND, no masses palpable, BS normal  Extremities: Warm, well perfused, pulses equal bilateral upper and lower extremities, no edema, no clubbing  Neurological: AAOx3, CN Grossly intact, no FNDs   Skin: Normal temperature, warm, dry. Her sacral decubitus ulcer (stage 3) is clean, no purulent discharge, no blood, no areas of necrosis. Has the appearance of healthy tissue around the ulcer.     MEDICATIONS  (STANDING):  albuterol/ipratropium for Nebulization 3 milliLiter(s) Nebulizer every 6 hours  atorvastatin 40 milliGRAM(s) Oral at bedtime  bictegravir 50 mG/emtricitabine 200 mG/tenofovir alafenamide 25 mG (BIKTARVY) 1 Tablet(s) Oral daily  budesonide  80 MICROgram(s)/formoterol 4.5 MICROgram(s) Inhaler 2 Puff(s) Inhalation two times a day  dextrose 40% Gel 15 Gram(s) Oral once  dextrose 5%. 1000 milliLiter(s) (50 mL/Hr) IV Continuous <Continuous>  dextrose 5%. 1000 milliLiter(s) (100 mL/Hr) IV Continuous <Continuous>  dextrose 50% Injectable 25 Gram(s) IV Push once  dextrose 50% Injectable 12.5 Gram(s) IV Push once  dextrose 50% Injectable 25 Gram(s) IV Push once  enoxaparin Injectable 40 milliGRAM(s) SubCutaneous every 24 hours  glucagon  Injectable 1 milliGRAM(s) IntraMuscular once  insulin lispro (ADMELOG) corrective regimen sliding scale   SubCutaneous Before meals and at bedtime  mirtazapine 15 milliGRAM(s) Oral at bedtime  nicotine -   7 mG/24Hr(s) Patch 1 Patch Transdermal daily  OLANZapine 5 milliGRAM(s) Oral daily  polyethylene glycol 3350 17 Gram(s) Oral daily  senna 2 Tablet(s) Oral at bedtime  trimethoprim  160 mG/sulfamethoxazole 800 mG 1 Tablet(s) Oral every 12 hours    MEDICATIONS  (PRN):  acetaminophen   Tablet .. 650 milliGRAM(s) Oral every 6 hours PRN Temp greater or equal to 38.5C (101.3F), Mild Pain (1 - 3)  diphenhydrAMINE 25 milliGRAM(s) Oral every 4 hours PRN Rash and/or Itching  melatonin 3 milliGRAM(s) Oral at bedtime PRN Insomnia  oxyCODONE    IR 15 milliGRAM(s) Oral every 6 hours PRN Severe Pain (7 - 10)  oxyCODONE    IR 10 milliGRAM(s) Oral every 6 hours PRN Moderate Pain (4 - 6)    Allergies    No Known Allergies    Intolerances    LABS:    09-03    134<L>  |  x   |  x   ----------------------------<  x   x    |  x   |  1.15      TPro  x   /  Alb  x   /  TBili  0.2  /  DBili  x   /  AST  x   /  ALT  x   /  AlkPhos  x   09-03    PT/INR - ( 03 Sep 2021 07:27 )   PT: 10.0 sec;   INR: 0.83                      RADIOLOGY & ADDITIONAL TESTS:  Reviewed

## 2021-09-05 NOTE — PROGRESS NOTE ADULT - ATTENDING COMMENTS
monitor BP.check with peds cuff  if still low consider eval for adrenal insuficiency due to chronic steroid use  Unlikely sepsis  Discharge planning to [facility]

## 2021-09-05 NOTE — PROGRESS NOTE ADULT - ASSESSMENT
55F PMH AIDS (nonadherent with Biktarvy and Bactrim, unknown CD4 and VL), hep B/C, COPD, active smoker, crack use, rectal Ca s/p RT, tongue ca s/p resection, BPD presenting with worsening shortness of breath for 2 days a/f COPD exacerbation in the setting of current smoker and medication noncompliance. Patient is here for placement in long term care facility due to inability to care for herself, patient currently in quarantine due to accidental covid exposure from her roommate.

## 2021-09-05 NOTE — PROGRESS NOTE ADULT - PROBLEM SELECTOR PLAN 2
Patient is hypotensive- this is almost daily and has been consistent throughout entire hospitalization  Systolics in the mid-high 90s    - Continue to monitor  - ? If concerned for AI can check AM cortisol

## 2021-09-05 NOTE — PROGRESS NOTE ADULT - SUBJECTIVE AND OBJECTIVE BOX
INTERVAL HPI/OVERNIGHT EVENTS:  O/N: No events   This morning: Patient was seen and examined at bedside. No complaints, feeling well     VITAL SIGNS:  T(F): 98.1 (09-05-21 @ 11:40)  HR: 79 (09-05-21 @ 11:40)  BP: 97/63 (09-05-21 @ 11:40)  RR: 16 (09-05-21 @ 11:40)  SpO2: 97% (09-05-21 @ 11:40)    PHYSICAL EXAM:    Constitutional: WDWN, NAD  HEENT: PERRL, EOMI, neck supple, no masses, no JVD, MMM  Respiratory: CTA b/l, good air entry b/l, no wheezing, no rhonchi, no rales, without accessory muscle use and no intercostal retractions  Cardiovascular: RRR, normal S1S2, no M/R/G  Gastrointestinal: soft, NTND, no masses palpable, BS normal  Extremities: Warm, well perfused, pulses equal bilateral upper and lower extremities, no edema, no clubbing  Neurological: AAOx3, CN Grossly intact, no FNDs   Skin: Normal temperature, warm, dry. Her sacral decubitus ulcer (stage 3) is clean, no purulent discharge, no blood, no areas of necrosis. Has the appearance of healthy tissue around the ulcer.     MEDICATIONS  (STANDING):  albuterol/ipratropium for Nebulization 3 milliLiter(s) Nebulizer every 6 hours  atorvastatin 40 milliGRAM(s) Oral at bedtime  bictegravir 50 mG/emtricitabine 200 mG/tenofovir alafenamide 25 mG (BIKTARVY) 1 Tablet(s) Oral daily  budesonide  80 MICROgram(s)/formoterol 4.5 MICROgram(s) Inhaler 2 Puff(s) Inhalation two times a day  dextrose 40% Gel 15 Gram(s) Oral once  dextrose 5%. 1000 milliLiter(s) (50 mL/Hr) IV Continuous <Continuous>  dextrose 5%. 1000 milliLiter(s) (100 mL/Hr) IV Continuous <Continuous>  dextrose 50% Injectable 25 Gram(s) IV Push once  dextrose 50% Injectable 12.5 Gram(s) IV Push once  dextrose 50% Injectable 25 Gram(s) IV Push once  enoxaparin Injectable 40 milliGRAM(s) SubCutaneous every 24 hours  glucagon  Injectable 1 milliGRAM(s) IntraMuscular once  insulin lispro (ADMELOG) corrective regimen sliding scale   SubCutaneous Before meals and at bedtime  mirtazapine 15 milliGRAM(s) Oral at bedtime  nicotine -   7 mG/24Hr(s) Patch 1 Patch Transdermal daily  OLANZapine 5 milliGRAM(s) Oral daily  polyethylene glycol 3350 17 Gram(s) Oral daily  senna 2 Tablet(s) Oral at bedtime  trimethoprim  160 mG/sulfamethoxazole 800 mG 1 Tablet(s) Oral every 24 hours    MEDICATIONS  (PRN):  acetaminophen   Tablet .. 650 milliGRAM(s) Oral every 6 hours PRN Temp greater or equal to 38.5C (101.3F), Mild Pain (1 - 3)  diphenhydrAMINE 25 milliGRAM(s) Oral every 4 hours PRN Rash and/or Itching  melatonin 3 milliGRAM(s) Oral at bedtime PRN Insomnia  oxyCODONE    IR 15 milliGRAM(s) Oral every 6 hours PRN Severe Pain (7 - 10)  oxyCODONE    IR 10 milliGRAM(s) Oral every 6 hours PRN Moderate Pain (4 - 6)    Allergies    No Known Allergies    Intolerances                              RADIOLOGY & ADDITIONAL TESTS:  Reviewed

## 2021-09-05 NOTE — PROGRESS NOTE ADULT - PROBLEM SELECTOR PLAN 5
Asymptomatic UTI, UA was positive.    -treated with bactrim BID for 5 days (course completed on 9/3)   -resumed AIDS prophylactic dose once daily on 9/4

## 2021-09-05 NOTE — PROGRESS NOTE ADULT - PROBLEM SELECTOR PLAN 1
Patient is cachectic 2/2 to AIDS and medication non-compliance  Patient is very unsteady on her feet, making it difficult to walk- she has been bedbound for past several months, however seen walking in room today   She has a problem caring for herself and appears malnutrition  Patient currently lives in [facility], however it is unlikely she can go back given her condition  She has a large chronic nonhealing sacral ulcer- stage III  - Wound care consulted for sacral ulcer: Ulcer is clean and well healing  - PT consulted: Patient ambulating daily with PT and on own in her room    - Nutrition consulted- Patient tolerating PO   - Palliative care has been consulted  - Patient is awaiting dispo to a facility where she can receive more comprehensive care.   - Epidemiology noted she needs to be COVID negative 10 days after exposure, plan for COVID test 9/9/21  - Restarted on ARVT  - Patient is still FULL CODE AT THIS TIME-  patient is fearful of ending up on life support but for now is willing to accept CPR/Intubation if it gives her a chance to live. HCP form filled out: HCP is her eldest son, Reed as alternate decision maker.

## 2021-09-05 NOTE — PROGRESS NOTE ADULT - PROBLEM SELECTOR PLAN 4
Hx of AIDS, n ot compliant with home biktarvy or bactrim.   Follows with Dr. Gavin at [facility].   CD4 37, viral load of 198K     Plan:  - Restarted on ART   -cont bactrim 1 strength tab OD for ppx

## 2021-09-06 NOTE — PROGRESS NOTE ADULT - PROBLEM SELECTOR PLAN 3
Pt with hx of COPD with chronic cough productive of green sputum- RESOLVED  Patients exacerbation brought on by current smoking and med noncompliance   Improving from on oxygenation standpoint, not currently coughing, no wheezing, no supplmental O2    Plan:  -duonebs q6 standing  -cont home symbicort 80-4.5 2 puffs BID Pt with hx of COPD with chronic cough productive of green sputum- RESOLVED  Patients exacerbation brought on by current smoking and med noncompliance   Improving from on oxygenation standpoint, not currently coughing, no wheezing, no supplmental O2    Plan:  -duonebs discontinued  -cont home symbicort 80-4.5 2 puffs BID

## 2021-09-06 NOTE — PROGRESS NOTE ADULT - ASSESSMENT
*note in progress* 55F PMH AIDS (nonadherent with Biktarvy and Bactrim, unknown CD4 and VL), hep B/C, COPD, active smoker, crack use, rectal Ca s/p RT, tongue ca s/p resection, BPD presenting with worsening shortness of breath for 2 days a/f COPD exacerbation in the setting of current smoker and medication noncompliance. Patient is here for placement in long term care facility due to inability to care for herself, patient currently in quarantine due to accidental covid exposure from her roommate.

## 2021-09-06 NOTE — PROGRESS NOTE ADULT - SUBJECTIVE AND OBJECTIVE BOX
INTERVAL HPI/OVERNIGHT EVENTS:  Patient was seen and examined at bedside. As per nurse and patient, no o/n events, patient resting comfortably. No complaints at this time. Patient denies: fever, chills, dizziness, weakness, HA, Changes in vision, CP, palpitations, SOB, cough, N/V/D/C, dysuria, changes in bowel movements, LE edema. ROS otherwise negative.    VITAL SIGNS:  T(F): 98.6 (09-06-21 @ 04:47)  HR: 77 (09-06-21 @ 04:47)  BP: 83/54 (09-06-21 @ 04:47)  RR: 18 (09-06-21 @ 04:47)  SpO2: 97% (09-06-21 @ 04:47)  Wt(kg): --    PHYSICAL EXAM:    Constitutional: WDWN, NAD  HEENT: PERRL, EOMI, sclera non-icteric, neck supple, trachea midline, no masses, no JVD, MMM, good dentition  Respiratory: CTA b/l, good air entry b/l, no wheezing, no rhonchi, no rales, without accessory muscle use and no intercostal retractions  Cardiovascular: RRR, normal S1S2, no M/R/G  Gastrointestinal: soft, NTND, no masses palpable, BS normal  Extremities: Warm, well perfused, pulses equal bilateral upper and lower extremities, no edema, no clubbing  Neurological: AAOx3, CN Grossly intact  Skin: Normal temperature, warm, dry    MEDICATIONS  (STANDING):  albuterol/ipratropium for Nebulization 3 milliLiter(s) Nebulizer every 6 hours  atorvastatin 40 milliGRAM(s) Oral at bedtime  bictegravir 50 mG/emtricitabine 200 mG/tenofovir alafenamide 25 mG (BIKTARVY) 1 Tablet(s) Oral daily  budesonide  80 MICROgram(s)/formoterol 4.5 MICROgram(s) Inhaler 2 Puff(s) Inhalation two times a day  dextrose 40% Gel 15 Gram(s) Oral once  dextrose 5%. 1000 milliLiter(s) (50 mL/Hr) IV Continuous <Continuous>  dextrose 5%. 1000 milliLiter(s) (100 mL/Hr) IV Continuous <Continuous>  dextrose 50% Injectable 25 Gram(s) IV Push once  dextrose 50% Injectable 12.5 Gram(s) IV Push once  dextrose 50% Injectable 25 Gram(s) IV Push once  enoxaparin Injectable 40 milliGRAM(s) SubCutaneous every 24 hours  glucagon  Injectable 1 milliGRAM(s) IntraMuscular once  insulin lispro (ADMELOG) corrective regimen sliding scale   SubCutaneous Before meals and at bedtime  mirtazapine 15 milliGRAM(s) Oral at bedtime  nicotine -   7 mG/24Hr(s) Patch 1 Patch Transdermal daily  OLANZapine 5 milliGRAM(s) Oral daily  polyethylene glycol 3350 17 Gram(s) Oral daily  senna 2 Tablet(s) Oral at bedtime  trimethoprim  160 mG/sulfamethoxazole 800 mG 1 Tablet(s) Oral every 24 hours    MEDICATIONS  (PRN):  acetaminophen   Tablet .. 650 milliGRAM(s) Oral every 6 hours PRN Temp greater or equal to 38.5C (101.3F), Mild Pain (1 - 3)  diphenhydrAMINE 25 milliGRAM(s) Oral every 4 hours PRN Rash and/or Itching  melatonin 3 milliGRAM(s) Oral at bedtime PRN Insomnia  oxyCODONE    IR 10 milliGRAM(s) Oral every 6 hours PRN Moderate Pain (4 - 6)  oxyCODONE    IR 15 milliGRAM(s) Oral every 6 hours PRN Severe Pain (7 - 10)      Allergies    No Known Allergies    Intolerances        LABS:                RADIOLOGY & ADDITIONAL TESTS:  Reviewed   INTERVAL HPI/OVERNIGHT EVENTS:  Patient was seen and examined at bedside. Nurse noted patient has been asking for pain medication over night, but when the nurse brings the medication the patient is asleep. During exam, pt noted some itchiness that improves with benadryl, but has no other complaints this morning.     VITAL SIGNS:  T(F): 98.6 (09-06-21 @ 04:47)  HR: 77 (09-06-21 @ 04:47)  BP: 83/54 (09-06-21 @ 04:47)  RR: 18 (09-06-21 @ 04:47)  SpO2: 97% (09-06-21 @ 04:47)  Wt(kg): --    PHYSICAL EXAM:    Constitutional: WDWN, NAD  HEENT: PERRL, EOMI, sclera non-icteric, neck supple, trachea midline, no masses, no JVD, MMM, good dentition  Respiratory: CTA b/l, good air entry b/l, no wheezing, no rhonchi, no rales, without accessory muscle use and no intercostal retractions  Cardiovascular: RRR, normal S1S2, no M/R/G  Gastrointestinal: soft, NTND, no masses palpable, BS normal  Extremities: Warm, well perfused, pulses equal bilateral upper and lower extremities, no edema, no clubbing  Neurological: AAOx3, CN Grossly intact  Skin: Normal temperature, warm, dry    MEDICATIONS  (STANDING):  albuterol/ipratropium for Nebulization 3 milliLiter(s) Nebulizer every 6 hours  atorvastatin 40 milliGRAM(s) Oral at bedtime  bictegravir 50 mG/emtricitabine 200 mG/tenofovir alafenamide 25 mG (BIKTARVY) 1 Tablet(s) Oral daily  budesonide  80 MICROgram(s)/formoterol 4.5 MICROgram(s) Inhaler 2 Puff(s) Inhalation two times a day  dextrose 40% Gel 15 Gram(s) Oral once  dextrose 5%. 1000 milliLiter(s) (50 mL/Hr) IV Continuous <Continuous>  dextrose 5%. 1000 milliLiter(s) (100 mL/Hr) IV Continuous <Continuous>  dextrose 50% Injectable 25 Gram(s) IV Push once  dextrose 50% Injectable 12.5 Gram(s) IV Push once  dextrose 50% Injectable 25 Gram(s) IV Push once  enoxaparin Injectable 40 milliGRAM(s) SubCutaneous every 24 hours  glucagon  Injectable 1 milliGRAM(s) IntraMuscular once  insulin lispro (ADMELOG) corrective regimen sliding scale   SubCutaneous Before meals and at bedtime  mirtazapine 15 milliGRAM(s) Oral at bedtime  nicotine -   7 mG/24Hr(s) Patch 1 Patch Transdermal daily  OLANZapine 5 milliGRAM(s) Oral daily  polyethylene glycol 3350 17 Gram(s) Oral daily  senna 2 Tablet(s) Oral at bedtime  trimethoprim  160 mG/sulfamethoxazole 800 mG 1 Tablet(s) Oral every 24 hours    MEDICATIONS  (PRN):  acetaminophen   Tablet .. 650 milliGRAM(s) Oral every 6 hours PRN Temp greater or equal to 38.5C (101.3F), Mild Pain (1 - 3)  diphenhydrAMINE 25 milliGRAM(s) Oral every 4 hours PRN Rash and/or Itching  melatonin 3 milliGRAM(s) Oral at bedtime PRN Insomnia  oxyCODONE    IR 10 milliGRAM(s) Oral every 6 hours PRN Moderate Pain (4 - 6)  oxyCODONE    IR 15 milliGRAM(s) Oral every 6 hours PRN Severe Pain (7 - 10)      Allergies    No Known Allergies    Intolerances        LABS:        RADIOLOGY & ADDITIONAL TESTS:  Reviewed

## 2021-09-06 NOTE — PROGRESS NOTE ADULT - PROBLEM SELECTOR PLAN 2
Patient is hypotensive- this is almost daily and has been consistent throughout entire hospitalization  Systolics in the mid-high 90s    - Continue to monitor  - ? If concerned for AI can check AM cortisol Patient is hypotensive- this is almost daily and has been consistent throughout entire hospitalization  Systolics in the mid-high 90s    - Continue to monitor  - ? If concerned for AI can check AM cortisol - 83/54 BP (9/6) ordered AM cortisol, will f/u

## 2021-09-06 NOTE — PROGRESS NOTE ADULT - PROBLEM SELECTOR PLAN 1
Patient is cachectic 2/2 to AIDS and medication non-compliance  Patient is very unsteady on her feet, making it difficult to walk- she has been bedbound for past several months, however seen walking in room today   She has a problem caring for herself and appears malnutrition  Patient currently lives in [facility], however it is unlikely she can go back given her condition  She has a large chronic nonhealing sacral ulcer- stage III  - Wound care consulted for sacral ulcer: Ulcer is clean and well healing  - PT consulted: Patient ambulating daily with PT and on own in her room    - Nutrition consulted- Patient tolerating PO   - Palliative care has been consulted  - Patient is awaiting dispo to a facility where she can receive more comprehensive care.   - Epidemiology noted she needs to be COVID negative 10 days after exposure, plan for COVID test 9/9/21  - Restarted on ARVT  - Patient is still FULL CODE AT THIS TIME-  patient is fearful of ending up on life support but for now is willing to accept CPR/Intubation if it gives her a chance to live. HCP form filled out: HCP is her eldest son, Reed as alternate decision maker.

## 2021-09-06 NOTE — PROGRESS NOTE ADULT - PROBLEM SELECTOR PLAN 4
Hx of AIDS, n ot compliant with home biktarvy or bactrim.   Follows with Dr. Gavin at [facility].   CD4 37, viral load of 198K     Plan:  - Restarted on ART   -cont bactrim 1 strength tab OD for ppx

## 2021-09-07 NOTE — PROGRESS NOTE ADULT - PROBLEM SELECTOR PLAN 4
Hx of AIDS, n ot compliant with home biktarvy or bactrim.   Follows with Dr. Gavin at [facility].   CD4 37, viral load of 198K     Plan:  - Restarted on ART   -cont bactrim 1 strength tab OD for ppx

## 2021-09-07 NOTE — PROGRESS NOTE ADULT - PROBLEM SELECTOR PLAN 3
Pt with hx of COPD with chronic cough productive of green sputum- RESOLVED  Patients exacerbation brought on by current smoking and med noncompliance   Improving from on oxygenation standpoint, not currently coughing, no wheezing, no supplmental O2    Plan:  -duonebs discontinued  -cont home symbicort 80-4.5 2 puffs BID

## 2021-09-07 NOTE — PROGRESS NOTE ADULT - PROBLEM SELECTOR PLAN 2
Patient is hypotensive- this is almost daily and has been consistent throughout entire hospitalization  Systolics in the mid-high 90s    - Continue to monitor  - ? If concerned for AI can check AM cortisol - 83/54 BP (9/6) ordered AM cortisol, will f/u Patient is hypotensive- this is almost daily and has been consistent throughout entire hospitalization  Systolics in the mid-high 90s    - Continue to monitor  - ? If concerned for AI can check AM cortisol - 83/54 BP (9/6) ordered AM cortisol, will f/u  - BP improved on its own 106/75 (9/7) Patient is hypotensive- this is almost daily and has been consistent throughout entire hospitalization  Systolics in the mid-high 90s    - Continue to monitor  - ? If concerned for AI can check AM cortisol  - AM Cortisol 8.56 (9/7)  - BP improved on its own 106/75 (9/7)

## 2021-09-07 NOTE — PROGRESS NOTE ADULT - SUBJECTIVE AND OBJECTIVE BOX
INTERVAL HPI/OVERNIGHT EVENTS:  Patient was seen and examined at bedside. As per nurse and patient, no o/n events, patient resting comfortably. Pt stated she wasn't able to sleep all night because of her nightmares of "being chased by somebody trying to kill me". She attempted to get a sleeping pill but the nurse informed her it was too late at night to get one. No other complaints at this time.     VITAL SIGNS:  T(F): 98.4 (09-07-21 @ 06:21)  HR: 84 (09-07-21 @ 06:21)  BP: 106/65 (09-07-21 @ 06:21)  RR: 18 (09-07-21 @ 06:21)  SpO2: 99% (09-07-21 @ 06:21)  Wt(kg): --    PHYSICAL EXAM:    Constitutional: WDWN, NAD  HEENT: PERRL, EOMI, sclera non-icteric, neck supple, trachea midline, no masses, no JVD, MMM, good dentition  Respiratory: CTA b/l, good air entry b/l, no wheezing, no rhonchi, no rales, without accessory muscle use and no intercostal retractions  Cardiovascular: RRR, normal S1S2, no M/R/G  Gastrointestinal: soft, NTND, no masses palpable, BS normal  Extremities: Warm, well perfused, pulses equal bilateral upper and lower extremities, no edema, no clubbing  Neurological: AAOx3, CN Grossly intact  Skin: Normal temperature, warm, dry    MEDICATIONS  (STANDING):  atorvastatin 40 milliGRAM(s) Oral at bedtime  bictegravir 50 mG/emtricitabine 200 mG/tenofovir alafenamide 25 mG (BIKTARVY) 1 Tablet(s) Oral daily  budesonide  80 MICROgram(s)/formoterol 4.5 MICROgram(s) Inhaler 2 Puff(s) Inhalation two times a day  enoxaparin Injectable 40 milliGRAM(s) SubCutaneous every 24 hours  glucagon  Injectable 1 milliGRAM(s) IntraMuscular once  mirtazapine 15 milliGRAM(s) Oral at bedtime  nicotine -   7 mG/24Hr(s) Patch 1 Patch Transdermal daily  OLANZapine 5 milliGRAM(s) Oral daily  polyethylene glycol 3350 17 Gram(s) Oral daily  senna 2 Tablet(s) Oral at bedtime  trimethoprim  160 mG/sulfamethoxazole 800 mG 1 Tablet(s) Oral every 24 hours    MEDICATIONS  (PRN):  acetaminophen   Tablet .. 650 milliGRAM(s) Oral every 6 hours PRN Temp greater or equal to 38.5C (101.3F), Mild Pain (1 - 3)  diphenhydrAMINE 25 milliGRAM(s) Oral every 4 hours PRN Rash and/or Itching  melatonin 3 milliGRAM(s) Oral at bedtime PRN Insomnia  oxyCODONE    IR 10 milliGRAM(s) Oral every 6 hours PRN Moderate Pain (4 - 6)  oxyCODONE    IR 15 milliGRAM(s) Oral every 6 hours PRN Severe Pain (7 - 10)      Allergies    No Known Allergies    Intolerances        LABS:      RADIOLOGY & ADDITIONAL TESTS:  Reviewed

## 2021-09-07 NOTE — PROGRESS NOTE ADULT - PROBLEM SELECTOR PLAN 1
Patient is cachectic 2/2 to AIDS and medication non-compliance  Patient is very unsteady on her feet, making it difficult to walk- she has been bedbound for past several months, however seen walking in room today   She has a problem caring for herself and appears malnutrition  Patient currently lives in [facility], however it is unlikely she can go back given her condition  She has a large chronic nonhealing sacral ulcer- stage III  - Wound care consulted for sacral ulcer: Ulcer is clean and well healing  - PT consulted: Patient ambulating daily with PT and on own in her room    - Nutrition consulted- Patient tolerating PO   - Palliative care has been consulted  - Patient is awaiting dispo to a facility where she can receive more comprehensive care.   - Epidemiology noted she needs to be COVID negative 10 days after exposure, plan for COVID test 9/9/21  - Restarted on ARVT  - Patient is still FULL CODE AT THIS TIME-  patient is fearful of ending up on life support but for now is willing to accept CPR/Intubation if it gives her a chance to live. HCP form filled out: HCP is her eldest son, Reed as alternate decision maker.

## 2021-09-07 NOTE — PROGRESS NOTE ADULT - ATTENDING COMMENTS
Patient was seen and examined with the resident team today.  I agree with Dr. Minaya's assessment and plan with the following exceptions/additions:     Briefly, this is a 56yo woman, active smoker, with a PMH of AIDs c/b medication non-adherence (1993, CD4/VL unknown, previously on Biktarvy, OI's unknown), chronic HBV/HCV, rectal CA s/p RT c/b a chronic stage 3 sacral ulcer, tongue CA s/p resection, COPD and BPD who p/w SOB, found to have an acute emphysematous COPD exacerbation 2/2 active tobacco use and medication non-adherence.  Exposed to COVID and is incompletely vaccinated.  Completing quarantine here until 9/9 with plans to then transfer to [facility].    #Emphysematous COPD with an Acute Exacerbation - on room air, s/p Pred x 5 days, c/w duonebs and Symbicort  #AIDS - HIV consult following, c/w Bactrim; c/w ARVs   #Rectal CA s/p RT c/b chronic stage 3 sacral ulcer - c/w pain control and bowel regimen (if ulcer tolerates); wound care following, appreciate assistance   #Tongue CA s/p resection - no active issues, encourage PO intake   #Chronic HCV and HBV - no active issues, will monitor LFT's  #Severe protein calorie malnutrition - likely part of her overall failure to thrive presentation; encourage PO intake as much as able and should improve w/treatment of her chronic infections   #Bipolar Disorder - c/w Olanzapine   #COVID Exposure - repeat COVID swab on 9/9  #DVT PPx - Lovenox  #Dispo - [facility] after 9/9's COVID swab (if negative)    Nat Valdes  [phone]

## 2021-09-07 NOTE — PROGRESS NOTE ADULT - ASSESSMENT
*progress note* 55F PMH AIDS (nonadherent with Biktarvy and Bactrim, unknown CD4 and VL), hep B/C, COPD, active smoker, crack use, rectal Ca s/p RT, tongue ca s/p resection, BPD presenting with worsening shortness of breath for 2 days a/f COPD exacerbation in the setting of current smoker and medication noncompliance. Patient is here for placement in long term care facility due to inability to care for herself, patient currently in quarantine due to accidental covid exposure from her roommate.

## 2021-09-08 NOTE — PROGRESS NOTE ADULT - PROBLEM SELECTOR PLAN 2
Patient is hypotensive- this is almost daily and has been consistent throughout entire hospitalization  Systolics in the mid-high 90s    - Continue to monitor  - ? If concerned for AI can check AM cortisol  - AM Cortisol 8.56 (9/7)  - BP improved on its own 106/75 (9/7)

## 2021-09-08 NOTE — PROGRESS NOTE ADULT - SUBJECTIVE AND OBJECTIVE BOX
INTERVAL HPI/OVERNIGHT EVENTS:  Patient was seen and examined at bedside. As per nurse and patient, no o/n events, patient resting comfortably. No complaints at this time. Patient denies: fever, chills, dizziness, weakness, HA, Changes in vision, CP, palpitations, SOB, cough, N/V/D/C, dysuria, changes in bowel movements, LE edema. ROS otherwise negative.    VITAL SIGNS:  T(F): 98.8 (09-08-21 @ 05:56)  HR: 76 (09-08-21 @ 05:56)  BP: 94/56 (09-08-21 @ 05:56)  RR: 16 (09-08-21 @ 05:56)  SpO2: 96% (09-08-21 @ 05:56)  Wt(kg): --    PHYSICAL EXAM:    Constitutional: WDWN, NAD  HEENT: PERRL, EOMI, sclera non-icteric, neck supple, trachea midline, no masses, no JVD, MMM, good dentition  Respiratory: CTA b/l, good air entry b/l, no wheezing, no rhonchi, no rales, without accessory muscle use and no intercostal retractions  Cardiovascular: RRR, normal S1S2, no M/R/G  Gastrointestinal: soft, NTND, no masses palpable, BS normal  Extremities: Warm, well perfused, pulses equal bilateral upper and lower extremities, no edema, no clubbing  Neurological: AAOx3, CN Grossly intact  Skin: Normal temperature, warm, dry    MEDICATIONS  (STANDING):  atorvastatin 40 milliGRAM(s) Oral at bedtime  bictegravir 50 mG/emtricitabine 200 mG/tenofovir alafenamide 25 mG (BIKTARVY) 1 Tablet(s) Oral daily  budesonide  80 MICROgram(s)/formoterol 4.5 MICROgram(s) Inhaler 2 Puff(s) Inhalation two times a day  enoxaparin Injectable 40 milliGRAM(s) SubCutaneous every 24 hours  glucagon  Injectable 1 milliGRAM(s) IntraMuscular once  mirtazapine 15 milliGRAM(s) Oral at bedtime  nicotine -   7 mG/24Hr(s) Patch 1 Patch Transdermal daily  OLANZapine 5 milliGRAM(s) Oral daily  polyethylene glycol 3350 17 Gram(s) Oral daily  senna 2 Tablet(s) Oral at bedtime  trimethoprim  160 mG/sulfamethoxazole 800 mG 1 Tablet(s) Oral every 24 hours    MEDICATIONS  (PRN):  acetaminophen   Tablet .. 650 milliGRAM(s) Oral every 6 hours PRN Temp greater or equal to 38.5C (101.3F), Mild Pain (1 - 3)  diphenhydrAMINE 25 milliGRAM(s) Oral every 4 hours PRN Rash and/or Itching  melatonin 3 milliGRAM(s) Oral at bedtime PRN Insomnia  oxyCODONE    IR 10 milliGRAM(s) Oral every 6 hours PRN Moderate Pain (4 - 6)  oxyCODONE    IR 15 milliGRAM(s) Oral every 6 hours PRN Severe Pain (7 - 10)      Allergies    No Known Allergies    Intolerances        LABS:                RADIOLOGY & ADDITIONAL TESTS:  Reviewed INTERVAL HPI/OVERNIGHT EVENTS:  Patient was seen and examined at bedside. Patient noted she was cold this morning, otherwise no new complaints at this time. She said she was able to get a good night's sleep yesterday    VITAL SIGNS:  T(F): 98.8 (09-08-21 @ 05:56)  HR: 76 (09-08-21 @ 05:56)  BP: 94/56 (09-08-21 @ 05:56)  RR: 16 (09-08-21 @ 05:56)  SpO2: 96% (09-08-21 @ 05:56)  Wt(kg): --    PHYSICAL EXAM:    Constitutional: WDWN, NAD  HEENT: PERRL, EOMI, sclera non-icteric, neck supple, trachea midline, no masses, no JVD, MMM, good dentition  Respiratory: CTA b/l, good air entry b/l, no wheezing, no rhonchi, no rales, without accessory muscle use and no intercostal retractions  Cardiovascular: RRR, normal S1S2, no M/R/G  Gastrointestinal: soft, NTND, no masses palpable, BS normal  Extremities: Warm, well perfused, pulses equal bilateral upper and lower extremities, no edema, no clubbing  Neurological: AAOx3, CN Grossly intact  Skin: Normal temperature, warm, dry    MEDICATIONS  (STANDING):  atorvastatin 40 milliGRAM(s) Oral at bedtime  bictegravir 50 mG/emtricitabine 200 mG/tenofovir alafenamide 25 mG (BIKTARVY) 1 Tablet(s) Oral daily  budesonide  80 MICROgram(s)/formoterol 4.5 MICROgram(s) Inhaler 2 Puff(s) Inhalation two times a day  enoxaparin Injectable 40 milliGRAM(s) SubCutaneous every 24 hours  glucagon  Injectable 1 milliGRAM(s) IntraMuscular once  mirtazapine 15 milliGRAM(s) Oral at bedtime  nicotine -   7 mG/24Hr(s) Patch 1 Patch Transdermal daily  OLANZapine 5 milliGRAM(s) Oral daily  polyethylene glycol 3350 17 Gram(s) Oral daily  senna 2 Tablet(s) Oral at bedtime  trimethoprim  160 mG/sulfamethoxazole 800 mG 1 Tablet(s) Oral every 24 hours    MEDICATIONS  (PRN):  acetaminophen   Tablet .. 650 milliGRAM(s) Oral every 6 hours PRN Temp greater or equal to 38.5C (101.3F), Mild Pain (1 - 3)  diphenhydrAMINE 25 milliGRAM(s) Oral every 4 hours PRN Rash and/or Itching  melatonin 3 milliGRAM(s) Oral at bedtime PRN Insomnia  oxyCODONE    IR 10 milliGRAM(s) Oral every 6 hours PRN Moderate Pain (4 - 6)  oxyCODONE    IR 15 milliGRAM(s) Oral every 6 hours PRN Severe Pain (7 - 10)      Allergies    No Known Allergies    Intolerances        LABS:          RADIOLOGY & ADDITIONAL TESTS:  Reviewed

## 2021-09-08 NOTE — PROGRESS NOTE ADULT - ATTENDING COMMENTS
Patient was seen and examined with the resident team today.  I agree with Dr. Minaya's assessment and plan with the following exceptions/additions:     Briefly, this is a 56yo woman, active smoker, with a PMH of AIDs c/b medication non-adherence (1993, CD4/VL unknown, previously on Biktarvy, OI's unknown), chronic HBV/HCV, rectal CA s/p RT c/b a chronic stage 3 sacral ulcer, tongue CA s/p resection, COPD and BPD who p/w SOB, found to have an acute emphysematous COPD exacerbation 2/2 active tobacco use and medication non-adherence.  Exposed to COVID and is incompletely vaccinated.  Completing quarantine here until 9/9 with plans to then transfer to [facility].    #Emphysematous COPD with an Acute Exacerbation - on room air, s/p Pred x 5 days, c/w duonebs and Symbicort  #AIDS - HIV consult following, c/w Bactrim; c/w ARVs   #Rectal CA s/p RT c/b chronic stage 3 sacral ulcer - c/w pain control and bowel regimen; wound care   #Tongue CA s/p resection - no active issues, encourage PO intake   #Chronic HCV and HBV - no active issues, can f/u as an outpatient   #Severe protein calorie malnutrition - likely part of her overall failure to thrive presentation; encourage PO intake as much as able and should improve w/treatment of her chronic infections   #Bipolar Disorder - c/w Olanzapine   #COVID Exposure - repeat COVID swab on 9/9  #DVT PPx - Lovenox  #Dispo - long-term placement after 9/9's COVID swab (if negative)    Nat Valdes  [phone]

## 2021-09-08 NOTE — PROGRESS NOTE ADULT - PROBLEM SELECTOR PLAN 1
Patient is cachectic 2/2 to AIDS and medication non-compliance  Patient is very unsteady on her feet, making it difficult to walk- she has been bedbound for past several months, however seen walking in room today   She has a problem caring for herself and appears malnutrition  Patient currently lives in [facility], however it is unlikely she can go back given her condition  She has a large chronic nonhealing sacral ulcer- stage III  - Wound care consulted for sacral ulcer: Ulcer is clean and well healing  - PT consulted: Patient ambulating daily with PT and on own in her room    - Nutrition consulted- Patient tolerating PO   - Palliative care has been consulted  - Patient is awaiting dispo to a facility where she can receive more comprehensive care.   - Epidemiology noted she needs to be COVID negative 10 days after exposure, plan for COVID test 9/9/21  - Restarted on ARVT  - Patient is still FULL CODE AT THIS TIME-  patient is fearful of ending up on life support but for now is willing to accept CPR/Intubation if it gives her a chance to live. HCP form filled out: HCP is her eldest son, Reed as alternate decision maker.

## 2021-09-08 NOTE — PROGRESS NOTE ADULT - PROBLEM SELECTOR PLAN 3
Pt with hx of COPD with chronic cough productive of green sputum- RESOLVED  Patients exacerbation brought on by current smoking and med noncompliance   Improving from on oxygenation standpoint, not currently coughing, no wheezing, no supplmental O2    Plan:  -duonebs discontinued  -cont home symbicort 80-4.5 2 puffs BID  -COVID test tomorrow Pt with hx of COPD with chronic cough productive of green sputum- RESOLVED  Patients exacerbation brought on by current smoking and med noncompliance   Improving from on oxygenation standpoint, not currently coughing, no wheezing, no supplmental O2    Plan:  -duonebs discontinued  -cont home symbicort 80-4.5 2 puffs BID  -COVID-19 PCR test planned for 9/8 AM Pt with hx of COPD with chronic cough productive of green sputum- RESOLVED  Patients exacerbation brought on by current smoking and med noncompliance   Improving from on oxygenation standpoint, not currently coughing, no wheezing, no supplmental O2    Plan:  -duonebs discontinued  -cont home symbicort 80-4.5 2 puffs BID  -COVID-19 PCR test ordered for 9/9 AM

## 2021-09-08 NOTE — PROGRESS NOTE ADULT - PROBLEM SELECTOR PLAN 4
Hx of AIDS, n ot compliant with home biktarvy or bactrim.   Follows with Dr. Gavin at [facility].   CD4 37, viral load of 198K     Plan:  - Restarted on ART   -cont bactrim 1 strength tab OD for ppx

## 2021-09-09 NOTE — DISCHARGE NOTE PROVIDER - DETAILS OF MALNUTRITION DIAGNOSIS/DIAGNOSES
This patient has been assessed with a concern for Malnutrition and was treated during this hospitalization for the following Nutrition diagnosis/diagnoses:     -  08/30/2021: Severe protein-calorie malnutrition   -  08/30/2021: Underweight (BMI < 19)

## 2021-09-09 NOTE — DISCHARGE NOTE PROVIDER - HOSPITAL COURSE
Patient is __ yo M/F with past medical history of _____  Presented with _____, found to have _____  Problem List/Main Diagnoses (system-based):   Inpatient treatment course:   New medications:   Labs to be followed outpatient:   Exam to be followed outpatient:    55 year old F with past medical history of AIDS (nonadherent with Biktarvy and Bactrim, unknown CD4 and VL), hepatitis B/C, COPD, active smoker, crack use, rectal cancer status post RT, tongue cancer status post resection, BPD presented with worsening shortness of breath for 2 days after COPD exacerbation in the setting of being a current smoker and medication noncompliance. Patient is here for placement in long term care facility due to inability to care for herself, patient currently in quarantine due to accidental COVID-19 exposure from her roommate on 8/30/21.    Problem List/Main Diagnoses (system-based):   Failure to thrive in adult  - Patient is cachectic secondary to AIDS and medication non-compliance  -Patient is very unsteady on her feet, making it difficult to walk; she has been bedbound for past several months, but will occasionally walk around her room   -She has a problem caring for herself and appears malnutrition  -Patient previously living in [facility], unlikely she can go back given her condition  -She has a large chronic nonhealing sacral ulcer- stage III  -Wound care consulted for sacral ulcer: Ulcer is clean and well healing  -PT was consulted; Patient ambulating daily with PT and on own in her room    -Nutrition consulted; Patient tolerating PO   -Palliative care consulted  -She will be discharged to a facility where she can receive more comprehensive care.   -Epidemiology noted she needs to be COVID negative 10 days after exposure, COVID test at 10 day mikhail was negative  -She was restarted on ARVT      Hypotension   -She is hypotensive almost daily and this has been consistent throughout entire hospitalization (Systolics in the mid-high 90s)  -Monitored blood pressure, and when we were concerned we checked AM cortisol (of note on 9/7 AM cortisol was within normal range at 8.56)  -Blood pressure has stayed steady at her normal range    COPD exacerbation.   -Pt with hx of COPD with chronic cough productive of green sputum  - Patients exacerbation brought on by current smoking and medical noncompliance   -Improved from an oxygenation standpoint, and she isn't coughing anymore, no wheezing, and no supplemental O2  -we placed her on duonebulizer, and continued home symbicort 80-4.5 2 puffs BID  -discontinued the dunoeb because her green sputum and COPD exacerbation resolved  -COVID-19 PCR test performed 9/9: results above (show Not Detected).    AIDS.   -History of AIDS, not compliant with home Biktarvy or bactrim.   -Follows with Dr. Romaine Nava at [facility].   -Her CD4 was 37 and viral load of 198K   -We restarted her ART   -She was placed on bactrim 1 strength tab OD for prophylaxis    Acute UTI (urinary tract infection).   -Her UTI is asymptomatic, but her urine analysis was positive.  -Patient was treated with bactrim BID for 5 days (course completed on 9/3)  -Resumed AIDS prophylactic dose of one tab daily on 9/4.    Chronic back pain.   -Patient was chronically prescribed opiates for pain  -ISTOP reference noted, last Rx was for Oxy 20mg PO QID as needed  -We continued with Oxy IR 15mg PO q6h as needed for Severe Pain, can escalate to 20mg PRNs if necessary  -Patient has an outpatient doctor for pain medicine to follow-up with    Inpatient treatment course:   Patient was admitted through the ED for management of her COPD exacerbation, but was found to have a stable respiratory status. She was put on oxycodone for pain, but developed a pruritis, for which she received benadryl that resolved it. She was placed on Bactrim DS BID for 5 days due to her UTI and was transitioned to prophylaxis dosage on 9/4.  Her roommate on 8/30 tested positive for COVID, so patient was placed on isolation precautions. Per epidemiology recommendations, she needed to be isolated for 10 days after exposure and then test negative on the last day. On 9/1 we found her cryptogen antigen to be negative, so we restarted her Biktarvy (ART). We maintained her ART and antibiotic prophylaxis while we waited for her 10-day negative COVID clearance. There were no acute changes in the waiting period, and on 9/9 her COVID-19 PCR swab was negative. Patient was then processed for discharge to skilled nursing home, with follow up with her HIV doctor outpatient.     New medications: Bactrim 1 tab PO daily, BIKTARVY 1 tab PO daily,   Labs to be followed outpatient: none  Exam to be followed outpatient: none

## 2021-09-09 NOTE — PROGRESS NOTE ADULT - PROBLEM SELECTOR PROBLEM 3
AIDS
COPD exacerbation
AIDS
COPD exacerbation
AIDS
COPD exacerbation

## 2021-09-09 NOTE — PROGRESS NOTE ADULT - PROBLEM SELECTOR PLAN 1
Patient is cachectic 2/2 to AIDS and medication non-compliance  Patient is very unsteady on her feet, making it difficult to walk- she has been bedbound for past several months, however seen walking in room today   She has a problem caring for herself and appears malnutrition  Patient currently lives in [facility], however it is unlikely she can go back given her condition  She has a large chronic nonhealing sacral ulcer- stage III  - Wound care consulted for sacral ulcer: Ulcer is clean and well healing  - PT consulted: Patient ambulating daily with PT and on own in her room    - Nutrition consulted- Patient tolerating PO   - Palliative care has been consulted  - Patient is awaiting dispo to a facility where she can receive more comprehensive care.   - Epidemiology noted she needs to be COVID negative 10 days after exposure, plan for COVID test 9/9/21  - Restarted on ARVT  - Patient is still FULL CODE AT THIS TIME-  patient is fearful of ending up on life support but for now is willing to accept CPR/Intubation if it gives her a chance to live. HCP form filled out: HCP is her eldest son, Reed as alternate decision maker.

## 2021-09-09 NOTE — DISCHARGE NOTE PROVIDER - CARE PROVIDER_API CALL
Nat Valdes)  Internal Medicine  [address] [address]  Phone: [phone]  Fax: [phone]  Follow Up Time:

## 2021-09-09 NOTE — PROGRESS NOTE ADULT - SUBJECTIVE AND OBJECTIVE BOX
INTERVAL HPI/OVERNIGHT EVENTS:  Patient was seen and examined at bedside. As per nurse and patient, no o/n events, patient resting comfortably. No complaints at this time. Patient denies: fever, chills, dizziness, weakness, HA, Changes in vision, CP, palpitations, SOB, cough, N/V/D/C, dysuria, changes in bowel movements, LE edema. ROS otherwise negative.    VITAL SIGNS:  T(F): 98.2 (09-09-21 @ 05:39)  HR: 76 (09-09-21 @ 06:19)  BP: 91/50 (09-09-21 @ 06:19)  RR: 18 (09-09-21 @ 05:39)  SpO2: 95% (09-09-21 @ 05:39)  Wt(kg): --    PHYSICAL EXAM:    Constitutional: WDWN, NAD  HEENT: PERRL, EOMI, sclera non-icteric, neck supple, trachea midline, no masses, no JVD, MMM, good dentition  Respiratory: CTA b/l, good air entry b/l, no wheezing, no rhonchi, no rales, without accessory muscle use and no intercostal retractions  Cardiovascular: RRR, normal S1S2, no M/R/G  Gastrointestinal: soft, NTND, no masses palpable, BS normal  Extremities: Warm, well perfused, pulses equal bilateral upper and lower extremities, no edema, no clubbing  Neurological: AAOx3, CN Grossly intact  Skin: Normal temperature, warm, dry    MEDICATIONS  (STANDING):  atorvastatin 40 milliGRAM(s) Oral at bedtime  bictegravir 50 mG/emtricitabine 200 mG/tenofovir alafenamide 25 mG (BIKTARVY) 1 Tablet(s) Oral daily  budesonide  80 MICROgram(s)/formoterol 4.5 MICROgram(s) Inhaler 2 Puff(s) Inhalation two times a day  enoxaparin Injectable 40 milliGRAM(s) SubCutaneous every 24 hours  glucagon  Injectable 1 milliGRAM(s) IntraMuscular once  mirtazapine 15 milliGRAM(s) Oral at bedtime  nicotine -   7 mG/24Hr(s) Patch 1 Patch Transdermal daily  OLANZapine 5 milliGRAM(s) Oral daily  polyethylene glycol 3350 17 Gram(s) Oral daily  senna 2 Tablet(s) Oral at bedtime  trimethoprim  160 mG/sulfamethoxazole 800 mG 1 Tablet(s) Oral every 24 hours    MEDICATIONS  (PRN):  acetaminophen   Tablet .. 650 milliGRAM(s) Oral every 6 hours PRN Temp greater or equal to 38.5C (101.3F), Mild Pain (1 - 3)  diphenhydrAMINE 25 milliGRAM(s) Oral every 4 hours PRN Rash and/or Itching  melatonin 3 milliGRAM(s) Oral at bedtime PRN Insomnia  oxyCODONE    IR 10 milliGRAM(s) Oral every 6 hours PRN Moderate Pain (4 - 6)  oxyCODONE    IR 15 milliGRAM(s) Oral every 6 hours PRN Severe Pain (7 - 10)      Allergies    No Known Allergies    Intolerances        LABS:                RADIOLOGY & ADDITIONAL TESTS:  Reviewed INTERVAL HPI/OVERNIGHT EVENTS:  Patient was seen and examined at bedside. Patient noted she accidently got a double dose of her pain medication yesterday during the nurse shift change and slept "really well" due to this. She admitted it was her fault for not letting the nurses know she had already received her dose for pain medication. No other complaints at this time.     VITAL SIGNS:  T(F): 98.2 (09-09-21 @ 05:39)  HR: 76 (09-09-21 @ 06:19)  BP: 91/50 (09-09-21 @ 06:19)  RR: 18 (09-09-21 @ 05:39)  SpO2: 95% (09-09-21 @ 05:39)  Wt(kg): --    PHYSICAL EXAM:    Constitutional: WDWN, NAD  HEENT: PERRL, EOMI, sclera non-icteric, neck supple, trachea midline, no masses, no JVD, MMM, good dentition  Respiratory: CTA b/l, good air entry b/l, no wheezing, no rhonchi, no rales, without accessory muscle use and no intercostal retractions  Cardiovascular: RRR, normal S1S2, no M/R/G  Gastrointestinal: soft, NTND, no masses palpable, BS normal  Extremities: Warm, well perfused, pulses equal bilateral upper and lower extremities, no edema, no clubbing  Neurological: AAOx3, CN Grossly intact  Skin: Normal temperature, warm, dry    MEDICATIONS  (STANDING):  atorvastatin 40 milliGRAM(s) Oral at bedtime  bictegravir 50 mG/emtricitabine 200 mG/tenofovir alafenamide 25 mG (BIKTARVY) 1 Tablet(s) Oral daily  budesonide  80 MICROgram(s)/formoterol 4.5 MICROgram(s) Inhaler 2 Puff(s) Inhalation two times a day  enoxaparin Injectable 40 milliGRAM(s) SubCutaneous every 24 hours  glucagon  Injectable 1 milliGRAM(s) IntraMuscular once  mirtazapine 15 milliGRAM(s) Oral at bedtime  nicotine -   7 mG/24Hr(s) Patch 1 Patch Transdermal daily  OLANZapine 5 milliGRAM(s) Oral daily  polyethylene glycol 3350 17 Gram(s) Oral daily  senna 2 Tablet(s) Oral at bedtime  trimethoprim  160 mG/sulfamethoxazole 800 mG 1 Tablet(s) Oral every 24 hours    MEDICATIONS  (PRN):  acetaminophen   Tablet .. 650 milliGRAM(s) Oral every 6 hours PRN Temp greater or equal to 38.5C (101.3F), Mild Pain (1 - 3)  diphenhydrAMINE 25 milliGRAM(s) Oral every 4 hours PRN Rash and/or Itching  melatonin 3 milliGRAM(s) Oral at bedtime PRN Insomnia  oxyCODONE    IR 10 milliGRAM(s) Oral every 6 hours PRN Moderate Pain (4 - 6)  oxyCODONE    IR 15 milliGRAM(s) Oral every 6 hours PRN Severe Pain (7 - 10)      Allergies    No Known Allergies    Intolerances        LABS:    RADIOLOGY & ADDITIONAL TESTS:  COVID-19 PCR . (09.09.21 @ 05:56)   COVID-19 PCR: NotDetec  Testing is performed using polymerase chain reaction (PCR) or   transcription mediated amplification (TMA).      Reviewed

## 2021-09-09 NOTE — DISCHARGE NOTE NURSING/CASE MANAGEMENT/SOCIAL WORK - NSDCFUADDAPPT_GEN_ALL_CORE_FT
9/13/21 at 2:15pm: Patient is scheduled to follow up with her HIV/AIDS doctor, Dr. Romaine Nava of [facility].

## 2021-09-09 NOTE — PROGRESS NOTE ADULT - PROBLEM SELECTOR PLAN 10
F: None   E: Replete as necessary K>4 Mg>2  N: Regular diet   DVT Prophylaxis: Lovenox 40mg daily   GI prophylaxis: None   CODE STATUS: FULL  D: Pending placement.
F: none  E: replete prn  N: regular diet    DVT ppx: lovenox    Code status: full code
F: None   E: Replete as necessary K>4 Mg>2  N: Regular diet   DVT Prophylaxis: Lovenox 40mg daily   GI prophylaxis: None   CODE STATUS: FULL  D: Pending placement.
F: none  E: replete prn  N: regular diet    DVT ppx: lovenox    Code status: full code

## 2021-09-09 NOTE — PROGRESS NOTE ADULT - ASSESSMENT
*note in progress* 55F PMH AIDS (nonadherent with Biktarvy and Bactrim, unknown CD4 and VL), hep B/C, COPD, active smoker, crack use, rectal Ca s/p RT, tongue ca s/p resection, BPD presenting with worsening shortness of breath for 2 days a/f COPD exacerbation in the setting of current smoker and medication noncompliance. Patient is here for placement in long term care facility due to inability to care for herself, patient currently in quarantine due to accidental covid exposure from her roommate. 55F PMH AIDS (nonadherent with Biktarvy and Bactrim, unknown CD4 and VL), hep B/C, COPD, active smoker, crack use, rectal Ca s/p RT, tongue ca s/p resection, BPD presenting with worsening shortness of breath for 2 days a/f COPD exacerbation in the setting of current smoker and medication noncompliance. Patient is here for placement in long term care facility due to inability to care for herself, patient currently in quarantine due to accidental covid exposure from her roommate. Of note, Patient was discharged today to the nursing home of her own preference, [facility] in [place], [place]. She will be following up outpatient with a Dr. Romaine Nava for continued AIDS care as well as to complete her COVID vaccination.

## 2021-09-09 NOTE — PROGRESS NOTE ADULT - PROBLEM SELECTOR PLAN 4
Hx of AIDS, n ot compliant with home biktarvy or bactrim.   Follows with Dr. Gavin at [facility].   CD4 37, viral load of 198K     Plan:  - Restarted on ART   -cont bactrim 1 strength tab OD for ppx

## 2021-09-09 NOTE — DISCHARGE NOTE PROVIDER - NSDCCPCAREPLAN_GEN_ALL_CORE_FT
PRINCIPAL DISCHARGE DIAGNOSIS  Diagnosis: FTT (failure to thrive) in adult  Assessment and Plan of Treatment: You arrived to the hospital with weakness and difficulty breathing. During your hospital stay we found that you were not taking your AIDS medications and were very malnourished as well as weak from the disease. We found you had trouble staying steayd on your feet, having difficulty walking or even getting up from bed. Due to your condition, you are unable to be taken care of properly at your previous nursing home. Because you were bound to the bed for so long, you developed a deep pressure injury wound on your backside. This was treated by our woundcare team, and the wound is now clean and healing well. Our nutrition team made sure to help you with your diet, and you are able to tolerate food through your mouth now. We are discharging you to a facility that can provide more comprehensive care and that can coordinate with your HIV/AIDS doctor to keep up with your anti viral treatment. During your stay you were restarted on Biktarvy, your anti retroviral treatment, and a prohylactic antibiotic called Bactrim. This will be continued at your next place of care. You also have a follow up appointment with your AIDS doctor, Dr. Romaine Nava, on 9/13 at 2:15pm.      SECONDARY DISCHARGE DIAGNOSES  Diagnosis: Urinary tract infection  Assessment and Plan of Treatment: While you were here we treated your urinary tract infection with antibiotics, Bactrim. This was given to you for 5 days with your condition improving. On 9/4/21, we changed you to a prophylactic dose of Bactrim to prevent future re infection.    Diagnosis: AIDS  Assessment and Plan of Treatment: You have a history of AIDS and were not compliant with your anti viral therapy or antibiotics. You were following Dr. Romaine Nava at [facility], but were not keeping up with him. After you were admitted here, our HIV team visited you and evaluated your condition. We found your CD4 level to be at 37 and your viral load of 198,000. We restarted your anti retroviral therapy, and put you on Bactrim to prevent future infections. You did well on these medications and your condition improved. We will have you continue this regiment as outpatient with follow up with your doctor at [facility].    Diagnosis: COPD exacerbation  Assessment and Plan of Treatment: You were having trouble breathing on admittance to the hospital. We prescribed you a duonebulizer and placed you on your home medication of symbicort. After this treatment regiment, your symptoms from chronic obstructive pulmonary disease improved greatly. We then discontinued the duoneb and the symbicort was available to you on a as needed basis. You will continue this medication as outpatient if your symptoms ever get worse.    Diagnosis: Pressure sore  Assessment and Plan of Treatment: Your pressure sore was treated by our wound care team that performed daily dressing changes. Your pressure sore improved over your hospial stay and is clean now.

## 2021-09-09 NOTE — DISCHARGE NOTE NURSING/CASE MANAGEMENT/SOCIAL WORK - PATIENT PORTAL LINK FT
You can access the FollowMyHealth Patient Portal offered by [facility] by registering at the following website: [email or web address]. By joining [employer]s FollowMyHealth portal, you will also be able to view your health information using other applications (apps) compatible with our system.

## 2021-09-09 NOTE — DISCHARGE NOTE PROVIDER - NSDCFUADDAPPT_GEN_ALL_CORE_FT
9/13/21 at 2:15pm: Patient is scheduled to follow up with her HIV/AIDS doctor, Dr. Romaine Nava of [facility].

## 2021-09-09 NOTE — PROGRESS NOTE ADULT - PROBLEM SELECTOR PLAN 3
Pt with hx of COPD with chronic cough productive of green sputum- RESOLVED  Patients exacerbation brought on by current smoking and med noncompliance   Improving from on oxygenation standpoint, not currently coughing, no wheezing, no supplmental O2    Plan:  -duonebs discontinued  -cont home symbicort 80-4.5 2 puffs BID  -COVID-19 PCR test performed 9/9: results above (show Not Detected)

## 2021-09-09 NOTE — PROGRESS NOTE ADULT - NUTRITIONAL ASSESSMENT
This patient has been assessed with a concern for Malnutrition and has been determined to have a diagnosis/diagnoses of Severe protein-calorie malnutrition and Underweight (BMI < 19).    This patient is being managed with:   Diet Regular-  Supplement Feeding Modality:  Oral  Ensure Enlive Cans or Servings Per Day:  3       Frequency:  Daily  Entered: Aug 29 2021  2:44PM    

## 2021-09-09 NOTE — DISCHARGE NOTE PROVIDER - NSDCMRMEDTOKEN_GEN_ALL_CORE_FT
albuterol 90 mcg/inh inhalation aerosol: 2 puff(s) inhaled every 6 hours, As Needed  atorvastatin 40 mg oral tablet: 1 tab(s) orally once a day  Bactrim 400 mg-80 mg oral tablet: 1 tab(s) orally once a day  Biktarvy oral tablet: 1 tab(s) orally once a day  midodrine 5 mg oral tablet: 1 tab(s) orally once a day  mirtazapine 15 mg oral tablet: 1 tab(s) orally once a day (at bedtime)  OLANZapine 5 mg oral tablet: 1 tab(s) orally once a day  oxyCODONE 20 mg oral tablet, extended release: 1 tab(s) orally once a day  Symbicort 80 mcg-4.5 mcg/inh inhalation aerosol: 2 puff(s) inhaled 2 times a day   albuterol 90 mcg/inh inhalation aerosol: 2 puff(s) inhaled every 6 hours, As Needed  atorvastatin 40 mg oral tablet: 1 tab(s) orally once a day  Bactrim 400 mg-80 mg oral tablet: 1 tab(s) orally once a day  Biktarvy oral tablet: 1 tab(s) orally once a day  diphenhydrAMINE 25 mg oral capsule: 1 cap(s) orally every 4 hours, As needed, Rash and/or Itching  enoxaparin:   enoxaparin: 40 milligram(s) injectable once a day  melatonin 3 mg oral tablet: 1 tab(s) orally once a day (at bedtime), As needed, Insomnia  midodrine 5 mg oral tablet: 1 tab(s) orally once a day  mirtazapine 15 mg oral tablet: 1 tab(s) orally once a day (at bedtime)  nicotine 7 mg/24 hr transdermal film, extended release: 7 mg/24h transdermal once a day, As Needed  OLANZapine 5 mg oral tablet: 1 tab(s) orally once a day  polyethylene glycol 3350 oral powder for reconstitution: 17 gram(s) orally once a day  senna oral tablet: 2 tab(s) orally once a day (at bedtime)  Symbicort 80 mcg-4.5 mcg/inh inhalation aerosol: 2 puff(s) inhaled 2 times a day

## 2021-09-27 PROBLEM — J44.9 CHRONIC OBSTRUCTIVE PULMONARY DISEASE, UNSPECIFIED: Chronic | Status: ACTIVE | Noted: 2021-01-01

## 2021-09-27 PROBLEM — C20 MALIGNANT NEOPLASM OF RECTUM: Chronic | Status: ACTIVE | Noted: 2021-01-01

## 2021-09-27 PROBLEM — B20 HUMAN IMMUNODEFICIENCY VIRUS [HIV] DISEASE: Chronic | Status: ACTIVE | Noted: 2021-01-01

## 2021-09-27 PROBLEM — B19.20 UNSPECIFIED VIRAL HEPATITIS C WITHOUT HEPATIC COMA: Chronic | Status: ACTIVE | Noted: 2021-01-01

## 2021-09-27 PROBLEM — B19.10 UNSPECIFIED VIRAL HEPATITIS B WITHOUT HEPATIC COMA: Chronic | Status: ACTIVE | Noted: 2021-01-01

## 2021-09-27 PROBLEM — C02.9 MALIGNANT NEOPLASM OF TONGUE, UNSPECIFIED: Chronic | Status: ACTIVE | Noted: 2021-01-01

## 2021-09-27 NOTE — ED PROVIDER NOTE - ENMT, MLM
Airway patent, Nasal mucosa clear. + thrush. 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 neck supple

## 2021-09-27 NOTE — ED ADULT NURSE NOTE - OBJECTIVE STATEMENT
Presents for c/o throat pain with PO intolerance x 1 week, concerned about return of tongue/neck CA, in remission presently. Also notes pain to buttocks with stage 4 ulcer present, excoriation to B buttocks, tenderness.     Aox4, breathing tachypneic with spo2 88-92 on RA without pt c/o sob or dyspnea, rectal temp and oral temp 99.7 as documented, BP 80's systolic  wnl compared to hx, excoriations to rectal area noted with stage 4 ulcer with clean bandage to sacral area.

## 2021-09-27 NOTE — H&P ADULT - ATTENDING COMMENTS
reviewed pertinent data , h&p  patient seen and examined overnight     per RN pt was placed on NRB for "mouth breathing" as pt was hypoxic on 6LPM NC to the 80s when pt arrived on floor; pt was seen w/ NRB removed, in fetal position, asleep; awoken, ill appearing, decreased breath sounds, labored breathing, pox 80% on RA, pox 98% on 6LPM NC, weaned to 2LPM, pox 97%; s1s2, abd soft/ntnd, lower ext nontender, nonedematous     1 sepsis/ PNA/respiratory failure: bacterial PNA vs PCP, c/w vanc/zosyn, will check ABG, fungitell, LDH, consult PULM for BAL. low threshold for ICU consult if continues to require higher o2 requirements.  on IVFs o/n     2. started on fluconazole for thrush/ possible esophageal candidiasis , keep NPO, pending SLP    3. followup utox        rest of  plan as above

## 2021-09-27 NOTE — CHART NOTE - NSCHARTNOTEFT_GEN_A_CORE
Patient Name: Milvia DevlinBirth Date: 1965  Address: [address] [place] [id number]: Female  Rx Written	Rx Dispensed	Drug	Quantity	Days Supply	Prescriber Name	Prescriber Jenny #	Payment Method	Dispenser  08/18/2021	08/18/2021	oxycodone hcl 20 mg tablet	120	30	Romaine Nava	[id number]	Insurance	[facility]  07/19/2021	07/19/2021	oxycodone-acetaminophen  mg tab	120	30	Romaine Nava	[id number]	Insurance	[facility]  06/01/2021	06/01/2021	oxycodone-acetaminophen  mg tab	120	30	MartynowRomaine henson	[id number]	Insurance	[facility]  04/29/2021	04/29/2021	oxycodone-acetaminophen  mg tab	120	30	MartynowRomaine henson	[id number]	Insurance	[facility]  03/30/2021	03/30/2021	oxycodone-acetaminophen  mg tab	120	30	MartynowiczRomaine	[id number]	Insurance	[facility]  02/26/2021	02/26/2021	oxycodone-acetaminophen  mg tab	120	30	MartynowiczRomaine	[id number]	Insurance	[facility]  01/29/2021	01/29/2021	oxycodone-acetaminophen  mg tab	120	30	MartynowRomaine henson	[id number]	Insurance	[facility]  12/30/2020	12/30/2020	oxycodone-acetaminophen  mg tab	90	30	NikoynowRomaine henson	[id number]	Insurance	[facility]    Patient Name: Milvia DevlinBirth Date: 1965  Address: [address] [place] [id number]: Female  Rx Written	Rx Dispensed	Drug	Quantity	Days Supply	Prescriber Name	Prescriber Jenny #	Payment Method	Dispenser  09/13/2021	09/13/2021	oxycodone hcl 15 mg tablet	20	5	Meera Blue	[id number]	[place]	[facility]  09/13/2021	09/13/2021	oxycodone hcl 15 mg tablet	30	7	Ben Jung	[id number]	[place]	[facility]    Patient Name: Milvia DevlinBirth Date: 1965  Address: [address] [place], [place] [address]Rxs: Female  Rx Written	Rx Dispensed	Drug	Quantity	Days Supply	Prescriber Name	Prescriber Jenny #	Payment Method	Dispenser  11/30/2020	11/30/2020	oxycodone-acetaminophen 5-325 mg tab	90	30	Romaine Nava	[id number]	Insurance	[facility]

## 2021-09-27 NOTE — ED ADULT TRIAGE NOTE - CHIEF COMPLAINT QUOTE
difficulty swallowing; hx tongue ca s/p resection; pt has not been able to tolerate PO intake for several days

## 2021-09-27 NOTE — H&P ADULT - ASSESSMENT
54yo F w/ PMH AIDS (currently on Biktarvy and Bactrim), Hepatitis B/C, COPD (not on home O2, actively smokes), rectal cancer s/p radiation therapy, tongue cancer s/p resection, h/o crack use (last use 8/2021) presenting to ED from nursing home complaining of 6 days of worsening dysphagia, throat pain and cough, found to be hypoxic to 88% and has oral thrush. Admitted to [facility] for treatment of hypercapnic, hypoxic respiratory failure 2/2 HAP and recent dysphagia. 56yo F w/ PMH AIDS (currently on Biktarvy and Bactrim), Hepatitis B/C, COPD (not on home O2, actively smokes), rectal cancer s/p radiation therapy, tongue cancer s/p resection, h/o crack use (last use 8/2021) presenting to ED from nursing home complaining of 6 days of worsening dysphagia, throat pain and cough, found to be hypoxic to 88% and has oral thrush. Admitted to [facility] for treatment of hypercapnic, hypoxic respiratory failure,  2/2 presumed HAP and recent dysphagia. 56yo F w/ PMH AIDS (currently on Biktarvy and Bactrim), Hepatitis B/C, COPD (not on home O2, actively smokes), rectal cancer s/p radiation therapy, tongue cancer s/p resection, h/o crack use (last use 8/2021) presenting to ED from nursing home complaining of 6 days of worsening dysphagia, throat pain and cough, found to be hypoxic to 88% and has oral thrush. Admitted to [facility] for treatment of hypercapnic, hypoxic respiratory failure, sepsis 2/2 presumed PNA and recent dysphagia.

## 2021-09-27 NOTE — ED ADULT TRIAGE NOTE - NS ED NURSE AMBULANCES
"    3/2/2018         RE: China Guzman  [address] [facility] [id number]        Dear Colleague,    Thank you for referring your patient, China Guzman, to the [facility]. Please see a copy of my visit note below.     Current Eye Medications:  Latanoprost nightly ou     Subjective:  Comprehensive eye exam.   Pt reports is seeing pretty good, vision may be a little worse.     Objective:  See Ophthalmology Exam.       Assessment:  Stable intraocular pressure and discs in patient with treated ocular hypertension.  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      ICD-10-CM    1. Borderline glaucoma with ocular hypertension, bilateral - treated H40.053 EYE EXAM (SIMPLE-NONBILLABLE)   2. Macular degeneration, dry, mod, ou H35.30    3. Pseudophakia, Yag Caps, ou Z96.1    4. Posterior vitreous detachment, bilateral H43.813    5. Presbyopia H52.4 REFRACTIVE STATUS        Plan:  Continue Latanoprost at bedtime both eyes.  Glasses Rx given - optional.   Take a multiple vitamin or \"eye vitamin\" daily.  Protect your eyes outdoors from ultraviolet rays with sunglasses and/or brimmed hat.  Have spinach (cooked or raw), colorful fruits, walnuts, hazelnuts, almonds in your diet.  Monitor the vision in each eye weekly - call if any sudden persistent changes.   Return visit 6 months for intraocular pressure check, glaucoma OCT and [place] [facility].    Srikanth Craig M.D.           Again, thank you for allowing me to participate in the care of your patient.        Sincerely,        Srikanth Craig MD    " Other

## 2021-09-27 NOTE — H&P ADULT - PROBLEM SELECTOR PLAN 1
Hypoxic, hypercapnic respiratory failure.  Pt had SpO2 88% on RA on presentation to ED. Has h/o COPD, not on home O2.  VBG revealed pH 7.34, pCO2 57, HCO3 31 indicating patient is hypercapnic with compensation. This may reflect some chronic CO2 retention 2/2 COPD, but given new change in respiratory status it is more likely 2/2 PNA.  Was placed on 1L NC with mild improvement to SpO2 94%.  CXR revealed RLL opacity likely PNA, more specifically HAP given pt recent hospitalization at [facility] as well as that she lives in a nursing home. Pt also has new occasionally productive cough.  Change in respiratory status likely 2/2 HAP.  -Started duonebs and restarted pt home symbicort  -Placed on 1L O2, increased to 1.5L for tachypnea, and accessory muscle use  -S/p 1.5L NS, zosyn 3.375g, vancomycin 750mg in ED  -Started zosyn and vancomycin for HAP coverage (see HAP below) Hypoxic, hypercapnic respiratory failure.  Pt had SpO2 88% on RA on presentation to ED. Has h/o COPD, not on home O2.  VBG revealed pH 7.34, pCO2 57, HCO3 31 indicating patient is hypercapnic with compensation. This may reflect some chronic CO2 retention 2/2 COPD, but given acute change in respiratory status it is more likely 2/2 PNA.  CXR 9/27 revealed b/l lower lobe opacities concerning for PNA, more specifically HAP vs aspiration PNA given recent hospitalization at [facility] as well as that she lives in a nursing home, as well as recent h/o dysphagia, oral thrush and new occasionally productive cough. Pt is COVID NEGATIVE.  -Placed on 1L O2, increased to 1.5L for tachypnea, and accessory muscle use. Repeat SpO2 94%.  -Started duonebs and restarted home symbicort  -S/p 1.5L NS, zosyn 3.375g, vancomycin 750mg in ED  -Started zosyn and vancomycin for coverage of both HAP and potential aspiration PNA (given pt recent dysphagia possibly having led to aspiration event/s) -- see below Problem/Plan 3 "HAP" Pt met 2/4 SIRS criteria in the ED (RR 21, WBC 14.53), w/ CXR 9/27 showing b/l lower lobe infiltrates. Therefore PNA confirmed source of infection. Pt is s/p 1.5L NS, zosyn 3.375g x1, vancomycin 750mg x1 in the ED. Lactate 1.7.  -Started D5 1/2 NS @ 80cc/hr maintenance fluids  -Started vancomycin 750mg qd x2 more doses (check trough before dose on 9/30 if pt still needs vanc)  -Started zosyn 3.375g q6h  -Continued bactrim for PCP ppx given pt has AIDS, last CD4 <200  -F/u MRSA pcr (ordered) and can consider dc'ing vancomycin if negative  -F/u fungitell, galactomannan, LDH, fungal bcx

## 2021-09-27 NOTE — PATIENT PROFILE ADULT - FOOD INSECURITY
Problem: Goal Outcome Summary  Goal: Goal Outcome Summary  A/O X4. Low BP, otherwise VSS.Tele A- fib CVR. 2L NC O2. Sternal incision is ecchymotic, CDI/NAVA. CT with drsg CDI. LS diminished, .  Loose BM x2. Senna held. Hep gtt @13ml/hr. Showered today. Adequate UOP in the urinal. Tolerating regular diet with good appetite.             no

## 2021-09-27 NOTE — ED PROVIDER NOTE - OBJECTIVE STATEMENT
past medical history of AIDS, hepatitis B/C, COPD, active smoker, crack use, rectal cancer status post RT, tongue cancer status post resection, here from NH with difficulty swallowing for several days. Reports they recently diagnosed her with thrush and she started nystatin and losenges which seemed to be helping. She was initially not able to take anything by mouth, feeling nausea and vomiting. But since last night feels she is starting to improve. Able to take her medicines and swallow today (didn't take them for 4 days). Denies fever but had some chills. Has chronic pain in vagina, back, throat which she says is unchanged. Reports she asked to go to the hospital over the weekend but they told her the doctor had to evaluate her today. Today she didn't want to come because she thought she was feeling better but they made her come anyway.

## 2021-09-27 NOTE — ED PROVIDER NOTE - MUSCULOSKELETAL, MLM
Spine appears normal, range of motion is not limited, thin extremities. sacral pressure sore without drainage or surrounding erythema

## 2021-09-27 NOTE — H&P ADULT - PROBLEM SELECTOR PLAN 6
Pt has h/o Hep C, not currently on any medications. Unknown at this time whether she was ever treated in the past. Liver panel wnl this admission.    #H/o Hep B  -Pt has h/o Hep B. Treatment history unknown. Pt has known diagnosis of AIDS, follows w/ Dr. Romaine Nava at [facility]. Last known CD4 37, ,732 (8/29/2021). Pt had not been compliant with Biktarvy, but since recent hospitalization (discharged 9/9/2021) has been on Biktarvy and Bactrim.   -Restarted Biktarvy  -Restarted Bactrim  -F/u CD4, VL sent this admission  -Consider HIV consult in AM

## 2021-09-27 NOTE — H&P ADULT - HISTORY OF PRESENT ILLNESS
Ms. Devlin is a 56yo F w/ PMH AIDS (currently on Biktarvy and Bactrim), Hepatitis B/C, COPD (not on home O2, actively smokes), rectal cancer s/p radiation therapy, tongue cancer s/p resection, h/o crack use (last use 8/2021) presenting to ED from nursing home complaining of 6 days of worsening dysphagia, throat pain and cough. Pt reports being diagnosed with oral thrush at her nursing home 5 days ago and has since been on nystatin with some improvement in her throat pain. She first had trouble swallowing solids, then progressed to difficulty swallowing liquids and patient reports not having eaten in 5 days. Talking exacerbates pain and she does not have pain if she is not swallowing or talking. Pt is also notably hoarse which she says is not her baseline. She also has a cough that is sometimes productive. All of her symptoms began 5-6 days ago.  Pt has COPD and is not on home O2, however she had SpO2 88% on admission and was put on 1-1.5L NC  Of note, patient was admitted to [facility] in early September for failure to thrive. Pt had not been compliant with Biktarvy prior to hospitalization but has taken it at her nursing home since discharge.  Last known CD4 37, ,732 (8/29/2021)  Pt received 1 dose of Moderna vaccine 7/2021, did not receive second because she was high (had been using crack at the time)    ED Course:   Vitals: T 99.7, HR 85, BP 85/60, RR 20 SpO2 88% on RA  Labs: WBC 14.53, Hb 9.4, lactate 1.7, BUN/Cr 28/1.07, Tprotein 8.5, Albumin 3.3, AST 43; VBG - pH 7.34, pCO2 57, HCO3 31; UA w/ >10 WBC, +bacteria, +epithelial cells (likely contaminated)  CXR: RLL opacity  EKG: NSR, QTc 417  Intervention: 1.5L NS, zosyn 3.375g x1, vancomycin 750mg x1

## 2021-09-27 NOTE — H&P ADULT - PROBLEM SELECTOR PLAN 12
F: D5 1/2 NS @ 80cc/hr (maintenance dose)  E: replete as needed  Diet: NPO for now until speech and swallow eval in AM  GI ppx: n/a  DVT ppx: 30mg lovenox subq qd  Code status: FULL CODE  Dispo: [facility]

## 2021-09-27 NOTE — H&P ADULT - NSHPLABSRESULTS_GEN_ALL_CORE
LABS:                         9.4    14.53 )-----------( 195      ( 27 Sep 2021 14:41 )             31.1         135  |  97  |  28<H>  ----------------------------<  122<H>  4.8   |  28  |  1.07    Ca    9.9      27 Sep 2021 14:41    TPro  8.5<H>  /  Alb  3.3  /  TBili  0.3  /  DBili  x   /  AST  43<H>  /  ALT  18  /  AlkPhos  85      PT/INR - ( 27 Sep 2021 14:41 )   PT: 13.7 sec;   INR: 1.15          PTT - ( 27 Sep 2021 14:41 )  PTT:24.6 sec  Urinalysis Basic - ( 27 Sep 2021 17:23 )    Color: Yellow / Appearance: Clear / S.020 / pH: x  Gluc: x / Ketone: Trace mg/dL  / Bili: Small / Urobili: 4.0 E.U./dL   Blood: x / Protein: 100 mg/dL / Nitrite: NEGATIVE   Leuk Esterase: NEGATIVE / RBC: < 5 /HPF / WBC > 10 /HPF   Sq Epi: x / Non Sq Epi: Moderate /HPF / Bacteria: Present /HPF            Lactate, Blood: 1.7 mmol/L ( @ 14:42)      RADIOLOGY, EKG & ADDITIONAL TESTS:
99.8

## 2021-09-27 NOTE — H&P ADULT - PROBLEM SELECTOR PLAN 7
Pt has h/o tongue cancer s/p resection and graft placement. Unable to assess throat on physical exam as difficult for patient to open her mouth. Pt on oxycodone 15mg at home for pain.  -Treating oral thrush as above  -Restarted oxycodone  -Started bowel regimen (miralax, senna) for constipation 2/2 decreased PO intake and chronic opioid use Hb 9.4, MCV 90.7. Hb lower this admission than on prior admission (Hb 10-11 at that time). Pt has dry mucus membranes suspected 2/2 poor PO intake, did not allow exam of conjunctiva for pallor. Potentially anemia of chronic disease.  -F/u iron, ferritin, TIBC, transferrin, b12, folate Hb 9.4, MCV 90.7. Hb lower this admission than on prior admission (Hb 10-11 at that time). Pt has dry mucus membranes suspected 2/2 poor PO intake, did not allow exam of conjunctiva for pallor. Potentially anemia of chronic disease.  -F/u iron, ferritin, TIBC, transferrin, b12, folate      #HLD  -Patient has known h/o HLD, takes atorvastatin 40mg qd  -Restarted atorvastatin 40mg      #Depression  -Takes olanzapine 5mg qd and mirtazapine 15mg qd at home  -Restarted home meds

## 2021-09-27 NOTE — H&P ADULT - PROBLEM SELECTOR PLAN 10
F: D5 1/2 NS @ 80cc/hr (maintenance dose)  E: replete as needed  Diet: NPO for now until speech and swallow eval in AM  GI ppx: n/a  DVT ppx: 30mg lovenox subq qd  Code status: FULL CODE  Dispo: [facility] Pt has h/o tongue cancer s/p resection +/- graft placement. Unable to assess throat on physical exam as difficult for patient to open her mouth. Pt on oxycodone 15mg at home for pain.  -Treating oral thrush as above  -Restarted oxycodone and benadryl (for pruritis 2/2 chronic opioid use)  -Started bowel regimen (miralax, senna) for constipation 2/2 decreased PO intake and chronic opioid use

## 2021-09-27 NOTE — H&P ADULT - PROBLEM SELECTOR PLAN 9
Pt may have h/o Hep C (documented in ED note). Not currently on medication. Unknown at this time whether she was ever treated in the past. Liver panel wnl this admission.  -Can consider sending hepatitis panel to verify    #H/o Hep B  -Pt may have h/o Hep B (documented in ED note).  -Can consider sending hepatitis panel to verify Pt has stage 4 pressure ulcer on R sacrum measuring 4cm x 2cm x 0.2cm - staged and managed last admission by wound care. Ulcer currently covered w/ bandage and tape. Wound currently covered with bandage but pt has trouble lying on R side due to pain. Last admission wound care recs: Aquacel Extra, cut piece to fit over wound, cover with foam dressing  -Restarted home oxycodone for pain  -Consider wound care consult in AM

## 2021-09-27 NOTE — H&P ADULT - PROBLEM SELECTOR PLAN 8
F: s/p 1.5L NS  E: replete as needed  Diet: NPO for now until bedside dysphagia  GI ppx: n/a  DVT ppx: 30mg lovenox subq qd  Code status: FULL CODE Pt has h/o tongue cancer s/p resection +/- graft placement. Unable to assess throat on physical exam as difficult for patient to open her mouth. Pt on oxycodone 15mg at home for pain.  -Treating oral thrush as above  -Restarted oxycodone and benadryl (for pruritis 2/2 chronic opioid use)  -Started bowel regimen (miralax, senna) for constipation 2/2 decreased PO intake and chronic opioid use Pt UA positive for few trichomonas  -Started metronidazole 500mg BID x7d

## 2021-09-27 NOTE — H&P ADULT - NSHPREVIEWOFSYSTEMS_GEN_ALL_CORE
POSITIVE for dysphagia, throat pain, R buttock pain (2/2 stage IV sacral ulcer)  NEGATIVE for abdominal pain, CP, SOB, nausea, vomiting, diarrhea, constipation, dysuria

## 2021-09-27 NOTE — H&P ADULT - PROBLEM SELECTOR PLAN 3
Pt has known h/o COPD. Uses Symbicort 160-4.5mcg/act and ventolin HFA at home. Currently on 1.5L NC.  -Started duonebs q6h  -Restarted symbicort Pt presented with respiratory difficulty, new cough, dysphagia and throat pain. Pt afebrile w/ leukocytosis. CXR showed RLL opacity. Pt has recent hospitalization at [facility] at end of August-early September for failure to thrive and lives in a nursing home, so has increased risk for HAP.  -S/p 1.5L NS, zosyn 3.375g, vancomycin 750mg in ED  -Started zosyn and vancomycin for HAP coverage  -F/u MRSA swab and if negative can dc vanc  -F/u fungitell, galactomannan, LDH, fungal bcx

## 2021-09-27 NOTE — H&P ADULT - NSHPSOCIALHISTORY_GEN_ALL_CORE
Lives in a nursing home  Current smoker (6 cigarettes/day, has cut down and has been smoking since she was 12)  H/o crack use (reported last use 8/2021)

## 2021-09-27 NOTE — H&P ADULT - NSHPPHYSICALEXAM_GEN_ALL_CORE
T(C): 36.6 (09-27-21 @ 18:02), Max: 37.6 (09-27-21 @ 13:45)  HR: 74 (09-27-21 @ 17:30) (74 - 85)  BP: 114/60 (09-27-21 @ 17:30) (85/60 - 114/60)  RR: 21 (09-27-21 @ 17:30) (20 - 21)  SpO2: 94% (09-27-21 @ 17:30) (88% - 94%)    General: elderly-appearing female lying in bed on her L side in fetal position, wrapped in a blanket, in NAD on 1.5L NC  HEEN: head NC/AT, no conjunctival injection, EOMI, dry MM  Throat: oral thrush is present on tongue; graft present on L half of tongue (2/2 tongue cancer and resection); unable to appreciate rest of patient's throat as she would not fully open her mouth  Neck: supple, no JVD; small open sore present above L clavicle  Cardiovascular: tachycardic with regular rhythm; +S1/S2, no M/R/G  Respiratory: diffuse crackles b/l posteriorly and anteriorly; no wheezes, no rhonchi; +cough (nonproductive at time of exam); occasionally using accessory abdominal muscles  GI: +BSx4, mildly distended, nontender to palpation, +bowel sounds x4, no organomegaly or palpable masses  Extremities: WWP, no edema/cyanosis/clubbing   Vascular: 2+ radial and PT pulses b/l  Neurological: A&Ox3  Psych: speech non-pressured, thoughts goal-oriented  Skin: R sacral ulcer covered by bandage; skin is dry, intact, no visible jaundice   MSK: no joint swelling T(C): 36.6 (09-27-21 @ 18:02), Max: 37.6 (09-27-21 @ 13:45)  HR: 74 (09-27-21 @ 17:30) (74 - 85)  BP: 114/60 (09-27-21 @ 17:30) (85/60 - 114/60)  RR: 21 (09-27-21 @ 17:30) (20 - 21)  SpO2: 94% (09-27-21 @ 17:30) (88% - 94%)    General: elderly-appearing female lying in bed on her L side in fetal position, wrapped in a blanket, in NAD on 1.5L NC  HEEN: head NC/AT, no conjunctival injection, EOMI, dry MM  Throat: oral thrush is present on tongue; graft present on L half of tongue (2/2 tongue cancer and resection); unable to appreciate rest of patient's throat as she would not fully open her mouth  Neck: supple, no JVD; small open sore present above L clavicle  Cardiovascular: tachycardic with regular rhythm; +S1/S2, no M/R/G  Respiratory: diffuse crackles b/l posteriorly and anteriorly, most prominent on R side; no wheezes, no rhonchi; +cough (nonproductive at time of exam); occasionally using accessory abdominal muscles  GI: +BSx4, mildly distended, nontender to palpation, no organomegaly or palpable masses  Extremities: WWP, no edema/cyanosis/clubbing   Vascular: 2+ radial and PT pulses b/l  Neurological: A&Ox3  Psych: speech non-pressured, thoughts goal-oriented  Skin: R sacral ulcer covered by bandage; skin is dry, intact, no visible jaundice   MSK: no joint swelling

## 2021-09-27 NOTE — H&P ADULT - PROBLEM/PLAN-1
Name Of Supervising Technician: Aaliyah Protocol For Nb Uva: The patient received NB UVA. Protocol: NBUVB Total Treatment Time: 2:47 Protocol For Photochemotherapy For Severe Photoresponsive Dermatoses: Puva: The patient received Photochemotherapy for severe photoresponsive dermatoses: PUVA requiring at least 4 to 8 hours of care under direct physician supervision. Post-Care Instructions: I reviewed with the patient in detail post-care instructions. Patient is to wear sun protection. Patients may expect sunburn like redness, discomfort and scabbing. Protocol For Photochemotherapy: Triamcinolone Ointment And Nbuvb: The patient received Photochemotherapy: Triamcinolone and NBUVB (triamcinolone ointment applied to all lesions prior to phototherapy). Protocol For Bath Puva: The patient received Bath PUVA. Protocol For Photochemotherapy For Severe Photoresponsive Dermatoses: Tar And Broad Band Uvb (Goeckerman Treatment): The patient received Photochemotherapy for severe photoresponsive dermatoses: Tar and Broad Band UVB (Goeckerman treatment) requiring at least 4 to 8 hours of care under direct physician supervision. Consent: Written consent obtained.  The risks were reviewed with the patient including but not limited to: burn, pigmentary changes, pain, blistering, scabbing, redness, increased risk of skin cancers, and the remote possibility of scarring. Protocol For Photochemotherapy: Tar And Broad Band Uvb (Goeckerman Treatment): The patient received Photochemotherapy: Tar and Broad Band UVB (Goeckerman treatment). Protocol For Nbuvb: The patient received NBUVB. Protocol For Photochemotherapy: Mineral Oil And Broad Band Uvb: The patient received Photochemotherapy: Mineral Oil and Broad Band UVB. DISPLAY PLAN FREE TEXT Protocol For Uva1: The patient received UVA1. Protocol For Puva: The patient received PUVA. Protocol For Photochemotherapy: Petrolatum And Nbuvb: The patient received Photochemotherapy: Petrolatum and NBUVB (petrolatum applied to all lesions prior to phototherapy). Protocol For Photochemotherapy: Baby Oil And Nbuvb: The patient received Photochemotherapy: Baby Oil and NBUVB (baby oil applied to all lesions prior to phototherapy). Protocol For Photochemotherapy For Severe Photoresponsive Dermatoses: Petrolatum And Nbuvb: The patient received Photochemotherapy for severe photoresponsive dermatoses: Petrolatum and NBUVB requiring at least 4 to 8 hours of care under direct physician supervision. Protocol For Broad Band Uvb: The patient received Broad Band UVB. Render Post-Care In The Note: no Detail Level: Zone Protocol For Photochemotherapy: Tar And Nbuvb (Goeckerman Treatment): The patient received Photochemotherapy: Tar and NBUVB (Goeckerman treatment). Protocol For Uva: The patient received UVA. Protocol For Protocol For Photochemotherapy For Severe Photoresponsive Dermatoses: Bath Puva: The patient received Photochemotherapy for severe photoresponsive dermatoses: Bath PUVA requiring at least 4 to 8 hours of care under direct physician supervision. Protocol For Photochemotherapy: Mineral Oil And Nbuvb: The patient received Photochemotherapy: Mineral Oil and NBUVB (mineral oil applied to all lesions prior to phototherapy). Protocol For Photochemotherapy For Severe Photoresponsive Dermatoses: Petrolatum And Broad Band Uvb: The patient received Photochemotherapyfor severe photoresponsive dermatoses: Petrolatum and Broad Band UVB requiring at least 4 to 8 hours of care under direct physician supervision. Protocol For Photochemotherapy: Petrolatum And Broad Band Uvb: The patient received Photochemotherapy: Petrolatum and Broad Band UVB. Total Body Energy: 1840 Protocol For Photochemotherapy For Severe Photoresponsive Dermatoses: Tar And Nbuvb (Goeckerman Treatment): The patient received Photochemotherapy for severe photoresponsive dermatoses: Tar and NBUVB (Goeckerman treatment) requiring at least 4 to 8 hours of care under direct physician supervision.

## 2021-09-27 NOTE — H&P ADULT - PROBLEM SELECTOR PLAN 2
Pt presented with respiratory difficulty, new cough, dysphagia and throat pain. Pt afebrile w/ leukocytosis. CXR showed RLL opacity. Pt has recent hospitalization at [facility] at end of August-early September for failure to thrive and lives in a nursing home, so has increased risk for HAP.  -S/p 1.5L NS, zosyn 3.375g, vancomycin 750mg in ED  -Started zosyn and vancomycin for HAP coverage  -MRSA swab and if negative can dc vanc  -Consider sending fungitell, galactomannan to r/o fungal causes of PNA given pt has AIDS Pt met 2/4 SIRS criteria in the ED (RR 21, WBC 14.53), w/ CXR 9/27 showing b/l lower lobe infiltrates. Therefore PNA confirmed source of infection. Pt is s/p 1.5L NS, zosyn 3.375g x1, vancomycin 750mg x1 in the ED. Lactate 1.7.  -Started D5 1/2 NS @ 80cc/hr maintenance fluids  -Started vancomycin 750mg qd x2 more doses (check trough before dose on 9/30 if pt still needs vanc)  -Started zosyn 3.375g q6h  -Continued bactrim for PCP ppx given pt has AIDS, last CD4 <200  -F/u MRSA pcr (ordered) and can consider dc'ing vancomycin if negative  -F/u fungitell, galactomannan, LDH, fungal bcx Hypoxic, hypercapnic respiratory failure.  Pt had SpO2 88% on RA on presentation to ED. Has h/o COPD, not on home O2.  VBG revealed pH 7.34, pCO2 57, HCO3 31 indicating patient is hypercapnic with compensation. This may reflect some chronic CO2 retention 2/2 COPD, but given acute change in respiratory status it is more likely 2/2 PNA.  CXR 9/27 revealed b/l lower lobe opacities concerning for PNA, more specifically HAP vs aspiration PNA given recent hospitalization at [facility] as well as that she lives in a nursing home, as well as recent h/o dysphagia, oral thrush and new occasionally productive cough. Pt is COVID NEGATIVE.  -Placed on 1L O2, increased to 1.5L for tachypnea, and accessory muscle use. Repeat SpO2 94%.  -Started duonebs and restarted home symbicort  -S/p 1.5L NS, zosyn 3.375g, vancomycin 750mg in ED  -Started zosyn and vancomycin for coverage of both HAP and potential aspiration PNA (given pt recent dysphagia possibly having led to aspiration event/s) -- see below Problem/Plan 3 "HAP"

## 2021-09-27 NOTE — H&P ADULT - PROBLEM SELECTOR PLAN 4
Pt diagnosed with oral thrush 5d ago at nursing home, started on nystatin with reported minimal improvement in throat pain. However, pt reports being unable to swallow both solids and liquids.  -Restarted nystatin  -Started oxycodone 15mg q6h PRN for severe pain (home medication)  -Consider starting oral fluconazole Pt has known h/o COPD. Uses Symbicort 160-4.5mcg/act and ventolin HFA at home. Currently on 1.5L NC.  -C/w O2 by NC  -Started adan q6h  -Restarted symbicort  -Treating PNA as above Pt has known h/o COPD. Current smoker (cut down to 6 cigarettes/day but has smoked since she was 13yo). Uses Symbicort 160-4.5mcg/act and ventolin HFA at home. Currently on 1.5L NC.  -C/w O2 by NC  -Started duonebs q6h  -Restarted symbicort  -Treating PNA as above  -Nicotine patch (14mg/24h) ordered  - re smoking cessation Pt diagnosed with oral thrush 5d ago at nursing home, started on nystatin with minimal improvement in throat pain. Could not assess whether this is esophageal candidiasis as she did not allow full HEENT exam. Pt reports difficulty swallowing both solids and liquids. Potential etiologies include opportunistic infection 2/2 AIDS w/ CD4<200 vs 2/2 COPD inhaler use if she has not been rinsing after inhaler use. Pt has been able to swallow oral medications.  -Dc'd nystatin  -Started fluconazole - 400mg IV tonight (9/27), then continue 200mg PO x14d (ordered)  -Restarted oxycodone 15mg q6h PRN for severe pain (home medication)  -Consider repeat EKG in a few days now that pt on Fluconazole as it can increase QTc (QTc 417 on 9/27)

## 2021-09-27 NOTE — H&P ADULT - PROBLEM SELECTOR PLAN 5
Pt has known diagnosis of AIDS, follows w/ Dr. Romaine Nava at [facility]. Last known CD4 37, ,732 (8/29/2021). Pt had not been compliant with Biktarvy, but since recent hospitalization (discharged 9/9/2021) has been on Biktarvy and Bactrim.   -Restarted Biktarvy  -Restarted Bactrim  -Consider opportunistic infections workup given oral thrush, new PNA  -F/u CD4, VL sent this admission Pt diagnosed with oral thrush 5d ago at nursing home, started on nystatin with minimal improvement in throat pain. Could not assess whether this is esophageal candidiasis as she did not allow full HEENT exam. Pt reports difficulty swallowing both solids and liquids. Potential etiologies include opportunistic infection 2/2 AIDS w/ CD4<200 vs 2/2 COPD inhaler use if she has not been rinsing after inhaler use. Pt has been able to swallow oral medications.  -Dc'd nystatin  -Started fluconazole - 400mg IV tonight (9/27), then continue 200mg PO x14d (ordered)  -Restarted oxycodone 15mg q6h PRN for severe pain (home medication) Pt diagnosed with oral thrush 5d ago at nursing home, started on nystatin with minimal improvement in throat pain. Could not assess whether this is esophageal candidiasis as she did not allow full HEENT exam. Pt reports difficulty swallowing both solids and liquids. Potential etiologies include opportunistic infection 2/2 AIDS w/ CD4<200 vs 2/2 COPD inhaler use if she has not been rinsing after inhaler use. Pt has been able to swallow oral medications.  -Dc'd nystatin  -Started fluconazole - 400mg IV tonight (9/27), then continue 200mg PO x14d (ordered)  -Restarted oxycodone 15mg q6h PRN for severe pain (home medication)  -Consider repeat EKG in a few days now that pt on Fluconazole as it can increase QTc (QTc 417 on 9/27) Pt has known h/o COPD. Current smoker (cut down to 6 cigarettes/day but has smoked since she was 11yo). Uses Symbicort 160-4.5mcg/act and ventolin HFA at home. Currently on 1.5L NC.  -C/w O2 by NC  -Started duonebs q6h  -Restarted symbicort  -Treating PNA as above  -Nicotine patch (14mg/24h) ordered  - re smoking cessation

## 2021-09-27 NOTE — ED PROVIDER NOTE - CLINICAL SUMMARY MEDICAL DECISION MAKING FREE TEXT BOX
aids, cancer, from nh with po intolerance, weakness. po intolerance appears related to thrush, improving with treatment started 5 days ago. does appear frail/ weak. noted to be hypoxic on room air, no oxygen requirement at baseline. improved to 96% on 2L nc. no respiratory distress. bp low but per records from prior admit, systolic in 80's-90's at baseline. low grade rectal temp. labs/cultures/ lactate ordered. cxr done with rll infiltrate. antibiotics ordered. will admit for treatment of pneumonia, hypoxia. signed out to annemarie who requested antibiotics changed from cef/azithro to vanc/zosyn. difficult access required us guided iv which led to delay in antibiotic administration

## 2021-09-28 NOTE — PROGRESS NOTE ADULT - PROBLEM SELECTOR PLAN 7
Hb 9.4, MCV 90.7. Hb lower this admission than on prior admission (Hb 10-11 at that time). Pt has dry mucus membranes suspected 2/2 poor PO intake, did not allow exam of conjunctiva for pallor. Potentially anemia of chronic disease.  -F/u iron, ferritin, TIBC, transferrin, b12, folate      #HLD  -Patient has known h/o HLD, takes atorvastatin 40mg qd  -Restarted atorvastatin 40mg      #Depression  -Takes olanzapine 5mg qd and mirtazapine 15mg qd at home  -Restarted home meds Hb 9.4, MCV 90.7. Hb lower this admission than on prior admission (Hb 10-11 at that time). Pt has dry mucus membranes suspected 2/2 poor PO intake, did not allow exam of conjunctiva for pallor. Potentially anemia of chronic disease.    -F/u iron, ferritin, TIBC, transferrin, b12, folate    #HLD  -Patient has known h/o HLD, takes atorvastatin 40mg qd  -Restarted atorvastatin 40mg      #Depression  -Takes olanzapine 5mg qd and mirtazapine 15mg qd at home  -Restarted home meds Pt has h/o tongue cancer s/p resection +/- graft placement. Unable to assess throat on physical exam as difficult for patient to open her mouth. Pt on oxycodone 15mg at home for pain. Pt currently has dysphagia as well, unable to tolerate PO without aspiration.     -Treating oral thrush as above  -Restarted oxycodone; benadryl (for pruritis 2/2 chronic opioid use)  -Started bowel regimen (miralax, senna) for constipation 2/2 decreased PO intake and chronic opioid use

## 2021-09-28 NOTE — DIETITIAN INITIAL EVALUATION ADULT. - OTHER INFO
56 yo cachectic female w AIDS (on biktarvy & bactrim), Hep B/C, COPD (not on home O2, actively smokes), rectal Ca s/p XRT, tongue Ca s/p resxn in 2014 at [facility], reportedly does not follow routinely w ENT, substance abuse, adm w odynophagia & dysphagia x6 days. Found to be hypoxic to 88% & w oral thrush. Being tx'd for sepsis 2/2 presumed asp PNA. S/P full SLP work up and VFSS with noted severe profound dysphagia. Patient to remain NPO for now .No N/V/D .No BM.Noted history of constipation  Skin with noted stage III sacrum. No dentition on visit Noted severe oral candidiasis. Patient with 4lb weight loss since August 2021 admission 42.8kg to 40.8kg,IBW:61.4kg,BMI:14.1,67% of IBW. Patient has lost over 50lbs in year. Patient identified at severe PCM due to weight loss<6months(50+),poor po with inability to meet hypermetabolic demands of AIDS/CA/ PU and NFPE findings of muscle and fat loss with clavicle protrusion.  RD discussed nutrition support plans with team. Team is hoping thrush will improve and patient will be able to swallow without aspiration risk. Patient agreed to PEG(which she stated she had 2placed previously) should her swallowing not improve. Team notified of patients approval of PEG if warranted.  Rd will monitor po progression closely.

## 2021-09-28 NOTE — PROGRESS NOTE ADULT - PROBLEM SELECTOR PLAN 9
Pt has stage 4 pressure ulcer on R sacrum measuring 4cm x 2cm x 0.2cm - staged and managed last admission by wound care. Ulcer currently covered w/ bandage and tape. Wound currently covered with bandage but pt has trouble lying on R side due to pain. Last admission wound care recs: Aquacel Extra, cut piece to fit over wound, cover with foam dressing  -Restarted home oxycodone for pain  -Consider wound care consult in AM Hb 9.4, MCV 90.7. Hb lower this admission than on prior admission (Hb 10-11 at that time). Pt has dry mucus membranes suspected 2/2 poor PO intake, did not allow exam of conjunctiva for pallor. Potentially anemia of chronic disease.    -F/u iron, ferritin, TIBC, transferrin, b12, folate    #HLD  -Patient has known h/o HLD, takes atorvastatin 40mg qd  -Restarted atorvastatin 40mg      #Depression  -Takes olanzapine 5mg qd and mirtazapine 15mg qd at home  -Restarted home meds

## 2021-09-28 NOTE — DISCHARGE NOTE PROVIDER - NSDCFUADDAPPT_GEN_ALL_CORE_FT
[facility] [place]   [id number] [place] [address]  Wednesday 10/6/2021 @2pm  For 2nd dose of Moderna COVID vaccine You have an appointment at [facility] [place] for Wednesday 10/6/2021 @2pm to receive your 2nd dose of Moderna COVID vaccine  [id number] [place] [address]      You have an appointment with Dr. Saunders, a head and throat doctor, on Nov 15, 2021 at 10:30 AM.   [address], [address], [place] [address]

## 2021-09-28 NOTE — CHART NOTE - NSCHARTNOTEFT_GEN_A_CORE
An ABG analysis done at 4:30 am showed pH 7.33, pCO2 51, pO2 72, bicarb 27 with an A-a gradient 57. Pt started on Bactrim 200mg q8hrly to treat PCP pneumonia. Currently, Pt breathing on 2Lt NC and saturating at 97%.

## 2021-09-28 NOTE — PROGRESS NOTE ADULT - SUBJECTIVE AND OBJECTIVE BOX
INTERVAL HPI/OVERNIGHT EVENTS:  Patient was seen and examined at bedside. As per nurse and patient, no o/n events, patient resting comfortably. No complaints at this time. Patient denies: fever, chills, dizziness, weakness, HA, Changes in vision, CP, palpitations, SOB, cough, N/V/D/C, dysuria, changes in bowel movements, LE edema. ROS otherwise negative.    VITAL SIGNS:  T(F): 98.7 (21 @ 06:00)  HR: 82 (21 @ 04:21)  BP: 93/56 (21 @ 04:21)  RR: 21 (21 @ 04:21)  SpO2: 95% (21 @ 04:21)  Wt(kg): --    PHYSICAL EXAM:    Constitutional: WDWN, NAD  HEENT: PERRL, EOMI, sclera non-icteric, neck supple, trachea midline, no masses, no JVD, MMM, good dentition  Respiratory: CTA b/l, good air entry b/l, no wheezing, no rhonchi, no rales, without accessory muscle use and no intercostal retractions  Cardiovascular: RRR, normal S1S2, no M/R/G  Gastrointestinal: soft, NTND, no masses palpable, BS normal  Extremities: Warm, well perfused, pulses equal bilateral upper and lower extremities, no edema, no clubbing  Neurological: AAOx3, CN Grossly intact  Skin: Normal temperature, warm, dry    MEDICATIONS  (STANDING):  atorvastatin 40 milliGRAM(s) Oral at bedtime  bictegravir 50 mG/emtricitabine 200 mG/tenofovir alafenamide 25 mG (BIKTARVY) 1 Tablet(s) Oral daily  budesonide 160 MICROgram(s)/formoterol 4.5 MICROgram(s) Inhaler 2 Puff(s) Inhalation two times a day  dextrose 5% + sodium chloride 0.45%. 1000 milliLiter(s) (80 mL/Hr) IV Continuous <Continuous>  enoxaparin Injectable 30 milliGRAM(s) SubCutaneous every 24 hours  fluconAZOLE   Tablet 200 milliGRAM(s) Oral every 24 hours  melatonin 5 milliGRAM(s) Oral at bedtime  metroNIDAZOLE    Tablet 500 milliGRAM(s) Oral every 12 hours  mirtazapine 15 milliGRAM(s) Oral at bedtime  nicotine -  14 mG/24Hr(s) Patch 1 patch Transdermal daily  OLANZapine 5 milliGRAM(s) Oral daily  piperacillin/tazobactam IVPB... 3.375 Gram(s) IV Intermittent every 6 hours  polyethylene glycol 3350 17 Gram(s) Oral daily  senna 2 Tablet(s) Oral at bedtime  trimethoprim / sulfamethoxazole IVPB 200 milliGRAM(s) IV Intermittent every 8 hours  vancomycin  IVPB 750 milliGRAM(s) IV Intermittent daily    MEDICATIONS  (PRN):  acetaminophen   Tablet .. 650 milliGRAM(s) Oral every 6 hours PRN Temp greater or equal to 38.5C (101.3F), Mild Pain (1 - 3)  albuterol/ipratropium for Nebulization 3 milliLiter(s) Nebulizer every 6 hours PRN Shortness of Breath and/or Wheezing  oxyCODONE    IR 15 milliGRAM(s) Oral every 6 hours PRN Severe Pain (7 - 10)      Allergies    No Known Allergies    Intolerances        LABS:                        8.3    15.60 )-----------( 170      ( 28 Sep 2021 06:05 )             27.4         131<L>  |  101  |  17  ----------------------------<  186<H>  3.8   |  24  |  0.87    Ca    8.8      28 Sep 2021 06:05  Mg     1.5         TPro  8.5<H>  /  Alb  3.3  /  TBili  0.3  /  DBili  x   /  AST  43<H>  /  ALT  18  /  AlkPhos  85      PT/INR - ( 27 Sep 2021 14:41 )   PT: 13.7 sec;   INR: 1.15          PTT - ( 27 Sep 2021 14:41 )  PTT:24.6 sec  Urinalysis Basic - ( 28 Sep 2021 06:05 )    Color: Yellow / Appearance: Clear / S.020 / pH: x  Gluc: x / Ketone: NEGATIVE  / Bili: Negative / Urobili: 2.0 E.U./dL   Blood: x / Protein: Trace mg/dL / Nitrite: NEGATIVE   Leuk Esterase: Small / RBC: < 5 /HPF / WBC > 10 /HPF   Sq Epi: x / Non Sq Epi: 5-10 /HPF / Bacteria: Present /HPF        RADIOLOGY & ADDITIONAL TESTS:  Reviewed INTERVAL HPI/OVERNIGHT EVENTS:  Patient was seen and examined at bedside. As per nurse and patient, no o/n events, patient resting comfortably. No complaints at this time. Patient denies: fever, chills, dizziness, weakness, HA, Changes in vision, CP, palpitations, SOB, cough, N/V/D/C, dysuria, changes in bowel movements, LE edema. ROS otherwise negative.    VITAL SIGNS:  T(F): 98.7 (21 @ 06:00)  HR: 82 (21 @ 04:21)  BP: 93/56 (21 @ 04:21)  RR: 21 (21 @ 04:21)  SpO2: 95% (21 @ 04:21)  Wt(kg): --    PHYSICAL EXAM:    Constitutional: WDWN, NAD  HEENT: PERRL, EOMI, sclera non-icteric, neck supple, trachea midline, no masses, no JVD, MMM, good dentition  Respiratory: CTA b/l, good air entry b/l, no wheezing, no rhonchi, no rales, without accessory muscle use and no intercostal retractions  Cardiovascular: RRR, normal S1S2, no M/R/G  Gastrointestinal: soft, NTND, no masses palpable, BS normal  Extremities: Warm, well perfused, pulses equal bilateral upper and lower extremities, no edema, no clubbing  Neurological: AAOx3, CN Grossly intact  Skin: Normal temperature, warm, dry    MEDICATIONS  (STANDING):  atorvastatin 40 milliGRAM(s) Oral at bedtime  bictegravir 50 mG/emtricitabine 200 mG/tenofovir alafenamide 25 mG (BIKTARVY) 1 Tablet(s) Oral daily  budesonide 160 MICROgram(s)/formoterol 4.5 MICROgram(s) Inhaler 2 Puff(s) Inhalation two times a day  dextrose 5% + sodium chloride 0.45%. 1000 milliLiter(s) (80 mL/Hr) IV Continuous <Continuous>  enoxaparin Injectable 30 milliGRAM(s) SubCutaneous every 24 hours  fluconAZOLE   Tablet 200 milliGRAM(s) Oral every 24 hours  melatonin 5 milliGRAM(s) Oral at bedtime  metroNIDAZOLE    Tablet 500 milliGRAM(s) Oral every 12 hours  mirtazapine 15 milliGRAM(s) Oral at bedtime  nicotine -  14 mG/24Hr(s) Patch 1 patch Transdermal daily  OLANZapine 5 milliGRAM(s) Oral daily  piperacillin/tazobactam IVPB... 3.375 Gram(s) IV Intermittent every 6 hours  polyethylene glycol 3350 17 Gram(s) Oral daily  senna 2 Tablet(s) Oral at bedtime  trimethoprim / sulfamethoxazole IVPB 200 milliGRAM(s) IV Intermittent every 8 hours  vancomycin  IVPB 750 milliGRAM(s) IV Intermittent daily    MEDICATIONS  (PRN):  acetaminophen   Tablet .. 650 milliGRAM(s) Oral every 6 hours PRN Temp greater or equal to 38.5C (101.3F), Mild Pain (1 - 3)  albuterol/ipratropium for Nebulization 3 milliLiter(s) Nebulizer every 6 hours PRN Shortness of Breath and/or Wheezing  oxyCODONE    IR 15 milliGRAM(s) Oral every 6 hours PRN Severe Pain (7 - 10)      Allergies    No Known Allergies    Intolerances        LABS:                        8.3    15.60 )-----------( 170      ( 28 Sep 2021 06:05 )             27.4     T Cell Subset (21 @ 05:36)    CD3 %: 80: Reference ranges for pediatric population (0-18 years old) are from  Dejuan Sheldon, et al. "Lymphocyte subsets in healthy children from  birth through 18 years of age: the Pediatric AIDS Clinical Trials Group   study. "Journal of Allergy and Clinical Immunology 112.5 (2003):  973-980.  Reference range for adult (18-65 years old) and geriatric (65 years old  and above) populations are developed at [place], [place]. %    CD4 %: 3 %    CD8 %: 74 %    ABS CD3: 1614 /uL    ABS CD4: 59 /uL    ABS CD8: 1482 /uL          131<L>  |  101  |  17  ----------------------------<  186<H>  3.8   |  24  |  0.87    Ca    8.8      28 Sep 2021 06:05  Mg     1.5         TPro  8.5<H>  /  Alb  3.3  /  TBili  0.3  /  DBili  x   /  AST  43<H>  /  ALT  18  /  AlkPhos  85      PT/INR - ( 27 Sep 2021 14:41 )   PT: 13.7 sec;   INR: 1.15          PTT - ( 27 Sep 2021 14:41 )  PTT:24.6 sec  Urinalysis Basic - ( 28 Sep 2021 06:05 )    Color: Yellow / Appearance: Clear / S.020 / pH: x  Gluc: x / Ketone: NEGATIVE  / Bili: Negative / Urobili: 2.0 E.U./dL   Blood: x / Protein: Trace mg/dL / Nitrite: NEGATIVE   Leuk Esterase: Small / RBC: < 5 /HPF / WBC > 10 /HPF   Sq Epi: x / Non Sq Epi: 5-10 /HPF / Bacteria: Present /HPF    Blood Gas Profile - Arterial (21 @ 04:38)    pH, Arterial: 7.33    pCO2, Arterial: 51 mmHg    pO2, Arterial: 72 mmHg    HCO3, Arterial: 27 mmol/L    Base Excess, Arterial: 0.2 mmol/L    Oxygen Saturation, Arterial: 95.4 %    Total CO2, Arterial: 28 mmol/L    Blood Gas Source Arterial: Arterial    Blood Gas Profile - Venous (21 @ 14:49)    pH, Venous: 7.34    pCO2, Venous: 57 mmHg    pO2, Venous: <35 mmHg    HCO3, Venous: 31 mmol/L    Base Excess, Venous: 3.5 mmol/L    Oxygen Saturation, Venous: 46.6 %    Total CO2, Venous: 32.5 mmol/L        RADIOLOGY & ADDITIONAL TESTS:  Reviewed INTERVAL HPI/OVERNIGHT EVENTS:  Patient was seen and examined at bedside. As per nurse and patient, no o/n events, patient resting comfortably. No complaints at this time. Patient denies: fever, chills, dizziness, weakness, HA, Changes in vision, CP, palpitations, SOB, cough, N/V/D/C, dysuria, changes in bowel movements, LE edema. ROS otherwise negative.    VITAL SIGNS:  T(F): 98.7 (21 @ 06:00)  HR: 82 (21 @ 04:21)  BP: 93/56 (21 @ 04:21)  RR: 21 (21 @ 04:21)  SpO2: 95% (21 @ 04:21)  Wt(kg): --    PHYSICAL EXAM:    Constitutional: WDWN, NAD  HEENT: PERRL, EOMI, sclera non-icteric, neck supple, trachea midline, no masses, no JVD, MMM, good dentition  Respiratory: CTA b/l, good air entry b/l, no wheezing, no rhonchi, no rales, without accessory muscle use and no intercostal retractions  Cardiovascular: RRR, normal S1S2, no M/R/G  Gastrointestinal: soft, NTND, no masses palpable, BS normal  Extremities: Warm, well perfused, pulses equal bilateral upper and lower extremities, no edema, no clubbing  Neurological: AAOx3, CN Grossly intact  Skin: Normal temperature, warm, dry    MEDICATIONS  (STANDING):  atorvastatin 40 milliGRAM(s) Oral at bedtime  bictegravir 50 mG/emtricitabine 200 mG/tenofovir alafenamide 25 mG (BIKTARVY) 1 Tablet(s) Oral daily  budesonide 160 MICROgram(s)/formoterol 4.5 MICROgram(s) Inhaler 2 Puff(s) Inhalation two times a day  dextrose 5% + sodium chloride 0.45%. 1000 milliLiter(s) (80 mL/Hr) IV Continuous <Continuous>  enoxaparin Injectable 30 milliGRAM(s) SubCutaneous every 24 hours  fluconAZOLE   Tablet 200 milliGRAM(s) Oral every 24 hours  melatonin 5 milliGRAM(s) Oral at bedtime  metroNIDAZOLE    Tablet 500 milliGRAM(s) Oral every 12 hours  mirtazapine 15 milliGRAM(s) Oral at bedtime  nicotine -  14 mG/24Hr(s) Patch 1 patch Transdermal daily  OLANZapine 5 milliGRAM(s) Oral daily  piperacillin/tazobactam IVPB... 3.375 Gram(s) IV Intermittent every 6 hours  polyethylene glycol 3350 17 Gram(s) Oral daily  senna 2 Tablet(s) Oral at bedtime  trimethoprim / sulfamethoxazole IVPB 200 milliGRAM(s) IV Intermittent every 8 hours  vancomycin  IVPB 750 milliGRAM(s) IV Intermittent daily    MEDICATIONS  (PRN):  acetaminophen   Tablet .. 650 milliGRAM(s) Oral every 6 hours PRN Temp greater or equal to 38.5C (101.3F), Mild Pain (1 - 3)  albuterol/ipratropium for Nebulization 3 milliLiter(s) Nebulizer every 6 hours PRN Shortness of Breath and/or Wheezing  oxyCODONE    IR 15 milliGRAM(s) Oral every 6 hours PRN Severe Pain (7 - 10)      Allergies    No Known Allergies    Intolerances        LABS:                        8.3    15.60 )-----------( 170      ( 28 Sep 2021 06:05 )             27.4     T Cell Subset (21 @ 05:36)    CD3 %: 80: Reference ranges for pediatric population (0-18 years old) are from  Dejuan Sheldon, et al. "Lymphocyte subsets in healthy children from  birth through 18 years of age: the Pediatric AIDS Clinical Trials Group   study. "Journal of Allergy and Clinical Immunology 112.5 (2003):  973-980.  Reference range for adult (18-65 years old) and geriatric (65 years old  and above) populations are developed at [place], [place]. %    CD4 %: 3 %    CD8 %: 74 %    ABS CD3: 1614 /uL    ABS CD4: 59 /uL    ABS CD8: 1482 /uL          131<L>  |  101  |  17  ----------------------------<  186<H>  3.8   |  24  |  0.87    Ca    8.8      28 Sep 2021 06:05  Mg     1.5         TPro  8.5<H>  /  Alb  3.3  /  TBili  0.3  /  DBili  x   /  AST  43<H>  /  ALT  18  /  AlkPhos  85      PT/INR - ( 27 Sep 2021 14:41 )   PT: 13.7 sec;   INR: 1.15          PTT - ( 27 Sep 2021 14:41 )  PTT:24.6 sec  Urinalysis Basic - ( 28 Sep 2021 06:05 )    Color: Yellow / Appearance: Clear / S.020 / pH: x  Gluc: x / Ketone: NEGATIVE  / Bili: Negative / Urobili: 2.0 E.U./dL   Blood: x / Protein: Trace mg/dL / Nitrite: NEGATIVE   Leuk Esterase: Small / RBC: < 5 /HPF / WBC > 10 /HPF   Sq Epi: x / Non Sq Epi: 5-10 /HPF / Bacteria: Present /HPF    Blood Gas Profile - Arterial (21 @ 04:38)    pH, Arterial: 7.33    pCO2, Arterial: 51 mmHg    pO2, Arterial: 72 mmHg    HCO3, Arterial: 27 mmol/L    Base Excess, Arterial: 0.2 mmol/L    Oxygen Saturation, Arterial: 95.4 %    Total CO2, Arterial: 28 mmol/L    Blood Gas Source Arterial: Arterial    Blood Gas Profile - Venous (21 @ 14:49)    pH, Venous: 7.34    pCO2, Venous: 57 mmHg    pO2, Venous: <35 mmHg    HCO3, Venous: 31 mmol/L    Base Excess, Venous: 3.5 mmol/L    Oxygen Saturation, Venous: 46.6 %    Total CO2, Venous: 32.5 mmol/L    Iron with Total Binding Capacity in AM (21 @ 06:05)    Iron - Total Binding Capacity.: 166 ug/dL    % Saturation, Iron: 17 %    Iron Total, Serum: 29 ug/dL    Unsaturated Iron Binding Capacity: 137 ug/dL    Hepatitis C Antibody Test (21 @ 06:05)    Hepatitis C Virus S/CO Ratio: 56.16 S/CO    Hepatitis C Virus Interpretation: Reactive: Hepatitis C AB  S/CO Ratio          Interpretation  <=0.89                     Non-Reactive  >= 1.00                   Reactive  Non-Reactive: A person with a non-reactive HCV antibody result is  considered uninfected.  No further action is needed unless recent  infection is suspected.  In these cases, consider repeat testing later to  detect seroconversion..  Reactive: HCV antibody test is abnormal, HCV RNA Qualitative test will  follow.  Note: HCV antibody testing is performed on the Roche [id number].      RADIOLOGY & ADDITIONAL TESTS:  Reviewed INTERVAL HPI/OVERNIGHT EVENTS:  Patient was seen and examined at bedside. As per nurse and patient, no o/n events, patient resting comfortably. No complaints at this time. Patient denies: fever, chills, dizziness, weakness, HA, Changes in vision, CP, palpitations, SOB, cough, N/V/D/C, dysuria, changes in bowel movements, LE edema. ROS otherwise negative.    VITAL SIGNS:  T(F): 98.7 (21 @ 06:00)  HR: 82 (21 @ 04:21)  BP: 93/56 (21 @ 04:21)  RR: 21 (21 @ 04:21)  SpO2: 95% (21 @ 04:21)  Wt(kg): --    PHYSICAL EXAM:    Constitutional: WDWN, NAD  HEENT: PERRL, EOMI, sclera non-icteric, neck supple, trachea midline, no masses, no JVD, MMM, good dentition  Respiratory: CTA b/l, good air entry b/l, no wheezing, no rhonchi, no rales, without accessory muscle use and no intercostal retractions  Cardiovascular: RRR, normal S1S2, no M/R/G  Gastrointestinal: soft, NTND, no masses palpable, BS normal  Extremities: Warm, well perfused, pulses equal bilateral upper and lower extremities, no edema, no clubbing  Neurological: AAOx3, CN Grossly intact  Skin: Normal temperature, warm, dry    MEDICATIONS  (STANDING):  atorvastatin 40 milliGRAM(s) Oral at bedtime  bictegravir 50 mG/emtricitabine 200 mG/tenofovir alafenamide 25 mG (BIKTARVY) 1 Tablet(s) Oral daily  budesonide 160 MICROgram(s)/formoterol 4.5 MICROgram(s) Inhaler 2 Puff(s) Inhalation two times a day  dextrose 5% + sodium chloride 0.45%. 1000 milliLiter(s) (80 mL/Hr) IV Continuous <Continuous>  enoxaparin Injectable 30 milliGRAM(s) SubCutaneous every 24 hours  fluconAZOLE   Tablet 200 milliGRAM(s) Oral every 24 hours  melatonin 5 milliGRAM(s) Oral at bedtime  metroNIDAZOLE    Tablet 500 milliGRAM(s) Oral every 12 hours  mirtazapine 15 milliGRAM(s) Oral at bedtime  nicotine -  14 mG/24Hr(s) Patch 1 patch Transdermal daily  OLANZapine 5 milliGRAM(s) Oral daily  piperacillin/tazobactam IVPB... 3.375 Gram(s) IV Intermittent every 6 hours  polyethylene glycol 3350 17 Gram(s) Oral daily  senna 2 Tablet(s) Oral at bedtime  trimethoprim / sulfamethoxazole IVPB 200 milliGRAM(s) IV Intermittent every 8 hours  vancomycin  IVPB 750 milliGRAM(s) IV Intermittent daily    MEDICATIONS  (PRN):  acetaminophen   Tablet .. 650 milliGRAM(s) Oral every 6 hours PRN Temp greater or equal to 38.5C (101.3F), Mild Pain (1 - 3)  albuterol/ipratropium for Nebulization 3 milliLiter(s) Nebulizer every 6 hours PRN Shortness of Breath and/or Wheezing  oxyCODONE    IR 15 milliGRAM(s) Oral every 6 hours PRN Severe Pain (7 - 10)      Allergies    No Known Allergies    Intolerances        LABS:                        8.3    15.60 )-----------( 170      ( 28 Sep 2021 06:05 )             27.4         131<L>  |  101  |  17  ----------------------------<  186<H>  3.8   |  24  |  0.87    Ca    8.8      28 Sep 2021 06:05  Mg     1.5         TPro  8.5<H>  /  Alb  3.3  /  TBili  0.3  /  DBili  x   /  AST  43<H>  /  ALT  18  /  AlkPhos  85      PT/INR - ( 27 Sep 2021 14:41 )   PT: 13.7 sec;   INR: 1.15          PTT - ( 27 Sep 2021 14:41 )  PTT:24.6 sec  Urinalysis Basic - ( 28 Sep 2021 06:05 )    T Cell Subset (21 @ 05:36)    CD3 %: 80: Reference ranges for pediatric population (0-18 years old) are from  Dejuan Sheldon et al. "Lymphocyte subsets in healthy children from  birth through 18 years of age: the Pediatric AIDS Clinical Trials Group   study. "Journal of Allergy and Clinical Immunology 112.5 (2003):  973-980.  Reference range for adult (18-65 years old) and geriatric (65 years old  and above) populations are developed at [facility]  at [place], [place]. %    CD4 %: 3 %    CD8 %: 74 %    ABS CD3: 1614 /uL    ABS CD4: 59 /uL    ABS CD8: 1482 /uL    Color: Yellow / Appearance: Clear / S.020 / pH: x  Gluc: x / Ketone: NEGATIVE  / Bili: Negative / Urobili: 2.0 E.U./dL   Blood: x / Protein: Trace mg/dL / Nitrite: NEGATIVE   Leuk Esterase: Small / RBC: < 5 /HPF / WBC > 10 /HPF   Sq Epi: x / Non Sq Epi: 5-10 /HPF / Bacteria: Present /HPF    Blood Gas Profile - Arterial (21 @ 04:38)    pH, Arterial: 7.33    pCO2, Arterial: 51 mmHg    pO2, Arterial: 72 mmHg    HCO3, Arterial: 27 mmol/L    Base Excess, Arterial: 0.2 mmol/L    Oxygen Saturation, Arterial: 95.4 %    Total CO2, Arterial: 28 mmol/L    Blood Gas Source Arterial: Arterial    Blood Gas Profile - Venous (21 @ 14:49)    pH, Venous: 7.34    pCO2, Venous: 57 mmHg    pO2, Venous: <35 mmHg    HCO3, Venous: 31 mmol/L    Base Excess, Venous: 3.5 mmol/L    Oxygen Saturation, Venous: 46.6 %    Total CO2, Venous: 32.5 mmol/L    Iron with Total Binding Capacity in AM (21 @ 06:05)    Iron - Total Binding Capacity.: 166 ug/dL    % Saturation, Iron: 17 %    Iron Total, Serum: 29 ug/dL    Unsaturated Iron Binding Capacity: 137 ug/dL    Hepatitis C Antibody Test (21 @ 06:05)    Hepatitis C Virus S/CO Ratio: 56.16 S/CO    Hepatitis C Virus Interpretation: Reactive: Hepatitis C AB  S/CO Ratio          Interpretation  <=0.89                     Non-Reactive  >= 1.00                   Reactive  Non-Reactive: A person with a non-reactive HCV antibody result is  considered uninfected.  No further action is needed unless recent  infection is suspected.  In these cases, consider repeat testing later to  detect seroconversion..  Reactive: HCV antibody test is abnormal, HCV RNA Qualitative test will  follow.  Note: HCV antibody testing is performed on the Roche [id number].    Culture - Blood (21 @ 20:07)    Specimen Source: .Blood Blood-Peripheral    Culture Results:   No growth at 12 hours    Culture - Blood (21 @ 20:06)    Specimen Source: .Blood Blood-Peripheral    Culture Results:   No growth at 12 hours      RADIOLOGY & ADDITIONAL TESTS:  Reviewed INTERVAL HPI/OVERNIGHT EVENTS:  Patient was seen and examined at bedside. Overnight, spO2 range 85-97%, tried on different devices (nasal cannula, nonrebreather mask, aerosol mask). This morning, patient resting comfortably in bed, drowsy. Patient endorses difficulty breathing, denies chest pain or throat pain. ROS otherwise negative.    VITAL SIGNS:  T(F): 98.7 (21 @ 06:00)  HR: 82 (21 @ 04:21)  BP: 93/56 (21 @ 04:21)  RR: 21 (21 @ 04:21)  SpO2: 95% (21 @ 04:21)  Wt(kg): --    PHYSICAL EXAM:    Constitutional: thin-appearing, drowsy, NAD  HEENT: PERRL, EOMI, sclera non-icteric, neck supple, trachea midline, no masses, no JVD, MMM, good dentition  Respiratory: CTA b/l, good air entry b/l, no wheezing, no rhonchi, no rales, without accessory muscle use and no intercostal retractions  Cardiovascular: RRR, normal S1S2, no M/R/G  Gastrointestinal: soft, NTND, no masses palpable, BS normal  Extremities: Warm, well perfused, pulses equal bilateral upper and lower extremities, no edema, no clubbing  Neurological: AAOx3, CN Grossly intact  Skin: Normal temperature, warm, dry    MEDICATIONS  (STANDING):  atorvastatin 40 milliGRAM(s) Oral at bedtime  bictegravir 50 mG/emtricitabine 200 mG/tenofovir alafenamide 25 mG (BIKTARVY) 1 Tablet(s) Oral daily  budesonide 160 MICROgram(s)/formoterol 4.5 MICROgram(s) Inhaler 2 Puff(s) Inhalation two times a day  dextrose 5% + sodium chloride 0.45%. 1000 milliLiter(s) (80 mL/Hr) IV Continuous <Continuous>  enoxaparin Injectable 30 milliGRAM(s) SubCutaneous every 24 hours  fluconAZOLE   Tablet 200 milliGRAM(s) Oral every 24 hours  melatonin 5 milliGRAM(s) Oral at bedtime  metroNIDAZOLE    Tablet 500 milliGRAM(s) Oral every 12 hours  mirtazapine 15 milliGRAM(s) Oral at bedtime  nicotine -  14 mG/24Hr(s) Patch 1 patch Transdermal daily  OLANZapine 5 milliGRAM(s) Oral daily  piperacillin/tazobactam IVPB... 3.375 Gram(s) IV Intermittent every 6 hours  polyethylene glycol 3350 17 Gram(s) Oral daily  senna 2 Tablet(s) Oral at bedtime  trimethoprim / sulfamethoxazole IVPB 200 milliGRAM(s) IV Intermittent every 8 hours  vancomycin  IVPB 750 milliGRAM(s) IV Intermittent daily    MEDICATIONS  (PRN):  acetaminophen   Tablet .. 650 milliGRAM(s) Oral every 6 hours PRN Temp greater or equal to 38.5C (101.3F), Mild Pain (1 - 3)  albuterol/ipratropium for Nebulization 3 milliLiter(s) Nebulizer every 6 hours PRN Shortness of Breath and/or Wheezing  oxyCODONE    IR 15 milliGRAM(s) Oral every 6 hours PRN Severe Pain (7 - 10)      Allergies    No Known Allergies    Intolerances        LABS:                        8.3    15.60 )-----------( 170      ( 28 Sep 2021 06:05 )             27.4         131<L>  |  101  |  17  ----------------------------<  186<H>  3.8   |  24  |  0.87    Ca    8.8      28 Sep 2021 06:05  Mg     1.5         TPro  8.5<H>  /  Alb  3.3  /  TBili  0.3  /  DBili  x   /  AST  43<H>  /  ALT  18  /  AlkPhos  85      PT/INR - ( 27 Sep 2021 14:41 )   PT: 13.7 sec;   INR: 1.15          PTT - ( 27 Sep 2021 14:41 )  PTT:24.6 sec  Urinalysis Basic - ( 28 Sep 2021 06:05 )    T Cell Subset (21 @ 05:36)    CD3 %: 80: Reference ranges for pediatric population (0-18 years old) are from  Dejuan Sheldon, et al. "Lymphocyte subsets in healthy children from  birth through 18 years of age: the Pediatric AIDS Clinical Trials Group   study. "Journal of Allergy and Clinical Immunology 112.5 (2003):  973-980.  Reference range for adult (18-65 years old) and geriatric (65 years old  and above) populations are developed at [place], [place]. %    CD4 %: 3 %    CD8 %: 74 %    ABS CD3: 1614 /uL    ABS CD4: 59 /uL    ABS CD8: 1482 /uL    Color: Yellow / Appearance: Clear / S.020 / pH: x  Gluc: x / Ketone: NEGATIVE  / Bili: Negative / Urobili: 2.0 E.U./dL   Blood: x / Protein: Trace mg/dL / Nitrite: NEGATIVE   Leuk Esterase: Small / RBC: < 5 /HPF / WBC > 10 /HPF   Sq Epi: x / Non Sq Epi: 5-10 /HPF / Bacteria: Present /HPF    Blood Gas Profile - Arterial (21 @ 04:38)    pH, Arterial: 7.33    pCO2, Arterial: 51 mmHg    pO2, Arterial: 72 mmHg    HCO3, Arterial: 27 mmol/L    Base Excess, Arterial: 0.2 mmol/L    Oxygen Saturation, Arterial: 95.4 %    Total CO2, Arterial: 28 mmol/L    Blood Gas Source Arterial: Arterial    Blood Gas Profile - Venous (21 @ 14:49)    pH, Venous: 7.34    pCO2, Venous: 57 mmHg    pO2, Venous: <35 mmHg    HCO3, Venous: 31 mmol/L    Base Excess, Venous: 3.5 mmol/L    Oxygen Saturation, Venous: 46.6 %    Total CO2, Venous: 32.5 mmol/L    Iron with Total Binding Capacity in AM (21 @ 06:05)    Iron - Total Binding Capacity.: 166 ug/dL    % Saturation, Iron: 17 %    Iron Total, Serum: 29 ug/dL    Unsaturated Iron Binding Capacity: 137 ug/dL    Hepatitis C Antibody Test (21 @ 06:05)    Hepatitis C Virus S/CO Ratio: 56.16 S/CO    Hepatitis C Virus Interpretation: Reactive: Hepatitis C AB  S/CO Ratio          Interpretation  <=0.89                     Non-Reactive  >= 1.00                   Reactive  Non-Reactive: A person with a non-reactive HCV antibody result is  considered uninfected.  No further action is needed unless recent  infection is suspected.  In these cases, consider repeat testing later to  detect seroconversion..  Reactive: HCV antibody test is abnormal, HCV RNA Qualitative test will  follow.  Note: HCV antibody testing is performed on the Roche [id number].    Culture - Blood (21 @ 20:07)    Specimen Source: .Blood Blood-Peripheral    Culture Results:   No growth at 12 hours    Culture - Blood (21 @ 20:06)    Specimen Source: .Blood Blood-Peripheral    Culture Results:   No growth at 12 hours      RADIOLOGY & ADDITIONAL TESTS:  Reviewed INTERVAL HPI/OVERNIGHT EVENTS:  Patient was seen and examined at bedside. Overnight, spO2 range 85-97%, tried on different devices (nasal cannula, nonrebreather mask, aerosol mask). This morning, patient resting comfortably in bed, drowsy. Patient endorses difficulty breathing, denies chest pain or throat pain. ROS otherwise negative.    VITAL SIGNS:  T(F): 98.7 (21 @ 06:00)  HR: 82 (21 @ 04:21)  BP: 93/56 (21 @ 04:21)  RR: 21 (21 @ 04:21)  SpO2: 95% (21 @ 04:21)  Wt(kg): --    PHYSICAL EXAM:    Constitutional: thin-appearing, drowsy, NAD  HEENT: EOMI, sclera non-icteric, +white plaques on palate, +tongue s/p resection  Respiratory: +diffuse crackles b/l, no accessory muscle use and no intercostal retractions  Cardiovascular: RRR, normal S1S2, no M/R/G  Extremities: Warm, well perfused, no edema, no clubbing  Neurological: AAOx3   Skin: Normal temperature, warm, dry, +sacral ulcer    MEDICATIONS  (STANDING):  atorvastatin 40 milliGRAM(s) Oral at bedtime  bictegravir 50 mG/emtricitabine 200 mG/tenofovir alafenamide 25 mG (BIKTARVY) 1 Tablet(s) Oral daily  budesonide 160 MICROgram(s)/formoterol 4.5 MICROgram(s) Inhaler 2 Puff(s) Inhalation two times a day  dextrose 5% + sodium chloride 0.45%. 1000 milliLiter(s) (80 mL/Hr) IV Continuous <Continuous>  enoxaparin Injectable 30 milliGRAM(s) SubCutaneous every 24 hours  fluconAZOLE   Tablet 200 milliGRAM(s) Oral every 24 hours  melatonin 5 milliGRAM(s) Oral at bedtime  metroNIDAZOLE    Tablet 500 milliGRAM(s) Oral every 12 hours  mirtazapine 15 milliGRAM(s) Oral at bedtime  nicotine -  14 mG/24Hr(s) Patch 1 patch Transdermal daily  OLANZapine 5 milliGRAM(s) Oral daily  piperacillin/tazobactam IVPB... 3.375 Gram(s) IV Intermittent every 6 hours  polyethylene glycol 3350 17 Gram(s) Oral daily  senna 2 Tablet(s) Oral at bedtime  trimethoprim / sulfamethoxazole IVPB 200 milliGRAM(s) IV Intermittent every 8 hours  vancomycin  IVPB 750 milliGRAM(s) IV Intermittent daily    MEDICATIONS  (PRN):  acetaminophen   Tablet .. 650 milliGRAM(s) Oral every 6 hours PRN Temp greater or equal to 38.5C (101.3F), Mild Pain (1 - 3)  albuterol/ipratropium for Nebulization 3 milliLiter(s) Nebulizer every 6 hours PRN Shortness of Breath and/or Wheezing  oxyCODONE    IR 15 milliGRAM(s) Oral every 6 hours PRN Severe Pain (7 - 10)      Allergies    No Known Allergies    Intolerances        LABS:                        8.3    15.60 )-----------( 170      ( 28 Sep 2021 06:05 )             27.4         131<L>  |  101  |  17  ----------------------------<  186<H>  3.8   |  24  |  0.87    Ca    8.8      28 Sep 2021 06:05  Mg     1.5         TPro  8.5<H>  /  Alb  3.3  /  TBili  0.3  /  DBili  x   /  AST  43<H>  /  ALT  18  /  AlkPhos  85      PT/INR - ( 27 Sep 2021 14:41 )   PT: 13.7 sec;   INR: 1.15          PTT - ( 27 Sep 2021 14:41 )  PTT:24.6 sec  Urinalysis Basic - ( 28 Sep 2021 06:05 )    T Cell Subset (21 @ 05:36)    CD3 %: 80: Reference ranges for pediatric population (0-18 years old) are from  Dejuan Sheldon et al. "Lymphocyte subsets in healthy children from  birth through 18 years of age: the Pediatric AIDS Clinical Trials Group   study. "Journal of Allergy and Clinical Immunology 112.5 (2003):  973-980.  Reference range for adult (18-65 years old) and geriatric (65 years old  and above) populations are developed at [place], [place]. %    CD4 %: 3 %    CD8 %: 74 %    ABS CD3: 1614 /uL    ABS CD4: 59 /uL    ABS CD8: 1482 /uL    Color: Yellow / Appearance: Clear / S.020 / pH: x  Gluc: x / Ketone: NEGATIVE  / Bili: Negative / Urobili: 2.0 E.U./dL   Blood: x / Protein: Trace mg/dL / Nitrite: NEGATIVE   Leuk Esterase: Small / RBC: < 5 /HPF / WBC > 10 /HPF   Sq Epi: x / Non Sq Epi: 5-10 /HPF / Bacteria: Present /HPF    Blood Gas Profile - Arterial (21 @ 04:38)    pH, Arterial: 7.33    pCO2, Arterial: 51 mmHg    pO2, Arterial: 72 mmHg    HCO3, Arterial: 27 mmol/L    Base Excess, Arterial: 0.2 mmol/L    Oxygen Saturation, Arterial: 95.4 %    Total CO2, Arterial: 28 mmol/L    Blood Gas Source Arterial: Arterial    Blood Gas Profile - Venous (21 @ 14:49)    pH, Venous: 7.34    pCO2, Venous: 57 mmHg    pO2, Venous: <35 mmHg    HCO3, Venous: 31 mmol/L    Base Excess, Venous: 3.5 mmol/L    Oxygen Saturation, Venous: 46.6 %    Total CO2, Venous: 32.5 mmol/L    Iron with Total Binding Capacity in AM (21 @ 06:05)    Iron - Total Binding Capacity.: 166 ug/dL    % Saturation, Iron: 17 %    Iron Total, Serum: 29 ug/dL    Unsaturated Iron Binding Capacity: 137 ug/dL    Hepatitis C Antibody Test (21 @ 06:05)    Hepatitis C Virus S/CO Ratio: 56.16 S/CO    Hepatitis C Virus Interpretation: Reactive: Hepatitis C AB  S/CO Ratio          Interpretation  <=0.89                     Non-Reactive  >= 1.00                   Reactive  Non-Reactive: A person with a non-reactive HCV antibody result is  considered uninfected.  No further action is needed unless recent  infection is suspected.  In these cases, consider repeat testing later to  detect seroconversion..  Reactive: HCV antibody test is abnormal, HCV RNA Qualitative test will  follow.  Note: HCV antibody testing is performed on the Roche [id number].    Culture - Blood (21 @ 20:07)    Specimen Source: .Blood Blood-Peripheral    Culture Results:   No growth at 12 hours    Culture - Blood (21 @ 20:06)    Specimen Source: .Blood Blood-Peripheral    Culture Results:   No growth at 12 hours      RADIOLOGY & ADDITIONAL TESTS:  Reviewed

## 2021-09-28 NOTE — DIETITIAN INITIAL EVALUATION ADULT. - PROBLEM SELECTOR PLAN 2
Hypoxic, hypercapnic respiratory failure.  Pt had SpO2 88% on RA on presentation to ED. Has h/o COPD, not on home O2.  VBG revealed pH 7.34, pCO2 57, HCO3 31 indicating patient is hypercapnic with compensation. This may reflect some chronic CO2 retention 2/2 COPD, but given acute change in respiratory status it is more likely 2/2 PNA.  CXR 9/27 revealed b/l lower lobe opacities concerning for PNA, more specifically HAP vs aspiration PNA given recent hospitalization at [facility] as well as that she lives in a nursing home, as well as recent h/o dysphagia, oral thrush and new occasionally productive cough. Pt is COVID NEGATIVE.  -Placed on 1L O2, increased to 1.5L for tachypnea, and accessory muscle use. Repeat SpO2 94%.  -Started duonebs and restarted home symbicort  -S/p 1.5L NS, zosyn 3.375g, vancomycin 750mg in ED  -Started zosyn and vancomycin for coverage of both HAP and potential aspiration PNA (given pt recent dysphagia possibly having led to aspiration event/s) -- see below Problem/Plan 3 "HAP"

## 2021-09-28 NOTE — DIETITIAN INITIAL EVALUATION ADULT. - PERTINENT MEDS FT
Diflucan,Bactrim,Biktarvy,Falgyl,Albuterol,Lovenox,Miralax,Senna,Remeron,Lipitor,Oxycodone,Melatonin,Zosyn,Vanco,Zyprexa,D5IV,Tylenol

## 2021-09-28 NOTE — SWALLOW BEDSIDE ASSESSMENT ADULT - SLP PERTINENT HISTORY OF CURRENT PROBLEM
54 yo W w AIDS (on biktarvy & bactrim), Hep B/C, COPD (not on home O2, actively smokes), rectal Ca s/p XRT, tongue Ca s/p resxn in 2014 at [facility], reportedly does not follow routinely w ENT, substance abuse, adm w odynophagia & dsyphagia x6 days. Found to be hypoxic to 88% & w oral thrush. Being tx'd for sepsis 2/2 presumed asp pna.

## 2021-09-28 NOTE — CONSULT NOTE ADULT - SUBJECTIVE AND OBJECTIVE BOX
PULMONARY SERVICE INITIAL CONSULT NOTE    HPI: ***  Ms. Devlin is a 54yo F w/ PMH AIDS (currently on Biktarvy and Bactrim), Hepatitis B/C, COPD (not on home O2, actively smokes), rectal cancer s/p radiation therapy, tongue cancer s/p resection, h/o crack use (last use 2021) presenting to ED from nursing home complaining of 6 days of worsening dysphagia, throat pain and cough. Pt reports being diagnosed with oral thrush at her nursing home 5 days ago and has since been on nystatin with some improvement in her throat pain. She first had trouble swallowing solids, then progressed to difficulty swallowing liquids and patient reports not having eaten in 5 days. Talking exacerbates pain and she does not have pain if she is not swallowing or talking. Pt is also notably hoarse which she says is not her baseline. She also has a cough that is sometimes productive. All of her symptoms began 5-6 days ago.  Pt has COPD and is not on home O2, however she had SpO2 88% on admission and was put on 1-1.5L NC  Of note, patient was admitted to [facility] in early September for failure to thrive. Pt had not been compliant with Biktarvy prior to hospitalization but has taken it at her nursing home since discharge.  Last known CD4 37, ,732 (2021)  Pt received 1 dose of Moderna vaccine 2021, did not receive second because she was high (had been using crack at the time)    ED Course:   Vitals: T 99.7, HR 85, BP 85/60, RR 20 SpO2 88% on RA  Labs: WBC 14.53, Hb 9.4, lactate 1.7, BUN/Cr 28/1.07, Tprotein 8.5, Albumin 3.3, AST 43; VBG - pH 7.34, pCO2 57, HCO3 31; UA w/ >10 WBC, +bacteria, +epithelial cells (likely contaminated)  CXR: RLL opacity  EKG: NSR, QTc 417  Intervention: 1.5L NS, zosyn 3.375g x1, vancomycin 750mg x1 (27 Sep 2021 18:24)    Subjective;        REVIEW OF SYSTEMS:  Constitutional: No fever, weight loss or fatigue  Eyes: No eye pain, visual disturbances, or discharge  ENMT:  No difficulty hearing, tinnitus, vertigo; No sinus or throat pain  Neck: No pain, stiffness or neck swelling  Respiratory: see HPI  Cardiovascular: No chest pain, palpitations, dizziness or leg swelling  Gastrointestinal: No abdominal or epigastric pain. No nausea, vomiting or hematemesis; No diarrhea or constipation. No melena or hematochezia.  Genitourinary: No dysuria, frequency, hematuria or incontinence  Neurological: No headaches, memory loss, loss of strength, numbness or tremors  Skin: No itching, burning, rashes or lesions   Lymph Nodes: No enlarged glands  Endocrine: No heat or cold intolerance; No hair loss  Musculoskeletal: No joint pain or swelling; No muscle, back or extremity pain  Psychiatric: No depression, anxiety, mood swings or difficulty sleeping  Heme/Lymph: No easy bruising or bleeding gums  Allergy and Immunologic: No hives or eczema    PAST MEDICAL & SURGICAL HISTORY:  HIV disease    Advanced COPD    Tongue cancer    Rectal cancer    Hepatitis B    Hepatitis C    No significant past surgical history        FAMILY HISTORY:  FH: heart disease (Mother)        SOCIAL HISTORY:  Smoking Status: [ ] Current, [ ] Former, [ ] Never  Pack Years:    MEDICATIONS:  Pulmonary:  albuterol/ipratropium for Nebulization 3 milliLiter(s) Nebulizer every 6 hours  budesonide 160 MICROgram(s)/formoterol 4.5 MICROgram(s) Inhaler 2 Puff(s) Inhalation two times a day    Antimicrobials:  bictegravir 50 mG/emtricitabine 200 mG/tenofovir alafenamide 25 mG (BIKTARVY) 1 Tablet(s) Oral daily  fluconAZOLE   Tablet 200 milliGRAM(s) Oral every 24 hours  metroNIDAZOLE    Tablet 500 milliGRAM(s) Oral every 12 hours  piperacillin/tazobactam IVPB... 3.375 Gram(s) IV Intermittent every 6 hours  trimethoprim / sulfamethoxazole IVPB 200 milliGRAM(s) IV Intermittent every 8 hours  vancomycin  IVPB 750 milliGRAM(s) IV Intermittent every 24 hours    Anticoagulants:  enoxaparin Injectable 30 milliGRAM(s) SubCutaneous every 24 hours    Onc:    GI/:  polyethylene glycol 3350 17 Gram(s) Oral daily  senna 2 Tablet(s) Oral at bedtime    Endocrine:  atorvastatin 40 milliGRAM(s) Oral at bedtime    Cardiac:    Other Medications:  acetaminophen   Tablet .. 650 milliGRAM(s) Oral every 6 hours PRN  dextrose 5% + sodium chloride 0.45%. 1000 milliLiter(s) IV Continuous <Continuous>  melatonin 5 milliGRAM(s) Oral at bedtime  mirtazapine 15 milliGRAM(s) Oral at bedtime  nicotine -  14 mG/24Hr(s) Patch 1 patch Transdermal daily  OLANZapine 5 milliGRAM(s) Oral daily  oxyCODONE    IR 15 milliGRAM(s) Oral every 6 hours PRN      Allergies    No Known Allergies    Intolerances        Vital Signs Last 24 Hrs  T(C): 36.7 (28 Sep 2021 09:11), Max: 38.3 (28 Sep 2021 04:21)  T(F): 98.1 (28 Sep 2021 09:11), Max: 100.9 (28 Sep 2021 04:21)  HR: 82 (28 Sep 2021 04:21) (74 - 85)  BP: 83/40 (28 Sep 2021 09:11) (83/40 - 114/60)  BP(mean): --  RR: 16 (28 Sep 2021 09:11) (16 - 22)  SpO2: 97% (28 Sep 2021 09:11) (85% - 97%)        PHYSICAL EXAM:  Constitutional: WDWN  Head: NC/AT  EENT: PERRL, anicteric sclera; oropharynx clear, MMM  Neck: supple, no appreciable JVD  Respiratory: CTA B/L; no W/R/R  Cardiovascular: +S1/S2, RRR  Gastrointestinal: soft, NT/ND  Extremities: WWP; no edema, clubbing or cyanosis  Vascular: 2+ radial pulses B/L  Neurological: awake and alert; CHOUDHARY    LABS:  ABG - ( 28 Sep 2021 04:38 )  pH, Arterial: 7.33  pH, Blood: x     /  pCO2: 51    /  pO2: 72    / HCO3: 27    / Base Excess: 0.2   /  SaO2: 95.4                CBC Full  -  ( 28 Sep 2021 06:05 )  WBC Count : 15.60 K/uL  RBC Count : 2.98 M/uL  Hemoglobin : 8.3 g/dL  Hematocrit : 27.4 %  Platelet Count - Automated : 170 K/uL  Mean Cell Volume : 91.9 fl  Mean Cell Hemoglobin : 27.9 pg  Mean Cell Hemoglobin Concentration : 30.3 gm/dL  Auto Neutrophil # : 12.31 K/uL  Auto Lymphocyte # : 1.64 K/uL  Auto Monocyte # : 0.14 K/uL  Auto Eosinophil # : 0.00 K/uL  Auto Basophil # : 0.00 K/uL  Auto Neutrophil % : 77.2 %  Auto Lymphocyte % : 10.5 %  Auto Monocyte % : 0.9 %  Auto Eosinophil % : 0.0 %  Auto Basophil % : 0.0 %        131<L>  |  101  |  17  ----------------------------<  186<H>  3.8   |  24  |  0.87    Ca    8.8      28 Sep 2021 06:05  Mg     1.5         TPro  8.5<H>  /  Alb  3.3  /  TBili  0.3  /  DBili  x   /  AST  43<H>  /  ALT  18  /  AlkPhos  85      PT/INR - ( 27 Sep 2021 14:41 )   PT: 13.7 sec;   INR: 1.15          PTT - ( 27 Sep 2021 14:41 )  PTT:24.6 sec      Urinalysis Basic - ( 28 Sep 2021 06:05 )    Color: Yellow / Appearance: Clear / S.020 / pH: x  Gluc: x / Ketone: NEGATIVE  / Bili: Negative / Urobili: 2.0 E.U./dL   Blood: x / Protein: Trace mg/dL / Nitrite: NEGATIVE   Leuk Esterase: Small / RBC: < 5 /HPF / WBC > 10 /HPF   Sq Epi: x / Non Sq Epi: 5-10 /HPF / Bacteria: Present /HPF    RADIOLOGY & ADDITIONAL STUDIES: PULMONARY SERVICE INITIAL CONSULT NOTE    HPI: ***  Ms. Devlin is a 56yo F w/ PMH AIDS (currently on Biktarvy and Bactrim), Hepatitis B/C, COPD (not on home O2, actively smokes), rectal cancer s/p radiation therapy, tongue cancer s/p resection, h/o crack use (last use 2021) presenting to ED from nursing home complaining of 6 days of worsening dysphagia, throat pain and cough. Pt reports being diagnosed with oral thrush at her nursing home 5 days ago and has since been on nystatin with some improvement in her throat pain. She first had trouble swallowing solids, then progressed to difficulty swallowing liquids and patient reports not having eaten in 5 days. Talking exacerbates pain and she does not have pain if she is not swallowing or talking. Pt is also notably hoarse which she says is not her baseline. She also has a cough that is sometimes productive. All of her symptoms began 5-6 days ago.  Pt has COPD and is not on home O2, however she had SpO2 88% on admission and was put on 1-1.5L NC  Of note, patient was admitted to [facility] in early September for failure to thrive. Pt had not been compliant with Biktarvy prior to hospitalization but has taken it at her nursing home since discharge.  Last known CD4 37, ,732 (2021)  Pt received 1 dose of Moderna vaccine 2021, did not receive second because she was high (had been using crack at the time)    ED Course:   Vitals: T 99.7, HR 85, BP 85/60, RR 20 SpO2 88% on RA  Labs: WBC 14.53, Hb 9.4, lactate 1.7, BUN/Cr 28/1.07, Tprotein 8.5, Albumin 3.3, AST 43; VBG - pH 7.34, pCO2 57, HCO3 31; UA w/ >10 WBC, +bacteria, +epithelial cells (likely contaminated)  CXR: RLL opacity  EKG: NSR, QTc 417  Intervention: 1.5L NS, zosyn 3.375g x1, vancomycin 750mg x1 (27 Sep 2021 18:24)    Subjective (seen this evening0:  Pt reports she     REVIEW OF SYSTEMS:  Constitutional: No fever, weight loss or fatigue  Eyes: No eye pain, visual disturbances, or discharge  ENMT:  No difficulty hearing, tinnitus, vertigo; No sinus or throat pain  Neck: No pain, stiffness or neck swelling  Respiratory: see HPI  Cardiovascular: No chest pain, palpitations, dizziness or leg swelling  Gastrointestinal: No abdominal or epigastric pain. No nausea, vomiting or hematemesis; No diarrhea or constipation. No melena or hematochezia.  Genitourinary: No dysuria, frequency, hematuria or incontinence  Neurological: No headaches, memory loss, loss of strength, numbness or tremors  Skin: No itching, burning, rashes or lesions   Lymph Nodes: No enlarged glands  Endocrine: No heat or cold intolerance; No hair loss  Musculoskeletal: No joint pain or swelling; No muscle, back or extremity pain  Psychiatric: No depression, anxiety, mood swings or difficulty sleeping  Heme/Lymph: No easy bruising or bleeding gums  Allergy and Immunologic: No hives or eczema    PAST MEDICAL & SURGICAL HISTORY:  HIV disease    Advanced COPD    Tongue cancer    Rectal cancer    Hepatitis B    Hepatitis C    No significant past surgical history        FAMILY HISTORY:  FH: heart disease (Mother)        SOCIAL HISTORY:  Smoking Status: [ ] Current, [ ] Former, [ ] Never  Pack Years:    MEDICATIONS:  Pulmonary:  albuterol/ipratropium for Nebulization 3 milliLiter(s) Nebulizer every 6 hours  budesonide 160 MICROgram(s)/formoterol 4.5 MICROgram(s) Inhaler 2 Puff(s) Inhalation two times a day    Antimicrobials:  bictegravir 50 mG/emtricitabine 200 mG/tenofovir alafenamide 25 mG (BIKTARVY) 1 Tablet(s) Oral daily  fluconAZOLE   Tablet 200 milliGRAM(s) Oral every 24 hours  metroNIDAZOLE    Tablet 500 milliGRAM(s) Oral every 12 hours  piperacillin/tazobactam IVPB... 3.375 Gram(s) IV Intermittent every 6 hours  trimethoprim / sulfamethoxazole IVPB 200 milliGRAM(s) IV Intermittent every 8 hours  vancomycin  IVPB 750 milliGRAM(s) IV Intermittent every 24 hours    Anticoagulants:  enoxaparin Injectable 30 milliGRAM(s) SubCutaneous every 24 hours    Onc:    GI/:  polyethylene glycol 3350 17 Gram(s) Oral daily  senna 2 Tablet(s) Oral at bedtime    Endocrine:  atorvastatin 40 milliGRAM(s) Oral at bedtime    Cardiac:    Other Medications:  acetaminophen   Tablet .. 650 milliGRAM(s) Oral every 6 hours PRN  dextrose 5% + sodium chloride 0.45%. 1000 milliLiter(s) IV Continuous <Continuous>  melatonin 5 milliGRAM(s) Oral at bedtime  mirtazapine 15 milliGRAM(s) Oral at bedtime  nicotine -  14 mG/24Hr(s) Patch 1 patch Transdermal daily  OLANZapine 5 milliGRAM(s) Oral daily  oxyCODONE    IR 15 milliGRAM(s) Oral every 6 hours PRN      Allergies    No Known Allergies    Intolerances        Vital Signs Last 24 Hrs  T(C): 36.7 (28 Sep 2021 09:11), Max: 38.3 (28 Sep 2021 04:21)  T(F): 98.1 (28 Sep 2021 09:11), Max: 100.9 (28 Sep 2021 04:21)  HR: 82 (28 Sep 2021 04:21) (74 - 85)  BP: 83/40 (28 Sep 2021 09:11) (83/40 - 114/60)  BP(mean): --  RR: 16 (28 Sep 2021 09:11) (16 - 22)  SpO2: 97% (28 Sep 2021 09:11) (85% - 97%)        PHYSICAL EXAM:  Constitutional: WDWN  Head: NC/AT  EENT: PERRL, anicteric sclera; oropharynx clear, MMM  Neck: supple, no appreciable JVD  Respiratory: CTA B/L; no W/R/R  Cardiovascular: +S1/S2, RRR  Gastrointestinal: soft, NT/ND  Extremities: WWP; no edema, clubbing or cyanosis  Vascular: 2+ radial pulses B/L  Neurological: awake and alert; CHOUDHARY    LABS:  ABG - ( 28 Sep 2021 04:38 )  pH, Arterial: 7.33  pH, Blood: x     /  pCO2: 51    /  pO2: 72    / HCO3: 27    / Base Excess: 0.2   /  SaO2: 95.4                CBC Full  -  ( 28 Sep 2021 06:05 )  WBC Count : 15.60 K/uL  RBC Count : 2.98 M/uL  Hemoglobin : 8.3 g/dL  Hematocrit : 27.4 %  Platelet Count - Automated : 170 K/uL  Mean Cell Volume : 91.9 fl  Mean Cell Hemoglobin : 27.9 pg  Mean Cell Hemoglobin Concentration : 30.3 gm/dL  Auto Neutrophil # : 12.31 K/uL  Auto Lymphocyte # : 1.64 K/uL  Auto Monocyte # : 0.14 K/uL  Auto Eosinophil # : 0.00 K/uL  Auto Basophil # : 0.00 K/uL  Auto Neutrophil % : 77.2 %  Auto Lymphocyte % : 10.5 %  Auto Monocyte % : 0.9 %  Auto Eosinophil % : 0.0 %  Auto Basophil % : 0.0 %        131<L>  |  101  |  17  ----------------------------<  186<H>  3.8   |  24  |  0.87    Ca    8.8      28 Sep 2021 06:05  Mg     1.5         TPro  8.5<H>  /  Alb  3.3  /  TBili  0.3  /  DBili  x   /  AST  43<H>  /  ALT  18  /  AlkPhos  85      PT/INR - ( 27 Sep 2021 14:41 )   PT: 13.7 sec;   INR: 1.15          PTT - ( 27 Sep 2021 14:41 )  PTT:24.6 sec      Urinalysis Basic - ( 28 Sep 2021 06:05 )    Color: Yellow / Appearance: Clear / S.020 / pH: x  Gluc: x / Ketone: NEGATIVE  / Bili: Negative / Urobili: 2.0 E.U./dL   Blood: x / Protein: Trace mg/dL / Nitrite: NEGATIVE   Leuk Esterase: Small / RBC: < 5 /HPF / WBC > 10 /HPF   Sq Epi: x / Non Sq Epi: 5-10 /HPF / Bacteria: Present /HPF    RADIOLOGY & ADDITIONAL STUDIES: PULMONARY SERVICE INITIAL CONSULT NOTE    Per admission H&P:   Ms. Devlin is a 54yo F w/ PMH AIDS (currently on Biktarvy and Bactrim), Hepatitis B/C, COPD (not on home O2, actively smokes), rectal cancer s/p radiation therapy, tongue cancer s/p resection, h/o crack use (last use 2021) presenting to ED from nursing home complaining of 6 days of worsening dysphagia, throat pain and cough. Pt reports being diagnosed with oral thrush at her nursing home 5 days ago and has since been on nystatin with some improvement in her throat pain. She first had trouble swallowing solids, then progressed to difficulty swallowing liquids and patient reports not having eaten in 5 days. Talking exacerbates pain and she does not have pain if she is not swallowing or talking. Pt is also notably hoarse which she says is not her baseline. She also has a cough that is sometimes productive. All of her symptoms began 5-6 days ago.  Pt has COPD and is not on home O2, however she had SpO2 88% on admission and was put on 1-1.5L NC  Of note, patient was admitted to [facility] in early September for failure to thrive. Pt had not been compliant with Biktarvy prior to hospitalization but has taken it at her nursing home since discharge.  Last known CD4 37, ,732 (2021)  Pt received 1 dose of Moderna vaccine 2021, did not receive second because she was high (had been using crack at the time)    ED Course:   Vitals: T 99.7, HR 85, BP 85/60, RR 20 SpO2 88% on RA  Labs: WBC 14.53, Hb 9.4, lactate 1.7, BUN/Cr 28/1.07, Tprotein 8.5, Albumin 3.3, AST 43; VBG - pH 7.34, pCO2 57, HCO3 31; UA w/ >10 WBC, +bacteria, +epithelial cells (likely contaminated)  CXR: RLL opacity  EKG: NSR, QTc 417  Intervention: 1.5L NS, zosyn 3.375g x1, vancomycin 750mg x1 (27 Sep 2021 18:24)  __________    Subjective (seen this evening)  Pt reports she came in for difficulty eating/pain with swallowing. she reports coughing and choking at times with eating and drinking but has not wanted to 2/2 pain. She reports she has a cough usually and has been somewhat short of breath. Mostly reports dry cough but sometimes able to bring up sputum. She uses a red inhalers at her nursing home. Currently, she feels her breathing is good and wants to walk. She has been wearing the supplemental O2 when she needs it but also feels okay without it. Does not wear home O2. Denies fever, chills, wheezing,     REVIEW OF SYSTEMS:  Constitutional: No fever, weight loss or fatigue  Eyes: No eye pain, visual disturbances, or discharge  ENMT:  No difficulty hearing, tinnitus, vertigo; No sinus or throat pain  Neck: No pain, stiffness or neck swelling  Respiratory: see HPI  Cardiovascular: No chest pain, palpitations, dizziness or leg swelling  Gastrointestinal: No abdominal or epigastric pain. No nausea, vomiting or hematemesis; No diarrhea or constipation. No melena or hematochezia.  Genitourinary: No dysuria, frequency, hematuria or incontinence  Neurological: No headaches, memory loss, loss of strength, numbness or tremors  Skin: No itching, burning, rashes or lesions   Lymph Nodes: No enlarged glands  Endocrine: No heat or cold intolerance; No hair loss  Musculoskeletal: No joint pain or swelling; No muscle, back or extremity pain  Psychiatric: No depression, anxiety, mood swings or difficulty sleeping  Heme/Lymph: No easy bruising or bleeding gums  Allergy and Immunologic: No hives or eczema    PAST MEDICAL & SURGICAL HISTORY:  HIV disease    Advanced COPD    Tongue cancer    Rectal cancer    Hepatitis B    Hepatitis C    No significant past surgical history    FAMILY HISTORY:  FH: heart disease (Mother)    SOCIAL HISTORY:  lives in NH for now    MEDICATIONS:  Pulmonary:  albuterol/ipratropium for Nebulization 3 milliLiter(s) Nebulizer every 6 hours  budesonide 160 MICROgram(s)/formoterol 4.5 MICROgram(s) Inhaler 2 Puff(s) Inhalation two times a day    Antimicrobials:  bictegravir 50 mG/emtricitabine 200 mG/tenofovir alafenamide 25 mG (BIKTARVY) 1 Tablet(s) Oral daily  fluconAZOLE   Tablet 200 milliGRAM(s) Oral every 24 hours  metroNIDAZOLE    Tablet 500 milliGRAM(s) Oral every 12 hours  piperacillin/tazobactam IVPB... 3.375 Gram(s) IV Intermittent every 6 hours  trimethoprim / sulfamethoxazole IVPB 200 milliGRAM(s) IV Intermittent every 8 hours  vancomycin  IVPB 750 milliGRAM(s) IV Intermittent every 24 hours    Anticoagulants:  enoxaparin Injectable 30 milliGRAM(s) SubCutaneous every 24 hours    Onc:    GI/:  polyethylene glycol 3350 17 Gram(s) Oral daily  senna 2 Tablet(s) Oral at bedtime    Endocrine:  atorvastatin 40 milliGRAM(s) Oral at bedtime    Cardiac:    Other Medications:  acetaminophen   Tablet .. 650 milliGRAM(s) Oral every 6 hours PRN  dextrose 5% + sodium chloride 0.45%. 1000 milliLiter(s) IV Continuous <Continuous>  melatonin 5 milliGRAM(s) Oral at bedtime  mirtazapine 15 milliGRAM(s) Oral at bedtime  nicotine -  14 mG/24Hr(s) Patch 1 patch Transdermal daily  OLANZapine 5 milliGRAM(s) Oral daily  oxyCODONE    IR 15 milliGRAM(s) Oral every 6 hours PRN    Allergies    No Known Allergies    Vital Signs Last 24 Hrs  T(C): 36.7 (28 Sep 2021 09:11), Max: 38.3 (28 Sep 2021 04:21)  T(F): 98.1 (28 Sep 2021 09:11), Max: 100.9 (28 Sep 2021 04:21)  HR: 82 (28 Sep 2021 04:21) (74 - 85)  BP: 83/40 (28 Sep 2021 09:11) (83/40 - 114/60)  BP(mean): --  RR: 16 (28 Sep 2021 09:11) (16 - 22)  SpO2: 97% (28 Sep 2021 09:11) (85% - 97%)    PHYSICAL EXAM:  Constitutional: cachetic and appears older than stated age, no acute distress (not wearing O2)  Head: NC/AT  EENT: PERRL, anicteric sclera; oropharynx clear, dry MM  Neck: supple, no appreciable JVD  Respiratory: clear throughout, no wheezing  Cardiovascular: +S1/S2, RRR  Gastrointestinal: soft, NT/ND  Extremities: WWP; no edema, clubbing or cyanosis  Vascular: 2+ radial pulses B/L  Neurological: awake and alert; CHOUDHARY    LABS:  ABG - ( 28 Sep 2021 04:38 )  pH, Arterial: 7.33  pH, Blood: x     /  pCO2: 51    /  pO2: 72    / HCO3: 27    / Base Excess: 0.2   /  SaO2: 95.4      CBC Full  -  ( 28 Sep 2021 06:05 )  WBC Count : 15.60 K/uL  RBC Count : 2.98 M/uL  Hemoglobin : 8.3 g/dL  Hematocrit : 27.4 %  Platelet Count - Automated : 170 K/uL  Mean Cell Volume : 91.9 fl  Mean Cell Hemoglobin : 27.9 pg  Mean Cell Hemoglobin Concentration : 30.3 gm/dL  Auto Neutrophil # : 12.31 K/uL  Auto Lymphocyte # : 1.64 K/uL  Auto Monocyte # : 0.14 K/uL  Auto Eosinophil # : 0.00 K/uL  Auto Basophil # : 0.00 K/uL  Auto Neutrophil % : 77.2 %  Auto Lymphocyte % : 10.5 %  Auto Monocyte % : 0.9 %  Auto Eosinophil % : 0.0 %  Auto Basophil % : 0.0 %        131<L>  |  101  |  17  ----------------------------<  186<H>  3.8   |  24  |  0.87    Ca    8.8      28 Sep 2021 06:05  Mg     1.5         TPro  8.5<H>  /  Alb  3.3  /  TBili  0.3  /  DBili  x   /  AST  43<H>  /  ALT  18  /  AlkPhos  85      PT/INR - ( 27 Sep 2021 14:41 )   PT: 13.7 sec;   INR: 1.15       PTT - ( 27 Sep 2021 14:41 )  PTT:24.6 sec    Urinalysis Basic - ( 28 Sep 2021 06:05 )    Color: Yellow / Appearance: Clear / S.020 / pH: x  Gluc: x / Ketone: NEGATIVE  / Bili: Negative / Urobili: 2.0 E.U./dL   Blood: x / Protein: Trace mg/dL / Nitrite: NEGATIVE   Leuk Esterase: Small / RBC: < 5 /HPF / WBC > 10 /HPF   Sq Epi: x / Non Sq Epi: 5-10 /HPF / Bacteria: Present /HPF    Micro:  COVID negative  21--Blood cultures x2   HIV viral load 149 (1 mth ago 198K), CD4 59 (1 mth ago 39)    RADIOLOGY & ADDITIONAL STUDIES:  2021 CXR  IMPRESSION: Bilateral lower lobe infiltrates.    2021  CT chest/abd/pelvis  IMPRESSION:  1. No pulmonary embolism.  2. Thickened endometrial complex 1.2 cm, differentials include endometrial hyperplasia, polyp, or malignancy. Recommend follow-up pelvic ultrasound.  3. Pelvic organ prolapse with rectal and vaginal prolapse. Moderate retained stool burden throughout the colon and rectum. Correlate with constipation.  4. Nonspecific diffuse rectal wall thickening in this patient with history of rectal cancer, possibly inflammatory/post radiation therapy changes, infectious, or nonobstructing primary malignancy. Recommend clinical correlation.  5. Mild small airways disease and mild atelectasis bilateral lower lobes.

## 2021-09-28 NOTE — DISCHARGE NOTE PROVIDER - NSDCCPCAREPLAN_GEN_ALL_CORE_FT
PRINCIPAL DISCHARGE DIAGNOSIS  Diagnosis: Healthcare associated bacterial pneumonia  Assessment and Plan of Treatment:       SECONDARY DISCHARGE DIAGNOSES  Diagnosis: Thrush  Assessment and Plan of Treatment:      PRINCIPAL DISCHARGE DIAGNOSIS  Diagnosis: Healthcare associated bacterial pneumonia  Assessment and Plan of Treatment: You arrived to [facility] Emergency room with a fever and elevated breathing rate. We performed a chest X ray and discovered you had fluid build up in your lungs. You also mentioned you had pain in your throat with eating. The speech pathology team evaluated you and discovered that when you eat food, you breath in your food due to the history of tongue cancer. The pathologist suspects you've had this issue for some time now, but because you don't reflexively react to breathing in fluids and food, you didn't notice it until it became painful for your throat. We transitioned you to a nothing by mouth diet, and provided you with IV fluids to keep you hydrated while you were here in the hospital. The palliative care team evaluated you as well, because since you are unable to eat without inhaling food into your lungs, you are at high risk for choking and suffucating, as well as re-infection with pneumonia. You showed you understood this, and that you would lilke to be Do Not Resucitate (DNR) and Do Not Intubate (DNI) and that you would like to continue eating food anyways.      SECONDARY DISCHARGE DIAGNOSES  Diagnosis: Thrush  Assessment and Plan of Treatment: You arrived at [facility] Emergency room with pain in your throat and difficulty swallowing your food. On physical exam, it was found that you have a fungal infection of your oral cavity and throat. This is likely due to your weakened immune system secondary to your AIDS infection. You were given Nyastatin, an anti-fungal medication, at the nursing home, but it did not help improve your Thrush (fungal infection). So we switched you to an IV anti-fungal with better effectiveness. We have also helped you maintain good oral hygiene while you were in the hospital with flushing of your mouth and scrubbing of the fungal plaque build up. After a few days you noted to us your throat felt better. You will continue the fungal medication until you finish the total 14 day course.     PRINCIPAL DISCHARGE DIAGNOSIS  Diagnosis: Healthcare associated bacterial pneumonia  Assessment and Plan of Treatment: You arrived to [facility] Emergency room with a fever and elevated breathing rate. We performed a chest X ray and discovered you had fluid build up in your lungs and evidence of pneumonia likely due to aspiration. You were treated with a course of IV antibiotics. You also mentioned you had pain in your throat with eating. The speech pathology team evaluated you and discovered that when you eat food, it sometimes goes into your lungs. The pathologist suspects you've had this issue for some time now, but because you don't reflexively react to breathing in fluids and food, you didn't notice it until it became painful for your throat. We transitioned you to a nothing by mouth diet, and provided you with IV fluids to keep you hydrated while you were here in the hospital. The palliative care team evaluated you as well, because since you are unable to eat without inhaling food into your lungs, you are at high risk for choking and suffucating, as well as re-infection with pneumonia. You showed you understood this, and that you would lilke to be Do Not Resucitate (DNR) and Do Not Intubate (DNI) and that you would like to continue eating food anyways.      SECONDARY DISCHARGE DIAGNOSES  Diagnosis: Thrush  Assessment and Plan of Treatment: You arrived at [facility] Emergency room with pain in your throat and difficulty swallowing your food. On physical exam, it was found that you have a fungal infection of your oral cavity and throat. This is likely due to your weakened immune system secondary to your AIDS infection. You were given Nyastatin, an anti-fungal medication, at the nursing home, but it did not help improve your Thrush (fungal infection). So we switched you to an IV anti-fungal with better effectiveness. We have also helped you maintain good oral hygiene while you were in the hospital with flushing of your mouth and scrubbing of the fungal plaque build up. After a few days you noted to us your throat felt better. You will continue the fungal medication until you finish the total 14 day course, last day 10/10    Diagnosis: Trichomoniasis  Assessment and Plan of Treatment: You were found to have trichomonas in your urine which is a parasite. that can cause foul smeling vaginal discharge, itching and painful urination. You are currently being treated with metronidazole 500 mg twice a day for a total course of 7 days. Last day 10/4.

## 2021-09-28 NOTE — PROGRESS NOTE ADULT - ASSESSMENT
56yo F w/ PMH AIDS (currently on Biktarvy and Bactrim), Hepatitis B/C, COPD (not on home O2, actively smokes), rectal cancer s/p radiation therapy, tongue cancer s/p resection, h/o crack use (last use 8/2021) presenting to ED from nursing home complaining of 6 days of worsening dysphagia, throat pain and cough, found to be hypoxic to 88% and has oral thrush. Admitted to [facility] for treatment of hypercapnic, hypoxic respiratory failure, sepsis 2/2 presumed PNA and recent dysphagia.

## 2021-09-28 NOTE — DISCHARGE NOTE PROVIDER - NSDCMRMEDTOKEN_GEN_ALL_CORE_FT
albuterol 90 mcg/inh inhalation aerosol: 2 puff(s) inhaled every 6 hours, As Needed  atorvastatin 40 mg oral tablet: 1 tab(s) orally once a day  Bactrim 400 mg-80 mg oral tablet: 1 tab(s) orally once a day  Biktarvy oral tablet: 1 tab(s) orally once a day  diphenhydrAMINE 25 mg oral capsule: 1 cap(s) orally every 4 hours, As needed, Rash and/or Itching  enoxaparin:   enoxaparin: 40 milligram(s) injectable once a day  melatonin 3 mg oral tablet: 1 tab(s) orally once a day (at bedtime), As needed, Insomnia  midodrine 5 mg oral tablet: 1 tab(s) orally once a day  mirtazapine 15 mg oral tablet: 1 tab(s) orally once a day (at bedtime)  nicotine 7 mg/24 hr transdermal film, extended release: 7 mg/24h transdermal once a day, As Needed  OLANZapine 5 mg oral tablet: 1 tab(s) orally once a day  polyethylene glycol 3350 oral powder for reconstitution: 17 gram(s) orally once a day  senna oral tablet: 2 tab(s) orally once a day (at bedtime)  Symbicort 80 mcg-4.5 mcg/inh inhalation aerosol: 2 puff(s) inhaled 2 times a day   albuterol 90 mcg/inh inhalation aerosol: 2 puff(s) inhaled every 6 hours, As Needed  atorvastatin 40 mg oral tablet: 1 tab(s) orally once a day  Bactrim 400 mg-80 mg oral tablet: 1 tab(s) orally once a day  Biktarvy oral tablet: 1 tab(s) orally once a day  diphenhydrAMINE 25 mg oral capsule: 1 cap(s) orally every 4 hours, As needed, Rash and/or Itching  fluconazole 200 mg oral tablet: 1 tab(s) orally every 24 hours. Last day 10/10  melatonin 3 mg oral tablet: 1 tab(s) orally once a day (at bedtime), As needed, Insomnia  metroNIDAZOLE 500 mg oral tablet: 1 tab(s) orally every 12 hours. Last day 10/4  midodrine 5 mg oral tablet: 1 tab(s) orally once a day  mirtazapine 15 mg oral tablet: 1 tab(s) orally once a day (at bedtime)  nicotine 7 mg/24 hr transdermal film, extended release: 7 mg/24h transdermal once a day, As Needed  OLANZapine 5 mg oral tablet: 1 tab(s) orally once a day  tiotropium-olodaterol 2.5 mcg-2.5 mcg/inh inhalation aerosol: 2 puff(s) inhaled once a day

## 2021-09-28 NOTE — SWALLOW BEDSIDE ASSESSMENT ADULT - SWALLOW EVAL: PROGNOSIS
Fair-guarded given PMHx. Pt would benefit from MBS to visualize the pharyngeal swallow, determine need for a modified diet & candidacy for dysphagia tx vs need for TFs

## 2021-09-28 NOTE — SWALLOW BEDSIDE ASSESSMENT ADULT - ORAL PHASE
Bolus stasis on the left posterior tongue after the swallow with puree. Pt benefitted from use of tsp NTL wash to clear oral stasis/Decreased anterior-posterior movement of the bolus/Delayed oral transit time

## 2021-09-28 NOTE — DIETITIAN INITIAL EVALUATION ADULT. - PROBLEM SELECTOR PLAN 5
Pt has known h/o COPD. Current smoker (cut down to 6 cigarettes/day but has smoked since she was 11yo). Uses Symbicort 160-4.5mcg/act and ventolin HFA at home. Currently on 1.5L NC.  -C/w O2 by NC  -Started duonebs q6h  -Restarted symbicort  -Treating PNA as above  -Nicotine patch (14mg/24h) ordered  - re smoking cessation

## 2021-09-28 NOTE — PROGRESS NOTE ADULT - PROBLEM SELECTOR PLAN 3
Pt presented with respiratory difficulty, new cough, dysphagia and throat pain. Pt afebrile w/ leukocytosis. CXR showed RLL opacity. Pt has recent hospitalization at [facility] at end of August-early September for failure to thrive and lives in a nursing home, so has increased risk for HAP.  -S/p 1.5L NS, zosyn 3.375g, vancomycin 750mg in ED  -Started zosyn and vancomycin for HAP coverage  -F/u MRSA swab and if negative can dc vanc  -F/u fungitell, galactomannan, LDH, fungal bcx Pt presented with respiratory difficulty, new cough, dysphagia and throat pain. Pt afebrile w/ leukocytosis & tachypnea. CXR showed RLL opacity. Pt had recent hospitalization at [facility] at end of August-early September for failure to thrive and lives in a nursing home; is at increased risk for HAP.    -S/p 1.5L NS, zosyn 3.375g, vancomycin 750mg in ED  -c/w zosyn and vancomycin for HAP coverage  -F/u MRSA swab; if negative can dc vanc  -F/u fungitell, galactomannan, LDH, fungal bcx Pt presented with respiratory difficulty, new cough, dysphagia and throat pain. Pt afebrile w/ leukocytosis & tachypnea. CXR showed RLL opacity. Pt had recent hospitalization at [facility] at end of August-early September for failure to thrive and lives in a nursing home; is at increased risk for HAP.    -c/w zosyn and vancomycin for HAP coverage  -F/u MRSA swab; if negative can dc vanc  -F/u fungitell, galactomannan, LDH, fungal bcx Pt has known h/o COPD. Current smoker (cut down to 6 cigarettes/day but has smoked since she was 11yo). Uses Symbicort 160-4.5mcg/act and ventolin HFA at home. Currently on 1.5L NC.    -c/w O2 by NC, wean down as tolerated.  -c/w duonebs q6h  -Started stiolto  -Treating PNA as above  -Nicotine patch (14mg/24h) ordered  - re smoking cessation

## 2021-09-28 NOTE — DIETITIAN INITIAL EVALUATION ADULT. - ENTERAL
Would consider PEG placement due to suspected poor clinical improvement in swallowing to achieve needs.PEG feed recommendations:;Jevity 1.5@30cc/hr increase by 10cc q4hrs to goal rate:50cc/hr providing;1200cc TV/1800kcal/80gmprotein/950cc free water and 2 LPS(adds 30gmprotein and 200kcal(110gmprotein/2000kcal

## 2021-09-28 NOTE — SWALLOW BEDSIDE ASSESSMENT ADULT - SWALLOW EVAL: DIAGNOSIS
Mild oral stage dysphagia & suspect at least a moderate pharyngeal dysphagia as evidenced by +cough with thin liquids and persistent wet voice/throat clear after trials of nectar-thick liquid.

## 2021-09-28 NOTE — SWALLOW VFSS/MBS ASSESSMENT ADULT - PHARYNGEAL PHASE COMMENTS
Posterior bolus loss from the oral stage of the swallow & delayed swallow trigger resulted in gross, silent aspiration across textures, but worse for thin than thick liquids. On 2x 1/4 tsp trials thin, Pt aspirated ~60% of the bolus before the swallow & had no spontaneous cough response. In addition to delayed trigger, epiglottic retroflexion was absent. Hyolaryngeal excursion was intact once the swallow was triggered. BOT to PPW contact & pharyngeal squeeze was reduced resulting in bolus stasis in the valleculae, along the PPW, AE folds, and in the LV after the swallow. A cued cough & reswallow was inconsistently effective at clearing bolus stasis from the airway.

## 2021-09-28 NOTE — DIETITIAN INITIAL EVALUATION ADULT. - ORAL INTAKE PTA/DIET HISTORY
Patient reported she was eating 'fine"up until about a week ago.Was able to take soft/almost pureed foods and ensure.

## 2021-09-28 NOTE — PROGRESS NOTE ADULT - PROBLEM SELECTOR PLAN 10
Pt has h/o tongue cancer s/p resection +/- graft placement. Unable to assess throat on physical exam as difficult for patient to open her mouth. Pt on oxycodone 15mg at home for pain.  -Treating oral thrush as above  -Restarted oxycodone and benadryl (for pruritis 2/2 chronic opioid use)  -Started bowel regimen (miralax, senna) for constipation 2/2 decreased PO intake and chronic opioid use Pt has h/o tongue cancer s/p resection +/- graft placement. Unable to assess throat on physical exam as difficult for patient to open her mouth. Pt on oxycodone 15mg at home for pain. Pt currently has dysphagia as well, unable to tolerate PO without aspiration.     -Treating oral thrush as above  -Restarted oxycodone; benadryl (for pruritis 2/2 chronic opioid use)  -Started bowel regimen (miralax, senna) for constipation 2/2 decreased PO intake and chronic opioid use    - Speech and Swallow evaluated pt, recommended MBS.   - MBS revealed pt is aspirating everything; S&S recc NGT for medications and nutrition  - Pt adamently refused NGT, stating "I don't need that". She insisted on eating despite explaining to her the results of the MBS in layman's terms.  - Converted majority of pt's medications to IV Pt may have h/o Hep C (documented in ED note). Not currently on medication. Unknown at this time whether she was ever treated in the past. Liver panel wnl this admission.  -Can consider sending hepatitis panel to verify    #H/o Hep B  -Pt may have h/o Hep B (documented in ED note).  -Can consider sending hepatitis panel to verify

## 2021-09-28 NOTE — CONSULT NOTE ADULT - TIME BILLING
Patient seen and examined with house-staff during bedside rounds.  Resident note read, including vitals, physical findings, laboratory data, and radiological reports.   Revisions included below.  Direct personal management at bed side and extensive interpretation of the data.  Plan was outlined and discussed in details with the housestaff.  Decision making of high complexity  Action taken for acute disease activity to reflect the level of care provided:  - medication reconciliation  - review laboratory data    .  The patient is clinically stable.  I reviewed the CT scan and the barium swallow.  The patient is aspirating.  I compared the chest x-ray to the previous 1.  There is definite right lower lobe infiltrate which is new compared to the previous 1.  The patient is also bronchiectasis in the area of the lingula.  Continue treatment for hospital-acquired aspiration pneumonia.  The patient has also underlying COPD which is stable at this point and is no evidence of acute exacerbation

## 2021-09-28 NOTE — DIETITIAN INITIAL EVALUATION ADULT. - PROBLEM SELECTOR PLAN 7
Hb 9.4, MCV 90.7. Hb lower this admission than on prior admission (Hb 10-11 at that time). Pt has dry mucus membranes suspected 2/2 poor PO intake, did not allow exam of conjunctiva for pallor. Potentially anemia of chronic disease.  -F/u iron, ferritin, TIBC, transferrin, b12, folate      #HLD  -Patient has known h/o HLD, takes atorvastatin 40mg qd  -Restarted atorvastatin 40mg      #Depression  -Takes olanzapine 5mg qd and mirtazapine 15mg qd at home  -Restarted home meds

## 2021-09-28 NOTE — SWALLOW BEDSIDE ASSESSMENT ADULT - CONSISTENCIES ADMINISTERED
thin liquid/nectar thick/puree Island Pedicle Flap Text: The defect edges were debeveled with a #15 scalpel blade.  Given the location of the defect, shape of the defect and the proximity to free margins an island pedicle advancement flap was deemed most appropriate.  Using a sterile surgical marker, an appropriate advancement flap was drawn incorporating the defect, outlining the appropriate donor tissue and placing the expected incisions within the relaxed skin tension lines where possible.    The area thus outlined was incised deep to adipose tissue with a #15 scalpel blade.  The skin margins were undermined to an appropriate distance in all directions around the primary defect and laterally outward around the island pedicle utilizing iris scissors.  There was minimal undermining beneath the pedicle flap.

## 2021-09-28 NOTE — DIETITIAN INITIAL EVALUATION ADULT. - PROBLEM SELECTOR PLAN 3
Pt presented with respiratory difficulty, new cough, dysphagia and throat pain. Pt afebrile w/ leukocytosis. CXR showed RLL opacity. Pt has recent hospitalization at [facility] at end of August-early September for failure to thrive and lives in a nursing home, so has increased risk for HAP.  -S/p 1.5L NS, zosyn 3.375g, vancomycin 750mg in ED  -Started zosyn and vancomycin for HAP coverage  -F/u MRSA swab and if negative can dc vanc  -F/u fungitell, galactomannan, LDH, fungal bcx

## 2021-09-28 NOTE — PROGRESS NOTE ADULT - PROBLEM SELECTOR PLAN 6
Pt has known diagnosis of AIDS, follows w/ Dr. Romaine Nava at [facility]. Last known CD4 37, ,732 (8/29/2021). Pt had not been compliant with Biktarvy, but since recent hospitalization (discharged 9/9/2021) has been on Biktarvy and Bactrim.   -Restarted Biktarvy  -Restarted Bactrim  -F/u CD4, VL sent this admission  -Consider HIV consult in AM Pt has known diagnosis of AIDS, follows w/ Dr. Romaine Nava at [facility]. Last known CD4 37, ,732 (8/29/2021). Pt had not been compliant with Biktarvy, but since recent hospitalization (discharged 9/9/2021) has been on Biktarvy and Bactrim.   -Restarted Biktarvy  -Restarted Bactrim  -F/u CD4, VL sent this admission Pt has known diagnosis of AIDS, follows w/ Dr. Romaine Nava at [facility]. Last known CD4 37, ,732 (8/29/2021). Pt had not been compliant with Biktarvy, but since recent hospitalization (discharged 9/9/2021) has been on Biktarvy and Bactrim. CD4 count 59 (9/28/2021).   -Restarted Biktarvy  -Restarted Bactrim  -F/u VL sent this admission Pt has known diagnosis of AIDS, follows w/ Dr. Romaine Nava at [facility]. Last known CD4 37, ,732 (8/29/2021). Pt had not been compliant with Biktarvy, but since recent hospitalization (discharged 9/9/2021) has been on Biktarvy and Bactrim. CD4 count 59 (9/28/2021).   -Restarted Biktarvy  -Restarted ppx Bactrim  -F/u VL this admission Pt UA positive for few trichomonas  -Started metronidazole 500mg BID x7d

## 2021-09-28 NOTE — DIETITIAN INITIAL EVALUATION ADULT. - PHYSCIAL ASSESSMENT
Per physical assessment: Muscle Wasting- Temporal [   ]  Clavicle/Pectoral [ x  ]  Shoulder/Deltoid [ x  ]  Scapula [  x ]  Interosseous [   ]  Quadriceps [ x  ]  Gastrocnemius [ x  ]; Fat Wasting- Orbital [   ]  Buccal [   ]  Triceps [ x  ]  Rib [ x  ]--> Suspect severe [PCM] 2/2 to physical assessment, [poor intake], and [wt loss]; please see malnutrition chart note./underweight/emaciated/debilitated

## 2021-09-28 NOTE — CONSULT NOTE ADULT - ASSESSMENT
-ambulatory O2 sats  -d/c symbicort  -start stiolto  56yo F w/ PMH HIV/AIDS (currently on Biktarvy and Bactrim), Hepatitis B/C, COPD (not on home O2, active smoker), rectal cancer s/p radiation therapy, tongue cancer s/p resection, h/o substance use disorder (crack cocaine last use 8/2021) presenting to ED from nursing home dysphagia, throat pain and cough. Pt reports being diagnosed with oral thrush at her nursing home 5 days ago and has since been on nystatin with some improvement in her throat pain. She first had trouble swallowing solids, then progressed to difficulty swallowing liquids and patient reports not having eaten in 5 days. Talking exacerbates pain and she does not have pain if she is not swallowing or talking. Pt is also notably hoarse which she says is not her baseline. She also has a cough that is sometimes productive. All of her symptoms began 5-6 days ago.  Pt has COPD and is not on home O2, however she had SpO2 88% on admission and was put on 1-1.5L NC  Of note, patient was admitted to [facility] in early September for failure to thrive. Pt had not been compliant with Biktarvy prior to hospitalization but has taken it at her nursing home since discharge.  Last known CD4 37, ,732 (8/29/2021)  Pt received 1 dose of Moderna vaccine 7/2021, did not receive second because she was high (had been using crack at the time)        #PNA  Presented with   reasonable to cover HAP given recent hospitalization although given findings per SLP/barium exam she is a significant aspiration risk and at increased risk for aspiration PNA. I walked the patient this evening off oxygen up and down the the hallway with nurse present and no significant desaturation (able to maintain O2 sat at no less than 89%). Given ambulatory sat, recent compliance with bactrim ppx, CXR findings would consider PCP PNA less likely but if deterioration or no improvement would consider bronchoscopy at that time.   -agree with abx (currently on vanc/zosyn) and antifungal (fluconazole for potential esophagitis) per primary team  -hold of on bronchoscopy at this time  -continue Bactrim pcp ppx    #COPD  Home inhalers are symbicort (ICS/LABA) and Albuterol prn. Inhaled steroids (symbicort) can in general contribute to PNAs and thrush if she is not rinsing her mouth out after each use and especially given her immunocompromised status. Would recommend discontinuing Symbicort and starting Stiolto instead (LAMA/LABA). She currently doesn't appear to be in COPD exacerbation as she is moving air well, lung exam generally clear without wheeze or significant O2 requirement.   -d/c symbicort  -start stiolto   -will need 2nd covid vaccine at discharge  -encourage smoking cessation      54yo F w/ PMH HIV/AIDS (currently on Biktarvy and Bactrim), Hepatitis B/C, COPD (not on home O2, active smoker), rectal cancer s/p radiation therapy, tongue cancer s/p resection, h/o substance use disorder (crack cocaine last use 8/2021) presenting with dysphagia and cough.     #possible HAP  Presented with dysphagia, cough, leukocytosis, fever, hypotension (possibly baseline as noted prior admission and hypoxia (appears last admission normal O2 sat high 90s on RA--initally in ED noted to be 88% and req 2L per flowsheet) Currently on vanc/zosyn agree its reasonable to cover HAP given recent hospitalization although given findings per SLP/barium exam she significantly aspirates and at high risk for aspiration PNA. I walked the patient this evening off supplemental oxygen up and down the hallway with nurse present and no significant desaturation (able to maintain O2 sat at no less than 89%). Given ambulatory sat, recent compliance with bactrim ppx, CXR findings would consider PCP PNA less likely but if deterioration or no improvement would consider bronchoscopy. Could also consider viral PNA (covid negative). MRSA swab positive but given stability seems less likely MRSA PNA.   -agree with abx (currently on vanc/zosyn) and antifungal (fluconazole for potential esophagitis) per primary team  -continue Bactrim pcp ppx  -please send serum LDH, galactomannan, B-Dglucan (fungitell), CMV PCR, chlamydia/GC NAAT, Respiratory viral panel  -sputum sample if able for bacterial cx/ PCP PCR (may need induced sputum by RT for pcp pcr)   -hold off on bronchoscopy at this time    #COPD  Home inhalers are symbicort (ICS/LABA) and Albuterol prn. Inhaled steroids (symbicort) can in general contribute to PNAs and thrush if she is not rinsing her mouth out after each use and especially given her immunocompromised status. Would recommend discontinuing Symbicort and starting Stiolto instead (LAMA/LABA). She currently doesn't appear to be in COPD exacerbation as she is moving air well, lung exam generally clear without wheeze or significant O2 requirement.   -d/c symbicort   -start stiolto   -continue albuterol prn  -will need 2nd covid vaccine at discharge   -encourage smoking cessation     #complex comorbidities  -consider ID/HIV consult given complex infectious disease patient (HIV/AIDS, Hep B/C, trichomonas in urine, PNA, possible esophagitis etc)    Plan to be discussed with Dr. Rausch     54yo F w/ PMH HIV/AIDS (currently on Biktarvy and Bactrim), Hepatitis B/C, COPD (not on home O2, active smoker), rectal cancer s/p radiation therapy, tongue cancer s/p resection, h/o substance use disorder (crack cocaine last use 8/2021) presenting with dysphagia and cough.     #possible HAP  Presented with dysphagia, cough, leukocytosis, fever, hypotension (possibly baseline as noted prior admission and hypoxia (appears last admission normal O2 sat high 90s on RA--initally in ED noted to be 88% and req 2L per flowsheet) Currently on vanc/zosyn agree its reasonable to cover HAP given recent hospitalization although given findings per SLP/barium exam she significantly aspirates and at high risk for aspiration PNA. I walked the patient this evening off supplemental oxygen up and down the hallway with nurse present and no significant desaturation (able to maintain O2 sat at no less than 89%). Given ambulatory sat, recent compliance with bactrim ppx, CXR findings would consider PCP PNA less likely but if deterioration or no improvement would consider bronchoscopy. Could also consider viral PNA (covid negative). MRSA swab positive but given stability seems less likely MRSA PNA.   -agree with abx (currently on vanc/zosyn) and antifungal (fluconazole for potential esophagitis) per primary team  -continue Bactrim pcp ppx  -please send serum LDH, galactomannan, B-Dglucan (fungitell), CMV PCR, chlamydia/GC NAAT, Respiratory viral panel, urine legionella/strep ag  -sputum sample if able for bacterial cx/ PCP PCR (may need induced sputum by RT for pcp pcr)   -hold off on bronchoscopy at this time    #COPD  Home inhalers are symbicort (ICS/LABA) and Albuterol prn. Inhaled steroids (symbicort) can in general contribute to PNAs and thrush if she is not rinsing her mouth out after each use and especially given her immunocompromised status. Would recommend discontinuing Symbicort and starting Stiolto instead (LAMA/LABA). She currently doesn't appear to be in COPD exacerbation as she is moving air well, lung exam generally clear without wheeze or significant O2 requirement.   -d/c symbicort   -start stiolto   -continue albuterol prn  -will need 2nd covid vaccine at discharge   -encourage smoking cessation     #complex comorbidities  -consider ID/HIV consult given complex infectious disease patient (HIV/AIDS, Hep B/C, trichomonas in urine, PNA, possible esophagitis etc)    Plan to be discussed with Dr. Rausch

## 2021-09-28 NOTE — PROGRESS NOTE ADULT - PROBLEM SELECTOR PLAN 4
Pt diagnosed with oral thrush 5d ago at nursing home, started on nystatin with minimal improvement in throat pain. Could not assess whether this is esophageal candidiasis as she did not allow full HEENT exam. Pt reports difficulty swallowing both solids and liquids. Potential etiologies include opportunistic infection 2/2 AIDS w/ CD4<200 vs 2/2 COPD inhaler use if she has not been rinsing after inhaler use. Pt has been able to swallow oral medications.  -Dc'd nystatin  -Started fluconazole - 400mg IV tonight (9/27), then continue 200mg PO x14d (ordered)  -Restarted oxycodone 15mg q6h PRN for severe pain (home medication)  -Consider repeat EKG in a few days now that pt on Fluconazole as it can increase QTc (QTc 417 on 9/27) Pt diagnosed with oral thrush 5d ago at nursing home, started on nystatin with minimal improvement in throat pain. Could not assess whether this is esophageal candidiasis as she did not allow full HEENT exam. Pt reports difficulty swallowing both solids and liquids. Potential etiologies include opportunistic infection 2/2 AIDS w/ CD4<200 vs 2/2 COPD inhaler use if she has not been rinsing after inhaler use. Pt has been able to swallow oral medications.  -Dc'd nystatin  -Started fluconazole - 400mg IV (9/27), continue IV until cleared to tolerate PO  -Restarted oxycodone 15mg q6h PRN for severe pain (home medication)  -Consider repeat EKG in a few days now that pt on Fluconazole as it can increase QTc (QTc 417 on 9/27) Pt diagnosed with oral thrush 5d ago at nursing home, started on nystatin with minimal improvement in throat pain. Could not assess whether this is esophageal candidiasis as she did not allow full HEENT exam. Pt reports difficulty swallowing both solids and liquids. Potential etiologies include opportunistic infection 2/2 AIDS w/ CD4<200 vs 2/2 COPD inhaler use if she has not been rinsing after inhaler use. Pt has been able to swallow oral medications.    -Dc'd nystatin  -Started fluconazole - 400mg IV (9/27), continue IV until cleared to tolerate PO  -Restarted oxycodone 15mg q6h PRN for severe pain (home medication)  -Consider repeat EKG in a few days now that pt on Fluconazole as it can increase QTc (QTc 417 on 9/27) Pt met 2/4 SIRS criteria in the ED (RR 21, WBC 14.53), w/ CXR 9/27 showing b/l lower lobe infiltrates. Therefore PNA confirmed source of infection. Pt is s/p 1.5L NS, zosyn 3.375g x1, vancomycin 750mg x1 in the ED. Lactate 1.7.    -Pt WBC 15.6, RR 16; pt no longer meets SIRS  -c/w D5 1/2 NS @ 80cc/hr maintenance fluids  -C/w ppx bactrim for PCP (AIDS, last CD4 <200)  -c/w zosyn 3.375g q6h  -c/w vancomycin 750mg qd for x2 more doses (check trough before dose on 9/30 if pt still needs vanc)

## 2021-09-28 NOTE — DIETITIAN INITIAL EVALUATION ADULT. - PROBLEM SELECTOR PLAN 1
Pt met 2/4 SIRS criteria in the ED (RR 21, WBC 14.53), w/ CXR 9/27 showing b/l lower lobe infiltrates. Therefore PNA confirmed source of infection. Pt is s/p 1.5L NS, zosyn 3.375g x1, vancomycin 750mg x1 in the ED. Lactate 1.7.  -Started D5 1/2 NS @ 80cc/hr maintenance fluids  -Started vancomycin 750mg qd x2 more doses (check trough before dose on 9/30 if pt still needs vanc)  -Started zosyn 3.375g q6h  -Continued bactrim for PCP ppx given pt has AIDS, last CD4 <200  -F/u MRSA pcr (ordered) and can consider dc'ing vancomycin if negative  -F/u fungitell, galactomannan, LDH, fungal bcx

## 2021-09-28 NOTE — CHART NOTE - NSCHARTNOTEFT_GEN_A_CORE
55F w/ AIDS, recently admitted for COPD exacerbation which improved w/ therapy.  OI w/u during that recent admission unremarkable.  Patient now back w/ worsening dysphagia and found to have hypoxic/hypercapneic respiratory failure.     Patient has been adherent to ARV use while at rehab.     She started having difficulty and pain w/ swallowing followed by pain while talking.  Reports no PO intake in about 5 days due to pain.    Hypoxic on admission.      REVIEW OF SYSTEMS: Dysphagia per above.    Outpatient HIV Provider: Dr. Gavin  Year of HIV Diagnosis: 1993  T cell joanna: Unknown, but current level at 37  Current ARV regimen: biktarvy   ARV adherence: Optimal since prev admission.  Previous ARV regimens: patient unsure   Hx of Past Oppurtunistic Infections: unknown     PAST MEDICAL & SURGICAL HISTORY:  HIV disease  Advanced COPD  Tongue cancer  Rectal cancer  Hepatitis B  Hepatitis C      No significant past surgical history        Social Hx:  Sexual History: Last sexual activity in 2014  Sexual Activity: Since 2014  Number of Current Partners: unknown  Number of Lifetime Partners: Unknown     STI Hx: Unknown     ICU Vital Signs Last 24 Hrs  T(C): 36.7 (28 Sep 2021 09:11), Max: 38.3 (28 Sep 2021 04:21)  T(F): 98.1 (28 Sep 2021 09:11), Max: 100.9 (28 Sep 2021 04:21)  HR: 82 (28 Sep 2021 04:21) (76 - 82)  BP: 83/40 (28 Sep 2021 09:11) (83/40 - 93/56)  RR: 16 (28 Sep 2021 09:11) (16 - 22)  SpO2: 97% (28 Sep 2021 09:11) (85% - 97%)    PHYSICAL EXAM  General: A&Ox3; NAD  Head: NC/AT; PERRL; EOMI; surgical intervention noted in tongue and jaw; oropharyngeal thrush  Neck: Supple; no JVD  Respiratory: CTA B/L; mild wheezing on exp.  No crackles auscultated.   Cardiovascular: Regular rhythm/rate; S1/S2   Gastrointestinal: Soft; NTND; normoactive BS  Extremities: WWP; no edema/cyanosis  Neurological:  CNII-XII grossly intact; no obvious focal deficits    Home Medications:  albuterol 90 mcg/inh inhalation aerosol: 2 puff(s) inhaled every 6 hours, As Needed (29 Aug 2021 05:47)  atorvastatin 40 mg oral tablet: 1 tab(s) orally once a day (29 Aug 2021 05:47)  Bactrim 400 mg-80 mg oral tablet: 1 tab(s) orally once a day (29 Aug 2021 05:47)  Biktarvy oral tablet: 1 tab(s) orally once a day (29 Aug 2021 05:47)  diphenhydrAMINE 25 mg oral capsule: 1 cap(s) orally every 4 hours, As needed, Rash and/or Itching (09 Sep 2021 12:12)  enoxaparin:  (09 Sep 2021 12:12)  enoxaparin: 40 milligram(s) injectable once a day (09 Sep 2021 12:14)  melatonin 3 mg oral tablet: 1 tab(s) orally once a day (at bedtime), As needed, Insomnia (09 Sep 2021 12:12)  midodrine 5 mg oral tablet: 1 tab(s) orally once a day (29 Aug 2021 05:47)  mirtazapine 15 mg oral tablet: 1 tab(s) orally once a day (at bedtime) (29 Aug 2021 05:47)  nicotine 7 mg/24 hr transdermal film, extended release: 7 mg/24h transdermal once a day, As Needed (09 Sep 2021 12:12)  OLANZapine 5 mg oral tablet: 1 tab(s) orally once a day (29 Aug 2021 05:47)  polyethylene glycol 3350 oral powder for reconstitution: 17 gram(s) orally once a day (09 Sep 2021 12:12)  senna oral tablet: 2 tab(s) orally once a day (at bedtime) (09 Sep 2021 12:12)  Symbicort 80 mcg-4.5 mcg/inh inhalation aerosol: 2 puff(s) inhaled 2 times a day (29 Aug 2021 05:47)      Allergies  No Known Allergies    Labs / Images reviewed:  VL now down <200.  CXR w/ B/L LL infiltrates    Assessment and Recs:  55F w/ AIDS, tongue cancer s/p resection presents w/ oropharyngeal (and likely) esophgeal thrush also found to have hypoxic resp failure 2/2 to PNA.  CD4 still severely suppressed.  VL now <200 indicated adherence over the past month.  CMV PCR negative last visit makes CMV esophagitis less likely.  Symptoms likely 2/2 thrush.  C/w fluconazole.   Can continue Biktarvy once no longer NPO.  Repeat OI w/u is not necessary at this time given her symptoms.  If she does not improve w/ treatment for HAP / Aspiration PNA, would consider bronch to r/o PCP.  C/w PCP PPx at this time.  F/u labs and sputum studies as per pulm.  Trichomonas on UA: On flagyl  Hep C: Will need viral load and genotyping in the future as outpatient.    HIV team will continue to follow.

## 2021-09-28 NOTE — PROGRESS NOTE ADULT - PROBLEM SELECTOR PLAN 1
Pt met 2/4 SIRS criteria in the ED (RR 21, WBC 14.53), w/ CXR 9/27 showing b/l lower lobe infiltrates. Therefore PNA confirmed source of infection. Pt is s/p 1.5L NS, zosyn 3.375g x1, vancomycin 750mg x1 in the ED. Lactate 1.7.  -Started D5 1/2 NS @ 80cc/hr maintenance fluids  -Started vancomycin 750mg qd x2 more doses (check trough before dose on 9/30 if pt still needs vanc)  -Started zosyn 3.375g q6h  -Continued bactrim for PCP ppx given pt has AIDS, last CD4 <200  -F/u MRSA pcr (ordered) and can consider dc'ing vancomycin if negative  -F/u fungitell, galactomannan, LDH, fungal bcx Pt met 2/4 SIRS criteria in the ED (RR 21, WBC 14.53), w/ CXR 9/27 showing b/l lower lobe infiltrates. Therefore PNA confirmed source of infection. Pt is s/p 1.5L NS, zosyn 3.375g x1, vancomycin 750mg x1 in the ED. Lactate 1.7.    -Pt WBC at 15.6, RR of 16 no longer meets SIRS  -c/w D5 1/2 NS @ 80cc/hr maintenance fluids  -c/w vancomycin 750mg qd x2 more doses (check trough before dose on 9/30 if pt still needs vanc)  -c/w zosyn 3.375g q6h  -Continued bactrim for PCP ppx given pt has AIDS, last CD4 <200 Pt met 2/4 SIRS criteria in the ED (RR 21, WBC 14.53), w/ CXR 9/27 showing b/l lower lobe infiltrates. Therefore PNA confirmed source of infection. Pt is s/p 1.5L NS, zosyn 3.375g x1, vancomycin 750mg x1 in the ED. Lactate 1.7.    -Pt WBC 15.6, RR 16; pt no longer meets SIRS  -c/w D5 1/2 NS @ 80cc/hr maintenance fluids  -C/w ppx bactrim for PCP (AIDS, last CD4 <200)  -c/w zosyn 3.375g q6h  -c/w vancomycin 750mg qd for x2 more doses (check trough before dose on 9/30 if pt still needs vanc) Pt diagnosed with oral thrush 5d ago at nursing home, started on nystatin with minimal improvement in throat pain. Could not assess whether this is esophageal candidiasis as she did not allow full HEENT exam. Pt reports difficulty swallowing both solids and liquids. Potential etiologies include opportunistic infection 2/2 AIDS w/ CD4<200 vs 2/2 COPD inhaler use if she has not been rinsing after inhaler use. Pt has been able to swallow oral medications.    -Dc'd nystatin  -Started fluconazole - 400mg IV (9/27), continue IV until cleared to tolerate PO  -Restarted oxycodone 15mg q6h PRN for severe pain (home medication)  -Consider repeat EKG in a few days now that pt on Fluconazole as it can increase QTc (QTc 417 on 9/27)  - Speech and Swallow evaluated pt, recommended MBS.   - MBS revealed pt is aspirating everything; S&S recc NGT for medications and nutrition  - Pt adamently refused NGT, stating "I don't need that". She insisted on eating despite explaining to her the results of the MBS in layman's terms.  - Converted majority of pt's medications to IV

## 2021-09-28 NOTE — PROGRESS NOTE ADULT - PROBLEM SELECTOR PLAN 12
F: D5 1/2 NS @ 80cc/hr (maintenance dose)  E: replete as needed  Diet: NPO for now until speech and swallow eval in AM  GI ppx: n/a  DVT ppx: 30mg lovenox subq qd  Code status: FULL CODE  Dispo: [facility] F: D5 1/2 NS @ 80cc/hr (maintenance dose)  E: replete as needed  Diet: NPO, MBS found pt is silently aspirating everything   GI ppx: n/a  DVT ppx: 30mg lovenox subq qd  Code status: FULL CODE  Dispo: [facility]

## 2021-09-28 NOTE — SWALLOW VFSS/MBS ASSESSMENT ADULT - DIAGNOSTIC IMPRESSIONS
Pt p/w a moderate oral stage and a severe-profound pharyngeal stage dysphagia characterized by silent aspiration of all tested consistencies. It is suspected that Pt had a baseline dysphagia d/t complex medical hx including left oral tongue resxn (2014) that is exacerbated in setting of thrush and sepsis. Further work-up by ENT is warranted to help determine prognosis for improvement.

## 2021-09-28 NOTE — DISCHARGE NOTE PROVIDER - CARE PROVIDER_API CALL
Amparo Saunders  OTOLARYNGOLOGY  [address] [place] [address]  Phone: [phone]  Fax: [phone]  Established Patient  Follow Up Time: 1 week

## 2021-09-28 NOTE — DIETITIAN INITIAL EVALUATION ADULT. - PROBLEM SELECTOR PLAN 9
Pt has stage 4 pressure ulcer on R sacrum measuring 4cm x 2cm x 0.2cm - staged and managed last admission by wound care. Ulcer currently covered w/ bandage and tape. Wound currently covered with bandage but pt has trouble lying on R side due to pain. Last admission wound care recs: Aquacel Extra, cut piece to fit over wound, cover with foam dressing  -Restarted home oxycodone for pain  -Consider wound care consult in AM

## 2021-09-28 NOTE — PROGRESS NOTE ADULT - PROBLEM SELECTOR PLAN 8
Pt UA positive for few trichomonas  -Started metronidazole 500mg BID x7d Pt has stage 4 pressure ulcer on R sacrum measuring 4cm x 2cm x 0.2cm - staged and managed last admission by wound care. Ulcer currently covered w/ bandage and tape. Wound currently covered with bandage but pt has trouble lying on R side due to pain. Last admission wound care recs: Aquacel Extra, cut piece to fit over wound, cover with foam dressing  -Restarted home oxycodone for pain  -Consider wound care consult in AM

## 2021-09-28 NOTE — DIETITIAN INITIAL EVALUATION ADULT. - PROBLEM SELECTOR PLAN 6
Pt has known diagnosis of AIDS, follows w/ Dr. Romaine Nava at [facility]. Last known CD4 37, ,732 (8/29/2021). Pt had not been compliant with Biktarvy, but since recent hospitalization (discharged 9/9/2021) has been on Biktarvy and Bactrim.   -Restarted Biktarvy  -Restarted Bactrim  -F/u CD4, VL sent this admission  -Consider HIV consult in AM

## 2021-09-28 NOTE — SWALLOW BEDSIDE ASSESSMENT ADULT - COMMENTS
Received awake & alert, sitting up in bed. Pt is pleasant & cooperative, and reports that prior to this episode, she was tolerating very soft foods and thin liquids. She said, "I don't wanna choke, so I'm careful. I know what I can eat."

## 2021-09-28 NOTE — PROGRESS NOTE ADULT - PROBLEM SELECTOR PLAN 5
Pt has known h/o COPD. Current smoker (cut down to 6 cigarettes/day but has smoked since she was 13yo). Uses Symbicort 160-4.5mcg/act and ventolin HFA at home. Currently on 1.5L NC.  -C/w O2 by NC  -Started duonebs q6h  -Restarted symbicort  -Treating PNA as above  -Nicotine patch (14mg/24h) ordered  - re smoking cessation Pt has known h/o COPD. Current smoker (cut down to 6 cigarettes/day but has smoked since she was 13yo). Uses Symbicort 160-4.5mcg/act and ventolin HFA at home. Currently on 1.5L NC.  -C/w O2 by NC, wean down as tolerated.  -Started duonebs q6h  -Restarted symbicort  -Treating PNA as above  -Nicotine patch (14mg/24h) ordered  - re smoking cessation Pt has known h/o COPD. Current smoker (cut down to 6 cigarettes/day but has smoked since she was 11yo). Uses Symbicort 160-4.5mcg/act and ventolin HFA at home. Currently on 1.5L NC.    -C/w O2 by NC, wean down as tolerated.  -Started duonebs q6h  -Restarted symbicort  -Treating PNA as above  -Nicotine patch (14mg/24h) ordered  - re smoking cessation Pt has known diagnosis of AIDS, follows w/ Dr. Romaine Nava at [facility]. Last known CD4 37, ,732 (8/29/2021). Pt had not been compliant with Biktarvy, but since recent hospitalization (discharged 9/9/2021) has been on Biktarvy and Bactrim. CD4 count 59 (9/28/2021).   -Restarted Biktarvy  -Restarted ppx Bactrim  -F/u VL this admission

## 2021-09-28 NOTE — SWALLOW VFSS/MBS ASSESSMENT ADULT - BEHAVIORAL MODIFICATIONS
A head turn left facilitated bolus transit through the PE segment, but did not completely eliminate stasis.

## 2021-09-28 NOTE — DIETITIAN INITIAL EVALUATION ADULT. - PROBLEM SELECTOR PLAN 4
Pt diagnosed with oral thrush 5d ago at nursing home, started on nystatin with minimal improvement in throat pain. Could not assess whether this is esophageal candidiasis as she did not allow full HEENT exam. Pt reports difficulty swallowing both solids and liquids. Potential etiologies include opportunistic infection 2/2 AIDS w/ CD4<200 vs 2/2 COPD inhaler use if she has not been rinsing after inhaler use. Pt has been able to swallow oral medications.  -Dc'd nystatin  -Started fluconazole - 400mg IV tonight (9/27), then continue 200mg PO x14d (ordered)  -Restarted oxycodone 15mg q6h PRN for severe pain (home medication)  -Consider repeat EKG in a few days now that pt on Fluconazole as it can increase QTc (QTc 417 on 9/27)

## 2021-09-28 NOTE — PROGRESS NOTE ADULT - PROBLEM SELECTOR PLAN 2
Hypoxic, hypercapnic respiratory failure.  Pt had SpO2 88% on RA on presentation to ED. Has h/o COPD, not on home O2.  VBG revealed pH 7.34, pCO2 57, HCO3 31 indicating patient is hypercapnic with compensation. This may reflect some chronic CO2 retention 2/2 COPD, but given acute change in respiratory status it is more likely 2/2 PNA.  CXR 9/27 revealed b/l lower lobe opacities concerning for PNA, more specifically HAP vs aspiration PNA given recent hospitalization at [facility] as well as that she lives in a nursing home, as well as recent h/o dysphagia, oral thrush and new occasionally productive cough. Pt is COVID NEGATIVE.  -Placed on 1L O2, increased to 1.5L for tachypnea, and accessory muscle use. Repeat SpO2 94%.  -Started duonebs and restarted home symbicort  -S/p 1.5L NS, zosyn 3.375g, vancomycin 750mg in ED  -Started zosyn and vancomycin for coverage of both HAP and potential aspiration PNA (given pt recent dysphagia possibly having led to aspiration event/s) -- see below Problem/Plan 3 "HAP" Hypoxic, hypercapnic respiratory failure.  Pt had SpO2 88% on RA on presentation to ED. Has h/o COPD, not on home O2.  VBG revealed pH 7.34, pCO2 57, HCO3 31 indicating patient is hypercapnic with compensation. This may reflect some chronic CO2 retention 2/2 COPD, but given acute change in respiratory status it is more likely 2/2 PNA.  CXR 9/27 revealed b/l lower lobe opacities concerning for PNA, more specifically HAP vs aspiration PNA given recent hospitalization at [facility] as well as that she lives in a nursing home, as well as recent h/o dysphagia, oral thrush and new occasionally productive cough. Pt is COVID NEGATIVE.  -Placed on 1L O2, increased to 1.5L for tachypnea, and accessory muscle use. Repeat SpO2 94%. Wean down as tolerated.   -Started duonebs and restarted home symbicort  -S/p 1.5L NS, zosyn 3.375g, vancomycin 750mg in ED  -Started zosyn and vancomycin for coverage of both HAP and potential aspiration PNA (given pt recent dysphagia possibly having led to aspiration event/s) -- see below Problem/Plan 3 "HAP" Hypoxic, hypercapnic respiratory failure.  Pt had SpO2 88% on RA on presentation to ED. Has h/o COPD, not on home O2.  VBG revealed pH 7.34, pCO2 57, HCO3 31 indicating patient is hypercapnic with compensation. This may reflect some chronic CO2 retention 2/2 COPD, but given acute change in respiratory status it is more likely 2/2 PNA.  CXR 9/27 revealed b/l lower lobe opacities concerning for PNA, more specifically HAP vs aspiration PNA given recent hospitalization at [facility] as well as that she lives in a nursing home, as well as recent h/o dysphagia, oral thrush and new occasionally productive cough. Pt is COVID NEGATIVE.  -Placed on 1L O2, increased to 1.5L for tachypnea, and accessory muscle use. Repeat SpO2 94%. Wean down as tolerated.   -Started duonebs  -S/p 1.5L NS, zosyn 3.375g, vancomycin 750mg in ED  -Started zosyn and vancomycin for coverage of both HAP and potential aspiration PNA (given pt recent dysphagia possibly having led to aspiration event/s) -- see below Problem/Plan 3 "HAP"  -Pulmonary recs appreciated: ambulatory O2 sats, d/c symbicort, start stiolto Hypoxic, hypercapnic respiratory failure. Pt had SpO2 88% on RA on presentation to ED. Has h/o COPD, not on home O2. VBG revealed pH 7.34, pCO2 57, HCO3 31 indicating patient is hypercapnic with compensation. This may reflect some chronic CO2 retention 2/2 COPD, but given acute change in respiratory status it is more likely 2/2 PNA.    -CXR (9/27) revealed b/l lower lobe opacities concerning for PNA;  HAP vs aspiration PNA given recent hospitalization at [facility] + pt lives in nursing home  -S/p 1.5L NS, zosyn 3.375g, vancomycin 750mg in ED  -C/w Vanc + Zosyn cover HAP and potential aspiration PNA  -Pt on 1.5L O2, wean down as tolerated; Sp%O2 was 97% on 2L  -Per Pulm reccs, f/u ambulatory O2 stats, c/w duonebs, and d/c symbicort started Stiolto Pt presented with respiratory difficulty, new cough, dysphagia and throat pain. Pt afebrile w/ leukocytosis & tachypnea. CXR showed RLL opacity. Pt had recent hospitalization at [facility] at end of August-early September for failure to thrive and lives in a nursing home; is at increased risk for HAP.    -CXR (9/27) revealed b/l lower lobe opacities concerning for PNA;  HAP vs aspiration PNA given recent hospitalization at [facility] + pt lives in nursing home  -c/w zosyn and vancomycin for HAP coverage  -Pt on 1.5L O2, wean down as tolerated; Sp%O2 was 97% on 2L  -Per Pulm reccs, f/u ambulatory O2 stats, c/w duonebs, and d/c symbicort started Stiolto  -F/u MRSA swab; if negative can dc vanc  -F/u fungitell, galactomannan, LDH, fungal bcx Pt presented with respiratory difficulty, new cough, dysphagia and throat pain. Pt afebrile w/ leukocytosis & tachypnea. CXR showed RLL opacity. Pt had recent hospitalization at [facility] at end of August-early September for failure to thrive and lives in a nursing home; is at increased risk for HAP.    -CXR (9/27) revealed b/l lower lobe opacities concerning for PNA;  HAP vs aspiration PNA given recent hospitalization at [facility] + pt lives in nursing home  -c/w zosyn and vancomycin for HAP coverage  -Pt on 1.5L O2, wean down as tolerated; Sp%O2 was 97% on 2L  -Per Pulm reccs, f/u ambulatory O2 stats, c/w duonebs, and d/c symbicort started Stiolto  -MRSA positive; continue vancomycin  -F/u fungitell, galactomannan, LDH, fungal bcx

## 2021-09-28 NOTE — PROGRESS NOTE ADULT - PROBLEM SELECTOR PLAN 11
Pt may have h/o Hep C (documented in ED note). Not currently on medication. Unknown at this time whether she was ever treated in the past. Liver panel wnl this admission.  -Can consider sending hepatitis panel to verify    #H/o Hep B  -Pt may have h/o Hep B (documented in ED note).  -Can consider sending hepatitis panel to verify F: D5 1/2 NS @ 80cc/hr (maintenance dose)  E: replete as needed  Diet: NPO, MBS found pt is silently aspirating everything   GI ppx: n/a  DVT ppx: 30mg lovenox subq qd  Code status: FULL CODE  Dispo: [facility]

## 2021-09-28 NOTE — SWALLOW BEDSIDE ASSESSMENT ADULT - PHARYNGEAL PHASE
Hyolaryngeal excursion palpated during swallow trigger. Suspect posterior bolus loss with thin resulting in mistimed airway protection AEB +immed cough on 1st sip of water. With smaller 1/2 tsp sips, no immed cough, but voice remained wet across trials (thin & NTL) suggestive of persistent pen/asp and/or inability to clear aspirated material.

## 2021-09-28 NOTE — DIETITIAN INITIAL EVALUATION ADULT. - PROBLEM SELECTOR PLAN 10
Pt has h/o tongue cancer s/p resection +/- graft placement. Unable to assess throat on physical exam as difficult for patient to open her mouth. Pt on oxycodone 15mg at home for pain.  -Treating oral thrush as above  -Restarted oxycodone and benadryl (for pruritis 2/2 chronic opioid use)  -Started bowel regimen (miralax, senna) for constipation 2/2 decreased PO intake and chronic opioid use

## 2021-09-28 NOTE — DISCHARGE NOTE PROVIDER - HOSPITAL COURSE
56yo F w/ PMH AIDS (currently on Biktarvy and Bactrim), Hepatitis B/C, COPD (not on home O2, actively smokes), rectal cancer s/p radiation therapy, tongue cancer s/p resection, h/o crack use (last use 8/2021) presenting to ED from nursing home complaining of 6 days of worsening dysphagia, throat pain and cough, found to be hypoxic to 88% and has oral thrush. Admitted to [facility] for treatment of hypercapnic, hypoxic respiratory failure, sepsis 2/2 presumed PNA and recent dysphagia.    Problem List/Main Diagnoses (system-based):       Inpatient treatment course:       New medications:   Labs to be followed outpatient:   Exam to be followed outpatient:    56yo F w/ PMH AIDS (currently on Biktarvy and Bactrim), Hepatitis B/C, COPD (not on home O2, actively smokes), rectal cancer s/p radiation therapy, tongue cancer s/p resection, h/o crack use (last use 8/2021) presenting to ED from nursing home complaining of 6 days of worsening dysphagia, throat pain and cough, found to be hypoxic to 88% and has oral thrush. Admitted to [facility] for treatment of hypercapnic, hypoxic respiratory failure, sepsis 2/2 presumed PNA and recent dysphagia.    Problem List/Main Diagnoses (system-based):     Oral thrush.   -Pt diagnosed with oral thrush 5d ago at nursing home, started on nystatin with minimal improvement in throat pain. Could not assess whether this is esophageal candidiasis as she did not allow full HEENT exam at that time. Pt reports difficulty swallowing both solids and liquids. Potential etiologies include opportunistic infection 2/2 AIDS w/ CD4<200 vs 2/2 COPD inhaler use if she has not been rinsing after inhaler use. Pt has been able to swallow oral medications.  -discontinued nystatin and started fluconazole - 400mg IV (9/27), received 200mg (9/28 &29) and tolerated well, increased back to 400mg IV until cleared to tolerate PO  -f/u EKG 10/1 to check QTc (QTc 417 on 9/27)  -Restarted oxycodone 15mg q6h PRN for severe pain (home medication)  -Speech and Swallow (S&S) evaluated pt, and modified barium swallow (MBS) revealed pt is aspirating everything; S&S recc NGT for medications and nutrition  -Pt adamantly refused NGT, stating "I don't need that". She insisted on eating despite explaining to her the results of the MBS in layman's terms.  -Converted majority of pt's medications to IV  -Oral hygiene provided to remove extensions of thrush from tongue to soft palate to provide for symptom improvement  -Chest physical therapy and mucomyst improved pt secretions and dysphagia   -Discussed with patient at length on progression of nutrition after her thrush and pneumonia improve; pt was amenable to this      HAP (hospital-acquired pneumonia).   -Pt presented with respiratory difficulty, new cough, dysphagia and throat pain. Pt afebrile w/ leukocytosis & tachypnea   -CXR (9/27) revealed b/l lower lobe opacities concerning for PNA;  HAP vs aspiration PNA given recent hospitalization at [facility] + pt lives in nursing home  -pt was MRSA positive, so vancomycin antibiotic continued  -Legionella urine analysis & respiratory viral panel (RVP) was Negative  -pt received Zosyn and Vancomycin for HAP coverage; Vancomycin trough on 9/30 showed______  -Per Pulm reccs: pt recieving duonebulizer and was started on Stiolto  -Pt on 1.5L O2, wean down as tolerated; Sp%O2 was 97% on 2L      COPD (chronic obstructive pulmonary disease).   -Pt has known history of COPD. Current smoker (cut down to 6 cigarettes/day but has smoked since she was 13yo). Uses Symbicort 160-4.5mcg/act and ventolin HFA at home. Currently spO2 90% on room air.   -Treating PNA as above  -c/w duonebs q6h  -Started stiolto  -Incentive Spirometer  -Nicotine patch (14mg/24h) ordered  -Re- smoking cessation.       Problem/Plan - 4:  ·  Problem: Sepsis.   ·  Plan: Pt met 2/4 SIRS criteria in the ED (RR 21, WBC 14.53), w/ CXR 9/27 showing b/l lower lobe infiltrates. Therefore PNA confirmed source of infection. Pt is s/p 1.5L NS, zosyn 3.375g x1, vancomycin 750mg x1 in the ED. Lactate 1.7.    -Pt WBC 15.6, RR 16; pt no longer meets SIRS  -c/w D5 1/2 NS @ 80cc/hr maintenance fluids  -C/w ppx bactrim for PCP (AIDS, last CD4 <200)  -c/w zosyn 3.375g q6h  -c/w vancomycin 750mg qd, MRSA swab positive  -f/u Vanc trough (9/30).    AIDS.   -Pt has known diagnosis of AIDS, follows w/ Dr. Romaine Nava at [facility]. Pt had not been compliant with Biktarvy, but since recent hospitalization (discharged 9/9/2021) has been on Biktarvy and Bactrim. CD4 count 59, viral load 142 (9/28/2021).   -Continue ppx Bactrim  -Per HIV team recs:   -CMV PCR negative last visit makes CMV esophagitis less likely.  Symptoms likely 2/2 thrush.    -C/w fluconazole. Can continue Biktarvy once no longer NPO.    - Repeat OI w/u is not necessary at this time given her symptoms.    - If she does not improve w/ treatment for HAP / Aspiration PNA, would consider bronch to r/o PCP.    - C/w PCP PPx at this time.     - Trichomonas on UA: On flagyl.    - Hep C: Will need viral load and genotyping in the future as outpatient.      Trichomonas vaginalis infection   -Pt UA positive for few trichomonas  -Started metronidazole 500mg BID x7d (currently day 3/7).      Tongue cancer   -Pt has h/o tongue cancer s/p resection +/- graft placement. Unable to assess throat on physical exam as difficult for patient to open her mouth. Pt on oxycodone 15mg at home for pain. Pt currently has dysphagia as well, unable to tolerate PO without aspiration.   -Treating oral thrush as above  -Restarted oxycodone; benadryl (for pruritis 2/2 chronic opioid use)  -Started bowel regimen (miralax, senna) for constipation 2/2 decreased PO intake and chronic opioid use.      Ulcer of sacral region, stage 4   -Pt has stage 4 pressure ulcer on R sacrum measuring 4cm x 2cm x 0.2cm - staged and managed last admission by wound care.   -Ulcer currently covered w/ bandage and tape; but pt has trouble lying on R side due to pain  -wound care recs: Aquacel Extra, cut piece to fit over wound, cover with foam dressing  -dressings changed every other day    Inpatient treatment course:       New medications:   Labs to be followed outpatient:   Exam to be followed outpatient:    54yo F w/ PMH AIDS (currently on Biktarvy and Bactrim), Hepatitis B/C, COPD (not on home O2, actively smokes), rectal cancer s/p radiation therapy, tongue cancer s/p resection, h/o crack use (last use 8/2021) presenting to ED from nursing home complaining of 6 days of worsening dysphagia, throat pain and cough, found to be hypoxic to 88% and has oral thrush. Admitted to [facility] for treatment of hypercapnic, hypoxic respiratory failure, sepsis 2/2 presumed PNA and recent dysphagia.    Problem List/Main Diagnoses (system-based):     Oral thrush:   -Pt diagnosed with oral thrush 5d ago at nursing home, started on nystatin with minimal improvement in throat pain. Could not assess whether this is esophageal candidiasis as she did not allow full HEENT exam at that time. Pt reports difficulty swallowing both solids and liquids. Potential etiologies include opportunistic infection 2/2 AIDS w/ CD4<200 vs 2/2 COPD inhaler use if she has not been rinsing after inhaler use. Pt has been able to swallow oral medications.  -discontinued nystatin and started fluconazole - 400mg IV (9/27), received 200mg (9/28 &29) and tolerated well, increased back to 400mg IV until cleared to tolerate PO  -f/u EKG 10/1 to check QTc (QTc 417 on 9/27)  -Restarted oxycodone 15mg q6h PRN for severe pain (home medication)  -Speech and Swallow (S&S) evaluated pt, and modified barium swallow (MBS) revealed pt is aspirating everything; S&S recc NGT for medications and nutrition  -Pt adamantly refused NGT, stating "I don't need that". She insisted on eating despite explaining to her the results of the MBS in layman's terms.  -Converted majority of pt's medications to IV  -Oral hygiene provided to remove extensions of thrush from tongue to soft palate to provide for symptom improvement  -Chest physical therapy and mucomyst improved pt secretions and dysphagia   -Discussed with patient at length on progression of nutrition after her thrush and pneumonia improve; pt was amenable to this      HAP (hospital-acquired pneumonia):   -Pt presented with respiratory difficulty, new cough, dysphagia and throat pain. Pt afebrile w/ leukocytosis & tachypnea   -CXR (9/27) revealed b/l lower lobe opacities concerning for PNA;  HAP vs aspiration PNA given recent hospitalization at [facility] + pt lives in nursing home  -pt was MRSA positive, so vancomycin antibiotic continued  -Legionella urine analysis & respiratory viral panel (RVP) was Negative  -pt received Zosyn and Vancomycin for HAP coverage; Vancomycin trough on 9/30 showed______  -Per Pulm reccs: pt recieving duonebulizer and was started on Stiolto  -Pt on prn 1L O2 on Nasal Cannula, wean down as tolerated; Sp%O2 was 97% on 2L      COPD (chronic obstructive pulmonary disease):   -Pt has known history of COPD. Current smoker (cut down to 6 cigarettes/day but has smoked since she was 13yo). Uses Symbicort 160-4.5mcg/act and ventolin HFA at home. Currently spO2 90% on room air.   -Treating PNA as above  -c/w duonebs q6h  -Started stiolto  -Incentive Spirometer  -Nicotine patch (14mg/24h) ordered  -Re- smoking cessation      AIDS:  -Pt has known diagnosis of AIDS, follows w/ Dr. Romaine Nava at [facility]. Pt had not been compliant with Biktarvy, but since recent hospitalization (discharged 9/9/2021) has been on Biktarvy and Bactrim. CD4 count 59, viral load 142 (9/28/2021).   -Continued ppx Bactrim  -Per HIV team recs: CMV PCR negative last visit makes CMV esophagitis less likely.  Symptoms likely 2/2 thrush. Continued with IV fluconazole and PO Biktarvy (once she could tolerate PO again)       Trichomonas vaginalis infection:   -Pt urine analysis was positive for few trichomonas  -received metronidazole 500mg BID x7d.      Tongue cancer:  -Pt has h/o tongue cancer s/p resection +/- graft placement. Unable to assess throat on physical exam as difficult for patient to open her mouth. Pt on oxycodone 15mg at home for pain. Pt currently has dysphagia as well, unable to tolerate PO without aspiration.   -Treating oral thrush as above  -Restarted oxycodone; benadryl (for pruritis secondary to (2/2) chronic opioid use)  -Patient placed on bowel regimen (miralax, senna) for constipation 2/2 decreased PO intake and chronic opioid use      Ulcer of sacral region, stage 4:  -Pt has stage 4 pressure ulcer on R sacrum measuring 4cm x 2cm x 0.2cm - staged and managed last admission by wound care.   -Ulcer currently covered w/ bandage and tape; but pt has trouble lying on R side due to pain  -wound care recs: Aquacel Extra, cut piece to fit over wound, cover with foam dressing (changed every other day)    Sepsis:  -Pt initially met 2/4 SIRS criteria in the ED (RR 21, WBC 14.53), w/ CXR 9/27 showing b/l lower lobe infiltrates. Therefore PNA confirmed source of infection. Pt is s/p 1.5L NS, zosyn 3.375g x1, vancomycin 750mg x1 in the ED. Lactate 1.7.  -Pt WBC 15.6, RR 16 on following day; pt no longer meets SIRS  -placed on D5 1/2 NS @ 80cc/hr maintenance fluids  -Prophylactic bactrim for PCP (since pt has AIDS, last CD4 <200)  -Also received Zosyn 3.375g q6h abd Vancomycin 750mg qd    Inpatient treatment course:       New medications: Stiolto   Labs to be followed outpatient:   Exam to be followed outpatient:    54yo F w/ PMH AIDS (currently on Biktarvy and Bactrim), Hepatitis B/C, COPD (not on home O2, actively smokes), rectal cancer s/p radiation therapy, tongue cancer s/p resection, h/o crack use (last use 8/2021) presenting to ED from nursing home complaining of 6 days of worsening dysphagia, throat pain and cough, found to be hypoxic to 88% and has oral thrush. Admitted to [facility] for treatment of hypercapnic, hypoxic respiratory failure, sepsis 2/2 presumed PNA and recent dysphagia.    Problem List/Main Diagnoses (system-based):     Oral thrush:   -Pt diagnosed with oral thrush 5d ago at nursing home, started on nystatin with minimal improvement in throat pain. Could not assess whether this is esophageal candidiasis as she did not allow full HEENT exam at that time. Pt reports difficulty swallowing both solids and liquids. Potential etiologies include opportunistic infection 2/2 AIDS w/ CD4<200 vs 2/2 COPD inhaler use if she has not been rinsing after inhaler use. Pt has been able to swallow oral medications.  -discontinued nystatin and started fluconazole - 400mg IV (9/27), received 200mg (9/28 &29) and tolerated well, increased back to 400mg IV until cleared to tolerate PO  -f/u EKG 10/1 to check QTc (QTc 417 on 9/27)  -Restarted oxycodone 15mg q6h PRN for severe pain (home medication)  -Speech and Swallow (S&S) evaluated pt, and modified barium swallow (MBS) revealed pt is aspirating everything; S&S recc NGT for medications and nutrition  -Pt adamantly refused NGT, stating "I don't need that". She insisted on eating despite explaining to her the results of the MBS in layman's terms.  -Converted majority of pt's medications to IV  -Oral hygiene provided to remove extensions of thrush from tongue to soft palate to provide for symptom improvement  -Chest physical therapy and mucomyst improved pt secretions and dysphagia   -Discussed with patient at length on progression of nutrition after her thrush and pneumonia improve; pt was amenable to this      HAP (hospital-acquired pneumonia):   -Pt presented with respiratory difficulty, new cough, dysphagia and throat pain. Pt afebrile w/ leukocytosis & tachypnea   -CXR (9/27) revealed b/l lower lobe opacities concerning for PNA;  HAP vs aspiration PNA given recent hospitalization at [facility] + pt lives in nursing home  -pt was MRSA positive, so vancomycin antibiotic continued  -Legionella urine analysis & respiratory viral panel (RVP) was Negative  -pt received Zosyn and Vancomycin for HAP coverage; Vancomycin trough on 9/30 showed______  -Per Pulm reccs: pt recieving duonebulizer and was started on Stiolto  -Pt on prn 1L O2 on Nasal Cannula, wean down as tolerated; Sp%O2 was 97% on 2L      COPD (chronic obstructive pulmonary disease):   -Pt has known history of COPD. Current smoker (cut down to 6 cigarettes/day but has smoked since she was 11yo). Uses Symbicort 160-4.5mcg/act and ventolin HFA at home. Currently spO2 90% on room air.   -Treating PNA as above  -c/w duonebs q6h  -Started stiolto  -Incentive Spirometer  -Nicotine patch (14mg/24h) ordered  -Re- smoking cessation      AIDS:  -Pt has known diagnosis of AIDS, follows w/ Dr. Romaine Nava at [facility]. Pt had not been compliant with Biktarvy, but since recent hospitalization (discharged 9/9/2021) has been on Biktarvy and Bactrim. CD4 count 59, viral load 142 (9/28/2021).   -Continued ppx Bactrim  -Per HIV team recs: CMV PCR negative last visit makes CMV esophagitis less likely.  Symptoms likely 2/2 thrush. Continued with IV fluconazole and PO Biktarvy (once she could tolerate PO again)       Trichomonas vaginalis infection:   -Pt urine analysis was positive for few trichomonas  -received metronidazole 500mg BID x7d.      Tongue cancer:  -Pt has h/o tongue cancer s/p resection +/- graft placement. Unable to assess throat on physical exam as difficult for patient to open her mouth. Pt on oxycodone 15mg at home for pain. Pt currently has dysphagia as well, unable to tolerate PO without aspiration.   -Treating oral thrush as above  -Restarted oxycodone; benadryl (for pruritis secondary to (2/2) chronic opioid use)  -Patient placed on bowel regimen (miralax, senna) for constipation 2/2 decreased PO intake and chronic opioid use      Ulcer of sacral region, stage 4:  -Pt has stage 4 pressure ulcer on R sacrum measuring 4cm x 2cm x 0.2cm - staged and managed last admission by wound care.   -Ulcer currently covered w/ bandage and tape; but pt has trouble lying on R side due to pain  -wound care recs: Aquacel Extra, cut piece to fit over wound, cover with foam dressing (changed every other day)    Sepsis:  -Pt initially met 2/4 SIRS criteria in the ED (RR 21, WBC 14.53), w/ CXR 9/27 showing b/l lower lobe infiltrates. Therefore PNA confirmed source of infection. Pt is s/p 1.5L NS, zosyn 3.375g x1, vancomycin 750mg x1 in the ED. Lactate 1.7.  -Pt WBC 15.6, RR 16 on following day; pt no longer meets SIRS  -placed on D5 1/2 NS @ 80cc/hr maintenance fluids  -Prophylactic bactrim for PCP (since pt has AIDS, last CD4 <200)  -Also received Zosyn 3.375g q6h abd Vancomycin 750mg qd    Inpatient treatment course:       New medications: Stiolto   Labs to be followed outpatient:   Exam to be followed outpatient:   Disposition: 56yo F w/ PMH AIDS (currently on Biktarvy and Bactrim), Hepatitis B/C, COPD (not on home O2, actively smokes), rectal cancer s/p radiation therapy, tongue cancer s/p resection, h/o crack use (last use 8/2021) presenting to ED from nursing home complaining of 6 days of worsening dysphagia, throat pain and cough, found to be hypoxic to 88% and has oral thrush. Admitted to [facility] for treatment of hypercapnic, hypoxic respiratory failure, sepsis 2/2 presumed PNA and recent dysphagia.    Problem List/Main Diagnoses (system-based):     Oral thrush:   -Pt diagnosed with oral thrush 5d ago at nursing home, started on nystatin with minimal improvement in throat pain. Could not assess whether this is esophageal candidiasis as she did not allow full HEENT exam at that time. Pt reports difficulty swallowing both solids and liquids. Potential etiologies include opportunistic infection 2/2 AIDS w/ CD4<200 vs 2/2 COPD inhaler use if she has not been rinsing after inhaler use. Pt has been able to swallow oral medications.  -discontinued nystatin and started fluconazole - 400mg IV (9/27), received 200mg (9/28 &29) and tolerated well, increased back to 400mg IV until cleared to tolerate PO  -EKG 10/1 to check QTc (QTc 417 on 9/27) showed ________  -Restarted oxycodone 15mg q6h PRN for severe pain (home medication)  -Speech and Swallow evaluated pt, and MBS revealed pt is aspirating everything; S&S recc NGT for medications and nutrition  -Pt adamently refused NGT, stating "I don't need that". She insisted on eating despite explaining to her the results of the MBS in layman's terms.  -Converted majority of pt's medications to IV.   -Oral hygiene for symptom improvement, chest PT and mucomyst improved pt secretions and dysphagia   -S&S re-evaluated pt, performed FEES: baseline pooling of secretions, 2 small white focal lesions appreciated with copious thick secretions in hypopharynx. Cleared after many attempts of cued coughing and oral suction. (appreciate FEES note for full results)  -ENT consulted: pt lost to f/u for her tongue CA since 2017, recc f/u w/ Dr. Saunders in [place].  -Palliative Care consulted:    HAP (hospital-acquired pneumonia):   -Pt presented with respiratory difficulty, new cough, dysphagia and throat pain. Pt afebrile w/ leukocytosis & tachypnea   -CXR (9/27) revealed b/l lower lobe opacities concerning for PNA;  HAP vs aspiration PNA given recent hospitalization at [facility] + pt lives in nursing home  -pt was MRSA positive, so vancomycin antibiotic continued  -Legionella urine analysis & respiratory viral panel (RVP) was Negative  -pt received Zosyn and Vancomycin for HAP coverage; Vancomycin trough on 9/30 showed subtherapeutic level at 5.8; restarted Vancomycin q12; Vancomycin trough on 10/2 showed ____  -Pt on prn 1L O2 on Nasal Cannula, wean down as tolerated; Sp%O2 was 97% on 2L; most recently Sp%O2 95% on room air    COPD (chronic obstructive pulmonary disease):   -Pt has known history of COPD. Current smoker (cut down to 6 cigarettes/day but has smoked since she was 13yo). Uses Symbicort 160-4.5mcg/act and ventolin HFA at home. Currently spO2 95% on room air.   -Treating PNA as above  -Per Pulm reccs: pt recieving duonebulizer and was started on Stiolto  -Incentive Spirometer  -Nicotine patch (14mg/24h) ordered  -Re- smoking cessation    AIDS:  -Pt has known diagnosis of AIDS, follows w/ Dr. Romaine Nava at [facility]. Pt had not been compliant with Biktarvy, but since recent hospitalization (discharged 9/9/2021) has been on Biktarvy and Bactrim. CD4 count 59, viral load 142 (9/28/2021).   -Continued ppx Bactrim  -Per HIV team recs: CD4 still severely suppressed.  VL now <200 indicated adherence over the past month. CMV PCR negative last visit makes CMV esophagitis less likely.  Symptoms likely 2/2 thrush.  C/w fluconazole.  Can continue Biktarvy once no longer NPO.  If needs crushable regimen, will likely switch to truvada / tivicay. Repeat OI w/u is not necessary at this time given her symptoms. Sputum PCP PCR is not always indicative of lung disease.  A negative sputum study does not r/o PCP.  If she does not improve w/ treatment for HAP / Aspiration PNA, would consider bronch to r/o PCP.  C/w PCP PPx at this time.  F/u labs and sputum studies as per pulm. Trichomonas on UA: On flagyl. Hep C: Will need viral load and genotyping in the future as outpatient.    Trichomonas vaginalis infection:   -Pt urine analysis was positive for few trichomonas  -Labs for GC/chlamydia negative  -received metronidazole 500mg BID x7d.    Tongue cancer:  -Pt has h/o tongue cancer s/p resection +/- graft placement. Unable to assess throat on physical exam as difficult for patient to open her mouth. Pt on oxycodone 15mg at home for pain. Pt currently has dysphagia as well, unable to tolerate PO without aspiration.   -Treating oral thrush as above  -Restarted oxycodone; benadryl (for pruritis secondary to (2/2) chronic opioid use)  -Patient placed on bowel regimen (miralax, senna) for constipation 2/2 decreased PO intake and chronic opioid use    Ulcer of sacral region, stage 3:  -Pt has stage 3 pressure ulcer on R sacrum measuring 4x2x0.3cm - staged by wound care this admission.   -Ulcer currently covered w/ bandage and tape; but pt has trouble lying on R side due to pain  -Restarted oxycodone PRN for pain.  -wound care recs: Coccyx Stage-3 PI: Cleanse with NS. Apply Cavilon to periwound. Fold Aquacel AG into wound bed. Cover with foam. Change every other day and PRN for soiling. Consider lidocaine gel to perirectal and perivaginal area to improve comfort.    Sepsis:  -Pt initially met 2/4 SIRS criteria in the ED (RR 21, WBC 14.53), w/ CXR 9/27 showing b/l lower lobe infiltrates. Therefore PNA confirmed source of infection. Pt is s/p 1.5L NS, zosyn 3.375g x1, vancomycin 750mg x1 in the ED. Lactate 1.7.  -Pt WBC 15.6, RR 16 on following day; pt no longer meets SIRS  -placed on D5 1/2 NS @ 80cc/hr maintenance fluids  -Prophylactic bactrim for PCP (since pt has AIDS, last CD4 <200)  -Also received Zosyn 3.375g q6h abd Vancomycin 750mg qd    Inpatient treatment course:   9/27: Admitted for hypercapnic, hypoxic respiratory failure, sepsis 2/2 PNA, dysphagia/throat pain/thrush. Put on 1-->1.5L O2 NC for SpO2 88% on arrival. In ED pt got 1.5L NS, zosyn x1, vancomycin x1. Started tx for HAP/aspiration PNA (vanc, zosyn), restarted home symbicort and duonebs; started fluconazole for candidiasis; restarted bactrim for PCP ppx; restarted Biktarvy; started metronizadole for trichomonas on UA. Restarted atorvastatin, olanzapine, mirtazapine. Sent opportunistic infection labs (fungitell, galactomannan, LDH, fungal bcx). Sent rpt UA, Utox. Pt NPO except medications for now pending AM speech and swallow eval. Started D5 1/2 NS. Pulm consult in AM.   o/n: pt O2 sat 89% on 6L NC, placed on NRB with O2 sat 94%, ordered Oxycodone ER 5mg forbreakthrough pain, ordered ABG, pt back on 2L NC with 97% sat, T 100.9, given tylenol,  Bactrim 200mg q8hrly to treat PCP pneumonia  9/28: Pulm consulted, recc d/c symbicort and start stiolto 2 puffs/daily. MBS by S&S, recc NPO b/c she is high risk. MBS revealed pt is silent aspirating ~60% of bolus w/ no spontaneous cough response. Sputum Cx, Orthostatic O2 sat, RVP, and MRSA swab performed. CD4 count 59, . MRSA positive. Mg 1.5, repleted.   9/29: Oral hygiene initiated. RVP swab and PNA work up.  9/30: FEES swallow study conducted: pt still has silent aspiration of all PO consistencies, with lesions found in her hypopharanx (appreciate FEES report). However, Pt does not want to be NPO and wants to eat. Palliative consult for GOC/feeding long term. ENT consulted.   o/n: Vanc trough 5.8, vacomycin started at 750 mg q12hrs and next trough before the 4th dose on 10/02 @AM    New medications: Stiolto   Labs to be followed outpatient: Hepatitis C qualitative panel, aspergillus antibodies  Exam to be followed outpatient: none  Disposition: To nursing home. 54yo F w/ PMH AIDS (currently on Biktarvy and Bactrim), Hepatitis B/C, COPD (not on home O2, actively smokes), rectal cancer s/p radiation therapy, tongue cancer s/p resection, h/o crack use (last use 8/2021) presenting to ED from nursing home complaining of 6 days of worsening dysphagia, throat pain and cough, found to be hypoxic to 88% and has oral thrush. Admitted to [facility] for treatment of hypercapnic, hypoxic respiratory failure, sepsis 2/2 presumed PNA and recent dysphagia. She reports being diagnosed with oral thrush at her nursing home 5 days ago and has since been on nystatin with some improvement in her throat pain. She first had trouble swallowing solids, then progressed to difficulty swallowing liquids and patient reported not having eaten in 5 days. Talking exacerbated the pain and she did not have pain if she is not swallowing or talking. Patient was also notably hoarse which she says is not her baseline. She also had a cough that is sometimes productive. All of her symptoms began 5-6 days ago. Patient has COPD and is not on home O2. On admission, patient was tachypneic, and had SpO2 88% and was put on 1-1.5L NC. Physical exam showed oral thrush on the tongue, diffuse crackles bilateral in the lungs posteriorly and anteriorly and most prominent on R side, cough, occasional use of abdominal accessory muscles, and R sacral ulcer covered with a bandage. In the ED she received 1.5L NS, zosyn x1, vancomycin x1. Labs were significant for WBC 14.53, Hb 9.4, lactate 1.7, BUN/Cr 28/1.07, Tprotein 8.5, Albumin 3.3, AST 43; VBG - pH 7.34, pCO2 57, HCO3 31; UA w/ >10 WBC, +bacteria, +epithelial cells (likely contaminated), +trichomonas. Chest X-ray showed bilateral lower lobe opacities in the lungs. Given recent admission (09/2021) and lives in a nursing home facility, she was treated for HAP/aspiration PNA (vanc, zosyn), restarted on home symbicort and duonebs; started fluconazole for candidiasis; restarted bactrim for PCP ppx; restarted Biktarvy; started metronizadole for trichomonas on UA. Restarted home atorvastatin, olanzapine, mirtazapine. She was started D5 1/2 NS. Overnight, patient's O2 sat 89% on 6L NC, was placed on NRB with O2 sat 94%, then back on 2L NC with 97% sat. She was febrile at 100.9F and given tylenol. She was switched to Bactrim 200mg q8hrly to treat PCP pneumonia. On hospital day 2, pulmonology was consulted, and following their recommendations symbicort was discontinued and Stiolto was started at 2 puffs/daily. Patient was seen by Speech & Swallow. Modified barium swallow revealed patient is silent aspirating ~60% of bolus w/ no spontaneous cough response. Patient was made NPO and results were discussed with patient. She refused NG tube placement. Labs showed CD4 count 59, HIV , and patient was found to be MRSA positive, so vancomycin was continued. On hospital day 3 oral hygiene was initiated for symptom improvement. On hospital day 3, FEES swallow study was conducted, which showed: pt still has silent aspiration of all PO consistencies, with lesions found in her hypopharanx (appreciate FEES report). ENT was consulted, and they recommended follow-up outpatient with Dr. Saunders in [place]. On hospital day 4, given patient's continued dysphagia and refusal of NG tube, palliative was consulted, and conversation regarding goals of care and feeding were started.      Sputum cultures Orthostatic O2 sat, RVP, and MRSA swab performed. Mg 1.5, repleted. RVP swab and PNA work up.  Sent opportunistic infection labs (fungitell, galactomannan, LDH, fungal bcx). Sent rpt UA, Utox.     Problem List/Main Diagnoses (system-based):     Oral thrush:   -Pt diagnosed with oral thrush 5d ago at nursing home, started on nystatin with minimal improvement in throat pain. Could not assess whether this is esophageal candidiasis as she did not allow full HEENT exam at that time. Pt reports difficulty swallowing both solids and liquids. Potential etiologies include opportunistic infection 2/2 AIDS w/ CD4<200 vs 2/2 COPD inhaler use if she has not been rinsing after inhaler use. Pt has been able to swallow oral medications.  -discontinued nystatin and started fluconazole - 400mg IV (9/27), received 200mg (9/28 &29) and tolerated well, increased back to 400mg IV until cleared to tolerate PO  -EKG 10/1 to check QTc (QTc 417 on 9/27) showed ________  -Restarted oxycodone 15mg q6h PRN for severe pain (home medication)  -Speech and Swallow evaluated pt, and MBS revealed pt is aspirating everything; S&S recc NGT for medications and nutrition  -Pt adamently refused NGT, stating "I don't need that". She insisted on eating despite explaining to her the results of the MBS in layman's terms.  -Converted majority of pt's medications to IV.   -Oral hygiene for symptom improvement, chest PT and mucomyst improved pt secretions and dysphagia   -S&S re-evaluated pt, performed FEES: baseline pooling of secretions, 2 small white focal lesions appreciated with copious thick secretions in hypopharynx. Cleared after many attempts of cued coughing and oral suction. (appreciate FEES note for full results)  -ENT consulted: pt lost to f/u for her tongue CA since 2017, recc f/u w/ Dr. Saunders in [place].  -Palliative Care consulted:    HAP (hospital-acquired pneumonia):   -Pt presented with respiratory difficulty, new cough, dysphagia and throat pain. Pt afebrile w/ leukocytosis & tachypnea   -CXR (9/27) revealed b/l lower lobe opacities concerning for PNA;  HAP vs aspiration PNA given recent hospitalization at [facility] + pt lives in nursing home  -pt was MRSA positive, so vancomycin antibiotic continued  -Legionella urine analysis & respiratory viral panel (RVP) was Negative  -pt received Zosyn and Vancomycin for HAP coverage; Vancomycin trough on 9/30 showed subtherapeutic level at 5.8; restarted Vancomycin q12; Vancomycin trough on 10/2 showed ____  -Pt on prn 1L O2 on Nasal Cannula, wean down as tolerated; Sp%O2 was 97% on 2L; most recently Sp%O2 95% on room air    COPD (chronic obstructive pulmonary disease):   -Pt has known history of COPD. Current smoker (cut down to 6 cigarettes/day but has smoked since she was 11yo). Uses Symbicort 160-4.5mcg/act and ventolin HFA at home. Currently spO2 95% on room air.   -Treating PNA as above  -Per Pulm reccs: pt recieving duonebulizer and was started on Stiolto  -Incentive Spirometer  -Nicotine patch (14mg/24h) ordered  -Re- smoking cessation    AIDS:  -Pt has known diagnosis of AIDS, follows w/ Dr. Romaine Nava at [facility]. Pt had not been compliant with Biktarvy, but since recent hospitalization (discharged 9/9/2021) has been on Biktarvy and Bactrim. CD4 count 59, viral load 142 (9/28/2021).   -Continued ppx Bactrim  -Per HIV team recs: CD4 still severely suppressed.  VL now <200 indicated adherence over the past month. CMV PCR negative last visit makes CMV esophagitis less likely.  Symptoms likely 2/2 thrush.  C/w fluconazole.  Can continue Biktarvy once no longer NPO.  If needs crushable regimen, will likely switch to truvada / tivicay. Repeat OI w/u is not necessary at this time given her symptoms. Sputum PCP PCR is not always indicative of lung disease.  A negative sputum study does not r/o PCP.  If she does not improve w/ treatment for HAP / Aspiration PNA, would consider bronch to r/o PCP.  C/w PCP PPx at this time.  F/u labs and sputum studies as per pulm. Trichomonas on UA: On flagyl. Hep C: Will need viral load and genotyping in the future as outpatient.    Trichomonas vaginalis infection:   -Pt urine analysis was positive for few trichomonas  -Labs for GC/chlamydia negative  -received metronidazole 500mg BID x7d.    Tongue cancer:  -Pt has h/o tongue cancer s/p resection +/- graft placement. Unable to assess throat on physical exam as difficult for patient to open her mouth. Pt on oxycodone 15mg at home for pain. Pt currently has dysphagia as well, unable to tolerate PO without aspiration.   -Treating oral thrush as above  -Restarted oxycodone; benadryl (for pruritis secondary to (2/2) chronic opioid use)  -Patient placed on bowel regimen (miralax, senna) for constipation 2/2 decreased PO intake and chronic opioid use    Ulcer of sacral region, stage 3:  -Pt has stage 3 pressure ulcer on R sacrum measuring 4x2x0.3cm - staged by wound care this admission.   -Ulcer currently covered w/ bandage and tape; but pt has trouble lying on R side due to pain  -Restarted oxycodone PRN for pain.  -wound care recs: Coccyx Stage-3 PI: Cleanse with NS. Apply Cavilon to periwound. Fold Aquacel AG into wound bed. Cover with foam. Change every other day and PRN for soiling. Consider lidocaine gel to perirectal and perivaginal area to improve comfort.    Sepsis:  -Pt initially met 2/4 SIRS criteria in the ED (RR 21, WBC 14.53), w/ CXR 9/27 showing b/l lower lobe infiltrates. Therefore PNA confirmed source of infection. Pt is s/p 1.5L NS, zosyn 3.375g x1, vancomycin 750mg x1 in the ED. Lactate 1.7.  -Pt WBC 15.6, RR 16 on following day; pt no longer meets SIRS  -placed on D5 1/2 NS @ 80cc/hr maintenance fluids  -Prophylactic bactrim for PCP (since pt has AIDS, last CD4 <200)  -Also received Zosyn 3.375g q6h abd Vancomycin 750mg qd    Inpatient treatment course:       New medications: Stiolto   Labs to be followed outpatient: Hepatitis C qualitative panel, aspergillus antibodies  Exam to be followed outpatient: none  Disposition: To nursing home. 54yo F w/ PMH AIDS (currently on Biktarvy and Bactrim), Hepatitis B/C, COPD (not on home O2, actively smokes), rectal cancer s/p radiation therapy, tongue cancer s/p resection, h/o crack use (last use 8/2021) presenting to ED from nursing home complaining of 6 days of worsening dysphagia, throat pain and cough, found to be hypoxic to 88% and has oral thrush. Admitted to [facility] for treatment of hypercapnic, hypoxic respiratory failure, sepsis 2/2 presumed PNA and recent dysphagia.     Problem List/Main Diagnoses (system-based):   Oral thrush:   -Pt diagnosed with oral thrush 5d ago at nursing home, started on nystatin with minimal improvement in throat pain. Could not assess whether this is esophageal candidiasis as she did not allow full HEENT exam at that time. Pt reports difficulty swallowing both solids and liquids. Potential etiologies include opportunistic infection 2/2 AIDS w/ CD4<200 vs 2/2 COPD inhaler use if she has not been rinsing after inhaler use. Pt has been able to swallow oral medications.  -discontinued nystatin and started fluconazole - 400mg IV (9/27), received 200mg (9/28 &29) and tolerated well, increased back to 400mg IV until cleared to tolerate PO  -EKG 10/1 to check QTc (QTc 417 on 9/27) showed ________  -Restarted oxycodone 15mg q6h PRN for severe pain (home medication)  -Speech and Swallow evaluated pt, and MBS revealed pt is aspirating everything; S&S recc NGT for medications and nutrition  -Pt adamently refused NGT, stating "I don't need that". She insisted on eating despite explaining to her the results of the MBS in layman's terms.  -Converted majority of pt's medications to IV.   -Oral hygiene for symptom improvement, chest PT and mucomyst improved pt secretions and dysphagia   -S&S re-evaluated pt, performed FEES: baseline pooling of secretions, 2 small white focal lesions appreciated with copious thick secretions in hypopharynx. Cleared after many attempts of cued coughing and oral suction. (appreciate FEES note for full results)  -ENT consulted: pt lost to f/u for her tongue CA since 2017, recc f/u w/ Dr. Saunders in [place].  -Palliative Care consulted:    HAP (hospital-acquired pneumonia):   -Pt presented with respiratory difficulty, new cough, dysphagia and throat pain. Pt afebrile w/ leukocytosis & tachypnea   -CXR (9/27) revealed b/l lower lobe opacities concerning for PNA;  HAP vs aspiration PNA given recent hospitalization at [facility] + pt lives in nursing home  -pt was MRSA positive, so vancomycin antibiotic continued  -Legionella urine analysis & respiratory viral panel (RVP) was Negative  -pt received Zosyn and Vancomycin for HAP coverage; Vancomycin trough on 9/30 showed subtherapeutic level at 5.8; restarted Vancomycin q12; Vancomycin trough on 10/2 showed ____  -Pt on prn 1L O2 on Nasal Cannula, wean down as tolerated; Sp%O2 was 97% on 2L; most recently Sp%O2 95% on room air    COPD (chronic obstructive pulmonary disease):   -Pt has known history of COPD. Current smoker (cut down to 6 cigarettes/day but has smoked since she was 11yo). Uses Symbicort 160-4.5mcg/act and ventolin HFA at home. Currently spO2 95% on room air.   -Treating PNA as above  -Per Pulm reccs: pt recieving duonebulizer and was started on Stiolto  -Incentive Spirometer  -Nicotine patch (14mg/24h) ordered  -Re- smoking cessation    AIDS:  -Pt has known diagnosis of AIDS, follows w/ Dr. Romaine Nava at [facility]. Pt had not been compliant with Biktarvy, but since recent hospitalization (discharged 9/9/2021) has been on Biktarvy and Bactrim. CD4 count 59, viral load 142 (9/28/2021).   -Continued ppx Bactrim  -Per HIV team recs: CD4 still severely suppressed.  VL now <200 indicated adherence over the past month. CMV PCR negative last visit makes CMV esophagitis less likely.  Symptoms likely 2/2 thrush.  C/w fluconazole.  Can continue Biktarvy once no longer NPO.  If needs crushable regimen, will likely switch to truvada / tivicay. Repeat OI w/u is not necessary at this time given her symptoms. Sputum PCP PCR is not always indicative of lung disease.  A negative sputum study does not r/o PCP.  If she does not improve w/ treatment for HAP / Aspiration PNA, would consider bronch to r/o PCP.  C/w PCP PPx at this time.  F/u labs and sputum studies as per pulm. Trichomonas on UA: On flagyl. Hep C: Will need viral load and genotyping in the future as outpatient.    Trichomonas vaginalis infection:   -Pt urine analysis was positive for few trichomonas  -Labs for GC/chlamydia negative  -received metronidazole 500mg BID x7d.    Tongue cancer:  -Pt has h/o tongue cancer s/p resection +/- graft placement. Unable to assess throat on physical exam as difficult for patient to open her mouth. Pt on oxycodone 15mg at home for pain. Pt currently has dysphagia as well, unable to tolerate PO without aspiration.   -Treating oral thrush as above  -Restarted oxycodone; benadryl (for pruritis secondary to (2/2) chronic opioid use)  -Patient placed on bowel regimen (miralax, senna) for constipation 2/2 decreased PO intake and chronic opioid use    Ulcer of sacral region, stage 3:  -Pt has stage 3 pressure ulcer on R sacrum measuring 4x2x0.3cm - staged by wound care this admission.   -Ulcer currently covered w/ bandage and tape; but pt has trouble lying on R side due to pain  -Restarted oxycodone PRN for pain.  -wound care recs: Coccyx Stage-3 PI: Cleanse with NS. Apply Cavilon to periwound. Fold Aquacel AG into wound bed. Cover with foam. Change every other day and PRN for soiling. Consider lidocaine gel to perirectal and perivaginal area to improve comfort.    Sepsis:  -Pt initially met 2/4 SIRS criteria in the ED (RR 21, WBC 14.53), w/ CXR 9/27 showing b/l lower lobe infiltrates. Therefore PNA confirmed source of infection. Pt is s/p 1.5L NS, zosyn 3.375g x1, vancomycin 750mg x1 in the ED. Lactate 1.7.  -Pt WBC 15.6, RR 16 on following day; pt no longer meets SIRS  -placed on D5 1/2 NS @ 80cc/hr maintenance fluids  -Prophylactic bactrim for PCP (since pt has AIDS, last CD4 <200)  -Also received Zosyn 3.375g q6h abd Vancomycin 750mg qd    Inpatient treatment course:   On admission, patient was tachypneic, and had SpO2 88% and was put on 1-1.5L NC. Physical exam showed oral thrush on the tongue, diffuse crackles bilateral in the lungs posteriorly and anteriorly and most prominent on R side, cough, occasional use of abdominal accessory muscles, and R sacral ulcer covered with a bandage. In the ED she received 1.5L NS, zosyn x1, vancomycin x1. Labs were significant for WBC 14.53, Hb 9.4, lactate 1.7, BUN/Cr 28/1.07, Tprotein 8.5, Albumin 3.3, AST 43; VBG - pH 7.34, pCO2 57, HCO3 31; UA w/ >10 WBC, +bacteria, +epithelial cells (likely contaminated), +trichomonas. Chest X-ray showed bilateral lower lobe opacities in the lungs. Given previous recent admission (09/2021) and lives in a nursing home facility, she was treated for HAP/aspiration PNA (vanc, zosyn). She was restarted on home symbicort and duonebs; started fluconazole for candidiasis; restarted bactrim for PCP ppx; restarted Biktarvy; started metronizadole for trichomonas on UA. On hospital day 2, pulmonology was consulted, and following their recommendations symbicort was discontinued and Stiolto was started at 2 puffs/daily. Patient was seen by Speech & Swallow. Modified barium swallow revealed patient is silent aspirating ~60% of bolus w/ no spontaneous cough response. Patient was continued NPO and results were discussed with patient. She refused NG tube placement. Labs showed CD4 count 59, HIV , and patient was found to be MRSA positive, so vancomycin was continued. On hospital day 4, FEES swallow study was conducted, which showed: pt still has silent aspiration of all PO consistencies, with lesions found in her hypopharanx (appreciate FEES report). ENT was consulted, and they recommended follow-up outpatient with Dr. Saunders in [place]. On hospital day 5, given patient's continued dysphagia and refusal of NG tube, palliative was consulted and conversation regarding goals of care and feeding were started. Labs show +fungitell, blood cultures no growth to date.    New medications: Stiolto   Labs to be followed outpatient: Hepatitis C qualitative panel, aspergillus antibodies  Exam to be followed outpatient: none  Disposition: To nursing home. 54yo F w/ PMH AIDS (currently on Biktarvy and Bactrim), Hepatitis B/C, COPD (not on home O2, actively smokes), rectal cancer s/p radiation therapy, tongue cancer s/p resection, h/o crack use (last use 8/2021) presenting to ED from nursing home complaining of 6 days of worsening dysphagia, throat pain and cough, found to be hypoxic to 88% and has oral thrush. Admitted to [facility] for treatment of hypoxic, hypercapnic respiratory failure, sepsis 2/2 presumed PNA and recent dysphagia.     Problem List/Main Diagnoses (system-based):   Oral thrush:   -Pt diagnosed with oral thrush 5d ago at nursing home, started on nystatin with minimal improvement in throat pain. Could not assess whether this is esophageal candidiasis as she did not allow full HEENT exam at that time. Pt reports difficulty swallowing both solids and liquids. Potential etiologies include opportunistic infection 2/2 AIDS w/ CD4<200 vs 2/2 COPD inhaler use if she has not been rinsing after inhaler use. Pt has been able to swallow oral medications.  -discontinued nystatin and started fluconazole - 400mg IV (9/27), received 200mg QD for total 14 day course. Last day 10/10.  -EKG 10/1 to check QTc 451  -Restarted oxycodone 15mg q6h PRN for severe pain (home medication)  -Speech and Swallow evaluated pt, and MBS revealed pt is aspirating everything; S&S recc NGT for medications and nutrition. Pt refused NGT or any form of artifical feeding.  -S&S re-evaluated pt, performed FEES: baseline pooling of secretions, 2 small white focal lesions appreciated with copious thick secretions in hypopharynx. Cleared after many attempts of cued coughing and oral suction.   -ENT consulted: pt lost to f/u for her tongue CA since 2017, rec f/u w/ Dr. Saunders in [place].  -Palliative Care consulted: She would like to continue PO diet and does not want any artificial feeding, including PEG tube. She is aware of aspiration risk with oral feeding including infection, respiratory failure, and death. She would like to be made DNR/ DNI. MOLST form completed     HAP (hospital-acquired pneumonia):   -Pt presented with respiratory difficulty, new cough, dysphagia and throat pain. Pt afebrile w/ leukocytosis & tachypnea   -CXR (9/27) revealed b/l lower lobe opacities concerning for PNA;  HAP vs aspiration PNA given recent hospitalization at [facility] + pt lives in nursing home  -pt was MRSA positive, so vancomycin antibiotic continued  -Legionella urine analysis & respiratory viral panel (RVP) was Negative  -pt received Zosyn and Vancomycin for HAP coverage for total of 6 days.   -Pt on prn 1L O2 on Nasal Cannula, wean down as tolerated; Sp%O2 was 97% on 2L; most recently Sp%O2 95% on room air    COPD (chronic obstructive pulmonary disease):   -Pt has known history of COPD. Current smoker (cut down to 6 cigarettes/day but has smoked since she was 13yo). Uses Symbicort 160-4.5mcg/act and ventolin HFA at home. Currently spO2 95% on room air.   -Treating PNA as above  -Per Pulm reccs: pt recieving duonebulizer and was started on Stiolto. Hold symbicort.    -Incentive Spirometer  -Nicotine patch (14mg/24h) ordered  -Re- smoking cessation    AIDS:  -Pt has known diagnosis of AIDS, follows w/ Dr. Romaine Nava at [facility]. Pt had not been compliant with Biktarvy, but since recent hospitalization (discharged 9/9/2021) has been on Biktarvy and Bactrim. CD4 count 59, viral load 142 (9/28/2021).   -Continued ppx Bactrim  - c/w biktarvy   -Per HIV team recs: CD4 still severely suppressed.  VL now <200 indicated adherence over the past month. CMV PCR negative last visit makes CMV esophagitis less likely.  Symptoms likely 2/2 thrush.  C/w fluconazole.  Can continue Biktarvy once no longer NPO.  If needs crushable regimen, will likely switch to truvada / tivicay. Repeat OI w/u is not necessary at this time given her symptoms. Sputum PCP PCR is not always indicative of lung disease.  A negative sputum study does not r/o PCP.  If she does not improve w/ treatment for HAP / Aspiration PNA, would consider bronch to r/o PCP.  C/w PCP PPx at this time.  F/u labs and sputum studies as per pulm. Trichomonas on UA: On flagyl. Hep C: Will need viral load and genotyping in the future as outpatient.    Trichomonas vaginalis infection:   -Pt urine analysis was positive for few trichomonas  -Labs for GC/chlamydia negative  -c/w metronidazole 500mg BID x7d course. Last day 10/4    Tongue cancer:  -Pt has h/o tongue cancer s/p resection +/- graft placement. Unable to assess throat on physical exam as difficult for patient to open her mouth. Pt on oxycodone 15mg at home for pain. Pt currently has dysphagia as well, unable to tolerate PO without aspiration.   -Treating oral thrush as above  -Restarted oxycodone; benadryl (for pruritis secondary to (2/2) chronic opioid use)    Ulcer of sacral region, stage 3:  -Pt has stage 3 pressure ulcer on R sacrum measuring 4x2x0.3cm - staged by wound care this admission.   -Ulcer currently covered w/ bandage and tape; but pt has trouble lying on R side due to pain  -Restarted oxycodone PRN for pain.  -wound care recs: Coccyx Stage-3 PI: Cleanse with NS. Apply Cavilon to periwound. Fold Aquacel AG into wound bed. Cover with foam. Change every other day and PRN for soiling. Consider lidocaine gel to perirectal and perivaginal area to improve comfort.    Sepsis:  -Pt initially met 2/4 SIRS criteria in the ED (RR 21, WBC 14.53), w/ CXR 9/27 showing b/l lower lobe infiltrates. Therefore PNA confirmed source of infection. Pt is s/p 1.5L NS, zosyn 3.375g x1, vancomycin 750mg x1 in the ED. Lactate 1.7.  -Pt WBC 15.6, RR 16 on following day; pt no longer meets SIRS  -placed on D5 1/2 NS @ 80cc/hr maintenance fluids  -Prophylactic bactrim for PCP (since pt has AIDS, last CD4 <200)  -Also received Zosyn 3.375g q6h abd Vancomycin 750mg qd completed 6 day course.     Inpatient treatment course:   On admission, patient was tachypneic, and had SpO2 88% and was put on 1-1.5L NC. Physical exam showed oral thrush on the tongue, diffuse crackles bilateral in the lungs posteriorly and anteriorly and most prominent on R side, cough, occasional use of abdominal accessory muscles, and R sacral ulcer covered with a bandage. In the ED she received 1.5L NS, zosyn x1, vancomycin x1. Labs were significant for WBC 14.53, Hb 9.4, lactate 1.7, BUN/Cr 28/1.07, Tprotein 8.5, Albumin 3.3, AST 43; VBG - pH 7.34, pCO2 57, HCO3 31; UA w/ >10 WBC, +bacteria, +epithelial cells (likely contaminated), +trichomonas. Chest X-ray showed bilateral lower lobe opacities in the lungs. Given previous recent admission (09/2021) and lives in a nursing home facility, she was treated for HAP/aspiration PNA (vanc, zosyn). She was restarted on home symbicort and duonebs; started fluconazole for candidiasis; restarted bactrim for PCP ppx; restarted Biktarvy; started metronizadole for trichomonas on UA. On hospital day 2, pulmonology was consulted, and following their recommendations symbicort was discontinued and Stiolto was started at 2 puffs/daily. Patient was seen by Speech & Swallow. Modified barium swallow revealed patient is silent aspirating ~60% of bolus w/ no spontaneous cough response. Patient was continued NPO and results were discussed with patient. She refused NG tube placement. Labs showed CD4 count 59, HIV , and patient was found to be MRSA positive, so vancomycin was continued. On hospital day 4, FEES swallow study was conducted, which showed: pt still has silent aspiration of all PO consistencies, with lesions found in her hypopharanx (appreciate FEES report). ENT was consulted, and they recommended follow-up outpatient with Dr. Saunders in [place]. On hospital day 5, given patient's continued dysphagia and refusal of NG tube, palliative was consulted and conversation regarding goals of care and feeding were started. Labs show +fungitell, blood cultures no growth to date.    New medications: Stiolto, fluconazole 200 mg QD, metronidazole 500 mg BID  Labs to be followed outpatient: Hepatitis C qualitative panel  Exam to be followed outpatient: sacral ulcer  Disposition: To nursing home.

## 2021-09-29 NOTE — ADVANCED PRACTICE NURSE CONSULT - RECOMMEDATIONS
Coccyx Stage-3 PI: Cleanse with NS. Apply Cavilon to periwound. Fold Aquacel AG into wound bed. Cover with foam. Change every other day and PRN for soiling.  Consider lidocaine gel to perirectal and perivaginal area to improve comfort.

## 2021-09-29 NOTE — PROGRESS NOTE ADULT - PROBLEM SELECTOR PLAN 3
Pt has known h/o COPD. Current smoker (cut down to 6 cigarettes/day but has smoked since she was 11yo). Uses Symbicort 160-4.5mcg/act and ventolin HFA at home. Currently on 1.5L NC.    -c/w O2 by NC, wean down as tolerated.  -c/w duonebs q6h  -Started stiolto  -Treating PNA as above  -Nicotine patch (14mg/24h) ordered  - re smoking cessation Pt has known h/o COPD. Current smoker (cut down to 6 cigarettes/day but has smoked since she was 11yo). Uses Symbicort 160-4.5mcg/act and ventolin HFA at home. Currently spO2 90% on room air.     -c/w duonebs q6h  -Started stiolto  -Treating PNA as above  -Nicotine patch (14mg/24h) ordered  - re smoking cessation

## 2021-09-29 NOTE — PROGRESS NOTE ADULT - SUBJECTIVE AND OBJECTIVE BOX
PULMONARY CONSULT SERVICE FOLLOW-UP NOTE    INTERVAL HPI:  Reviewed chart and overnight events; patient seen and examined at bedside.  No desat with ambulation (off O2)   SLP eval/modified barium + aspiration    Subjective:  No complaints this morning. Denies shortness of breath, fever, chilks. Her cough is ongoing, still npo.     MEDICATIONS:  Pulmonary:  ALBUTerol    0.083% 2.5 milliGRAM(s) Nebulizer every 6 hours PRN  tiotropium 2.5 MICROgram(s)/olodaterol 2.5 MICROgram(s) (STIOLTO) Inhaler 2 Puff(s) Inhalation daily    Antimicrobials:  bictegravir 50 mG/emtricitabine 200 mG/tenofovir alafenamide 25 mG (BIKTARVY) 1 Tablet(s) Oral daily  fluconAZOLE IVPB 200 milliGRAM(s) IV Intermittent every 24 hours  metroNIDAZOLE  IVPB 500 milliGRAM(s) IV Intermittent every 12 hours  piperacillin/tazobactam IVPB... 3.375 Gram(s) IV Intermittent every 6 hours  trimethoprim / sulfamethoxazole IVPB 160 milliGRAM(s) IV Intermittent every 24 hours  vancomycin  IVPB 750 milliGRAM(s) IV Intermittent every 24 hours    Anticoagulants:  enoxaparin Injectable 30 milliGRAM(s) SubCutaneous every 24 hours    Cardiac:    Allergies    No Known Allergies    Vital Signs Last 24 Hrs  T(C): 37.1 (29 Sep 2021 11:25), Max: 37.1 (29 Sep 2021 11:25)  T(F): 98.7 (29 Sep 2021 11:25), Max: 98.7 (29 Sep 2021 11:25)  HR: 60 (29 Sep 2021 11:25) (60 - 79)  BP: 89/55 (29 Sep 2021 11:25) (89/55 - 93/55)  BP(mean): --  RR: 16 (29 Sep 2021 11:25) (16 - 18)  SpO2: 95% (29 Sep 2021 11:25) (90% - 95%)      PHYSICAL EXAM:  Constitutional: cachetic and appears older than stated age, no acute distress (not wearing O2)  Head: NC/AT  EENT: PERRL, anicteric sclera; oropharynx clear, dry MM  Neck: supple, no appreciable JVD  Respiratory: clear throughout, no wheezing  Cardiovascular: +S1/S2, RRR  Gastrointestinal: soft, NT/ND  Extremities: WWP; no edema, clubbing or cyanosis  Vascular: 2+ radial pulses B/L  Neurological: asleep but easily awoken    LABS:  ABG - ( 28 Sep 2021 04:38 )  pH, Arterial: 7.33  pH, Blood: x     /  pCO2: 51    /  pO2: 72    / HCO3: 27    / Base Excess: 0.2   /  SaO2: 95.4      CBC Full  -  ( 28 Sep 2021 06:05 )  WBC Count : 15.60 K/uL  RBC Count : 2.98 M/uL  Hemoglobin : 8.3 g/dL  Hematocrit : 27.4 %  Platelet Count - Automated : 170 K/uL  Mean Cell Volume : 91.9 fl  Mean Cell Hemoglobin : 27.9 pg  Mean Cell Hemoglobin Concentration : 30.3 gm/dL  Auto Neutrophil # : 12.31 K/uL  Auto Lymphocyte # : 1.64 K/uL  Auto Monocyte # : 0.14 K/uL  Auto Eosinophil # : 0.00 K/uL  Auto Basophil # : 0.00 K/uL  Auto Neutrophil % : 77.2 %  Auto Lymphocyte % : 10.5 %  Auto Monocyte % : 0.9 %  Auto Eosinophil % : 0.0 %  Auto Basophil % : 0.0 %        131<L>  |  101  |  17  ----------------------------<  186<H>  3.8   |  24  |  0.87    Ca    8.8      28 Sep 2021 06:05  Mg     1.5         Urinalysis Basic - ( 28 Sep 2021 06:05 )    Color: Yellow / Appearance: Clear / S.020 / pH: x  Gluc: x / Ketone: NEGATIVE  / Bili: Negative / Urobili: 2.0 E.U./dL   Blood: x / Protein: Trace mg/dL / Nitrite: NEGATIVE   Leuk Esterase: Small / RBC: < 5 /HPF / WBC > 10 /HPF   Sq Epi: x / Non Sq Epi: 5-10 /HPF / Bacteria: Present /HPF    MICRO:  negative RVP  negative COVID  + MRSA swab  Blood cultures negative x2   2021 sputum normal oral hilda      RADIOLOGY & ADDITIONAL STUDIES:  imaging reviewed

## 2021-09-29 NOTE — PROGRESS NOTE ADULT - SUBJECTIVE AND OBJECTIVE BOX
INTERVAL HPI/OVERNIGHT EVENTS:    *note in progress*    VITAL SIGNS:  T(F): 98.3 (21 @ 05:01)  HR: 62 (21 @ 05:01)  BP: 93/55 (21 @ 05:01)  RR: 18 (21 @ 05:01)  SpO2: 92% (21 @ 05:01)  Wt(kg): --    PHYSICAL EXAM:    Constitutional: WDWN, NAD  HEENT: PERRL, EOMI, sclera non-icteric, neck supple, trachea midline, no masses, no JVD, MMM, good dentition  Respiratory: CTA b/l, good air entry b/l, no wheezing, no rhonchi, no rales, without accessory muscle use and no intercostal retractions  Cardiovascular: RRR, normal S1S2, no M/R/G  Gastrointestinal: soft, NTND, no masses palpable, BS normal  Extremities: Warm, well perfused, pulses equal bilateral upper and lower extremities, no edema, no clubbing  Neurological: AAOx3, CN Grossly intact  Skin: Normal temperature, warm, dry    MEDICATIONS  (STANDING):  atorvastatin 40 milliGRAM(s) Oral at bedtime  bictegravir 50 mG/emtricitabine 200 mG/tenofovir alafenamide 25 mG (BIKTARVY) 1 Tablet(s) Oral daily  dextrose 5% + sodium chloride 0.9%. 1000 milliLiter(s) (80 mL/Hr) IV Continuous <Continuous>  enoxaparin Injectable 30 milliGRAM(s) SubCutaneous every 24 hours  fluconAZOLE IVPB 200 milliGRAM(s) IV Intermittent every 24 hours  melatonin 5 milliGRAM(s) Oral at bedtime  metroNIDAZOLE  IVPB 500 milliGRAM(s) IV Intermittent every 12 hours  mirtazapine 15 milliGRAM(s) Oral at bedtime  nicotine -  14 mG/24Hr(s) Patch 1 patch Transdermal daily  OLANZapine Disintegrating Tablet 5 milliGRAM(s) Oral every 24 hours  piperacillin/tazobactam IVPB... 3.375 Gram(s) IV Intermittent every 6 hours  polyethylene glycol 3350 17 Gram(s) Oral daily  senna 2 Tablet(s) Oral at bedtime  tiotropium 2.5 MICROgram(s)/olodaterol 2.5 MICROgram(s) (STIOLTO) Inhaler 2 Puff(s) Inhalation daily  trimethoprim / sulfamethoxazole IVPB 160 milliGRAM(s) IV Intermittent every 24 hours  vancomycin  IVPB 750 milliGRAM(s) IV Intermittent every 24 hours    MEDICATIONS  (PRN):  ALBUTerol    0.083% 2.5 milliGRAM(s) Nebulizer every 6 hours PRN Shortness of Breath and/or Wheezing  HYDROmorphone  Injectable 1 milliGRAM(s) IV Push every 6 hours PRN Severe Pain (7 - 10)      Allergies    No Known Allergies    Intolerances        LABS:                        8.3    15.60 )-----------( 170      ( 28 Sep 2021 06:05 )             27.4         131<L>  |  101  |  17  ----------------------------<  186<H>  3.8   |  24  |  0.87    Ca    8.8      28 Sep 2021 06:05  Mg     1.5         TPro  8.5<H>  /  Alb  3.3  /  TBili  0.3  /  DBili  x   /  AST  43<H>  /  ALT  18  /  AlkPhos  85      PT/INR - ( 27 Sep 2021 14:41 )   PT: 13.7 sec;   INR: 1.15          PTT - ( 27 Sep 2021 14:41 )  PTT:24.6 sec  Urinalysis Basic - ( 28 Sep 2021 06:05 )    Color: Yellow / Appearance: Clear / S.020 / pH: x  Gluc: x / Ketone: NEGATIVE  / Bili: Negative / Urobili: 2.0 E.U./dL   Blood: x / Protein: Trace mg/dL / Nitrite: NEGATIVE   Leuk Esterase: Small / RBC: < 5 /HPF / WBC > 10 /HPF   Sq Epi: x / Non Sq Epi: 5-10 /HPF / Bacteria: Present /HPF        RADIOLOGY & ADDITIONAL TESTS:  Reviewed INTERVAL HPI/OVERNIGHT EVENTS:  Patient seen and assessed at bedside this morning. No acute events overnight. This morning, patient asleep in bed, drowsy. Endorses throat pain.   *note in progress*    VITAL SIGNS:  T(F): 98.3 (21 @ 05:01)  HR: 62 (21 @ 05:01)  BP: 93/55 (21 @ 05:01)  RR: 18 (21 @ 05:01)  SpO2: 92% (21 @ 05:01)  Wt(kg): --    PHYSICAL EXAM:    Constitutional: WDWN, NAD  HEENT: PERRL, EOMI, sclera non-icteric, neck supple, trachea midline, no masses, no JVD, MMM, good dentition  Respiratory: CTA b/l, good air entry b/l, no wheezing, no rhonchi, no rales, without accessory muscle use and no intercostal retractions  Cardiovascular: RRR, normal S1S2, no M/R/G  Gastrointestinal: soft, NTND, no masses palpable, BS normal  Extremities: Warm, well perfused, pulses equal bilateral upper and lower extremities, no edema, no clubbing  Neurological: AAOx3, CN Grossly intact  Skin: Normal temperature, warm, dry    MEDICATIONS  (STANDING):  atorvastatin 40 milliGRAM(s) Oral at bedtime  bictegravir 50 mG/emtricitabine 200 mG/tenofovir alafenamide 25 mG (BIKTARVY) 1 Tablet(s) Oral daily  dextrose 5% + sodium chloride 0.9%. 1000 milliLiter(s) (80 mL/Hr) IV Continuous <Continuous>  enoxaparin Injectable 30 milliGRAM(s) SubCutaneous every 24 hours  fluconAZOLE IVPB 200 milliGRAM(s) IV Intermittent every 24 hours  melatonin 5 milliGRAM(s) Oral at bedtime  metroNIDAZOLE  IVPB 500 milliGRAM(s) IV Intermittent every 12 hours  mirtazapine 15 milliGRAM(s) Oral at bedtime  nicotine -  14 mG/24Hr(s) Patch 1 patch Transdermal daily  OLANZapine Disintegrating Tablet 5 milliGRAM(s) Oral every 24 hours  piperacillin/tazobactam IVPB... 3.375 Gram(s) IV Intermittent every 6 hours  polyethylene glycol 3350 17 Gram(s) Oral daily  senna 2 Tablet(s) Oral at bedtime  tiotropium 2.5 MICROgram(s)/olodaterol 2.5 MICROgram(s) (STIOLTO) Inhaler 2 Puff(s) Inhalation daily  trimethoprim / sulfamethoxazole IVPB 160 milliGRAM(s) IV Intermittent every 24 hours  vancomycin  IVPB 750 milliGRAM(s) IV Intermittent every 24 hours    MEDICATIONS  (PRN):  ALBUTerol    0.083% 2.5 milliGRAM(s) Nebulizer every 6 hours PRN Shortness of Breath and/or Wheezing  HYDROmorphone  Injectable 1 milliGRAM(s) IV Push every 6 hours PRN Severe Pain (7 - 10)      Allergies    No Known Allergies    Intolerances        LABS:                        8.3    15.60 )-----------( 170      ( 28 Sep 2021 06:05 )             27.4         131<L>  |  101  |  17  ----------------------------<  186<H>  3.8   |  24  |  0.87    Ca    8.8      28 Sep 2021 06:05  Mg     1.5         TPro  8.5<H>  /  Alb  3.3  /  TBili  0.3  /  DBili  x   /  AST  43<H>  /  ALT  18  /  AlkPhos  85      PT/INR - ( 27 Sep 2021 14:41 )   PT: 13.7 sec;   INR: 1.15          PTT - ( 27 Sep 2021 14:41 )  PTT:24.6 sec  Urinalysis Basic - ( 28 Sep 2021 06:05 )    Color: Yellow / Appearance: Clear / S.020 / pH: x  Gluc: x / Ketone: NEGATIVE  / Bili: Negative / Urobili: 2.0 E.U./dL   Blood: x / Protein: Trace mg/dL / Nitrite: NEGATIVE   Leuk Esterase: Small / RBC: < 5 /HPF / WBC > 10 /HPF   Sq Epi: x / Non Sq Epi: 5-10 /HPF / Bacteria: Present /HPF        RADIOLOGY & ADDITIONAL TESTS:  Reviewed INTERVAL HPI/OVERNIGHT EVENTS:  Patient seen and assessed at bedside this morning. No acute events overnight. This morning, patient asleep in bed, drowsy. Endorses throat pain.   *note in progress*    VITAL SIGNS:  T(F): 98.3 (21 @ 05:01)  HR: 62 (21 @ 05:01)  BP: 93/55 (21 @ 05:01)  RR: 18 (21 @ 05:01)  SpO2: 92% (21 @ 05:01)  Wt(kg): --    PHYSICAL EXAM:    Constitutional:  NAD  HEEN: head NC/AT, no conjunctival injection, EOMI, dry MM  Throat: oral thrush is present on tongue; graft present on L half of tongue (2/2 tongue cancer and resection); unable to appreciate rest of patient's throat as she would not fully open her mouth  Neck: supple, no JVD  Cardiovascular: tachycardic with regular rhythm; +S1/S2, no M/R/G  Respiratory: diffuse crackles b/l posteriorly and anteriorly, most prominent on R side; no wheezes, no rhonchi; +cough (nonproductive at time of exam); occasionally using accessory abdominal muscles  GI: +BSx4, mildly distended, nontender to palpation, no organomegaly or palpable masses  Extremities: WWP, no edema/cyanosis/clubbing   Vascular: 2+ radial and PT pulses b/l  Neurological: A&Ox3  Skin: R sacral ulcer covered by bandage  MSK: no joint swelling    MEDICATIONS  (STANDING):  atorvastatin 40 milliGRAM(s) Oral at bedtime  bictegravir 50 mG/emtricitabine 200 mG/tenofovir alafenamide 25 mG (BIKTARVY) 1 Tablet(s) Oral daily  dextrose 5% + sodium chloride 0.9%. 1000 milliLiter(s) (80 mL/Hr) IV Continuous <Continuous>  enoxaparin Injectable 30 milliGRAM(s) SubCutaneous every 24 hours  fluconAZOLE IVPB 200 milliGRAM(s) IV Intermittent every 24 hours  melatonin 5 milliGRAM(s) Oral at bedtime  metroNIDAZOLE  IVPB 500 milliGRAM(s) IV Intermittent every 12 hours  mirtazapine 15 milliGRAM(s) Oral at bedtime  nicotine -  14 mG/24Hr(s) Patch 1 patch Transdermal daily  OLANZapine Disintegrating Tablet 5 milliGRAM(s) Oral every 24 hours  piperacillin/tazobactam IVPB... 3.375 Gram(s) IV Intermittent every 6 hours  polyethylene glycol 3350 17 Gram(s) Oral daily  senna 2 Tablet(s) Oral at bedtime  tiotropium 2.5 MICROgram(s)/olodaterol 2.5 MICROgram(s) (STIOLTO) Inhaler 2 Puff(s) Inhalation daily  trimethoprim / sulfamethoxazole IVPB 160 milliGRAM(s) IV Intermittent every 24 hours  vancomycin  IVPB 750 milliGRAM(s) IV Intermittent every 24 hours    MEDICATIONS  (PRN):  ALBUTerol    0.083% 2.5 milliGRAM(s) Nebulizer every 6 hours PRN Shortness of Breath and/or Wheezing  HYDROmorphone  Injectable 1 milliGRAM(s) IV Push every 6 hours PRN Severe Pain (7 - 10)      Allergies    No Known Allergies    Intolerances        LABS:                        8.3    15.60 )-----------( 170      ( 28 Sep 2021 06:05 )             27.4         131<L>  |  101  |  17  ----------------------------<  186<H>  3.8   |  24  |  0.87    Ca    8.8      28 Sep 2021 06:05  Mg     1.5         TPro  8.5<H>  /  Alb  3.3  /  TBili  0.3  /  DBili  x   /  AST  43<H>  /  ALT  18  /  AlkPhos  85      PT/INR - ( 27 Sep 2021 14:41 )   PT: 13.7 sec;   INR: 1.15          PTT - ( 27 Sep 2021 14:41 )  PTT:24.6 sec  Urinalysis Basic - ( 28 Sep 2021 06:05 )    Color: Yellow / Appearance: Clear / S.020 / pH: x  Gluc: x / Ketone: NEGATIVE  / Bili: Negative / Urobili: 2.0 E.U./dL   Blood: x / Protein: Trace mg/dL / Nitrite: NEGATIVE   Leuk Esterase: Small / RBC: < 5 /HPF / WBC > 10 /HPF   Sq Epi: x / Non Sq Epi: 5-10 /HPF / Bacteria: Present /HPF        RADIOLOGY & ADDITIONAL TESTS:  Reviewed INTERVAL HPI/OVERNIGHT EVENTS:  Patient seen and assessed at bedside this morning. No acute events overnight. This morning, patient asleep in bed, drowsy. Endorses throat pain. Denies SOB/CP/F/N/V/D at this time.      VITAL SIGNS:  T(F): 98.3 (21 @ 05:01)  HR: 62 (21 @ 05:01)  BP: 93/55 (21 @ 05:01)  RR: 18 (21 @ 05:01)  SpO2: 92% (21 @ 05:01)  Wt(kg): --    PHYSICAL EXAM:    Constitutional:  NAD  HEEN: head NC/AT, no conjunctival injection, EOMI, dry MM  Throat: oral thrush is present on tongue; graft present on L half of tongue (2/2 tongue cancer and resection); unable to appreciate rest of patient's throat as she would not fully open her mouth  Neck: supple, no JVD  Cardiovascular: tachycardic with regular rhythm; +S1/S2, no M/R/G  Respiratory: diffuse crackles b/l posteriorly and anteriorly, most prominent on R side; no wheezes, no rhonchi; +cough (nonproductive at time of exam); occasionally using accessory abdominal muscles  GI: +BSx4, mildly distended, nontender to palpation, no organomegaly or palpable masses  Extremities: WWP, no edema/cyanosis/clubbing   Vascular: 2+ radial and PT pulses b/l  Neurological: A&Ox3  Skin: R sacral ulcer covered by bandage  MSK: no joint swelling    MEDICATIONS  (STANDING):  atorvastatin 40 milliGRAM(s) Oral at bedtime  bictegravir 50 mG/emtricitabine 200 mG/tenofovir alafenamide 25 mG (BIKTARVY) 1 Tablet(s) Oral daily  dextrose 5% + sodium chloride 0.9%. 1000 milliLiter(s) (80 mL/Hr) IV Continuous <Continuous>  enoxaparin Injectable 30 milliGRAM(s) SubCutaneous every 24 hours  fluconAZOLE IVPB 200 milliGRAM(s) IV Intermittent every 24 hours  melatonin 5 milliGRAM(s) Oral at bedtime  metroNIDAZOLE  IVPB 500 milliGRAM(s) IV Intermittent every 12 hours  mirtazapine 15 milliGRAM(s) Oral at bedtime  nicotine -  14 mG/24Hr(s) Patch 1 patch Transdermal daily  OLANZapine Disintegrating Tablet 5 milliGRAM(s) Oral every 24 hours  piperacillin/tazobactam IVPB... 3.375 Gram(s) IV Intermittent every 6 hours  polyethylene glycol 3350 17 Gram(s) Oral daily  senna 2 Tablet(s) Oral at bedtime  tiotropium 2.5 MICROgram(s)/olodaterol 2.5 MICROgram(s) (STIOLTO) Inhaler 2 Puff(s) Inhalation daily  trimethoprim / sulfamethoxazole IVPB 160 milliGRAM(s) IV Intermittent every 24 hours  vancomycin  IVPB 750 milliGRAM(s) IV Intermittent every 24 hours    MEDICATIONS  (PRN):  ALBUTerol    0.083% 2.5 milliGRAM(s) Nebulizer every 6 hours PRN Shortness of Breath and/or Wheezing  HYDROmorphone  Injectable 1 milliGRAM(s) IV Push every 6 hours PRN Severe Pain (7 - 10)      Allergies    No Known Allergies    Intolerances        LABS:                        8.3    15.60 )-----------( 170      ( 28 Sep 2021 06:05 )             27.4         131<L>  |  101  |  17  ----------------------------<  186<H>  3.8   |  24  |  0.87    Ca    8.8      28 Sep 2021 06:05  Mg     1.5         TPro  8.5<H>  /  Alb  3.3  /  TBili  0.3  /  DBili  x   /  AST  43<H>  /  ALT  18  /  AlkPhos  85      PT/INR - ( 27 Sep 2021 14:41 )   PT: 13.7 sec;   INR: 1.15          PTT - ( 27 Sep 2021 14:41 )  PTT:24.6 sec  Urinalysis Basic - ( 28 Sep 2021 06:05 )    Color: Yellow / Appearance: Clear / S.020 / pH: x  Gluc: x / Ketone: NEGATIVE  / Bili: Negative / Urobili: 2.0 E.U./dL   Blood: x / Protein: Trace mg/dL / Nitrite: NEGATIVE   Leuk Esterase: Small / RBC: < 5 /HPF / WBC > 10 /HPF   Sq Epi: x / Non Sq Epi: 5-10 /HPF / Bacteria: Present /HPF        RADIOLOGY & ADDITIONAL TESTS:  Reviewed

## 2021-09-29 NOTE — PROGRESS NOTE ADULT - PROBLEM SELECTOR PLAN 11
F: D5 1/2 NS @ 80cc/hr (maintenance dose)  E: replete as needed  Diet: NPO, MBS found pt is silently aspirating everything   GI ppx: n/a  DVT ppx: 30mg lovenox subq qd  Code status: FULL CODE  Dispo: [facility]

## 2021-09-29 NOTE — PROGRESS NOTE ADULT - PROBLEM SELECTOR PLAN 4
Pt met 2/4 SIRS criteria in the ED (RR 21, WBC 14.53), w/ CXR 9/27 showing b/l lower lobe infiltrates. Therefore PNA confirmed source of infection. Pt is s/p 1.5L NS, zosyn 3.375g x1, vancomycin 750mg x1 in the ED. Lactate 1.7.    -Pt WBC 15.6, RR 16; pt no longer meets SIRS  -c/w D5 1/2 NS @ 80cc/hr maintenance fluids  -C/w ppx bactrim for PCP (AIDS, last CD4 <200)  -c/w zosyn 3.375g q6h  -c/w vancomycin 750mg qd for x2 more doses (check trough before dose on 9/30 if pt still needs vanc)

## 2021-09-29 NOTE — PROGRESS NOTE ADULT - PROBLEM SELECTOR PLAN 5
Pt has known diagnosis of AIDS, follows w/ Dr. Romaine Nava at [facility]. Last known CD4 37, ,732 (8/29/2021). Pt had not been compliant with Biktarvy, but since recent hospitalization (discharged 9/9/2021) has been on Biktarvy and Bactrim. CD4 count 59 (9/28/2021).   -Restarted Biktarvy  -Restarted ppx Bactrim  -F/u VL this admission Pt has known diagnosis of AIDS, follows w/ Dr. Romaine Nava at [facility]. Pt had not been compliant with Biktarvy, but since recent hospitalization (discharged 9/9/2021) has been on Biktarvy and Bactrim. CD4 count 59, viral load 142 (9/28/2021).     -Continue ppx Bactrim  -Per HIV team recs: CMV PCR negative last visit makes CMV esophagitis less likely.  Symptoms likely 2/2 thrush.  C/w fluconazole. Can continue Biktarvy once no longer NPO. Repeat OI w/u is not necessary at this time given her symptoms. If she does not improve w/ treatment for HAP / Aspiration PNA, would consider bronch to r/o PCP. C/w PCP PPx at this time.  F/u labs and sputum studies as per pulm. Trichomonas on UA: On flagyl. Hep C: Will need viral load and genotyping in the future as outpatient.

## 2021-09-29 NOTE — PROGRESS NOTE ADULT - PROBLEM SELECTOR PLAN 2
Pt presented with respiratory difficulty, new cough, dysphagia and throat pain. Pt afebrile w/ leukocytosis & tachypnea. CXR showed RLL opacity. Pt had recent hospitalization at [facility] at end of August-early September for failure to thrive and lives in a nursing home; is at increased risk for HAP.    -CXR (9/27) revealed b/l lower lobe opacities concerning for PNA;  HAP vs aspiration PNA given recent hospitalization at [facility] + pt lives in nursing home  -c/w zosyn and vancomycin for HAP coverage  -Pt on 1.5L O2, wean down as tolerated; Sp%O2 was 97% on 2L  -Per Pulm reccs, f/u ambulatory O2 stats, c/w duonebs, and d/c symbicort started Stiolto  -MRSA positive; continue vancomycin  -F/u fungitell, galactomannan, LDH, fungal bcx Pt presented with respiratory difficulty, new cough, dysphagia and throat pain. Pt afebrile w/ leukocytosis & tachypnea. CXR showed bilateral lower lobe opacity. Pt had recent hospitalization at [facility] at end of August-early September for failure to thrive and lives in a nursing home; is at increased risk for HAP.    -CXR (9/27) revealed b/l lower lobe opacities concerning for PNA;  HAP vs aspiration PNA given recent hospitalization at [facility] + pt lives in nursing home  -c/w zosyn and vancomycin for HAP coverage  -Pt on 1.5L O2, wean down as tolerated; Sp%O2 was 97% on 2L  -Per Pulm reccs, f/u ambulatory O2 stats, c/w duonebs, and d/c symbicort started Stiolto  -MRSA positive; continue vancomycin  -F/u fungitell, galactomannan, LDH, fungal bcx

## 2021-09-29 NOTE — ADVANCED PRACTICE NURSE CONSULT - ASSESSMENT
Patient with a Stage-3 PI to coccyx, present on admission. Wound measures 4x2x0.3cm with undermining present circumferentially with greatest length at 3o'clock measuring 0.3cm. Edges are hyperkeratinized and slightly macerated. Drainage is copious, seropurulent, and has no odor. Wound cleansed with NS. Cavilon applied to periwound. Aquacel AG Advantage applied to wound bed. Covered with foam.   Patient has severe erosion to perianal and perivaginal area. area presents as pink moist skin with pink satellite lesions to periphery. Patient is in severe pain with cleansing or applying cream. Patient would likely benefit from lidocaine gel to area for improved comfort. discussed with MD Butler and MD Zimmerman.  Patient with a Stage-3 PI to coccyx, present on admission. Wound measures 4x2x0.3cm with undermining present circumferentially with greatest length at 3o'clock measuring 0.3cm. Edges are hyperkeratinized and slightly macerated. Drainage is copious, seropurulent, and has no odor. Wound cleansed with NS. Cavilon applied to periwound. Aquacel AG Advantage applied to wound bed. Covered with foam.   Patient has severe erosion to perianal and perivaginal area, likely 2/2 previous radiation thereapy. Area presents as pink moist skin with pink satellite lesions to periphery. Patient is in severe pain with cleansing or applying cream. Patient would likely benefit from lidocaine gel to area for improved comfort. discussed with MD Butler and MD Zimmerman.  Patient with a Stage-3 PI to coccyx, present on admission. Wound measures 4x2x0.3cm 100% pink dermal tissue exposed, with undermining present circumferentially with greatest length at 3o'clock measuring 0.3cm. Edges are hyperkeratinized and slightly macerated. Drainage is copious, seropurulent, and has no odor. Wound cleansed with NS. Cavilon applied to periwound. Aquacel AG Advantage applied to wound bed. Covered with foam.   Patient has severe erosion to perianal and perivaginal area, likely 2/2 previous radiation thereapy. Area presents as pink moist skin with pink satellite lesions to periphery. Patient is in severe pain with cleansing or applying cream. Patient would likely benefit from lidocaine gel to area for improved comfort. discussed with MD Butler and MD Zimmerman.

## 2021-09-29 NOTE — PROGRESS NOTE ADULT - ASSESSMENT
54yo F w/ PMH AIDS (currently on Biktarvy and Bactrim), Hepatitis B/C, COPD (not on home O2, actively smokes), rectal cancer s/p radiation therapy, tongue cancer s/p resection, h/o crack use (last use 8/2021) presenting to ED from nursing home complaining of 6 days of worsening dysphagia, throat pain and cough, found to be hypoxic to 88% and has oral thrush. Admitted to [facility] for treatment of hypercapnic, hypoxic respiratory failure, sepsis 2/2 presumed PNA and recent dysphagia.

## 2021-09-29 NOTE — PROGRESS NOTE ADULT - PROBLEM SELECTOR PLAN 1
Pt diagnosed with oral thrush 5d ago at nursing home, started on nystatin with minimal improvement in throat pain. Could not assess whether this is esophageal candidiasis as she did not allow full HEENT exam. Pt reports difficulty swallowing both solids and liquids. Potential etiologies include opportunistic infection 2/2 AIDS w/ CD4<200 vs 2/2 COPD inhaler use if she has not been rinsing after inhaler use. Pt has been able to swallow oral medications.    -Dc'd nystatin  -Started fluconazole - 400mg IV (9/27), continue IV until cleared to tolerate PO  -Restarted oxycodone 15mg q6h PRN for severe pain (home medication)  -Consider repeat EKG in a few days now that pt on Fluconazole as it can increase QTc (QTc 417 on 9/27)  - Speech and Swallow evaluated pt, recommended MBS.   - MBS revealed pt is aspirating everything; S&S recc NGT for medications and nutrition  - Pt adamently refused NGT, stating "I don't need that". She insisted on eating despite explaining to her the results of the MBS in layman's terms.  - Converted majority of pt's medications to IV Pt diagnosed with oral thrush 5d ago at nursing home, started on nystatin with minimal improvement in throat pain. Could not assess whether this is esophageal candidiasis as she did not allow full HEENT exam. Pt reports difficulty swallowing both solids and liquids. Potential etiologies include opportunistic infection 2/2 AIDS w/ CD4<200 vs 2/2 COPD inhaler use if she has not been rinsing after inhaler use. Pt has been able to swallow oral medications.    -Dc'd nystatin  -Started fluconazole - 400mg IV (9/27), continue IV until cleared to tolerate PO  -Restarted oxycodone 15mg q6h PRN for severe pain (home medication)  -Consider repeat EKG in a few days now that pt on Fluconazole as it can increase QTc (QTc 417 on 9/27)  -Speech and Swallow evaluated pt, and MBS revealed pt is aspirating everything; S&S recc NGT for medications and nutrition  -Pt adamently refused NGT, stating "I don't need that". She insisted on eating despite explaining to her the results of the MBS in layman's terms.  -Converted majority of pt's medications to IV  -Oral hygiene for symptom improvement, consider viscous lidocaine swabs for comfort  -Motivational interview w/ pt re: nutrition

## 2021-09-29 NOTE — PROGRESS NOTE ADULT - ASSESSMENT
56yo F w/ PMH HIV/AIDS (currently on Biktarvy and Bactrim), Hepatitis B/C, COPD (not on home O2, active smoker), rectal cancer s/p radiation therapy, tongue cancer s/p resection, h/o substance use disorder (crack cocaine last use 8/2021) presenting with dysphagia and cough.     #possible HAP  Presented with dysphagia, cough, leukocytosis, fever, hypotension (possibly baseline as noted prior admission and hypoxia (appears last admission normal O2 sat high 90s on RA--initally in ED noted to be 88% and req 2L per flowsheet) Currently on vanc/zosyn agree its reasonable to cover HAP given recent hospitalization although given findings per SLP/barium exam she significantly aspirates and at high risk for aspiration PNA so also likely. I walked the patient this evening off supplemental oxygen up and down the hallway with nurse present and no significant desaturation (able to maintain O2 sat at no less than 89%). Given ambulatory sat, recent compliance with bactrim ppx, CXR findings would consider PCP PNA less likely but if deterioration or no improvement would consider bronchoscopy. Could also consider viral PNA (covid negative). MRSA swab positive but given stability seems less likely MRSA PNA.   -agree with abx (currently on vanc/zosyn) and antifungal (fluconazole for potential esophagitis) per primary team  -continue Bactrim pcp ppx  -will follow up LDH, galactomannan, B-Dglucan (fungitell), CMV PCR, chlamydia/GC NAAT, Respiratory viral panel, urine legionella/strep ag  -sputum sample if able for bacterial cx/ PCP PCR (may need induced sputum by RT for pcp pcr)   -hold off on bronchoscopy at this time    #COPD  Home inhalers are symbicort (ICS/LABA) and Albuterol prn. Inhaled steroids (symbicort) can in general contribute to PNAs and thrush if she is not rinsing her mouth out after each use and especially given her immunocompromised status. Would recommend discontinuing Symbicort and starting Stiolto instead (LAMA/LABA). She currently doesn't appear to be in COPD exacerbation as she is moving air well, lung exam generally clear without wheeze or significant O2 requirement.   -d/c symbicort   -start stiolto   -continue albuterol prn  -will need 2nd covid vaccine at discharge   -encourage smoking cessation     #complex comorbidities  -consider ID/HIV consult given complex infectious disease patient (HIV/AIDS, Hep B/C, trichomonas in urine, PNA, possible esophagitis etc)    Plan to be discussed with Dr. Rausch     56yo F w/ PMH HIV/AIDS (currently on Biktarvy and Bactrim), Hepatitis B/C, COPD (not on home O2, active smoker), rectal cancer s/p radiation therapy, tongue cancer s/p resection, h/o substance use disorder (crack cocaine last use 8/2021) presenting with dysphagia and cough.     #possible HAP  #COPD    Assessment per initial consult note. Ambulatory sats wnl yesterday. HAP vs aspiration PNA most likely. Negative RVP and normal oral hilda on sputum cultures. Started on stiolto and symbicort d/c'd.     -agree with abx (currently on vanc/zosyn/ for HAP and flagyl for trichomonas) and antifungal (fluconazole for potential esophagitis) per primary team  -continue Bactrim pcp ppx  -will follow up LDH, galactomannan, B-D glucan (fungitell), CMV PCR, chlamydia/GC NAAT  -Please also request PCP PCR on sputum.   -hold off on bronchoscopy at this time  -continue stiolto   -continue albuterol prn  -will need 2nd covid vaccine at discharge   -encourage smoking cessation     Plan to be discussed with Dr. Rausch     54yo F w/ PMH HIV/AIDS (currently on Biktarvy and Bactrim), Hepatitis B/C, COPD (not on home O2, active smoker), rectal cancer s/p radiation therapy, tongue cancer s/p resection, h/o substance use disorder (crack cocaine last use 8/2021) presenting with dysphagia and cough.     #possible HAP  #aspiration  #COPD    Assessment per initial consult note. Ambulatory sats wnl yesterday. HAP vs aspiration PNA most likely. Negative RVP and normal oral hilda on sputum cultures. Started on stiolto and symbicort d/c'd.     -agree with abx (currently on vanc/zosyn/ for HAP and flagyl for trichomonas) and antifungal (fluconazole for potential esophagitis) per primary team  -continue Bactrim pcp ppx  -will follow up LDH, galactomannan, B-D glucan (fungitell), CMV PCR, chlamydia/GC NAAT  -Please also request PCP PCR on sputum.   -hold off on bronchoscopy at this time  -continue stiolto   -continue albuterol prn  -aspiration precautions  -will need 2nd covid vaccine at discharge   -encourage smoking cessation     Plan to be discussed with Dr. Rausch

## 2021-09-29 NOTE — PROGRESS NOTE ADULT - PROBLEM SELECTOR PLAN 8
Pt has stage 4 pressure ulcer on R sacrum measuring 4cm x 2cm x 0.2cm - staged and managed last admission by wound care. Ulcer currently covered w/ bandage and tape. Wound currently covered with bandage but pt has trouble lying on R side due to pain. Last admission wound care recs: Aquacel Extra, cut piece to fit over wound, cover with foam dressing  -Restarted home oxycodone for pain  -Consider wound care consult in AM Pt has stage 4 pressure ulcer on R sacrum measuring 4cm x 2cm x 0.2cm - staged and managed last admission by wound care. Ulcer currently covered w/ bandage and tape. Wound currently covered with bandage but pt has trouble lying on R side due to pain. Last admission wound care recs: Aquacel Extra, cut piece to fit over wound, cover with foam dressing  -Restarted home oxycodone for pain  -Wound care consulted

## 2021-09-29 NOTE — PROGRESS NOTE ADULT - PROBLEM SELECTOR PLAN 10
Pt may have h/o Hep C (documented in ED note). Not currently on medication. Unknown at this time whether she was ever treated in the past. Liver panel wnl this admission.  -Can consider sending hepatitis panel to verify    #H/o Hep B  -Pt may have h/o Hep B (documented in ED note).  -Can consider sending hepatitis panel to verify

## 2021-09-29 NOTE — PHYSICAL THERAPY INITIAL EVALUATION ADULT - PERTINENT HX OF CURRENT PROBLEM, REHAB EVAL
Ms. Devlin is a 56yo F w/ PMH AIDS (currently on Biktarvy and Bactrim), Hepatitis B/C, COPD (not on home O2, actively smokes), rectal cancer s/p radiation therapy, tongue cancer s/p resection, h/o crack use (last use 8/2021) presenting to ED from nursing home complaining of 6 days of worsening dysphagia, throat pain and cough.

## 2021-09-29 NOTE — PROGRESS NOTE ADULT - PROBLEM SELECTOR PLAN 7
Pt has h/o tongue cancer s/p resection +/- graft placement. Unable to assess throat on physical exam as difficult for patient to open her mouth. Pt on oxycodone 15mg at home for pain. Pt currently has dysphagia as well, unable to tolerate PO without aspiration.     -Treating oral thrush as above  -Restarted oxycodone; benadryl (for pruritis 2/2 chronic opioid use)  -Started bowel regimen (miralax, senna) for constipation 2/2 decreased PO intake and chronic opioid use

## 2021-09-30 NOTE — PROGRESS NOTE ADULT - PROBLEM SELECTOR PLAN 4
Pt met 2/4 SIRS criteria in the ED (RR 21, WBC 14.53), w/ CXR 9/27 showing b/l lower lobe infiltrates. Therefore PNA confirmed source of infection. Pt is s/p 1.5L NS, zosyn 3.375g x1, vancomycin 750mg x1 in the ED. Lactate 1.7.    -Pt WBC 15.6, RR 16; pt no longer meets SIRS  -c/w D5 1/2 NS @ 80cc/hr maintenance fluids  -C/w ppx bactrim for PCP (AIDS, last CD4 <200)  -c/w zosyn 3.375g q6h  -c/w vancomycin 750mg qd for x2 more doses (check trough before dose on 9/30 if pt still needs vanc) Pt met 2/4 SIRS criteria in the ED (RR 21, WBC 14.53), w/ CXR 9/27 showing b/l lower lobe infiltrates. Therefore PNA confirmed source of infection. Pt is s/p 1.5L NS, zosyn 3.375g x1, vancomycin 750mg x1 in the ED. Lactate 1.7.    -Pt WBC 15.6, RR 16; pt no longer meets SIRS  -c/w D5 1/2 NS @ 80cc/hr maintenance fluids  -C/w ppx bactrim for PCP (AIDS, last CD4 <200)  -c/w zosyn 3.375g q6h  -c/w vancomycin 750mg qd, MRSA swab positive  -f/u Vanc trough (9/30)

## 2021-09-30 NOTE — SWALLOW FEES ASSESSMENT ADULT - SLP PERTINENT HISTORY OF CURRENT PROBLEM
56 yo W w AIDS (on biktarvy & bactrim), Hep B/C, COPD (not on home O2, actively smokes), rectal Ca s/p XRT, tongue Ca s/p resxn in 2014 at [facility], reportedly does not follow routinely w ENT, substance abuse, adm w odynophagia & dsyphagia x6 days. Found to be hypoxic to 88% & w oral thrush. Being tx'd for sepsis 2/2 presumed asp pna.

## 2021-09-30 NOTE — PROGRESS NOTE ADULT - PROBLEM SELECTOR PLAN 2
Pt presented with respiratory difficulty, new cough, dysphagia and throat pain. Pt afebrile w/ leukocytosis & tachypnea. CXR showed bilateral lower lobe opacity. Pt had recent hospitalization at [facility] at end of August-early September for failure to thrive and lives in a nursing home; is at increased risk for HAP.    -CXR (9/27) revealed b/l lower lobe opacities concerning for PNA;  HAP vs aspiration PNA given recent hospitalization at [facility] + pt lives in nursing home  -c/w zosyn and vancomycin for HAP coverage  -Pt on 1.5L O2, wean down as tolerated; Sp%O2 was 97% on 2L  -Per Pulm reccs, f/u ambulatory O2 stats, c/w duonebs, and d/c symbicort started Stiolto  -MRSA positive; continue vancomycin  -F/u fungitell, galactomannan, LDH, fungal bcx Pt presented with respiratory difficulty, new cough, dysphagia and throat pain. Pt afebrile w/ leukocytosis & tachypnea. CXR showed bilateral lower lobe opacity. Pt had recent hospitalization at [facility] at end of August-early September for failure to thrive and lives in a nursing home; is at increased risk for HAP.    -CXR (9/27) revealed b/l lower lobe opacities concerning for PNA;  HAP vs aspiration PNA given recent hospitalization at [facility] + pt lives in nursing home  -c/w zosyn and vancomycin for HAP coverage  -Pt on 1.5L O2, wean down as tolerated; Sp%O2 was 97% on 2L  -Per Pulm reccs, f/u ambulatory O2 stats, c/w duonebs, and d/c symbicort started Stiolto  -MRSA positive; continue vancomycin  -Legionella Negative  -RVP Negative  -F/u fungitell, galactomannan, LDH, fungal bcx Pt presented with respiratory difficulty, new cough, dysphagia and throat pain. Pt afebrile w/ leukocytosis & tachypnea. CXR showed bilateral lower lobe opacity. Pt had recent hospitalization at [facility] at end of August-early September for failure to thrive and lives in a nursing home; is at increased risk for HAP.    -CXR (9/27) revealed b/l lower lobe opacities concerning for PNA;  HAP vs aspiration PNA given recent hospitalization at [facility] + pt lives in nursing home  -c/w zosyn and vancomycin for HAP coverage  -vanc trough for 9/30  -Pt on 1.5L O2, wean down as tolerated; Sp%O2 was 97% on 2L  -Per Pulm reccs, f/u ambulatory O2 stats, c/w duonebs, and d/c symbicort started Stiolto  -MRSA positive; continue vancomycin  -Legionella Negative  -RVP Negative  -F/u fungitell, galactomannan, LDH, fungal bcx Pt presented with respiratory difficulty, new cough, dysphagia and throat pain. Pt afebrile w/ leukocytosis & tachypnea. CXR showed bilateral lower lobe opacity. Pt had recent hospitalization at [facility] at end of August-early September for failure to thrive and lives in a nursing home; is at increased risk for HAP.    -CXR (9/27) revealed b/l lower lobe opacities concerning for PNA;  HAP vs aspiration PNA given recent hospitalization at [facility] + pt lives in nursing home  -MRSA positive; continue vancomycin  -Legionella & RVP Negative  -c/w zosyn and vancomycin for HAP coverage; vanc trough for 9/30 (8pm)  -Per Pulm reccs, f/u ambulatory O2 stats, c/w duonebs, and d/c symbicort started Stiolto  -Pt on 1.5L O2, wean down as tolerated; Sp%O2 was 97% on 2L  -F/u fungitell, galactomannan, LDH, fungal bcx Pt presented with respiratory difficulty, new cough, dysphagia and throat pain. Pt afebrile w/ leukocytosis & tachypnea. CXR showed bilateral lower lobe opacity. Pt had recent hospitalization at [facility] at end of August-early September for failure to thrive and lives in a nursing home; is at increased risk for HAP.    -CXR (9/27) revealed b/l lower lobe opacities concerning for PNA;  HAP vs aspiration PNA given recent hospitalization at [facility] + pt lives in nursing home  -MRSA positive; continue vancomycin  -Legionella, Chlamydia, GC & RVP results are all Negative  -c/w zosyn and vancomycin for HAP coverage; vanc trough for 9/30 (8pm)  -Per Pulm reccs, f/u ambulatory O2 stats, c/w duonebs, and d/c symbicort started Stiolto  -Pt on 1.5L O2, wean down as tolerated; Sp%O2 was 97% on 2L  -F/u fungitell, galactomannan, LDH, fungal bcx

## 2021-09-30 NOTE — PROGRESS NOTE ADULT - SUBJECTIVE AND OBJECTIVE BOX
PULMONARY CONSULT SERVICE FOLLOW-UP NOTE    INTERVAL HPI:  Reviewed chart and overnight events; patient seen and examined at bedside.    MEDICATIONS:  Pulmonary:  ALBUTerol    0.083% 2.5 milliGRAM(s) Nebulizer every 6 hours PRN  sodium chloride 3%  Inhalation 3 milliLiter(s) Inhalation every 6 hours  tiotropium 2.5 MICROgram(s)/olodaterol 2.5 MICROgram(s) (STIOLTO) Inhaler 2 Puff(s) Inhalation daily    Antimicrobials:  bictegravir 50 mG/emtricitabine 200 mG/tenofovir alafenamide 25 mG (BIKTARVY) 1 Tablet(s) Oral daily  fluconAZOLE IVPB 200 milliGRAM(s) IV Intermittent every 24 hours  metroNIDAZOLE  IVPB 500 milliGRAM(s) IV Intermittent every 12 hours  piperacillin/tazobactam IVPB... 3.375 Gram(s) IV Intermittent every 6 hours  trimethoprim / sulfamethoxazole IVPB 160 milliGRAM(s) IV Intermittent every 24 hours    Anticoagulants:  enoxaparin Injectable 30 milliGRAM(s) SubCutaneous every 24 hours    Cardiac:      Allergies    No Known Allergies    Intolerances        Vital Signs Last 24 Hrs  T(C): 36.8 (30 Sep 2021 06:00), Max: 37.1 (29 Sep 2021 11:25)  T(F): 98.3 (30 Sep 2021 06:00), Max: 98.7 (29 Sep 2021 11:25)  HR: 62 (30 Sep 2021 06:00) (60 - 80)  BP: 96/58 (30 Sep 2021 06:00) (89/55 - 96/58)  BP(mean): --  RR: 15 (30 Sep 2021 06:00) (15 - 16)  SpO2: 94% (30 Sep 2021 06:00) (94% - 95%)        PHYSICAL EXAM:  Constitutional: WDWN  HEENT: NC/AT; PERRL, anicteric sclera; MMM  Neck: supple  Cardiovascular: +S1/S2, RRR  Respiratory: CTA B/L; no W/R/R  Gastrointestinal: soft, NT/ND  Extremities: WWP; no edema, clubbing or cyanosis  Vascular: 2+ radial pulses B/L  Neurological: awake and alert; CHOUDHARY    LABS:                                RADIOLOGY & ADDITIONAL STUDIES: PULMONARY CONSULT SERVICE FOLLOW-UP NOTE    INTERVAL HPI:  Reviewed chart and overnight events; patient seen and examined at bedside.  No acute events   seen by wound care and PT    Subjective:  Refuses physical therapy today. States breathing is fine. Still unable to eat/drink.   Denies fever, chills, shortness of breath.     MEDICATIONS:  Pulmonary:  ALBUTerol    0.083% 2.5 milliGRAM(s) Nebulizer every 6 hours PRN  sodium chloride 3%  Inhalation 3 milliLiter(s) Inhalation every 6 hours  tiotropium 2.5 MICROgram(s)/olodaterol 2.5 MICROgram(s) (STIOLTO) Inhaler 2 Puff(s) Inhalation daily    Antimicrobials:  bictegravir 50 mG/emtricitabine 200 mG/tenofovir alafenamide 25 mG (BIKTARVY) 1 Tablet(s) Oral daily  fluconAZOLE IVPB 200 milliGRAM(s) IV Intermittent every 24 hours  metroNIDAZOLE  IVPB 500 milliGRAM(s) IV Intermittent every 12 hours  piperacillin/tazobactam IVPB... 3.375 Gram(s) IV Intermittent every 6 hours  trimethoprim / sulfamethoxazole IVPB 160 milliGRAM(s) IV Intermittent every 24 hours    Anticoagulants:  enoxaparin Injectable 30 milliGRAM(s) SubCutaneous every 24 hours    Cardiac:      Allergies    No Known Allergies    Vital Signs Last 24 Hrs  T(C): 36.8 (30 Sep 2021 06:00), Max: 37.1 (29 Sep 2021 11:25)  T(F): 98.3 (30 Sep 2021 06:00), Max: 98.7 (29 Sep 2021 11:25)  HR: 62 (30 Sep 2021 06:00) (60 - 80)  BP: 96/58 (30 Sep 2021 06:00) (89/55 - 96/58)  BP(mean): --  RR: 15 (30 Sep 2021 06:00) (15 - 16)  SpO2: 94% (30 Sep 2021 06:00) (94% - 95%)        PHYSICAL EXAM:  PHYSICAL EXAM:  Constitutional: cachetic and appears older than stated age, no acute distress (not wearing O2)  Head: NC/AT  EENT: PERRL, anicteric sclera; oropharynx clear, dry MM  Neck: supple, no appreciable JVD  Respiratory: clear throughout, no wheezing  Cardiovascular: +S1/S2, RRR  Gastrointestinal: soft, NT/ND  Extremities: WWP; no edema, clubbing or cyanosis  Vascular: 2+ radial pulses B/L  Neurological: awake/alert     LABS:  No new labs or micro data.     RADIOLOGY & ADDITIONAL STUDIES:  No new chest imaging.

## 2021-09-30 NOTE — PROGRESS NOTE ADULT - ASSESSMENT
56yo F w/ PMH HIV/AIDS (currently on Biktarvy and Bactrim), Hepatitis B/C, COPD (not on home O2, active smoker), rectal cancer s/p radiation therapy, tongue cancer s/p resection, h/o substance use disorder (crack cocaine last use 8/2021) presenting with dysphagia and cough.     #possible HAP  #aspiration  #COPD    Assessment per initial consult note. Ambulatory sats wnl yesterday. HAP vs aspiration PNA most likely. Negative RVP and normal oral hilda on sputum cultures. Started on stiolto and symbicort d/c'd.     -agree with abx (currently on vanc/zosyn/ for HAP and flagyl for trichomonas) and antifungal (fluconazole for potential esophagitis) per primary team  -continue Bactrim pcp ppx  -will follow up LDH, galactomannan, B-D glucan (fungitell), CMV PCR, chlamydia/GC NAAT  -Please also request PCP PCR on sputum.   -hold off on bronchoscopy at this time  -continue stiolto   -continue albuterol prn  -aspiration precautions  -will need 2nd covid vaccine at discharge   -encourage smoking cessation     Plan to be discussed with Dr. Rausch     56yo F w/ PMH HIV/AIDS (currently on Biktarvy and Bactrim), Hepatitis B/C, COPD (not on home O2, active smoker), rectal cancer s/p radiation therapy, tongue cancer s/p resection, h/o substance use disorder (crack cocaine last use 8/2021) presenting with dysphagia and cough.     #possible HAP  #aspiration  #COPD    Assessment per initial consult note. Ambulatory sats wnl yesterday. HAP vs aspiration PNA most likely. Negative infectious work up thus far. Not on O2, clear lungs.     -agree with abx (currently on vanc/zosyn/ for HAP and flagyl for trichomonas) and antifungal (fluconazole for potential esophagitis) per primary team  -continue Bactrim pcp ppx  -will follow up LDH, galactomannan, B-D glucan (fungitell), CMV PCR, chlamydia/GC NAAT  -Please also request PCP PCR on sputum.   -hold off on bronchoscopy at this time  -continue stiolto   -continue albuterol prn  -aspiration precautions  -if unable to take PO safely--consider goals of care discussion or assessment of understanding of taking PO with aspiration risk    -will need 2nd covid vaccine at discharge   -encourage smoking cessation     Plan to be discussed with Dr. Rausch

## 2021-09-30 NOTE — PROGRESS NOTE ADULT - PROBLEM SELECTOR PLAN 3
Pt has known h/o COPD. Current smoker (cut down to 6 cigarettes/day but has smoked since she was 13yo). Uses Symbicort 160-4.5mcg/act and ventolin HFA at home. Currently spO2 90% on room air.     -c/w duonebs q6h  -Started stiolto  -Treating PNA as above  -Nicotine patch (14mg/24h) ordered  -Re- smoking cessation Pt has known h/o COPD. Current smoker (cut down to 6 cigarettes/day but has smoked since she was 11yo). Uses Symbicort 160-4.5mcg/act and ventolin HFA at home. Currently spO2 90% on room air.     -c/w duonebs q6h  -Started stiolto  -c/w albuterol PRN  -Treating PNA as above  -Nicotine patch (14mg/24h) ordered  -Re- smoking cessation Pt has known h/o COPD. Current smoker (cut down to 6 cigarettes/day but has smoked since she was 11yo). Uses Symbicort 160-4.5mcg/act and ventolin HFA at home. Currently spO2 90% on room air.     -c/w duonebs q6h  -Started stiolto  -Treating PNA as above  -Nicotine patch (14mg/24h) ordered  -Re- smoking cessation Pt has known h/o COPD. Current smoker (cut down to 6 cigarettes/day but has smoked since she was 13yo). Uses Symbicort 160-4.5mcg/act and ventolin HFA at home. Currently spO2 90% on room air.     -c/w duonebs q6h  -Started stiolto  -Treating PNA as above  -Incentive Spirometer  -Nicotine patch (14mg/24h) ordered  -Re- smoking cessation Pt has known h/o COPD. Current smoker (cut down to 6 cigarettes/day but has smoked since she was 11yo). Uses Symbicort 160-4.5mcg/act and ventolin HFA at home. Currently spO2 90% on room air.     -Treating PNA as above  -c/w duonebs q6h  -Started stiolto  -Incentive Spirometer  -Nicotine patch (14mg/24h) ordered  -Re- smoking cessation

## 2021-09-30 NOTE — PROGRESS NOTE ADULT - PROBLEM SELECTOR PLAN 6
Pt UA positive for few trichomonas  -Started metronidazole 500mg BID x7d Pt UA positive for few trichomonas  -Started metronidazole 500mg BID x7d (currently day 3/7)

## 2021-09-30 NOTE — PROGRESS NOTE ADULT - PROBLEM SELECTOR PLAN 1
Pt diagnosed with oral thrush 5d ago at nursing home, started on nystatin with minimal improvement in throat pain. Could not assess whether this is esophageal candidiasis as she did not allow full HEENT exam. Pt reports difficulty swallowing both solids and liquids. Potential etiologies include opportunistic infection 2/2 AIDS w/ CD4<200 vs 2/2 COPD inhaler use if she has not been rinsing after inhaler use. Pt has been able to swallow oral medications.    -Dc'd nystatin  -Started fluconazole - 400mg IV (9/27), continue IV until cleared to tolerate PO  -Restarted oxycodone 15mg q6h PRN for severe pain (home medication)  -Consider repeat EKG in a few days now that pt on Fluconazole as it can increase QTc (QTc 417 on 9/27)  -Speech and Swallow evaluated pt, and MBS revealed pt is aspirating everything; S&S recc NGT for medications and nutrition  -Pt adamently refused NGT, stating "I don't need that". She insisted on eating despite explaining to her the results of the MBS in layman's terms.  -Converted majority of pt's medications to IV  -Oral hygiene for symptom improvement, consider viscous lidocaine swabs for comfort  -Motivational interview w/ pt re: nutrition Pt diagnosed with oral thrush 5d ago at nursing home, started on nystatin with minimal improvement in throat pain. Could not assess whether this is esophageal candidiasis as she did not allow full HEENT exam. Pt reports difficulty swallowing both solids and liquids. Potential etiologies include opportunistic infection 2/2 AIDS w/ CD4<200 vs 2/2 COPD inhaler use if she has not been rinsing after inhaler use. Pt has been able to swallow oral medications.    -Dc'd nystatin  -Started fluconazole - 400mg IV (9/27), continue IV until cleared to tolerate PO  -Restarted oxycodone 15mg q6h PRN for severe pain (home medication)  -Consider repeat EKG in a few days now that pt on Fluconazole as it can increase QTc (QTc 417 on 9/27)  -Speech and Swallow evaluated pt, and MBS revealed pt is aspirating everything; S&S recc NGT for medications and nutrition  -Pt adamently refused NGT, stating "I don't need that". She insisted on eating despite explaining to her the results of the MBS in layman's terms.  -Converted majority of pt's medications to IV  -Oral hygiene for symptom improvement, consider viscous lidocaine swabs for comfort  -chest PT and mucomyst improved pt secretions and dysphagia   -Motivational interview w/ pt re: nutrition Pt diagnosed with oral thrush 5d ago at nursing home, started on nystatin with minimal improvement in throat pain. Could not assess whether this is esophageal candidiasis as she did not allow full HEENT exam. Pt reports difficulty swallowing both solids and liquids. Potential etiologies include opportunistic infection 2/2 AIDS w/ CD4<200 vs 2/2 COPD inhaler use if she has not been rinsing after inhaler use. Pt has been able to swallow oral medications.    -Dc'd nystatin  -Started fluconazole - 400mg IV (9/27), received 200mg (9/28 &29) and tolerated well, increased back to 400mg IV until cleared to tolerate PO  -Restarted oxycodone 15mg q6h PRN for severe pain (home medication)  -Consider repeat EKG in a few days now that pt on Fluconazole as it can increase QTc (QTc 417 on 9/27)  -Speech and Swallow evaluated pt, and MBS revealed pt is aspirating everything; S&S recc NGT for medications and nutrition  -Pt adamently refused NGT, stating "I don't need that". She insisted on eating despite explaining to her the results of the MBS in layman's terms.  -Converted majority of pt's medications to IV  -Oral hygiene for symptom improvement, consider viscous lidocaine swabs for comfort  -chest PT and mucomyst improved pt secretions and dysphagia   -Motivational interview w/ pt re: nutrition Pt diagnosed with oral thrush 5d ago at nursing home, started on nystatin with minimal improvement in throat pain. Could not assess whether this is esophageal candidiasis as she did not allow full HEENT exam. Pt reports difficulty swallowing both solids and liquids. Potential etiologies include opportunistic infection 2/2 AIDS w/ CD4<200 vs 2/2 COPD inhaler use if she has not been rinsing after inhaler use. Pt has been able to swallow oral medications.    -Dc'd nystatin  -Started fluconazole - 400mg IV (9/27), received 200mg (9/28 &29) and tolerated well, increased back to 400mg IV until cleared to tolerate PO; f/u EKG 10/1 to check QTc (QTc 417 on 9/27)  -Restarted oxycodone 15mg q6h PRN for severe pain (home medication)  -Speech and Swallow evaluated pt, and MBS revealed pt is aspirating everything; S&S recc NGT for medications and nutrition  -Pt adamently refused NGT, stating "I don't need that". She insisted on eating despite explaining to her the results of the MBS in layman's terms.  -Converted majority of pt's medications to IV  -Oral hygiene for symptom improvement, consider viscous lidocaine swabs for comfort  -chest PT and mucomyst improved pt secretions and dysphagia   -Motivational interview w/ pt re: nutrition

## 2021-09-30 NOTE — CHART NOTE - NSCHARTNOTEFT_GEN_A_CORE
O/N Events: DALI    Subjective/ROS: Improved pain but still w/ dysphagia.  Overall feeling better.  No CP/SOP, N/V, D/C.  12pt ROS otherwise negative.    VITALS  Vital Signs Last 24 Hrs  T(C): 37.1 (30 Sep 2021 17:28), Max: 37.1 (30 Sep 2021 17:28)  T(F): 98.7 (30 Sep 2021 17:28), Max: 98.7 (30 Sep 2021 17:28)  HR: 59 (30 Sep 2021 17:28) (59 - 80)  BP: 97/63 (30 Sep 2021 17:28) (94/57 - 97/63)  BP(mean): --  RR: 16 (30 Sep 2021 17:28) (15 - 16)  SpO2: 94% (30 Sep 2021 17:28) (94% - 99%)    PHYSICAL EXAM  General: A&Ox3; NAD  Head: NC/AT; MMM; PERRL; EOMI; oral thrush   Tongue s/p surgical repair  Neck: Supple; no JVD  Respiratory: CTA B/L; no wheezes/crackles   Cardiovascular: Regular rhythm/rate; S1/S2   Gastrointestinal: Soft; NTND; normoactive BS  Extremities: WWP; no edema/cyanosis  Neurological:  CNII-XII grossly intact; no obvious focal deficits    MEDICATIONS  (STANDING):  atorvastatin 40 milliGRAM(s) Oral at bedtime  bictegravir 50 mG/emtricitabine 200 mG/tenofovir alafenamide 25 mG (BIKTARVY) 1 Tablet(s) Oral daily  dextrose 5% + sodium chloride 0.9%. 1000 milliLiter(s) (80 mL/Hr) IV Continuous <Continuous>  enoxaparin Injectable 30 milliGRAM(s) SubCutaneous every 24 hours  fluconAZOLE IVPB 400 milliGRAM(s) IV Intermittent every 24 hours  melatonin 5 milliGRAM(s) Oral at bedtime  metroNIDAZOLE  IVPB 500 milliGRAM(s) IV Intermittent every 12 hours  mirtazapine 15 milliGRAM(s) Oral at bedtime  nicotine -  14 mG/24Hr(s) Patch 1 patch Transdermal daily  OLANZapine Disintegrating Tablet 5 milliGRAM(s) Oral every 24 hours  piperacillin/tazobactam IVPB... 3.375 Gram(s) IV Intermittent every 6 hours  polyethylene glycol 3350 17 Gram(s) Oral daily  senna 2 Tablet(s) Oral at bedtime  sodium chloride 3%  Inhalation 3 milliLiter(s) Inhalation every 6 hours  tiotropium 2.5 MICROgram(s)/olodaterol 2.5 MICROgram(s) (STIOLTO) Inhaler 2 Puff(s) Inhalation daily  trimethoprim / sulfamethoxazole IVPB 160 milliGRAM(s) IV Intermittent every 24 hours    MEDICATIONS  (PRN):  ALBUTerol    0.083% 2.5 milliGRAM(s) Nebulizer every 6 hours PRN Shortness of Breath and/or Wheezing  HYDROmorphone  Injectable 1 milliGRAM(s) IV Push every 6 hours PRN Severe Pain (7 - 10)      No Known Allergies      LABS                        9.0    13.34 )-----------( 122      ( 30 Sep 2021 08:38 )             29.1     09-30    135  |  103  |  5<L>  ----------------------------<  133<H>  4.0   |  24  |  0.76    Ca    8.6      30 Sep 2021 08:38      IMAGING/EKG/ETC: Reviewed    Assessment / Recs:  55F w/ AIDS, tongue cancer s/p resection presents w/ oropharyngeal (and likely) esophgeal thrush also found to have hypoxic resp failure 2/2 to PNA.  CD4 still severely suppressed.  VL now <200 indicated adherence over the past month.  CMV PCR negative last visit makes CMV esophagitis less likely.  Symptoms likely 2/2 thrush.  C/w fluconazole.   Can continue Biktarvy once no longer NPO.  If needs crushable regimen, will likely switch to truvada / tivicay.  Repeat OI w/u is not necessary at this time given her symptoms.   Sputum PCP PCR is not always indicative of lung disease.  A negative sputum study does not r/o PCP.  If she does not improve w/ treatment for HAP / Aspiration PNA, would consider bronch to r/o PCP.  C/w PCP PPx at this time.  F/u labs and sputum studies as per pulm.  Trichomonas on UA: On flagyl  Hep C: Will need viral load and genotyping in the future as outpatient.    HIV team will continue to follow.

## 2021-09-30 NOTE — SWALLOW FEES ASSESSMENT ADULT - PHARYNGEAL PHASE COMMENTS
Pharyngeal swallow initiation was delayed with bolus in hypopharynx. This resulted in inconsistent deep penetration of thin liquids and nectar during the swallow, spilling either over the epiglottis or via the lateral channel. Pharyngeal swallow initiation was mildly delayed with bolus in hypopharynx. Airway protection was mistimed, resulting in frequent deep penetration of thin and nectar-thick liquids during the swallow, spilling either over the epiglottis or via the lateral channel. Pt was not sensate. Aspiration can not be ruled out; poor visualization of laryngeal vestibule 2/2 poor tolerance of scope/hypersensitivity. Reduced pharyngeal contraction with moderate amount of stasis with limited boli, diffuse in oral cavity. This resulted in at least deep penetration after the swallow, with stasis spilling over the interarytenoid space, observed with nectar and honey-thick liquids. Study was terminated prematurely due to poor tolerance of scope.

## 2021-09-30 NOTE — PROGRESS NOTE ADULT - PROBLEM SELECTOR PLAN 5
Pt has known diagnosis of AIDS, follows w/ Dr. Romaine Nava at [facility]. Pt had not been compliant with Biktarvy, but since recent hospitalization (discharged 9/9/2021) has been on Biktarvy and Bactrim. CD4 count 59, viral load 142 (9/28/2021).     -Continue ppx Bactrim  -Per HIV team recs:    - CMV PCR negative last visit makes CMV esophagitis less likely.  Symptoms likely 2/2 thrush.     - C/w fluconazole. Can continue Biktarvy once no longer NPO.    - Repeat OI w/u is not necessary at this time given her symptoms.    - If she does not improve w/ treatment for HAP / Aspiration PNA, would consider bronch to r/o PCP.    - C/w PCP PPx at this time.     - F/u labs and sputum studies as per pulm.    - Trichomonas on UA: On flagyl.    - Hep C: Will need viral load and genotyping in the future as outpatient. Pt has known diagnosis of AIDS, follows w/ Dr. Romaine Nava at [facility]. Pt had not been compliant with Biktarvy, but since recent hospitalization (discharged 9/9/2021) has been on Biktarvy and Bactrim. CD4 count 59, viral load 142 (9/28/2021).     -Continue ppx Bactrim  -Per HIV team recs:    - CMV PCR negative last visit makes CMV esophagitis less likely.  Symptoms likely 2/2 thrush.     - C/w fluconazole. Can continue Biktarvy once no longer NPO.    - Repeat OI w/u is not necessary at this time given her symptoms.    - If she does not improve w/ treatment for HAP / Aspiration PNA, would consider bronch to r/o PCP.    - C/w PCP PPx at this time.     - Trichomonas on UA: On flagyl.    - Hep C: Will need viral load and genotyping in the future as outpatient.

## 2021-09-30 NOTE — PROGRESS NOTE ADULT - PROBLEM SELECTOR PLAN 11
F: D5 1/2 NS @ 80cc/hr (maintenance dose)  E: replete as needed  Diet: NPO, MBS found pt is silently aspirating everything   GI ppx: n/a  DVT ppx: 30mg lovenox subq qd  Code status: FULL CODE  Dispo: [facility]

## 2021-09-30 NOTE — SWALLOW FEES ASSESSMENT ADULT - SPECIFY REASON(S)
To re-assess swallow anatomy and physiology in setting of resolution of oropharyngeal thrush, at MD request

## 2021-09-30 NOTE — PROGRESS NOTE ADULT - SUBJECTIVE AND OBJECTIVE BOX
INTERVAL HPI/OVERNIGHT EVENTS:  *note in progress*    VITAL SIGNS:  T(F): 98.3 (09-30-21 @ 06:00)  HR: 62 (09-30-21 @ 06:00)  BP: 96/58 (09-30-21 @ 06:00)  RR: 15 (09-30-21 @ 06:00)  SpO2: 94% (09-30-21 @ 06:00)  Wt(kg): --    PHYSICAL EXAM:    Constitutional: WDWN, NAD  HEENT: PERRL, EOMI, sclera non-icteric, neck supple, trachea midline, no masses, no JVD, MMM, good dentition  Respiratory: CTA b/l, good air entry b/l, no wheezing, no rhonchi, no rales, without accessory muscle use and no intercostal retractions  Cardiovascular: RRR, normal S1S2, no M/R/G  Gastrointestinal: soft, NTND, no masses palpable, BS normal  Extremities: Warm, well perfused, pulses equal bilateral upper and lower extremities, no edema, no clubbing  Neurological: AAOx3, CN Grossly intact  Skin: Normal temperature, warm, dry    MEDICATIONS  (STANDING):  atorvastatin 40 milliGRAM(s) Oral at bedtime  bictegravir 50 mG/emtricitabine 200 mG/tenofovir alafenamide 25 mG (BIKTARVY) 1 Tablet(s) Oral daily  dextrose 5% + sodium chloride 0.9%. 1000 milliLiter(s) (80 mL/Hr) IV Continuous <Continuous>  enoxaparin Injectable 30 milliGRAM(s) SubCutaneous every 24 hours  fluconAZOLE IVPB 200 milliGRAM(s) IV Intermittent every 24 hours  melatonin 5 milliGRAM(s) Oral at bedtime  metroNIDAZOLE  IVPB 500 milliGRAM(s) IV Intermittent every 12 hours  mirtazapine 15 milliGRAM(s) Oral at bedtime  nicotine -  14 mG/24Hr(s) Patch 1 patch Transdermal daily  OLANZapine Disintegrating Tablet 5 milliGRAM(s) Oral every 24 hours  piperacillin/tazobactam IVPB... 3.375 Gram(s) IV Intermittent every 6 hours  polyethylene glycol 3350 17 Gram(s) Oral daily  senna 2 Tablet(s) Oral at bedtime  sodium chloride 3%  Inhalation 3 milliLiter(s) Inhalation every 6 hours  tiotropium 2.5 MICROgram(s)/olodaterol 2.5 MICROgram(s) (STIOLTO) Inhaler 2 Puff(s) Inhalation daily  trimethoprim / sulfamethoxazole IVPB 160 milliGRAM(s) IV Intermittent every 24 hours    MEDICATIONS  (PRN):  ALBUTerol    0.083% 2.5 milliGRAM(s) Nebulizer every 6 hours PRN Shortness of Breath and/or Wheezing  HYDROmorphone  Injectable 1 milliGRAM(s) IV Push every 6 hours PRN Severe Pain (7 - 10)      Allergies    No Known Allergies    Intolerances        LABS:                RADIOLOGY & ADDITIONAL TESTS:  Reviewed INTERVAL HPI/OVERNIGHT EVENTS:  *note in progress*    VITAL SIGNS:  T(F): 98.3 (09-30-21 @ 06:00)  HR: 62 (09-30-21 @ 06:00)  BP: 96/58 (09-30-21 @ 06:00)  RR: 15 (09-30-21 @ 06:00)  SpO2: 94% (09-30-21 @ 06:00)  Wt(kg): --    PHYSICAL EXAM:    Constitutional: WDWN, NAD  HEENT: PERRL, EOMI, sclera non-icteric, neck supple, trachea midline, no masses, no JVD, MMM, good dentition  Respiratory: CTA b/l, good air entry b/l, no wheezing, no rhonchi, no rales, without accessory muscle use and no intercostal retractions  Cardiovascular: RRR, normal S1S2, no M/R/G  Gastrointestinal: soft, NTND, no masses palpable, BS normal  Extremities: Warm, well perfused, pulses equal bilateral upper and lower extremities, no edema, no clubbing  Neurological: AAOx3, CN Grossly intact  Skin: Normal temperature, warm, dry    MEDICATIONS  (STANDING):  atorvastatin 40 milliGRAM(s) Oral at bedtime  bictegravir 50 mG/emtricitabine 200 mG/tenofovir alafenamide 25 mG (BIKTARVY) 1 Tablet(s) Oral daily  dextrose 5% + sodium chloride 0.9%. 1000 milliLiter(s) (80 mL/Hr) IV Continuous <Continuous>  enoxaparin Injectable 30 milliGRAM(s) SubCutaneous every 24 hours  fluconAZOLE IVPB 200 milliGRAM(s) IV Intermittent every 24 hours  melatonin 5 milliGRAM(s) Oral at bedtime  metroNIDAZOLE  IVPB 500 milliGRAM(s) IV Intermittent every 12 hours  mirtazapine 15 milliGRAM(s) Oral at bedtime  nicotine -  14 mG/24Hr(s) Patch 1 patch Transdermal daily  OLANZapine Disintegrating Tablet 5 milliGRAM(s) Oral every 24 hours  piperacillin/tazobactam IVPB... 3.375 Gram(s) IV Intermittent every 6 hours  polyethylene glycol 3350 17 Gram(s) Oral daily  senna 2 Tablet(s) Oral at bedtime  sodium chloride 3%  Inhalation 3 milliLiter(s) Inhalation every 6 hours  tiotropium 2.5 MICROgram(s)/olodaterol 2.5 MICROgram(s) (STIOLTO) Inhaler 2 Puff(s) Inhalation daily  trimethoprim / sulfamethoxazole IVPB 160 milliGRAM(s) IV Intermittent every 24 hours    MEDICATIONS  (PRN):  ALBUTerol    0.083% 2.5 milliGRAM(s) Nebulizer every 6 hours PRN Shortness of Breath and/or Wheezing  HYDROmorphone  Injectable 1 milliGRAM(s) IV Push every 6 hours PRN Severe Pain (7 - 10)      Allergies    No Known Allergies    Intolerances        LABS:    Legionella pneumophila Antigen, Urine (09.29.21 @ 13:25)    Legionella Antigen, Urine: Negative: Please submit a first morning sputum specimen for Legionella culture.  Positive Testing method: Immunochromatographic Assay.  Presumptive detection of L. pneumophila serogroup 1 antigen in urine,  suggesting recent or current infection. Order “Culture –Legionella” as  recommended to confirm infection.  Negative Testing method: Immunochromatographic Assay.  L. pneumophila serogroup 1 antigen in urine NOT detected, suggesting NO  recent or current infection. Infection due to Legionella cannot be ruled  out: other serogroups and species may cause disease, antigen may not be  present in urine in early infection, or the level of antigens in urine  may be below the detection limit of the test.  Order “Culture  –Legionella” is recommended for uncommon cases of suspected Legionella  pneumonia due to organisms other than L. pneumophila serogroup 1.    Respiratory Viral Panel with COVID-19 by EMERSON (09.29.21 @ 11:57)    Rapid RVP Result: NotDetec    Culture - Blood (09.27.21 @ 20:07)    Specimen Source: .Blood Blood-Peripheral    Culture Results:   No growth at 2 days.      RADIOLOGY & ADDITIONAL TESTS:  Reviewed INTERVAL HPI/OVERNIGHT EVENTS:  *note in progress*    Patient seen and examined at bedside this morning. No acute events overnight. This morning, patient is endorsing wanting to go home and is frustrated about not being able to eat. Endorses that throat pain has improved, currently 0/10 severity.     VITAL SIGNS:  T(F): 98.3 (09-30-21 @ 06:00)  HR: 62 (09-30-21 @ 06:00)  BP: 96/58 (09-30-21 @ 06:00)  RR: 15 (09-30-21 @ 06:00)  SpO2: 94% (09-30-21 @ 06:00)  Wt(kg): --    PHYSICAL EXAM:    Constitutional: WDWN, NAD  HEENT: EOMI, sclera non-icteric, +white mucus at back of palate  Respiratory: CTA b/l, good air entry b/l, no wheezing, no rhonchi, no rales, without accessory muscle use and no intercostal retractions  Cardiovascular: RRR, normal S1S2, no M/R/G  Gastrointestinal: soft, NTND, no masses palpable, BS normal  Extremities: Warm, well perfused, pulses equal bilateral upper and lower extremities, no edema, no clubbing  Neurological: AAOx3, CN Grossly intact  Skin: Normal temperature, warm, dry    MEDICATIONS  (STANDING):  atorvastatin 40 milliGRAM(s) Oral at bedtime  bictegravir 50 mG/emtricitabine 200 mG/tenofovir alafenamide 25 mG (BIKTARVY) 1 Tablet(s) Oral daily  dextrose 5% + sodium chloride 0.9%. 1000 milliLiter(s) (80 mL/Hr) IV Continuous <Continuous>  enoxaparin Injectable 30 milliGRAM(s) SubCutaneous every 24 hours  fluconAZOLE IVPB 200 milliGRAM(s) IV Intermittent every 24 hours  melatonin 5 milliGRAM(s) Oral at bedtime  metroNIDAZOLE  IVPB 500 milliGRAM(s) IV Intermittent every 12 hours  mirtazapine 15 milliGRAM(s) Oral at bedtime  nicotine -  14 mG/24Hr(s) Patch 1 patch Transdermal daily  OLANZapine Disintegrating Tablet 5 milliGRAM(s) Oral every 24 hours  piperacillin/tazobactam IVPB... 3.375 Gram(s) IV Intermittent every 6 hours  polyethylene glycol 3350 17 Gram(s) Oral daily  senna 2 Tablet(s) Oral at bedtime  sodium chloride 3%  Inhalation 3 milliLiter(s) Inhalation every 6 hours  tiotropium 2.5 MICROgram(s)/olodaterol 2.5 MICROgram(s) (STIOLTO) Inhaler 2 Puff(s) Inhalation daily  trimethoprim / sulfamethoxazole IVPB 160 milliGRAM(s) IV Intermittent every 24 hours    MEDICATIONS  (PRN):  ALBUTerol    0.083% 2.5 milliGRAM(s) Nebulizer every 6 hours PRN Shortness of Breath and/or Wheezing  HYDROmorphone  Injectable 1 milliGRAM(s) IV Push every 6 hours PRN Severe Pain (7 - 10)      Allergies    No Known Allergies    Intolerances        LABS:    Legionella pneumophila Antigen, Urine (09.29.21 @ 13:25)    Legionella Antigen, Urine: Negative: Please submit a first morning sputum specimen for Legionella culture.  Positive Testing method: Immunochromatographic Assay.  Presumptive detection of L. pneumophila serogroup 1 antigen in urine,  suggesting recent or current infection. Order “Culture –Legionella” as  recommended to confirm infection.  Negative Testing method: Immunochromatographic Assay.  L. pneumophila serogroup 1 antigen in urine NOT detected, suggesting NO  recent or current infection. Infection due to Legionella cannot be ruled  out: other serogroups and species may cause disease, antigen may not be  present in urine in early infection, or the level of antigens in urine  may be below the detection limit of the test.  Order “Culture  –Legionella” is recommended for uncommon cases of suspected Legionella  pneumonia due to organisms other than L. pneumophila serogroup 1.    Respiratory Viral Panel with COVID-19 by EMERSON (09.29.21 @ 11:57)    [facility]    Culture - Blood (09.27.21 @ 20:07)    Specimen Source: .Blood Blood-Peripheral    Culture Results:   No growth at 2 days.      RADIOLOGY & ADDITIONAL TESTS:  Reviewed INTERVAL HPI/OVERNIGHT EVENTS:  *note in progress*    Patient seen and examined at bedside this morning. No acute events overnight. This morning, patient is endorsing wanting to go home and is frustrated about not being able to eat. Endorses that throat pain has improved, currently 0/10 severity.     VITAL SIGNS:  T(F): 98.3 (09-30-21 @ 06:00)  HR: 62 (09-30-21 @ 06:00)  BP: 96/58 (09-30-21 @ 06:00)  RR: 15 (09-30-21 @ 06:00)  SpO2: 94% (09-30-21 @ 06:00)  Wt(kg): --    PHYSICAL EXAM:    Constitutional: WDWN, NAD  HEEN: head NC/AT, no conjunctival injection, EOMI, dry MM  Throat: oral thrush is present on tongue; graft present on L half of tongue (2/2 tongue cancer and resection)  Respiratory: CTA b/l, good air entry b/l, no wheezing, no rhonchi, no rales, without accessory muscle use and no intercostal retractions  Cardiovascular: RRR, normal S1S2, no M/R/G  Gastrointestinal: soft, NTND, no masses palpable, BS normal  Extremities: Warm, well perfused, pulses equal bilateral upper and lower extremities, no edema, no clubbing  Neurological: AAOx3, CN Grossly intact  Skin: Normal temperature, warm, dry    MEDICATIONS  (STANDING):  atorvastatin 40 milliGRAM(s) Oral at bedtime  bictegravir 50 mG/emtricitabine 200 mG/tenofovir alafenamide 25 mG (BIKTARVY) 1 Tablet(s) Oral daily  dextrose 5% + sodium chloride 0.9%. 1000 milliLiter(s) (80 mL/Hr) IV Continuous <Continuous>  enoxaparin Injectable 30 milliGRAM(s) SubCutaneous every 24 hours  fluconAZOLE IVPB 200 milliGRAM(s) IV Intermittent every 24 hours  melatonin 5 milliGRAM(s) Oral at bedtime  metroNIDAZOLE  IVPB 500 milliGRAM(s) IV Intermittent every 12 hours  mirtazapine 15 milliGRAM(s) Oral at bedtime  nicotine -  14 mG/24Hr(s) Patch 1 patch Transdermal daily  OLANZapine Disintegrating Tablet 5 milliGRAM(s) Oral every 24 hours  piperacillin/tazobactam IVPB... 3.375 Gram(s) IV Intermittent every 6 hours  polyethylene glycol 3350 17 Gram(s) Oral daily  senna 2 Tablet(s) Oral at bedtime  sodium chloride 3%  Inhalation 3 milliLiter(s) Inhalation every 6 hours  tiotropium 2.5 MICROgram(s)/olodaterol 2.5 MICROgram(s) (STIOLTO) Inhaler 2 Puff(s) Inhalation daily  trimethoprim / sulfamethoxazole IVPB 160 milliGRAM(s) IV Intermittent every 24 hours    MEDICATIONS  (PRN):  ALBUTerol    0.083% 2.5 milliGRAM(s) Nebulizer every 6 hours PRN Shortness of Breath and/or Wheezing  HYDROmorphone  Injectable 1 milliGRAM(s) IV Push every 6 hours PRN Severe Pain (7 - 10)      Allergies    No Known Allergies    Intolerances        LABS:    Legionella pneumophila Antigen, Urine (09.29.21 @ 13:25)    Legionella Antigen, Urine: Negative: Please submit a first morning sputum specimen for Legionella culture.  Positive Testing method: Immunochromatographic Assay.  Presumptive detection of L. pneumophila serogroup 1 antigen in urine,  suggesting recent or current infection. Order “Culture –Legionella” as  recommended to confirm infection.  Negative Testing method: Immunochromatographic Assay.  L. pneumophila serogroup 1 antigen in urine NOT detected, suggesting NO  recent or current infection. Infection due to Legionella cannot be ruled  out: other serogroups and species may cause disease, antigen may not be  present in urine in early infection, or the level of antigens in urine  may be below the detection limit of the test.  Order “Culture  –Legionella” is recommended for uncommon cases of suspected Legionella  pneumonia due to organisms other than L. pneumophila serogroup 1.    Respiratory Viral Panel with COVID-19 by EMERSON (09.29.21 @ 11:57)    Rapid RVP Result: [facility]    Culture - Blood (09.27.21 @ 20:07)    Specimen Source: .Blood Blood-Peripheral    Culture Results:   No growth at 2 days.      RADIOLOGY & ADDITIONAL TESTS:  Reviewed INTERVAL HPI/OVERNIGHT EVENTS:  Patient seen and examined at bedside this morning. No acute events overnight. This morning, patient is endorsing wanting to go home and is frustrated about not being able to eat. Endorses that throat pain has improved, currently 0/10 severity. Denies N/V/F/CP/D at this time.     VITAL SIGNS:  T(F): 98.3 (09-30-21 @ 06:00)  HR: 62 (09-30-21 @ 06:00)  BP: 96/58 (09-30-21 @ 06:00)  RR: 15 (09-30-21 @ 06:00)  SpO2: 94% (09-30-21 @ 06:00)  Wt(kg): --    PHYSICAL EXAM:    Constitutional: WDWN, NAD  HEEN: head NC/AT, no conjunctival injection, EOMI, dry MM  Throat: oral thrush is present on tongue; graft present on L half of tongue (2/2 tongue cancer and resection)  Respiratory: CTA b/l, good air entry b/l, no wheezing, no rhonchi, no rales, without accessory muscle use and no intercostal retractions  Cardiovascular: RRR, normal S1S2, no M/R/G  Gastrointestinal: soft, NTND, no masses palpable, BS normal  Extremities: Warm, well perfused, pulses equal bilateral upper and lower extremities, no edema, no clubbing  Neurological: AAOx3, CN Grossly intact  Skin: Normal temperature, warm, dry    MEDICATIONS  (STANDING):  atorvastatin 40 milliGRAM(s) Oral at bedtime  bictegravir 50 mG/emtricitabine 200 mG/tenofovir alafenamide 25 mG (BIKTARVY) 1 Tablet(s) Oral daily  dextrose 5% + sodium chloride 0.9%. 1000 milliLiter(s) (80 mL/Hr) IV Continuous <Continuous>  enoxaparin Injectable 30 milliGRAM(s) SubCutaneous every 24 hours  fluconAZOLE IVPB 200 milliGRAM(s) IV Intermittent every 24 hours  melatonin 5 milliGRAM(s) Oral at bedtime  metroNIDAZOLE  IVPB 500 milliGRAM(s) IV Intermittent every 12 hours  mirtazapine 15 milliGRAM(s) Oral at bedtime  nicotine -  14 mG/24Hr(s) Patch 1 patch Transdermal daily  OLANZapine Disintegrating Tablet 5 milliGRAM(s) Oral every 24 hours  piperacillin/tazobactam IVPB... 3.375 Gram(s) IV Intermittent every 6 hours  polyethylene glycol 3350 17 Gram(s) Oral daily  senna 2 Tablet(s) Oral at bedtime  sodium chloride 3%  Inhalation 3 milliLiter(s) Inhalation every 6 hours  tiotropium 2.5 MICROgram(s)/olodaterol 2.5 MICROgram(s) (STIOLTO) Inhaler 2 Puff(s) Inhalation daily  trimethoprim / sulfamethoxazole IVPB 160 milliGRAM(s) IV Intermittent every 24 hours    MEDICATIONS  (PRN):  ALBUTerol    0.083% 2.5 milliGRAM(s) Nebulizer every 6 hours PRN Shortness of Breath and/or Wheezing  HYDROmorphone  Injectable 1 milliGRAM(s) IV Push every 6 hours PRN Severe Pain (7 - 10)      Allergies    No Known Allergies    Intolerances        LABS:    Legionella pneumophila Antigen, Urine (09.29.21 @ 13:25)    Legionella Antigen, Urine: Negative: Please submit a first morning sputum specimen for Legionella culture.  Positive Testing method: Immunochromatographic Assay.  Presumptive detection of L. pneumophila serogroup 1 antigen in urine,  suggesting recent or current infection. Order “Culture –Legionella” as  recommended to confirm infection.  Negative Testing method: Immunochromatographic Assay.  L. pneumophila serogroup 1 antigen in urine NOT detected, suggesting NO  recent or current infection. Infection due to Legionella cannot be ruled  out: other serogroups and species may cause disease, antigen may not be  present in urine in early infection, or the level of antigens in urine  may be below the detection limit of the test.  Order “Culture  –Legionella” is recommended for uncommon cases of suspected Legionella  pneumonia due to organisms other than L. pneumophila serogroup 1.    Respiratory Viral Panel with COVID-19 by EMERSON (09.29.21 @ 11:57)    Rapid RVP Result: NotDetec    Culture - Blood (09.27.21 @ 20:07)    Specimen Source: .Blood Blood-Peripheral    Culture Results:   No growth at 2 days.      RADIOLOGY & ADDITIONAL TESTS:  Reviewed INTERVAL HPI/OVERNIGHT EVENTS:  Patient seen and examined at bedside this morning. No acute events overnight. This morning, patient is endorsing wanting to go home and is frustrated about not being able to eat. Endorses that throat pain has improved, currently 0/10 severity. Denies N/V/F/CP/D at this time.     VITAL SIGNS:  T(F): 98.3 (09-30-21 @ 06:00)  HR: 62 (09-30-21 @ 06:00)  BP: 96/58 (09-30-21 @ 06:00)  RR: 15 (09-30-21 @ 06:00)  SpO2: 94% (09-30-21 @ 06:00)  Wt(kg): --    PHYSICAL EXAM:    Constitutional: WDWN, NAD  HEEN: head NC/AT, no conjunctival injection, EOMI, dry MM  Throat: oral thrush is present on tongue; graft present on L half of tongue (2/2 tongue cancer and resection)  Respiratory: CTA b/l, good air entry b/l, no wheezing, no rhonchi, no rales, without accessory muscle use and no intercostal retractions  Cardiovascular: RRR, normal S1S2, no M/R/G  Gastrointestinal: soft, NTND, no masses palpable, BS normal  Extremities: Warm, well perfused, pulses equal bilateral upper and lower extremities, no edema, no clubbing  Neurological: AAOx3, CN Grossly intact  Skin: Normal temperature, warm, dry    MEDICATIONS  (STANDING):  atorvastatin 40 milliGRAM(s) Oral at bedtime  bictegravir 50 mG/emtricitabine 200 mG/tenofovir alafenamide 25 mG (BIKTARVY) 1 Tablet(s) Oral daily  dextrose 5% + sodium chloride 0.9%. 1000 milliLiter(s) (80 mL/Hr) IV Continuous <Continuous>  enoxaparin Injectable 30 milliGRAM(s) SubCutaneous every 24 hours  fluconAZOLE IVPB 200 milliGRAM(s) IV Intermittent every 24 hours  melatonin 5 milliGRAM(s) Oral at bedtime  metroNIDAZOLE  IVPB 500 milliGRAM(s) IV Intermittent every 12 hours  mirtazapine 15 milliGRAM(s) Oral at bedtime  nicotine -  14 mG/24Hr(s) Patch 1 patch Transdermal daily  OLANZapine Disintegrating Tablet 5 milliGRAM(s) Oral every 24 hours  piperacillin/tazobactam IVPB... 3.375 Gram(s) IV Intermittent every 6 hours  polyethylene glycol 3350 17 Gram(s) Oral daily  senna 2 Tablet(s) Oral at bedtime  sodium chloride 3%  Inhalation 3 milliLiter(s) Inhalation every 6 hours  tiotropium 2.5 MICROgram(s)/olodaterol 2.5 MICROgram(s) (STIOLTO) Inhaler 2 Puff(s) Inhalation daily  trimethoprim / sulfamethoxazole IVPB 160 milliGRAM(s) IV Intermittent every 24 hours    MEDICATIONS  (PRN):  ALBUTerol    0.083% 2.5 milliGRAM(s) Nebulizer every 6 hours PRN Shortness of Breath and/or Wheezing  HYDROmorphone  Injectable 1 milliGRAM(s) IV Push every 6 hours PRN Severe Pain (7 - 10)      Allergies    No Known Allergies    Intolerances        LABS:    Complete Blood Count + Automated Diff in AM (09.30.21 @ 08:38)    WBC Count: 13.34 K/uL    RBC Count: 3.20 M/uL    Hemoglobin: 9.0 g/dL    Hematocrit: 29.1 %    Mean Cell Volume: 90.9 fl    Mean Cell Hemoglobin: 28.1 pg    Mean Cell Hemoglobin Conc: 30.9 gm/dL    Red Cell Distrib Width: 16.9 %    Platelet Count - Automated: 122: NO CLOTS K/uL    Auto Neutrophil #: 9.22 K/uL    Auto Lymphocyte #: 2.19 K/uL    Auto Monocyte #: 0.85 K/uL    Basic Metabolic Panel in AM (09.30.21 @ 08:38)    Sodium, Serum: 135 mmol/L    Potassium, Serum: 4.0 mmol/L    Chloride, Serum: 103 mmol/L    Carbon Dioxide, Serum: 24 mmol/L    Anion Gap, Serum: 8 mmol/L    Blood Urea Nitrogen, Serum: 5: Result confirmed by repeated analysis after passing specimen ID/integrity  check mg/dL    Creatinine, Serum: 0.76 mg/dL    Glucose, Serum: 133 mg/dL    Calcium, Total Serum: 8.6 mg/dL    eGFR if Non : 87: The units for eGFR are ml/min/1.73m2 (normalized body surface area). The  eGFR is calculated from a serum creatinine using the CKD-EPI equation.  Other variables required for calculation are race, age and sex. Among  patients with chronic kidney disease (CKD), the eGFR is useful in  determining the stage of disease according to KDOQI CKD classification.  All eGFR results are reported numerically with the following  interpretation.          GFR                    With                        Without     (ml/min/1.73 m2)    Kidney Damage       Kidney Damage        >= 90                    Stage 1                     Normal        60-89                    Stage 2                     Decreased GFR        30-59                    Stage 3         Stage 3        15-29                    Stage 4                     Stage 4        < 15                      Stage 5                     Stage 5  Each stage of CKD assumes that the associated GFR level has been in  effect for at least 3 months. Determination of stages one and two (with  eGFR > 59 ml/min/m2) requires estimation of kidney damage for at least 3  months as defined by structural or functional abnormalities.  Limitations: All estimates of GFR will be less accurate for patientsat  extremes of muscle mass (including but not limited to frail elderly,  critically ill, or cancer patients), those with unusual diets, and those  with conditions associated with reduced secretion or extrarenal  elimination of creatinine. The eGFR equation is not recommended for use  in patients with unstable creatinine levels. mL/min/1.73M2    eGFR if : 101: The units for eGFR are ml/min/1.73m2 (normalized body surface area). The  eGFR is calculated from a serum creatinine using the CKD-EPI equation.  Other variables required for calculation are race, age and sex. Among  patients with chronic kidney disease (CKD), the eGFR is useful in  determining the stage of disease according to KDOQI CKD classification.  All eGFR results are reported numerically with the following  interpretation.          GFR                    With                        Without     (ml/min/1.73 m2)    Kidney Damage       Kidney Damage        >= 90                    Stage 1                     Normal        60-89                    Stage 2                     Decreased GFR        30-59                    Stage 3         Stage 3        15-29                    Stage 4                     Stage 4        < 15                      Stage 5                     Stage 5  Each stage of CKD assumes that the associated GFR level has been in  effect for at least 3 months. Determination of stages one and two (with  eGFR > 59 ml/min/m2) requires estimation of kidney damage for at least 3  months as defined by structural or functional abnormalities.  Limitations: All estimates of GFR will be less accurate for patientsat  extremes of muscle mass (including but not limited to frail elderly,  critically ill, or cancer patients), those with unusual diets, and those  with conditions associated with reduced secretion or extrarenal  elimination of creatinine. The eGFR equation is not recommended for use  in patients with unstable creatinine levels. mL/min/1.73M2      Auto Eosinophil #: 0.00 K/uL    Auto Basophil #: 0.00 K/uL    Auto Neutrophil %: 69.1: Slight Vacuolated Granulocytes Present.  Differential percentages must be correlated with absolute numbers for  clinical significance. %    Auto Lymphocyte %: 16.4 %    Auto Monocyte %: 6.4 %    Auto Eosinophil %: 0.0 %    Auto Basophil %: 0.0 %    Legionella pneumophila Antigen, Urine (09.29.21 @ 13:25)    Legionella Antigen, Urine: Negative: Please submit a first morning sputum specimen for Legionella culture.  Positive Testing method: Immunochromatographic Assay.  Presumptive detection of L. pneumophila serogroup 1 antigen in urine,  suggesting recent or current infection. Order “Culture –Legionella” as  recommended to confirm infection.  Negative Testing method: Immunochromatographic Assay.  L. pneumophila serogroup 1 antigen in urine NOT detected, suggesting NO  recent or current infection. Infection due to Legionella cannot be ruled  out: other serogroups and species may cause disease, antigen may not be  present in urine in early infection, or the level of antigens in urine  may be below the detection limit of the test.  Order “Culture  –Legionella” is recommended for uncommon cases of suspected Legionella  pneumonia due to organisms other than L. pneumophila serogroup 1.    Respiratory Viral Panel with COVID-19 by EMERSON (09.29.21 @ 11:57)    Rapid RVP Result: NotDete    Culture - Blood (09.27.21 @ 20:07)    Specimen Source: .Blood Blood-Peripheral    Culture Results:   No growth at 2 days.      RADIOLOGY & ADDITIONAL TESTS:  Reviewed INTERVAL HPI/OVERNIGHT EVENTS:  Patient seen and examined at bedside this morning. No acute events overnight. This morning, patient is endorsing wanting to go home and is frustrated about not being able to eat. Endorses that throat pain has improved, currently 0/10 severity. Denies N/V/F/CP/D at this time.     VITAL SIGNS:  T(F): 98.3 (09-30-21 @ 06:00)  HR: 62 (09-30-21 @ 06:00)  BP: 96/58 (09-30-21 @ 06:00)  RR: 15 (09-30-21 @ 06:00)  SpO2: 94% (09-30-21 @ 06:00)  Wt(kg): --    PHYSICAL EXAM:    Constitutional: WDWN, NAD  HEEN: head NC/AT, no conjunctival injection, EOMI, dry MM  Throat: oral thrush is present on tongue; graft present on L half of tongue (2/2 tongue cancer and resection)  Respiratory: CTA b/l, good air entry b/l, no wheezing, no rhonchi, no rales, without accessory muscle use and no intercostal retractions  Cardiovascular: RRR, normal S1S2, no M/R/G  Gastrointestinal: soft, NTND, no masses palpable, BS normal  Extremities: Warm, well perfused, pulses equal bilateral upper and lower extremities, no edema, no clubbing  Neurological: AAOx3, CN Grossly intact  Skin: Normal temperature, warm, dry    MEDICATIONS  (STANDING):  atorvastatin 40 milliGRAM(s) Oral at bedtime  bictegravir 50 mG/emtricitabine 200 mG/tenofovir alafenamide 25 mG (BIKTARVY) 1 Tablet(s) Oral daily  dextrose 5% + sodium chloride 0.9%. 1000 milliLiter(s) (80 mL/Hr) IV Continuous <Continuous>  enoxaparin Injectable 30 milliGRAM(s) SubCutaneous every 24 hours  fluconAZOLE IVPB 200 milliGRAM(s) IV Intermittent every 24 hours  melatonin 5 milliGRAM(s) Oral at bedtime  metroNIDAZOLE  IVPB 500 milliGRAM(s) IV Intermittent every 12 hours  mirtazapine 15 milliGRAM(s) Oral at bedtime  nicotine -  14 mG/24Hr(s) Patch 1 patch Transdermal daily  OLANZapine Disintegrating Tablet 5 milliGRAM(s) Oral every 24 hours  piperacillin/tazobactam IVPB... 3.375 Gram(s) IV Intermittent every 6 hours  polyethylene glycol 3350 17 Gram(s) Oral daily  senna 2 Tablet(s) Oral at bedtime  sodium chloride 3%  Inhalation 3 milliLiter(s) Inhalation every 6 hours  tiotropium 2.5 MICROgram(s)/olodaterol 2.5 MICROgram(s) (STIOLTO) Inhaler 2 Puff(s) Inhalation daily  trimethoprim / sulfamethoxazole IVPB 160 milliGRAM(s) IV Intermittent every 24 hours    MEDICATIONS  (PRN):  ALBUTerol    0.083% 2.5 milliGRAM(s) Nebulizer every 6 hours PRN Shortness of Breath and/or Wheezing  HYDROmorphone  Injectable 1 milliGRAM(s) IV Push every 6 hours PRN Severe Pain (7 - 10)      Allergies    No Known Allergies    Intolerances        LABS:    Complete Blood Count + Automated Diff in AM (09.30.21 @ 08:38)    WBC Count: 13.34 K/uL    RBC Count: 3.20 M/uL    Hemoglobin: 9.0 g/dL    Hematocrit: 29.1 %    Mean Cell Volume: 90.9 fl    Mean Cell Hemoglobin: 28.1 pg    Mean Cell Hemoglobin Conc: 30.9 gm/dL    Red Cell Distrib Width: 16.9 %    Platelet Count - Automated: 122: NO CLOTS K/uL    Auto Neutrophil #: 9.22 K/uL    Auto Lymphocyte #: 2.19 K/uL    Auto Monocyte #: 0.85 K/uL    Auto Eosinophil #: 0.00 K/uL    Auto Basophil #: 0.00 K/uL    Auto Neutrophil %: 69.1: Slight Vacuolated Granulocytes Present.  Differential percentages must be correlated with absolute numbers for  clinical significance. %    Auto Lymphocyte %: 16.4 %    Auto Monocyte %: 6.4 %    Auto Eosinophil %: 0.0 %    Auto Basophil %: 0.0 %    Basic Metabolic Panel in AM (09.30.21 @ 08:38)    Sodium, Serum: 135 mmol/L    Potassium, Serum: 4.0 mmol/L    Chloride, Serum: 103 mmol/L    Carbon Dioxide, Serum: 24 mmol/L    Anion Gap, Serum: 8 mmol/L    Blood Urea Nitrogen, Serum: 5: Result confirmed by repeated analysis after passing specimen ID/integrity  check mg/dL    Creatinine, Serum: 0.76 mg/dL    Glucose, Serum: 133 mg/dL    Calcium, Total Serum: 8.6 mg/dL    eGFR if Non : 87: The units for eGFR are ml/min/1.73m2 (normalized body surface area). The  eGFR is calculated from a serum creatinine using the CKD-EPI equation.  Other variables required for calculation are race, age and sex. Among  patients with chronic kidney disease (CKD), the eGFR is useful in  determining the stage of disease according to KDOQI CKD classification.  All eGFR results are reported numerically with the following  interpretation.          GFR                    With                        Without     (ml/min/1.73 m2)    Kidney Damage       Kidney Damage        >= 90                    Stage 1                     Normal        60-89                    Stage 2                     Decreased GFR        30-59                    Stage 3         Stage 3        15-29                    Stage 4                     Stage 4        < 15                      Stage 5                     Stage 5  Each stage of CKD assumes that the associated GFR level has been in  effect for at least 3 months. Determination of stages one and two (with  eGFR > 59 ml/min/m2) requires estimation of kidney damage for at least 3  months as defined by structural or functional abnormalities.  Limitations: All estimates of GFR will be less accurate for patientsat  extremes of muscle mass (including but not limited to frail elderly,  critically ill, or cancer patients), those with unusual diets, and those  with conditions associated with reduced secretion or extrarenal  elimination of creatinine. The eGFR equation is not recommended for use  in patients with unstable creatinine levels. mL/min/1.73M2    eGFR if : 101: The units for eGFR are ml/min/1.73m2 (normalized body surface area). The  eGFR is calculated from a serum creatinine using the CKD-EPI equation.  Other variables required for calculation are race, age and sex. Among  patients with chronic kidney disease (CKD), the eGFR is useful in  determining the stage of disease according to KDOQI CKD classification.  All eGFR results are reported numerically with the following  interpretation.          GFR                    With                        Without     (ml/min/1.73 m2)    Kidney Damage       Kidney Damage        >= 90                    Stage 1                     Normal        60-89                    Stage 2                     Decreased GFR        30-59                    Stage 3         Stage 3        15-29                    Stage 4                     Stage 4        < 15                      Stage 5                     Stage 5  Each stage of CKD assumes that the associated GFR level has been in  effect for at least 3 months. Determination of stages one and two (with  eGFR > 59 ml/min/m2) requires estimation of kidney damage for at least 3  months as defined by structural or functional abnormalities.  Limitations: All estimates of GFR will be less accurate for patientsat  extremes of muscle mass (including but not limited to frail elderly,  critically ill, or cancer patients), those with unusual diets, and those  with conditions associated with reduced secretion or extrarenal  elimination of creatinine. The eGFR equation is not recommended for use  in patients with unstable creatinine levels. mL/min/1.73M2    Legionella pneumophila Antigen, Urine (09.29.21 @ 13:25)    Legionella Antigen, Urine: Negative: Please submit a first morning sputum specimen for Legionella culture.  Positive Testing method: Immunochromatographic Assay.  Presumptive detection of L. pneumophila serogroup 1 antigen in urine,  suggesting recent or current infection. Order “Culture –Legionella” as  recommended to confirm infection.  Negative Testing method: Immunochromatographic Assay.  L. pneumophila serogroup 1 antigen in urine NOT detected, suggesting NO  recent or current infection. Infection due to Legionella cannot be ruled  out: other serogroups and species may cause disease, antigen may not be  present in urine in early infection, or the level of antigens in urine  may be below the detection limit of the test.  Order “Culture  –Legionella” is recommended for uncommon cases of suspected Legionella  pneumonia due to organisms other than L. pneumophila serogroup 1.    Respiratory Viral Panel with COVID-19 by EMERSON (09.29.21 @ 11:57)    Rapid RVP Result: NotDetec    Culture - Blood (09.27.21 @ 20:07)    Specimen Source: .Blood Blood-Peripheral    Culture Results:   No growth at 2 days.      RADIOLOGY & ADDITIONAL TESTS:  Reviewed

## 2021-09-30 NOTE — PROGRESS NOTE ADULT - PROBLEM SELECTOR PLAN 9
Hb 8.3, MCV 91.9 (9/29). Hb lower this admission than on prior admission (Hb 10-11 at that time). Pt has dry mucus membranes suspected 2/2 poor PO intake, did not allow exam of conjunctiva for pallor. Potentially anemia of chronic disease.    - Ferritin elevated at 274  - Iron decreased at 29  - Iron total binding decreased at 166  - Transferrin decreased at 140    #HLD  -Patient has known h/o HLD, takes atorvastatin 40mg qd  -Restarted atorvastatin 40mg      #Depression  -Takes olanzapine 5mg qd and mirtazapine 15mg qd at home  -Restarted home meds Hb 8.3, MCV 91.9 (9/29). Hb lower this admission than on prior admission (Hb 10-11 at that time). Pt has dry mucus membranes suspected 2/2 poor PO intake, did not allow exam of conjunctiva for pallor. Potentially anemia of chronic disease.    - Active T&S; Transfuse if Hb < 7  - Ferritin elevated at 274  - Iron decreased at 29  - Iron total binding decreased at 166  - Transferrin decreased at 140    #HLD  -Patient has known h/o HLD, takes atorvastatin 40mg qd  -Restarted atorvastatin 40mg      #Depression  -Takes olanzapine 5mg qd and mirtazapine 15mg qd at home  -Restarted home meds

## 2021-09-30 NOTE — SWALLOW FEES ASSESSMENT ADULT - COMMENTS
MBS completed 9/28: "Pt p/w a moderate oral stage and a severe-profound pharyngeal stage dysphagia characterized by silent aspiration of all tested consistencies. It is suspected that Pt had a baseline dysphagia d/t complex medical hx including left oral tongue resxn (2014) that is exacerbated in setting of thrush and sepsis. Further work-up by ENT is warranted to help determine prognosis for improvement." See full report.    Purpose, risk, and benefit of FEES provided to pt. Pt agreed to participate in the study. Pt with poor tolerance for the passing and presence of the scope, with high-sensitivity to presence of scope, resulting in limited visibility and limited study. Study was assisted by Munira Ferreira, SLP. - Fullness/prominence of L BOT.  - 2 white focal lesions appreciated, one on laryngeal side of epiglottic tip, the other at R lateral pharyngeal wall.  - Copious amount of pooled, thick secretions diffuse in hypopharynx, penetrated and aspirated at baseline. Ultimately cleared after many attempts via cued coughing and oral suction. - Fullness/prominence of L BOT.  - At least 2 small, white, focal lesions appreciated, one on laryngeal side of epiglottic tip, the other at R lateral pharyngeal wall.  - Copious amount of pooled, thick secretions diffuse in hypopharynx, penetrated and aspirated at baseline. Ultimately cleared after many attempts via cued coughing and oral suction.

## 2021-09-30 NOTE — CONSULT NOTE ADULT - SUBJECTIVE AND OBJECTIVE BOX
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 Saunders, [place], [facility]). Has not seen her ENT surgeon since 2017. Currently resides in [facility]. Presented 2 days ago w/ sepsis 2/2 PNA and oral candidiasis, now improved on IV antibiotics and diflucan. Patient s/p FEES earlier today w/ white plaques noted on right lateral pharyngeal wall and lingual surface of epiglottis. ENT consulted for further evaluation. Recently endorses odynophagia, however has improved since admission. Actively smoking cigarettes (6-8/day). Last used crack-cocaine a month agoN.     AFVSS   PE:   Gen: NAD   OC/OP: edentulous, no obvious mass or lesion. Rectus free flap well healed.    Neck: +fibrotic changes. No obvious mass palpated.   Resp: breathing comfortably on RA     FFL: Patient refused. FEES images reviewed.     54 yo F. hx of AID, HCV, Substance abuse, anal Ca, Oropharyngeal SCC, s/p CCRT w/ persistent disease ultimately necessitating salvage surgery with rectus free flap. Has been lost to follow up since 2017. Currently admitted with oral candidiasis and sepsis 2/2 aspiration PNA, found to be grossly aspirating on FEES. White plaques noted on FEES suggest laryngeal candidiasis.     - Continued medical management per primary team   - Patient has expressed desire to follow up with Dr. Saunders in [place], will coordinate follow up appointment. Should follow up with ENT after discharge.   - Diet recommendations per SLP   - Please page ENT w/ any additional questions/concerns

## 2021-09-30 NOTE — PROGRESS NOTE ADULT - PROBLEM SELECTOR PLAN 8
Pt has stage 4 pressure ulcer on R sacrum measuring 4cm x 2cm x 0.2cm - staged and managed last admission by wound care. Ulcer currently covered w/ bandage and tape. Wound currently covered with bandage but pt has trouble lying on R side due to pain. Last admission wound care recs: Aquacel Extra, cut piece to fit over wound, cover with foam dressing  -Restarted home oxycodone for pain  -Wound care consulted; reccs made in note (9/29)

## 2021-09-30 NOTE — PROGRESS NOTE ADULT - ASSESSMENT
54yo F w/ PMH AIDS (currently on Biktarvy and Bactrim), Hepatitis B/C, COPD (not on home O2, actively smokes), rectal cancer s/p radiation therapy, tongue cancer s/p resection, h/o crack use (last use 8/2021) presenting to ED from nursing home complaining of 6 days of worsening dysphagia, throat pain and cough, found to be hypoxic to 88% and has oral thrush. Admitted to [facility] for treatment of hypercapnic, hypoxic respiratory failure, sepsis 2/2 presumed PNA and recent dysphagia.

## 2021-10-01 NOTE — PROGRESS NOTE ADULT - SUBJECTIVE AND OBJECTIVE BOX
INTERVAL HPI/OVERNIGHT EVENTS:    *NOTE IN PROGRESS*    Patient was seen and examined at bedside. As per nurse and patient, no o/n events, patient resting comfortably. No complaints at this time. Patient denies: fever, chills, dizziness, weakness, HA, Changes in vision, CP, palpitations, SOB, cough, N/V/D/C, dysuria, changes in bowel movements, LE edema. ROS otherwise negative.    VITAL SIGNS:  T(F): 97.9 (10-01-21 @ 06:07)  HR: 54 (10-01-21 @ 06:07)  BP: 105/64 (10-01-21 @ 06:07)  RR: 18 (10-01-21 @ 06:07)  SpO2: 95% (10-01-21 @ 06:07)  Wt(kg): --    PHYSICAL EXAM:    Constitutional: WDWN, NAD  HEENT: PERRL, EOMI, sclera non-icteric, neck supple, trachea midline, no masses, no JVD, MMM, good dentition  Respiratory: CTA b/l, good air entry b/l, no wheezing, no rhonchi, no rales, without accessory muscle use and no intercostal retractions  Cardiovascular: RRR, normal S1S2, no M/R/G  Gastrointestinal: soft, NTND, no masses palpable, BS normal  Extremities: Warm, well perfused, pulses equal bilateral upper and lower extremities, no edema, no clubbing  Neurological: AAOx3, CN Grossly intact  Skin: Normal temperature, warm, dry    MEDICATIONS  (STANDING):  atorvastatin 40 milliGRAM(s) Oral at bedtime  bictegravir 50 mG/emtricitabine 200 mG/tenofovir alafenamide 25 mG (BIKTARVY) 1 Tablet(s) Oral daily  dextrose 5% + sodium chloride 0.9%. 1000 milliLiter(s) (80 mL/Hr) IV Continuous <Continuous>  enoxaparin Injectable 30 milliGRAM(s) SubCutaneous every 24 hours  fluconAZOLE IVPB 400 milliGRAM(s) IV Intermittent every 24 hours  melatonin 5 milliGRAM(s) Oral at bedtime  metroNIDAZOLE  IVPB 500 milliGRAM(s) IV Intermittent every 12 hours  mirtazapine 15 milliGRAM(s) Oral at bedtime  nicotine -  14 mG/24Hr(s) Patch 1 patch Transdermal daily  OLANZapine Disintegrating Tablet 5 milliGRAM(s) Oral every 24 hours  piperacillin/tazobactam IVPB... 3.375 Gram(s) IV Intermittent every 6 hours  polyethylene glycol 3350 17 Gram(s) Oral daily  senna 2 Tablet(s) Oral at bedtime  sodium chloride 3%  Inhalation 3 milliLiter(s) Inhalation every 6 hours  tiotropium 2.5 MICROgram(s)/olodaterol 2.5 MICROgram(s) (STIOLTO) Inhaler 2 Puff(s) Inhalation daily  trimethoprim / sulfamethoxazole IVPB 160 milliGRAM(s) IV Intermittent every 24 hours  vancomycin  IVPB 750 milliGRAM(s) IV Intermittent every 12 hours    MEDICATIONS  (PRN):  ALBUTerol    0.083% 2.5 milliGRAM(s) Nebulizer every 6 hours PRN Shortness of Breath and/or Wheezing  HYDROmorphone  Injectable 1 milliGRAM(s) IV Push every 6 hours PRN Severe Pain (7 - 10)      Allergies    No Known Allergies    Intolerances        LABS:                        9.0    13.34 )-----------( 122      ( 30 Sep 2021 08:38 )             29.1     09-30    135  |  103  |  5<L>  ----------------------------<  133<H>  4.0   |  24  |  0.76    Ca    8.6      30 Sep 2021 08:38            RADIOLOGY & ADDITIONAL TESTS:  Reviewed INTERVAL HPI/OVERNIGHT EVENTS:    *NOTE IN PROGRESS*    Patient was seen and examined at bedside. No o/n events. At bedside this morning, patient asleep and resting comfortably. No complaints at this time. Patient denies throat pain, denies difficulty breathing. ROS otherwise negative.    VITAL SIGNS:  T(F): 97.9 (10-01-21 @ 06:07)  HR: 54 (10-01-21 @ 06:07)  BP: 105/64 (10-01-21 @ 06:07)  RR: 18 (10-01-21 @ 06:07)  SpO2: 95% (10-01-21 @ 06:07)  Wt(kg): --    PHYSICAL EXAM:    Constitutional: WDWN, NAD  HEENT: PERRL, EOMI, sclera non-icteric, neck supple, trachea midline, no masses, no JVD, MMM, good dentition  Respiratory: CTA b/l, good air entry b/l, no wheezing, no rhonchi, no rales, without accessory muscle use and no intercostal retractions  Cardiovascular: RRR, normal S1S2, no M/R/G  Gastrointestinal: soft, NTND, no masses palpable, BS normal  Extremities: Warm, well perfused, pulses equal bilateral upper and lower extremities, no edema, no clubbing  Neurological: AAOx3, CN Grossly intact  Skin: Normal temperature, warm, dry    MEDICATIONS  (STANDING):  atorvastatin 40 milliGRAM(s) Oral at bedtime  bictegravir 50 mG/emtricitabine 200 mG/tenofovir alafenamide 25 mG (BIKTARVY) 1 Tablet(s) Oral daily  dextrose 5% + sodium chloride 0.9%. 1000 milliLiter(s) (80 mL/Hr) IV Continuous <Continuous>  enoxaparin Injectable 30 milliGRAM(s) SubCutaneous every 24 hours  fluconAZOLE IVPB 400 milliGRAM(s) IV Intermittent every 24 hours  melatonin 5 milliGRAM(s) Oral at bedtime  metroNIDAZOLE  IVPB 500 milliGRAM(s) IV Intermittent every 12 hours  mirtazapine 15 milliGRAM(s) Oral at bedtime  nicotine -  14 mG/24Hr(s) Patch 1 patch Transdermal daily  OLANZapine Disintegrating Tablet 5 milliGRAM(s) Oral every 24 hours  piperacillin/tazobactam IVPB... 3.375 Gram(s) IV Intermittent every 6 hours  polyethylene glycol 3350 17 Gram(s) Oral daily  senna 2 Tablet(s) Oral at bedtime  sodium chloride 3%  Inhalation 3 milliLiter(s) Inhalation every 6 hours  tiotropium 2.5 MICROgram(s)/olodaterol 2.5 MICROgram(s) (STIOLTO) Inhaler 2 Puff(s) Inhalation daily  trimethoprim / sulfamethoxazole IVPB 160 milliGRAM(s) IV Intermittent every 24 hours  vancomycin  IVPB 750 milliGRAM(s) IV Intermittent every 12 hours    MEDICATIONS  (PRN):  ALBUTerol    0.083% 2.5 milliGRAM(s) Nebulizer every 6 hours PRN Shortness of Breath and/or Wheezing  HYDROmorphone  Injectable 1 milliGRAM(s) IV Push every 6 hours PRN Severe Pain (7 - 10)      Allergies    No Known Allergies    Intolerances        LABS:                        9.0    13.34 )-----------( 122      ( 30 Sep 2021 08:38 )             29.1     09-30    135  |  103  |  5<L>  ----------------------------<  133<H>  4.0   |  24  |  0.76    Ca    8.6      30 Sep 2021 08:38            RADIOLOGY & ADDITIONAL TESTS:  Reviewed INTERVAL HPI/OVERNIGHT EVENTS:  Patient was seen and examined at bedside. No o/n events. At bedside this morning, patient asleep and resting comfortably. No complaints at this time. Patient denies throat pain, denies difficulty breathing. ROS otherwise negative.    VITAL SIGNS:  T(F): 97.9 (10-01-21 @ 06:07)  HR: 54 (10-01-21 @ 06:07)  BP: 105/64 (10-01-21 @ 06:07)  RR: 18 (10-01-21 @ 06:07)  SpO2: 95% (10-01-21 @ 06:07)  Wt(kg): --    PHYSICAL EXAM:    Constitutional: WDWN, NAD  HEENT: PERRL, EOMI, sclera non-icteric, neck supple, trachea midline, no masses, no JVD, MMM, good dentition  Respiratory: CTA b/l, good air entry b/l, no wheezing, no rhonchi, no rales, without accessory muscle use and no intercostal retractions  Cardiovascular: RRR, normal S1S2, no M/R/G  Gastrointestinal: soft, NTND, no masses palpable, BS normal  Extremities: Warm, well perfused, pulses equal bilateral upper and lower extremities, no edema, no clubbing  Neurological: AAOx3, CN Grossly intact  Skin: Normal temperature, warm, dry    MEDICATIONS  (STANDING):  atorvastatin 40 milliGRAM(s) Oral at bedtime  bictegravir 50 mG/emtricitabine 200 mG/tenofovir alafenamide 25 mG (BIKTARVY) 1 Tablet(s) Oral daily  dextrose 5% + sodium chloride 0.9%. 1000 milliLiter(s) (80 mL/Hr) IV Continuous <Continuous>  enoxaparin Injectable 30 milliGRAM(s) SubCutaneous every 24 hours  fluconAZOLE IVPB 400 milliGRAM(s) IV Intermittent every 24 hours  melatonin 5 milliGRAM(s) Oral at bedtime  metroNIDAZOLE  IVPB 500 milliGRAM(s) IV Intermittent every 12 hours  mirtazapine 15 milliGRAM(s) Oral at bedtime  nicotine -  14 mG/24Hr(s) Patch 1 patch Transdermal daily  OLANZapine Disintegrating Tablet 5 milliGRAM(s) Oral every 24 hours  piperacillin/tazobactam IVPB... 3.375 Gram(s) IV Intermittent every 6 hours  polyethylene glycol 3350 17 Gram(s) Oral daily  senna 2 Tablet(s) Oral at bedtime  sodium chloride 3%  Inhalation 3 milliLiter(s) Inhalation every 6 hours  tiotropium 2.5 MICROgram(s)/olodaterol 2.5 MICROgram(s) (STIOLTO) Inhaler 2 Puff(s) Inhalation daily  trimethoprim / sulfamethoxazole IVPB 160 milliGRAM(s) IV Intermittent every 24 hours  vancomycin  IVPB 750 milliGRAM(s) IV Intermittent every 12 hours    MEDICATIONS  (PRN):  ALBUTerol    0.083% 2.5 milliGRAM(s) Nebulizer every 6 hours PRN Shortness of Breath and/or Wheezing  HYDROmorphone  Injectable 1 milliGRAM(s) IV Push every 6 hours PRN Severe Pain (7 - 10)      Allergies    No Known Allergies    Intolerances        LABS:                        9.0    13.34 )-----------( 122      ( 30 Sep 2021 08:38 )             29.1     09-30    135  |  103  |  5<L>  ----------------------------<  133<H>  4.0   |  24  |  0.76    Ca    8.6      30 Sep 2021 08:38            RADIOLOGY & ADDITIONAL TESTS:  Reviewed

## 2021-10-01 NOTE — CONSULT NOTE ADULT - PROBLEM SELECTOR RECOMMENDATION 9
Discussion as outlined in GOC note   - patient would like to continue PO diet and does not want any artificial feeding, including PEG tube. She is aware of aspiration risk with oral feeding including infection, respiratory failure, and death. She would like to be made DNR/ DNI. Plan discussed with primary team, MOLST filled out and scanned in chart.

## 2021-10-01 NOTE — CONSULT NOTE ADULT - PROBLEM SELECTOR RECOMMENDATION 5
-c/w D5 1/2 NS @ 80cc/hr maintenance fluids  -C/w ppx bactrim for PCP (AIDS, last CD4 <200)  -c/w zosyn 3.375g q6h  -c/w vancomycin 750mg qd, MRSA swab positive  -f/u Vanc trough (9/30): 5.8, aim for range of 15-20 ug/ml.

## 2021-10-01 NOTE — PROGRESS NOTE ADULT - PROBLEM SELECTOR PLAN 8
Pt has stage 4 pressure ulcer on R sacrum measuring 4cm x 2cm x 0.2cm - staged and managed last admission by wound care. Ulcer currently covered w/ bandage and tape. Wound currently covered with bandage but pt has trouble lying on R side due to pain. Last admission wound care recs: Aquacel Extra, cut piece to fit over wound, cover with foam dressing  -Restarted home oxycodone for pain  -Wound care consulted; reccs made in note (9/29) Pt has h/o tongue cancer s/p resection +/- graft placement. Unable to assess throat on physical exam as difficult for patient to open her mouth. Pt on oxycodone 15mg at home for pain. Pt currently has dysphagia as well, unable to tolerate PO without aspiration.     -Treating oral thrush as above  -Restarted oxycodone; benadryl (for pruritis 2/2 chronic opioid use)  -Started bowel regimen (miralax, senna) for constipation 2/2 decreased PO intake and chronic opioid use

## 2021-10-01 NOTE — PROGRESS NOTE ADULT - PROBLEM SELECTOR PLAN 3
Pt has known h/o COPD. Current smoker (cut down to 6 cigarettes/day but has smoked since she was 11yo). Uses Symbicort 160-4.5mcg/act and ventolin HFA at home. Currently spO2 90% on room air.     -Treating PNA as above  -c/w duonebs q6h  -Started stiolto  -Incentive Spirometer  -Nicotine patch (14mg/24h) ordered  -Re- smoking cessation

## 2021-10-01 NOTE — PROGRESS NOTE ADULT - PROBLEM SELECTOR PLAN 2
Pt presented with respiratory difficulty, new cough, dysphagia and throat pain. Pt afebrile w/ leukocytosis & tachypnea. CXR showed bilateral lower lobe opacity. Pt had recent hospitalization at [facility] at end of August-early September for failure to thrive and lives in a nursing home; is at increased risk for HAP.    -CXR (9/27) revealed b/l lower lobe opacities concerning for PNA;  HAP vs aspiration PNA given recent hospitalization at [facility] + pt lives in nursing home  -MRSA positive; continue vancomycin  -Legionella, Fungitell Chlamydia, GC & RVP results are all Negative  -c/w zosyn and vancomycin for HAP coverage; vanc trough for 9/30 (8pm)  -Per Pulm reccs, f/u ambulatory O2 stats, c/w duonebs, and d/c symbicort started Stiolto  -Pt on 1.5L O2, wean down as tolerated; Sp%O2 was 97% on 2L  -F/u galactomannan, LDH, fungal bcx

## 2021-10-01 NOTE — PROGRESS NOTE ADULT - PROBLEM SELECTOR PLAN 7
Pt has h/o tongue cancer s/p resection +/- graft placement. Unable to assess throat on physical exam as difficult for patient to open her mouth. Pt on oxycodone 15mg at home for pain. Pt currently has dysphagia as well, unable to tolerate PO without aspiration.     -Treating oral thrush as above  -Restarted oxycodone; benadryl (for pruritis 2/2 chronic opioid use)  -Started bowel regimen (miralax, senna) for constipation 2/2 decreased PO intake and chronic opioid use Pt UA positive for few trichomonas  -Started metronidazole 500mg BID x7d (currently day 3/7)

## 2021-10-01 NOTE — CONSULT NOTE ADULT - PROBLEM SELECTOR RECOMMENDATION 6
-Per HIV team recs:    - CMV PCR negative last visit makes CMV esophagitis less likely.  Symptoms likely 2/2 thrush.     - C/w fluconazole. Can continue Biktarvy once no longer NPO; if pt needs crushable regiment, switch to Truvada/Trivicy   - Repeat OI w/u is not necessary at this time given her symptoms.    - If she does not improve w/ treatment for HAP / Aspiration PNA, would consider bronch to r/o PCP.    - C/w PCP PPx at this time.     - Trichomonas on UA: On flagyl.    - Hep C: Will need viral load and genotyping in the future as outpatient.

## 2021-10-01 NOTE — PROGRESS NOTE ADULT - PROBLEM SELECTOR PLAN 4
Pt met 2/4 SIRS criteria in the ED (RR 21, WBC 14.53), w/ CXR 9/27 showing b/l lower lobe infiltrates. Therefore PNA confirmed source of infection. Pt is s/p 1.5L NS, zosyn 3.375g x1, vancomycin 750mg x1 in the ED. Lactate 1.7.    -Pt WBC 15.6, RR 16; pt no longer meets SIRS  -c/w D5 1/2 NS @ 80cc/hr maintenance fluids  -C/w ppx bactrim for PCP (AIDS, last CD4 <200)  -c/w zosyn 3.375g q6h  -c/w vancomycin 750mg qd, MRSA swab positive  -f/u Vanc trough (9/30): 5.8, aim for range of 15-20 ug/ml Palliative consulted:  - GOC discussed w/ pt. Pt wants to be DNR/DNI and receive comfort feeds (requests "mashed potatoes")  - Patient acknowledged risk of aspiration with feeds.

## 2021-10-01 NOTE — CHART NOTE - NSCHARTNOTEFT_GEN_A_CORE
HIV Consult Note  Admission H&P, Progress Notes and Consultant Notes reviewed by me in detail.    CC:  Patient is a 55y old Female who presents with a chief complaint of Dysphagia, decreased PO intake, generalized malaise. Today on exam patient very tearful-broached subject of end of life care with patient given the fact that she cannot tolerate PO and may need PEG, which she is not interested in.       Additional HPI:    12 pt ROS otherwise negative.    PAST MEDICAL & SURGICAL HISTORY:  HIV disease    Advanced COPD    Tongue cancer    Rectal cancer    Hepatitis B    Hepatitis C    No significant past surgical history      STI Hx:    FHx: Non-contributory    HIV History:  Outpatient HIV Provider:  Year of HIV Diagnosis:  T cell joanna:  Highest Viral Load:  Current ARV regimen:  ARV adherence:  Previous ARV regimens:  Hx of Past Opportunistic Infections:    Social/Sexual Hx:  Sexual History:  Sexual Activity:  Condom Use:  Number of Current Partners:  Number of Lifetime Partners:    MEDICATIONS  (STANDING):  atorvastatin 40 milliGRAM(s) Oral at bedtime  bictegravir 50 mG/emtricitabine 200 mG/tenofovir alafenamide 25 mG (BIKTARVY) 1 Tablet(s) Oral daily  dextrose 5% + sodium chloride 0.9%. 1000 milliLiter(s) (80 mL/Hr) IV Continuous <Continuous>  enoxaparin Injectable 30 milliGRAM(s) SubCutaneous every 24 hours  fluconAZOLE IVPB 400 milliGRAM(s) IV Intermittent every 24 hours  melatonin 5 milliGRAM(s) Oral at bedtime  metroNIDAZOLE  IVPB 500 milliGRAM(s) IV Intermittent every 12 hours  mirtazapine 15 milliGRAM(s) Oral at bedtime  nicotine -  14 mG/24Hr(s) Patch 1 patch Transdermal daily  OLANZapine Disintegrating Tablet 5 milliGRAM(s) Oral every 24 hours  piperacillin/tazobactam IVPB... 3.375 Gram(s) IV Intermittent every 6 hours  polyethylene glycol 3350 17 Gram(s) Oral daily  senna 2 Tablet(s) Oral at bedtime  sodium chloride 3%  Inhalation 3 milliLiter(s) Inhalation every 6 hours  tiotropium 2.5 MICROgram(s)/olodaterol 2.5 MICROgram(s) (STIOLTO) Inhaler 2 Puff(s) Inhalation daily  trimethoprim / sulfamethoxazole IVPB 160 milliGRAM(s) IV Intermittent every 24 hours  vancomycin  IVPB 750 milliGRAM(s) IV Intermittent every 12 hours    MEDICATIONS  (PRN):  ALBUTerol    0.083% 2.5 milliGRAM(s) Nebulizer every 6 hours PRN Shortness of Breath and/or Wheezing  HYDROmorphone  Injectable 1 milliGRAM(s) IV Push every 6 hours PRN Severe Pain (7 - 10)      Allergies    No Known Allergies    Intolerances        PHYSICAL EXAM  General: A&Ox3; NAD  Head: NC/AT; MMM; PERRL; EOMI;  Neck: Supple; no JVD  Respiratory: CTA B/L; no wheezes/crackles   Cardiovascular: Regular rhythm/rate; S1/S2   Gastrointestinal: Soft; NTND; normoactive BS  Extremities: WWP; no edema/cyanosis  Neurological:  CNII-XII grossly intact; no obvious focal deficits    LABS:                         9.3    9.86  )-----------( 112      ( 01 Oct 2021 09:34 )             31.0     10-01    133<L>  |  102  |  6<L>  ----------------------------<  98  3.9   |  24  |  0.88    Ca    8.5      01 Oct 2021 09:34            Microbiology/Cultures: Reviewed    Images/EKG/etc: Reviewed        Assessment/Recommendation:  55F w/ AIDS, tongue cancer s/p resection presents w/ oropharyngeal (and likely) esophogeal thrush also found to have hypoxic resp failure 2/2 to PNA, HIV on board for AIDS management.    -Dysphagia and odynopagia likely 2/2 thrush.  C/w IV fluconazole.   -Can continue Biktarvy once no longer NPO; however, if needs crushable regimen, can consider switch to truvada / tivicay.   -Follow palliative care/GOC recs in regards to PEG.     All recommendations final upon attending attestation.       Provided HIV infection and treatment education to patient  Counseled patient on risks/benefits of treatment and non-adherence      HIV will HIV Consult Note  Admission H&P, Progress Notes and Consultant Notes reviewed by me in detail.    CC:  Patient is a 55y old Female who presents with a chief complaint of Dysphagia, decreased PO intake, generalized malaise. Today on exam patient very tearful-broached subject of end of life care with patient given the fact that she cannot tolerate PO and may need PEG, which she is not interested in.       Additional HPI:    12 pt ROS otherwise negative.    PAST MEDICAL & SURGICAL HISTORY:  HIV disease    Advanced COPD    Tongue cancer    Rectal cancer    Hepatitis B    Hepatitis C    No significant past surgical history      STI Hx:    FHx: Non-contributory    HIV History:  Outpatient HIV Provider:  Year of HIV Diagnosis:  T cell joanna:  Highest Viral Load:  Current ARV regimen:  ARV adherence:  Previous ARV regimens:  Hx of Past Opportunistic Infections:    Social/Sexual Hx:  Sexual History:  Sexual Activity:  Condom Use:  Number of Current Partners:  Number of Lifetime Partners:    MEDICATIONS  (STANDING):  atorvastatin 40 milliGRAM(s) Oral at bedtime  bictegravir 50 mG/emtricitabine 200 mG/tenofovir alafenamide 25 mG (BIKTARVY) 1 Tablet(s) Oral daily  dextrose 5% + sodium chloride 0.9%. 1000 milliLiter(s) (80 mL/Hr) IV Continuous <Continuous>  enoxaparin Injectable 30 milliGRAM(s) SubCutaneous every 24 hours  fluconAZOLE IVPB 400 milliGRAM(s) IV Intermittent every 24 hours  melatonin 5 milliGRAM(s) Oral at bedtime  metroNIDAZOLE  IVPB 500 milliGRAM(s) IV Intermittent every 12 hours  mirtazapine 15 milliGRAM(s) Oral at bedtime  nicotine -  14 mG/24Hr(s) Patch 1 patch Transdermal daily  OLANZapine Disintegrating Tablet 5 milliGRAM(s) Oral every 24 hours  piperacillin/tazobactam IVPB... 3.375 Gram(s) IV Intermittent every 6 hours  polyethylene glycol 3350 17 Gram(s) Oral daily  senna 2 Tablet(s) Oral at bedtime  sodium chloride 3%  Inhalation 3 milliLiter(s) Inhalation every 6 hours  tiotropium 2.5 MICROgram(s)/olodaterol 2.5 MICROgram(s) (STIOLTO) Inhaler 2 Puff(s) Inhalation daily  trimethoprim / sulfamethoxazole IVPB 160 milliGRAM(s) IV Intermittent every 24 hours  vancomycin  IVPB 750 milliGRAM(s) IV Intermittent every 12 hours    MEDICATIONS  (PRN):  ALBUTerol    0.083% 2.5 milliGRAM(s) Nebulizer every 6 hours PRN Shortness of Breath and/or Wheezing  HYDROmorphone  Injectable 1 milliGRAM(s) IV Push every 6 hours PRN Severe Pain (7 - 10)      Allergies    No Known Allergies    Intolerances        PHYSICAL EXAM  General: A&Ox3; NAD  Head: NC/AT; MMM; PERRL; EOMI;  Neck: Supple; no JVD  Respiratory: CTA B/L; no wheezes/crackles   Cardiovascular: Regular rhythm/rate; S1/S2   Gastrointestinal: Soft; NTND; normoactive BS  Extremities: WWP; no edema/cyanosis  Neurological:  CNII-XII grossly intact; no obvious focal deficits    LABS:                         9.3    9.86  )-----------( 112      ( 01 Oct 2021 09:34 )             31.0     10-01    133<L>  |  102  |  6<L>  ----------------------------<  98  3.9   |  24  |  0.88    Ca    8.5      01 Oct 2021 09:34            Microbiology/Cultures: Reviewed    Images/EKG/etc: Reviewed        Assessment/Recommendation:  55F w/ AIDS, tongue cancer s/p resection presents w/ oropharyngeal (and likely) esophogeal thrush also found to have hypoxic resp failure 2/2 to PNA, HIV on board for AIDS management.    -Dysphagia and odynopagia likely 2/2 thrush.  C/w IV fluconazole.   -Can continue Biktarvy once no longer NPO; however, if needs crushable regimen, can consider switch to truvada / tivicay.   -Follow palliative care/GOC recs in regards to PEG.     Attending attestion    I discussed GOC w/ patient given risk of aspiration.  Hopefully fluconazole helps more -- her oropharynx is clear of thrush but still seen on scope.  Palliative now following to help facilitate discussion.  She expressed to me that she may not be ready for 'comfort feeds' as she is 'full code' but still wants her mashed potatoes.  I explained the risk of aspiration which she understood.  She does not want a feeding tube at this time per our discussion.   Once patient is no longer NPO, can restart ARVs and PCP ppx.  Prev OI w/u unremarkable.  Her fungitell is elevated but her resipratory symptoms have improved.  If worsens again, I still recommend bronch to r/o PCP.  Pulm following.    HIV team will continue to follow.

## 2021-10-01 NOTE — CONSULT NOTE ADULT - PROBLEM SELECTOR RECOMMENDATION 4
Pt has known h/o COPD. Current smoker (cut down to 6 cigarettes/day but has smoked since she was 13yo). Uses Symbicort 160-4.5mcg/act and ventolin HFA at home. Currently spO2 90% on room air.     -Treating PNA as above  -c/w duonebs q6h  -Started stiolto  -Incentive Spirometer  -Nicotine patch (14mg/24h) ordered  -Re- smoking cessation.

## 2021-10-01 NOTE — PROGRESS NOTE ADULT - ASSESSMENT
54yo F w/ PMH AIDS (currently on Biktarvy and Bactrim), Hepatitis B/C, COPD (not on home O2, actively smokes), rectal cancer s/p radiation therapy, tongue cancer s/p resection, h/o crack use (last use 8/2021) presenting to ED from nursing home complaining of 6 days of worsening dysphagia, throat pain and cough, found to be hypoxic to 88% and has oral thrush. Admitted to [facility] for treatment of hypercapnic, hypoxic respiratory failure, sepsis 2/2 presumed PNA and recent dysphagia.

## 2021-10-01 NOTE — PROGRESS NOTE ADULT - PROBLEM SELECTOR PLAN 9
Hb 8.3, MCV 91.9 (9/29). Hb lower this admission than on prior admission (Hb 10-11 at that time). Pt has dry mucus membranes suspected 2/2 poor PO intake, did not allow exam of conjunctiva for pallor. Potentially anemia of chronic disease.    - Active T&S; Transfuse if Hb < 7  - Ferritin elevated at 274  - Iron decreased at 29  - Iron total binding decreased at 166  - Transferrin decreased at 140    #HLD  -Patient has known h/o HLD, takes atorvastatin 40mg qd  -Restarted atorvastatin 40mg      #Depression  -Takes olanzapine 5mg qd and mirtazapine 15mg qd at home  -Restarted home meds Pt has stage 4 pressure ulcer on R sacrum measuring 4cm x 2cm x 0.2cm - staged and managed last admission by wound care. Ulcer currently covered w/ bandage and tape. Wound currently covered with bandage but pt has trouble lying on R side due to pain. Last admission wound care recs: Aquacel Extra, cut piece to fit over wound, cover with foam dressing  -Restarted home oxycodone for pain  -Wound care consulted; reccs made in note (9/29)

## 2021-10-01 NOTE — CHART NOTE - NSCHARTNOTEFT_GEN_A_CORE
Admitting Diagnosis:   Patient is a 55y old  Female who presents with a chief complaint of Dysphagia, decreased PO intake, generalized malaise (01 Oct 2021 10:32)      PAST MEDICAL & SURGICAL HISTORY:  HIV disease    Advanced COPD    Tongue cancer    Rectal cancer    Hepatitis B    Hepatitis C    No significant past surgical history        Current Nutrition Order:NPO       PO Intake: Good (%) [   ]  Fair (50-75%) [   ] Poor (<25%) [   ]0%.Patient adamant about wanting to have some food despite known aspiration risk  GI Issues: 9/30    Pain:ongoing.Patient reported she has pain everywhere    Skin Integrity:Stage III sacrum    Labs:   10-01    133<L>  |  102  |  6<L>  ----------------------------<  98  3.9   |  24  |  0.88    Ca    8.5      01 Oct 2021 09:34      CAPILLARY BLOOD GLUCOSE          Medications:  MEDICATIONS  (STANDING):  atorvastatin 40 milliGRAM(s) Oral at bedtime  bictegravir 50 mG/emtricitabine 200 mG/tenofovir alafenamide 25 mG (BIKTARVY) 1 Tablet(s) Oral daily  dextrose 5% + sodium chloride 0.9%. 1000 milliLiter(s) (80 mL/Hr) IV Continuous <Continuous>  enoxaparin Injectable 30 milliGRAM(s) SubCutaneous every 24 hours  fluconAZOLE IVPB 400 milliGRAM(s) IV Intermittent every 24 hours  melatonin 5 milliGRAM(s) Oral at bedtime  metroNIDAZOLE  IVPB 500 milliGRAM(s) IV Intermittent every 12 hours  mirtazapine 15 milliGRAM(s) Oral at bedtime  nicotine -  14 mG/24Hr(s) Patch 1 patch Transdermal daily  OLANZapine Disintegrating Tablet 5 milliGRAM(s) Oral every 24 hours  piperacillin/tazobactam IVPB... 3.375 Gram(s) IV Intermittent every 6 hours  polyethylene glycol 3350 17 Gram(s) Oral daily  senna 2 Tablet(s) Oral at bedtime  sodium chloride 3%  Inhalation 3 milliLiter(s) Inhalation every 6 hours  tiotropium 2.5 MICROgram(s)/olodaterol 2.5 MICROgram(s) (STIOLTO) Inhaler 2 Puff(s) Inhalation daily  trimethoprim / sulfamethoxazole IVPB 160 milliGRAM(s) IV Intermittent every 24 hours  vancomycin  IVPB 750 milliGRAM(s) IV Intermittent every 12 hours    MEDICATIONS  (PRN):  ALBUTerol    0.083% 2.5 milliGRAM(s) Nebulizer every 6 hours PRN Shortness of Breath and/or Wheezing  HYDROmorphone  Injectable 1 milliGRAM(s) IV Push every 6 hours PRN Severe Pain (7 - 10)      Weight:40.8kg  Daily     Daily     Weight Change: no updated weights.50lb weight loss <6months PTA    Estimated energy needs: based on IBW due to below 80% of IBW.IBW:61.2kg [id number](AIDS/CA demands):1836-2142kcal and 1.4-1.6gm(AIDS/PU demands):86-98gmprotein and fluids per team    Subjective: 56yo cachectic severe malnourished F w/ PMH AIDS (currently on Biktarvy and Bactrim), Hepatitis B/C, COPD (not on home O2, actively smokes), rectal cancer s/p radiation therapy, tongue cancer s/p resection, h/o crack use (last use 8/2021) presenting to ED from nursing home complaining of 6 days of worsening dysphagia, throat pain and cough, found to be hypoxic to 88% and has oral thrush. Admitted to [facility] for treatment of hypercapnic, hypoxic respiratory failure, sepsis 2/2 presumed PNA and recent dysphagia.  Ongoing inability to eat .Palliative to see patient regarding GOC. Per MD, patient now is not interested in PEG(yesterday was considering).She is adamant about wanting to start any po. Requesting mashed potatoes .patient was identified with severe PCM by ASPEN guidelines based on weight loss /NFPE findings and inability to meet EER.      Previous Nutrition Diagnosis: Severe PCM r/t in setting of chronic illness 2/2 AIDS/CA/Thrush AEB: 50lb weight loss <6months /NFPE findings of muscle and fat loss and inability to meet EER via po    Active [  x ]  Resolved [   ]    If resolved, new PES:     Goal: Align nutrition with goals of care at all times    Recommendations:1.Comfort feeds if deemed appropriate by Palliative and patients wishes 2.Re-address patients consideration for PEG(consult RD if this route is determined appropriate 3.Trend weights   Education: patient has been extensively educated on risk of eating    Risk Level: High [ x  ] Moderate [   ] Low [   ]

## 2021-10-01 NOTE — CONSULT NOTE ADULT - PROBLEM SELECTOR RECOMMENDATION 2
Pt diagnosed with oral thrush 5d prior to admission at nursing home. Started on nystatin with minimal improvement in throat pain. Pt reports difficulty swallowing both solids and liquids. Potential etiologies include opportunistic infection 2/2 AIDS w/ CD4<200 vs 2/2 COPD inhaler use if she has not been rinsing after inhaler use. Pt has been able to swallow oral medications.    -Fluconazole - 400mg IV (9/27), received 200mg (9/28 &29) and tolerated well, increased back to 400mg IV until cleared to tolerate PO; f/u EKG 10/1: QTc 451 (QTc 417 on 9/27)  -Restarted oxycodone 15mg q6h PRN for severe pain (home medication)  -Converted majority of pt's medications to IV  -Oral hygiene for symptom improvement, consider viscous lidocaine swabs for comfort  -chest PT and mucomyst improved pt secretions and dysphagia   -Motivational interview w/ pt re: nutrition    -Speech and Swallow evaluated pt, and MBS revealed pt is aspirating everything; S&S recc NGT for medications and nutrition  -Pt adamently refused NGT, stating "I don't need that". She insisted on eating despite explaining to her the results of the MBS in layman's terms.  -S&S re-evaluated pt, performed FEES: baseline pooling of secretions, 2 small white focal lesions appreciated with copious thick secretions in hypopharynx. Cleared after many attempts of cued coughing and oral suction. (appreciate FEES note for full results)  -ENT consulted: pt lost to f/u for her tongue CA since 2017, recc f/u w/ Dr. Saunders in [place].

## 2021-10-01 NOTE — PROGRESS NOTE ADULT - PROBLEM SELECTOR PLAN 5
Pt has known diagnosis of AIDS, follows w/ Dr. Romaine Nava at [facility]. Pt had not been compliant with Biktarvy, but since recent hospitalization (discharged 9/9/2021) has been on Biktarvy and Bactrim. CD4 count 59, viral load 142 (9/28/2021).     -Continue ppx Bactrim  -Per HIV team recs:    - CMV PCR negative last visit makes CMV esophagitis less likely.  Symptoms likely 2/2 thrush.     - C/w fluconazole. Can continue Biktarvy once no longer NPO; if pt needs crushable regiment, switch to Truvada/Trivicy   - Repeat OI w/u is not necessary at this time given her symptoms.    - If she does not improve w/ treatment for HAP / Aspiration PNA, would consider bronch to r/o PCP.    - C/w PCP PPx at this time.     - Trichomonas on UA: On flagyl.    - Hep C: Will need viral load and genotyping in the future as outpatient. Pt met 2/4 SIRS criteria in the ED (RR 21, WBC 14.53), w/ CXR 9/27 showing b/l lower lobe infiltrates. Therefore PNA confirmed source of infection. Pt is s/p 1.5L NS, zosyn 3.375g x1, vancomycin 750mg x1 in the ED. Lactate 1.7.    -Pt WBC 15.6, RR 16; pt no longer meets SIRS  -c/w D5 1/2 NS @ 80cc/hr maintenance fluids  -C/w ppx bactrim for PCP (AIDS, last CD4 <200)  -c/w zosyn 3.375g q6h  -c/w vancomycin 750mg qd, MRSA swab positive  -f/u Vanc trough (9/30): 5.8, aim for range of 15-20 ug/ml

## 2021-10-01 NOTE — PROGRESS NOTE ADULT - PROBLEM SELECTOR PLAN 11
F: D5 1/2 NS @ 80cc/hr (maintenance dose)  E: replete as needed  Diet: NPO, MBS found pt is silently aspirating everything   GI ppx: n/a  DVT ppx: 30mg lovenox subq qd  Code status: FULL CODE  Dispo: [facility] Pt may have h/o Hep C (documented in ED note). Not currently on medication. Unknown at this time whether she was ever treated in the past. Liver panel wnl this admission.  -Can consider sending hepatitis panel to verify    #H/o Hep B  -Pt may have h/o Hep B (documented in ED note).  -Can consider sending hepatitis panel to verify

## 2021-10-01 NOTE — CONSULT NOTE ADULT - ASSESSMENT
56yo F w/ PMH AIDS (currently on Biktarvy and Bactrim), Hepatitis B/C, COPD (not on home O2, actively smokes), rectal cancer s/p radiation therapy, tongue cancer s/p resection, h/o crack use (last use 8/2021) presenting to ED from nursing home complaining of 6 days of worsening dysphagia, throat pain and cough. Admitted to [facility] for treatment of hypercapnic, hypoxic respiratory failure, sepsis 2/2 aspiration PNA and progressive dysphagia in setting of tongue cancer hx s/p resection and oral/laryngeal candidiasis.

## 2021-10-01 NOTE — CONSULT NOTE ADULT - PROBLEM SELECTOR RECOMMENDATION 7
Pt has h/o tongue cancer s/p resection +/- graft placement. Unable to assess throat on physical exam as difficult for patient to open her mouth. Pt on oxycodone 15mg at home for pain. Pt currently has dysphagia as well, unable to tolerate PO without aspiration.     -Treating oral thrush as above  -Restarted oxycodone; benadryl (for pruritis 2/2 chronic opioid use)  -Started bowel regimen (miralax, senna) for constipation 2/2 decreased PO intake and chronic opioid use.

## 2021-10-01 NOTE — CONSULT NOTE ADULT - SUBJECTIVE AND OBJECTIVE BOX
[facility] Pall[facility]  Reza Shay, Palliative Care Attending  Contact Info: Call [phone] (HEAL Line) or message on Microsoft Teams (Reza Shay)    HPI:  Ms. Devlin is a 56yo F w/ PMH AIDS (currently on Biktarvy and Bactrim), Hepatitis B/C, COPD (not on home O2, actively smokes), rectal cancer s/p radiation therapy, tongue cancer s/p resection, h/o crack use (last use 8/2021) presenting to ED from nursing home complaining of 6 days of worsening dysphagia, throat pain and cough. Pt reports being diagnosed with oral thrush at her nursing home 5 days ago and has since been on nystatin with some improvement in her throat pain. She first had trouble swallowing solids, then progressed to difficulty swallowing liquids and patient reports not having eaten in 5 days. Talking exacerbates pain and she does not have pain if she is not swallowing or talking. Pt is also notably hoarse which she says is not her baseline. She also has a cough that is sometimes productive. All of her symptoms began 5-6 days ago.  Pt has COPD and is not on home O2, however she had SpO2 88% on admission and was put on 1-1.5L NC  Of note, patient was admitted to [facility] in early September for failure to thrive. Pt had not been compliant with Biktarvy prior to hospitalization but has taken it at her nursing home since discharge.  Last known CD4 37, ,732 (8/29/2021)  Pt received 1 dose of Moderna vaccine 7/2021, did not receive second because she was high (had been using crack at the time)    ED Course:   Vitals: T 99.7, HR 85, BP 85/60, RR 20 SpO2 88% on RA  Labs: WBC 14.53, Hb 9.4, lactate 1.7, BUN/Cr 28/1.07, Tprotein 8.5, Albumin 3.3, AST 43; VBG - pH 7.34, pCO2 57, HCO3 31; UA w/ >10 WBC, +bacteria, +epithelial cells (likely contaminated)  CXR: RLL opacity  EKG: NSR, QTc 417  Intervention: 1.5L NS, zosyn 3.375g x1, vancomycin 750mg x1 (27 Sep 2021 18:24)    PERTINENT PM/SXH:   HIV disease    Advanced COPD    Tongue cancer    Rectal cancer    Hepatitis B immune    Hepatitis B    Hepatitis C          FAMILY HISTORY:  FH: heart disease (Mother)      ITEMS NOT CHECKED ARE NOT PRESENT    SOCIAL HISTORY:   Significant other/partner:  [X]  Children: son  []  Adventist/Spirituality:  Substance hx:  [X]   Tobacco hx:  []   Alcohol hx: []   Home Opioid hx:  [] I-Stop Reference No:  - no active Rx's / see chart note  Living Situation: []Home  [X]Long term care  []Rehab []Other    ADVANCE DIRECTIVES:    []MOLST  []Living Will  DECISION MAKER(s):  [X] Health Care Proxy(s)  [] Surrogate(s)  [] Guardian           Name(s)/Phone Number(s):     BASELINE (I)ADLs (prior to admission):  [place]: []Total  [] Moderate []Dependent    ALLERGIES:  No Known Allergies    MEDICATIONS  (STANDING):  atorvastatin 40 milliGRAM(s) Oral at bedtime  bictegravir 50 mG/emtricitabine 200 mG/tenofovir alafenamide 25 mG (BIKTARVY) 1 Tablet(s) Oral daily  dextrose 5% + sodium chloride 0.9%. 1000 milliLiter(s) (80 mL/Hr) IV Continuous <Continuous>  enoxaparin Injectable 30 milliGRAM(s) SubCutaneous every 24 hours  fluconAZOLE IVPB 400 milliGRAM(s) IV Intermittent every 24 hours  melatonin 5 milliGRAM(s) Oral at bedtime  metroNIDAZOLE  IVPB 500 milliGRAM(s) IV Intermittent every 12 hours  mirtazapine 15 milliGRAM(s) Oral at bedtime  nicotine -  14 mG/24Hr(s) Patch 1 patch Transdermal daily  OLANZapine Disintegrating Tablet 5 milliGRAM(s) Oral every 24 hours  piperacillin/tazobactam IVPB... 3.375 Gram(s) IV Intermittent every 6 hours  polyethylene glycol 3350 17 Gram(s) Oral daily  senna 2 Tablet(s) Oral at bedtime  sodium chloride 3%  Inhalation 3 milliLiter(s) Inhalation every 6 hours  tiotropium 2.5 MICROgram(s)/olodaterol 2.5 MICROgram(s) (STIOLTO) Inhaler 2 Puff(s) Inhalation daily  trimethoprim / sulfamethoxazole IVPB 160 milliGRAM(s) IV Intermittent every 24 hours  vancomycin  IVPB 750 milliGRAM(s) IV Intermittent every 12 hours    MEDICATIONS  (PRN):  ALBUTerol    0.083% 2.5 milliGRAM(s) Nebulizer every 6 hours PRN Shortness of Breath and/or Wheezing  HYDROmorphone  Injectable 1 milliGRAM(s) IV Push every 6 hours PRN Severe Pain (7 - 10)    PRESENT SYMPTOMS: []Unable to obtain due to poor mentation/encephalopathy  Source if other than patient:  []Family   []Team     Pain: [ ] yes [ ] no  QOL impact -   Location -                    Aggravating Factors -  Quality -  Radiation -  Timing -  Severity (0-10 scale) -   Minimal Acceptable Level (0-10 scale) -    PAIN AD Score:  http://[email or web address]/[email or web address] (press ctrl +  left click to view)    Dyspnea:                           []Mild  []Moderate []Severe  Anxiety:                             []Mild []Moderate []Severe  Fatigue:                             []Mild []Moderate []Severe  Nausea:                             []Mild []Moderate []Severe  Loss of Appetite:              []Mild []Moderate []Severe  Constipation:                    []Mild []Moderate []Severe    Other Symptoms:  []All other review of systems negative     Palliative Performance Status Version 2:  %    http://npcrc.org/files/news/palliative_performance_scale_ppsv2.pdf    PHYSICAL EXAM:  GENERAL:  []Alert  []Oriented x   []Lethargic  []Cachexia  []Unarousable  []Verbal  []Non-Verbal  Behavioral:   []Anxiety  []Delirium []Agitation []Cooperative  HEENT:  []Normal   []Dry mouth   []ET Tube/Trach  []Oral lesions  PULMONARY:   []Clear []Tachypnea  []Audible excessive secretions   []Rhonchi        []Right []Left []Bilateral  []Crackles        []Right []Left []Bilateral  []Wheezing     []Right []Left []Bilateral  CARDIOVASCULAR:    []Regular []Irregular []Tachy  []Sudeep []Murmur []Other  GASTROINTESTINAL:  []Soft  []Distended   []+BS  []Non tender []Tender  []PEG []OGT/ NGT  Last BM:     GENITOURINARY:  []Normal [] Incontinent   []Oliguria/Anuria   []Jeter  MUSCULOSKELETAL:   []Normal   []Weakness  []Bed/Wheelchair bound []Edema  NEUROLOGIC:   []No focal deficits  []Cognitive impairment  []Dysphagia []Dysarthria []Paresis []Encephalopathic   SKIN:   []Normal   []Pressure ulcer(s)  []Rash    CRITICAL CARE:  [ ]Shock Present  [ ]Septic [ ]Cardiogenic [ ]Neurologic [ ]Hypovolemic  [ ]Vasopressors [ ]Inotropes   [ ]Respiratory failure present [ ]Mechanical Ventilation [ ]Non-invasive ventilatory support [ ]High-Flow  [ ]Acute  [ ]Chronic [ ]Hypoxic  [ ]Hypercarbic  [ ]Other organ failure    Vital Signs Last 24 Hrs  T(C): 36.4 (01 Oct 2021 12:00), Max: 37.1 (30 Sep 2021 17:28)  T(F): 97.5 (01 Oct 2021 12:00), Max: 98.7 (30 Sep 2021 17:28)  HR: 52 (01 Oct 2021 12:00) (52 - 59)  BP: 93/56 (01 Oct 2021 12:00) (91/57 - 105/64)  BP(mean): --  RR: 18 (01 Oct 2021 12:00) (16 - 20)  SpO2: 95% (01 Oct 2021 12:00) (94% - 95%) I&O's Summary    30 Sep 2021 07:01  -  01 Oct 2021 07:00  --------------------------------------------------------  IN: 1160 mL / OUT: 0 mL / NET: 1160 mL        LABS:                        9.3    9.86  )-----------( 112      ( 01 Oct 2021 09:34 )             31.0   10-01    133<L>  |  102  |  6<L>  ----------------------------<  98  3.9   |  24  |  0.88    Ca    8.5      01 Oct 2021 09:34        RADIOLOGY & ADDITIONAL STUDIES:      PROTEIN CALORIE MALNUTRITION PRESENT: [ ]mild [ ]moderate [ ]severe [ ]underweight [ ]morbid obesity  []PPSV2 < or = to 30% []significant weight loss  []poor nutritional intake []catabolic state []anasarca     Artificial Nutrition []     REFERRALS:  [x]Social Work  []Case management []PT/OT []Chaplaincy  []Hospice  []Patient/Family Support    DISCUSSION OF CASE: Family - to obtain additional history and to provide emotional support; ( ) -     Care Coordination/Goals of Care Document:                                          Progress Notes    PROGRESS NOTE  Date & Time of Note   2021-09-30 11:30    Notes    Notes: Chart reviewed. Pt discussed during IDR.  As per medical team, pt is NPO, on IV Zosyn, s/s eval pending.  D/c plan: back to [facility] following hospital course.  CM will remain available.       Electronically signed by:  Laura Musa  Electronically signed on:  2021-09-30  11:33           PALLIATIVE MEDICINE COORDINATION OF CARE DOCUMENTATION: [x] Inpatient Consult  Non-Face-to-Face prolonged service provided that relates to (face-to-face) care that has or will occur and ongoing patient management, including one or more of the following: - Reviewed documentation from other physicians and other health care professional services - Reviewed medical records and diagnostic / radiology study results - Coordination with patient's support system  ************************************************************************  MEDICATION REVIEW:  - See Medication List Above    ISTOP REFERENCE:   - no active Rx's / see ISTOP Chart Note  - PRN usage: NO PRN'S  ------------------------------------------------------------------------  COORDINATION OF CARE:  - Palliative Care consulted for: GOC / Symptom Management  - Patient (to be) assessed:  - Patient previously seen by Palliative Care service: NO    ADVANCE CARE PLANNING  - Code status: FULL  - MOLST reviewed in chart: NONE; None found on [place]  - HCP/Surrogate:  - GOC documents: NONE found on [place]  - HCP/Living will/Other Advanced Directives in [place]: NONE found on [facility]  ------------------------------------------------------------------------  CARE PROVIDER DOCUMENTATION:  - SW/CM notes: Remains medically active  -   -   -     PLAN OF CARE  - Known admissions in past year:  - Current admit date:  - LOS:  - LACE score:   - Current dispo plan: TO BE DETERMINED    09-27-21 (4d)  ------------------------------------------------------------------------  - Time Spent/Chart reviewed: 31 Minutes [including time used to gather, review and transfer data]  - Start:  - End:    Prolonged services rendered, as part of this patient's care provided by Palliative Medicine, include: i. chart review for provider and ancillary service documentation, ii. pertinent diagnostics including laboratory and imaging studies, iii. medication review including PRN use, iv. admission history including previous palliative care encounters and GOC notes, v. advance care planning documents including HCP and MOLST forms in [place]. Part of Palliative Medicine extended evaluation and management also involves coordination of care with our IDT, the primary and consulting teams, and unit CM/SW and Hospice if eligible. Recommendations based on the information gathered and discussed are outlined in the A/P of Palliative notes.   [facility] and Pall[facility]  Reza Shay, Palliative Care Attending  Contact Info: Call [phone] (HEAL Line) or message on Microsoft Teams (Reza Shay)    HPI:  Ms. Devlin is a 54yo F w/ PMH AIDS (currently on Biktarvy and Bactrim), Hepatitis B/C, COPD (not on home O2, actively smokes), rectal cancer s/p radiation therapy, tongue cancer s/p resection, h/o crack use (last use 8/2021) presenting to ED from nursing home complaining of 6 days of worsening dysphagia, throat pain and cough. Pt reports being diagnosed with oral thrush at her nursing home 5 days ago and has since been on nystatin with some improvement in her throat pain. She first had trouble swallowing solids, then progressed to difficulty swallowing liquids and patient reports not having eaten in 5 days. Talking exacerbates pain and she does not have pain if she is not swallowing or talking. Pt is also notably hoarse which she says is not her baseline. She also has a cough that is sometimes productive. All of her symptoms began 5-6 days ago.  Pt has COPD and is not on home O2, however she had SpO2 88% on admission and was put on 1-1.5L NC  Of note, patient was admitted to [facility] in early September for failure to thrive. Pt had not been compliant with Biktarvy prior to hospitalization but has taken it at her nursing home since discharge.  Last known CD4 37, ,732 (8/29/2021)  Pt received 1 dose of Moderna vaccine 7/2021, did not receive second because she was high (had been using crack at the time)    ED Course:   Vitals: T 99.7, HR 85, BP 85/60, RR 20 SpO2 88% on RA  Labs: WBC 14.53, Hb 9.4, lactate 1.7, BUN/Cr 28/1.07, Tprotein 8.5, Albumin 3.3, AST 43; VBG - pH 7.34, pCO2 57, HCO3 31; UA w/ >10 WBC, +bacteria, +epithelial cells (likely contaminated)  CXR: RLL opacity  EKG: NSR, QTc 417  Intervention: 1.5L NS, zosyn 3.375g x1, vancomycin 750mg x1 (27 Sep 2021 18:24)    PERTINENT PM/SXH:   HIV disease    Advanced COPD    Tongue cancer    Rectal cancer    Hepatitis B immune    Hepatitis B    Hepatitis C          FAMILY HISTORY:  FH: heart disease (Mother)      ITEMS NOT CHECKED ARE NOT PRESENT    SOCIAL HISTORY:   Significant other/partner:  [X]  Children: son  []  Druze/Spirituality:  Substance hx:  [X]   Tobacco hx:  []   Alcohol hx: []   Home Opioid hx:  [] I-Stop Reference No:  - no active Rx's / see chart note  Living Situation: []Home  [X]Long term care  []Rehab []Other    ADVANCE DIRECTIVES:    []MOLST  []Living Will  DECISION MAKER(s):  [X] Health Care Proxy(s)  [] Surrogate(s)  [] Guardian           Name(s)/Phone Number(s):     BASELINE (I)ADLs (prior to admission):  [place]: []Total  [] Moderate []Dependent    ALLERGIES:  No Known Allergies    MEDICATIONS  (STANDING):  atorvastatin 40 milliGRAM(s) Oral at bedtime  bictegravir 50 mG/emtricitabine 200 mG/tenofovir alafenamide 25 mG (BIKTARVY) 1 Tablet(s) Oral daily  dextrose 5% + sodium chloride 0.9%. 1000 milliLiter(s) (80 mL/Hr) IV Continuous <Continuous>  enoxaparin Injectable 30 milliGRAM(s) SubCutaneous every 24 hours  fluconAZOLE IVPB 400 milliGRAM(s) IV Intermittent every 24 hours  melatonin 5 milliGRAM(s) Oral at bedtime  metroNIDAZOLE  IVPB 500 milliGRAM(s) IV Intermittent every 12 hours  mirtazapine 15 milliGRAM(s) Oral at bedtime  nicotine -  14 mG/24Hr(s) Patch 1 patch Transdermal daily  OLANZapine Disintegrating Tablet 5 milliGRAM(s) Oral every 24 hours  piperacillin/tazobactam IVPB... 3.375 Gram(s) IV Intermittent every 6 hours  polyethylene glycol 3350 17 Gram(s) Oral daily  senna 2 Tablet(s) Oral at bedtime  sodium chloride 3%  Inhalation 3 milliLiter(s) Inhalation every 6 hours  tiotropium 2.5 MICROgram(s)/olodaterol 2.5 MICROgram(s) (STIOLTO) Inhaler 2 Puff(s) Inhalation daily  trimethoprim / sulfamethoxazole IVPB 160 milliGRAM(s) IV Intermittent every 24 hours  vancomycin  IVPB 750 milliGRAM(s) IV Intermittent every 12 hours    MEDICATIONS  (PRN):  ALBUTerol    0.083% 2.5 milliGRAM(s) Nebulizer every 6 hours PRN Shortness of Breath and/or Wheezing  HYDROmorphone  Injectable 1 milliGRAM(s) IV Push every 6 hours PRN Severe Pain (7 - 10)    PRESENT SYMPTOMS: []Unable to obtain due to poor mentation/encephalopathy  Source if other than patient:  []Family   []Team     Pain: [ ] yes [ ] no  QOL impact -   Location -                    Aggravating Factors -  Quality -  Radiation -  Timing -  Severity (0-10 scale) -   Minimal Acceptable Level (0-10 scale) -    PAIN AD Score:  [email or web address]/[email or web address] (press ctrl +  left click to view)    Dyspnea:                           []Mild  []Moderate []Severe  Anxiety:                             []Mild []Moderate []Severe  Fatigue:                             []Mild []Moderate []Severe  Nausea:                             []Mild []Moderate []Severe  Loss of Appetite:              []Mild []Moderate []Severe  Constipation:                    []Mild []Moderate []Severe    Other Symptoms:  []All other review of systems negative     Palliative Performance Status Version 2:  %    http://npcrc.org/files/news/palliative_performance_scale_ppsv2.pdf    PHYSICAL EXAM:  GENERAL:  [X]Alert  [X]Oriented x   []Lethargic  []Cachexia  []Unarousable  []Verbal  []Non-Verbal  Behavioral:   []Anxiety  []Delirium []Agitation []Cooperative  HEENT:  []Normal   []Dry mouth   []ET Tube/Trach  []Oral lesions  PULMONARY:   []Clear []Tachypnea  []Audible excessive secretions   []Rhonchi        []Right []Left []Bilateral  []Crackles        []Right []Left []Bilateral  []Wheezing     []Right []Left []Bilateral  CARDIOVASCULAR:    []Regular []Irregular []Tachy  []Sudeep []Murmur []Other  GASTROINTESTINAL:  []Soft  []Distended   []+BS  []Non tender []Tender  []PEG []OGT/ NGT  Last BM:     GENITOURINARY:  []Normal [] Incontinent   []Oliguria/Anuria   []Jeter  MUSCULOSKELETAL:   []Normal   []Weakness  []Bed/Wheelchair bound []Edema  NEUROLOGIC:   []No focal deficits  []Cognitive impairment  []Dysphagia []Dysarthria []Paresis []Encephalopathic   SKIN:   []Normal   []Pressure ulcer(s)  []Rash    CRITICAL CARE:  [ ]Shock Present  [ ]Septic [ ]Cardiogenic [ ]Neurologic [ ]Hypovolemic  [ ]Vasopressors [ ]Inotropes   [ ]Respiratory failure present [ ]Mechanical Ventilation [ ]Non-invasive ventilatory support [ ]High-Flow  [ ]Acute  [ ]Chronic [ ]Hypoxic  [ ]Hypercarbic  [ ]Other organ failure    Vital Signs Last 24 Hrs  T(C): 36.4 (01 Oct 2021 12:00), Max: 37.1 (30 Sep 2021 17:28)  T(F): 97.5 (01 Oct 2021 12:00), Max: 98.7 (30 Sep 2021 17:28)  HR: 52 (01 Oct 2021 12:00) (52 - 59)  BP: 93/56 (01 Oct 2021 12:00) (91/57 - 105/64)  BP(mean): --  RR: 18 (01 Oct 2021 12:00) (16 - 20)  SpO2: 95% (01 Oct 2021 12:00) (94% - 95%) I&O's Summary    30 Sep 2021 07:01  -  01 Oct 2021 07:00  --------------------------------------------------------  IN: 1160 mL / OUT: 0 mL / NET: 1160 mL        LABS:                        9.3    9.86  )-----------( 112      ( 01 Oct 2021 09:34 )             31.0   10-01    133<L>  |  102  |  6<L>  ----------------------------<  98  3.9   |  24  |  0.88    Ca    8.5      01 Oct 2021 09:34        RADIOLOGY & ADDITIONAL STUDIES:      PROTEIN CALORIE MALNUTRITION PRESENT: [ ]mild [ ]moderate [ ]severe [ ]underweight [ ]morbid obesity  []PPSV2 < or = to 30% []significant weight loss  []poor nutritional intake []catabolic state []anasarca     Artificial Nutrition []     REFERRALS:  [x]Social Work  []Case management []PT/OT []Chaplaincy  []Hospice  []Patient/Family Support    DISCUSSION OF CASE: Family - to obtain additional history and to provide emotional support; ( ) -     Care Coordination/Goals of Care Document:                                          Progress Notes    PROGRESS NOTE  Date & Time of Note   2021-09-30 11:30    Notes    Notes: Chart reviewed. Pt discussed during IDR.  As per medical team, pt is NPO, on IV Zosyn, s/s eval pending.  D/c plan: back to [facility] following hospital course.  CM will remain available.       Electronically signed by:  Laura Musa  Electronically signed on:  2021-09-30  11:33           PALLIATIVE MEDICINE COORDINATION OF CARE DOCUMENTATION: [x] Inpatient Consult  Non-Face-to-Face prolonged service provided that relates to (face-to-face) care that has or will occur and ongoing patient management, including one or more of the following: - Reviewed documentation from other physicians and other health care professional services - Reviewed medical records and diagnostic / radiology study results - Coordination with patient's support system  ************************************************************************  MEDICATION REVIEW:  - See Medication List Above    ISTOP REFERENCE:   - no active Rx's / see ISTOP Chart Note  - PRN usage: NO PRN'S  ------------------------------------------------------------------------  COORDINATION OF CARE:  - Palliative Care consulted for: GOC / Symptom Management  - Patient (to be) assessed:  - Patient previously seen by Palliative Care service: NO    ADVANCE CARE PLANNING  - Code status: DNR/DNI  - MOLST reviewed in chart  - HCP/Surrogate: Reed Devlin   - [facility] documents: NONE found on [place]  - HCP/Living will/Other Advanced Directives in [place]: NONE found on [facility]  ------------------------------------------------------------------------  CARE PROVIDER DOCUMENTATION:  - SW/CM notes: Remains medically active  -   -   -     PLAN OF CARE  - Known admissions in past year:  - Current admit date:  - LOS:  - LACE score:   - Current dispo plan: TO BE DETERMINED    09-27-21 (4d)  ------------------------------------------------------------------------  - Time Spent/Chart reviewed: 31 Minutes [including time used to gather, review and transfer data]  - Start:  - End:    Prolonged services rendered, as part of this patient's care provided by Palliative Medicine, include: i. chart review for provider and ancillary service documentation, ii. pertinent diagnostics including laboratory and imaging studies, iii. medication review including PRN use, iv. admission history including previous palliative care encounters and [facility] notes, v. advance care planning documents including HCP and MOLST forms in Alpha. Part of Palliative Medicine extended evaluation and management also involves coordination of care with our IDT, the primary and consulting teams, and unit CM/SW and Hospice if eligible. Recommendations based on the information gathered and discussed are outlined in the A/P of Palliative notes.   [facility]s and Palliative Care  Reza Shay, Palliative Care Attending  Contact Info: Call [phone] (HEAL Line) or message on Microsoft Teams (Reza Shay)    HPI:  Ms. Devlin is a 56yo F w/ PMH AIDS (currently on Biktarvy and Bactrim), Hepatitis B/C, COPD (not on home O2, actively smokes), rectal cancer s/p radiation therapy, tongue cancer s/p resection, h/o crack use (last use 8/2021) presenting to ED from nursing home complaining of 6 days of worsening dysphagia, throat pain and cough. Pt reports being diagnosed with oral thrush at her nursing home 5 days ago and has since been on nystatin with some improvement in her throat pain. She first had trouble swallowing solids, then progressed to difficulty swallowing liquids and patient reports not having eaten in 5 days. Talking exacerbates pain and she does not have pain if she is not swallowing or talking. Pt is also notably hoarse which she says is not her baseline. She also has a cough that is sometimes productive. All of her symptoms began 5-6 days ago.  Pt has COPD and is not on home O2, however she had SpO2 88% on admission and was put on 1-1.5L NC  Of note, patient was admitted to [facility] in early September for failure to thrive. Pt had not been compliant with Biktarvy prior to hospitalization but has taken it at her nursing home since discharge.  Last known CD4 37, ,732 (8/29/2021)  Pt received 1 dose of Moderna vaccine 7/2021, did not receive second because she was high (had been using crack at the time)    ED Course:   Vitals: T 99.7, HR 85, BP 85/60, RR 20 SpO2 88% on RA  Labs: WBC 14.53, Hb 9.4, lactate 1.7, BUN/Cr 28/1.07, Tprotein 8.5, Albumin 3.3, AST 43; VBG - pH 7.34, pCO2 57, HCO3 31; UA w/ >10 WBC, +bacteria, +epithelial cells (likely contaminated)  CXR: RLL opacity  EKG: NSR, QTc 417  Intervention: 1.5L NS, zosyn 3.375g x1, vancomycin 750mg x1 (27 Sep 2021 18:24)    PERTINENT PM/SXH:   HIV disease    Advanced COPD    Tongue cancer    Rectal cancer    Hepatitis B immune    Hepatitis B    Hepatitis C          FAMILY HISTORY:  FH: heart disease (Mother)      ITEMS NOT CHECKED ARE NOT PRESENT    SOCIAL HISTORY:   Significant other/partner:  [X]  Children: son  []  Druze/Spirituality:  Substance hx:  [X]   Tobacco hx:  []   Alcohol hx: []   Home Opioid hx:  [] I-Stop Reference No: [id number]  - no active Rx's  Living Situation: []Home  [X]Long term care  []Rehab []Other    ADVANCE DIRECTIVES:    []MOLST  []Living Will  DECISION MAKER(s):  [X] Health Care Proxy(s)  [] Surrogate(s)  [] Guardian           Name(s)/Phone Number(s):     BASELINE (I)ADLs (prior to admission):  [place]: []Total  [] Moderate []Dependent    ALLERGIES:  No Known Allergies    MEDICATIONS  (STANDING):  atorvastatin 40 milliGRAM(s) Oral at bedtime  bictegravir 50 mG/emtricitabine 200 mG/tenofovir alafenamide 25 mG (BIKTARVY) 1 Tablet(s) Oral daily  dextrose 5% + sodium chloride 0.9%. 1000 milliLiter(s) (80 mL/Hr) IV Continuous <Continuous>  enoxaparin Injectable 30 milliGRAM(s) SubCutaneous every 24 hours  fluconAZOLE IVPB 400 milliGRAM(s) IV Intermittent every 24 hours  melatonin 5 milliGRAM(s) Oral at bedtime  metroNIDAZOLE  IVPB 500 milliGRAM(s) IV Intermittent every 12 hours  mirtazapine 15 milliGRAM(s) Oral at bedtime  nicotine -  14 mG/24Hr(s) Patch 1 patch Transdermal daily  OLANZapine Disintegrating Tablet 5 milliGRAM(s) Oral every 24 hours  piperacillin/tazobactam IVPB... 3.375 Gram(s) IV Intermittent every 6 hours  polyethylene glycol 3350 17 Gram(s) Oral daily  senna 2 Tablet(s) Oral at bedtime  sodium chloride 3%  Inhalation 3 milliLiter(s) Inhalation every 6 hours  tiotropium 2.5 MICROgram(s)/olodaterol 2.5 MICROgram(s) (STIOLTO) Inhaler 2 Puff(s) Inhalation daily  trimethoprim / sulfamethoxazole IVPB 160 milliGRAM(s) IV Intermittent every 24 hours  vancomycin  IVPB 750 milliGRAM(s) IV Intermittent every 12 hours    MEDICATIONS  (PRN):  ALBUTerol    0.083% 2.5 milliGRAM(s) Nebulizer every 6 hours PRN Shortness of Breath and/or Wheezing  HYDROmorphone  Injectable 1 milliGRAM(s) IV Push every 6 hours PRN Severe Pain (7 - 10)    PRESENT SYMPTOMS: []Unable to obtain due to poor mentation/encephalopathy  Source if other than patient:  []Family   []Team     Pain: [ ] yes [ ] no  QOL impact -   Location -                    Aggravating Factors -  Quality -  Radiation -  Timing -  Severity (0-10 scale) -   Minimal Acceptable Level (0-10 scale) -    PAIN AD Score:  http://geriatrictoolkit.[place].[facility]/cog/painad.pdf (press ctrl +  left click to view)    Dyspnea:                           []Mild  []Moderate []Severe  Anxiety:                             []Mild []Moderate []Severe  Fatigue:                             []Mild []Moderate []Severe  Nausea:                             []Mild []Moderate []Severe  Loss of Appetite:              []Mild []Moderate []Severe  Constipation:                    []Mild []Moderate []Severe    Other Symptoms:  []All other review of systems negative     Palliative Performance Status Version 2:  %    http://npcrc.org/files/news/palliative_performance_scale_ppsv2.pdf    PHYSICAL EXAM:  GENERAL:  [X]Alert  [X]Oriented x   []Lethargic  []Cachexia  []Unarousable  []Verbal  []Non-Verbal  Behavioral:   []Anxiety  []Delirium []Agitation []Cooperative  HEENT:  []Normal   []Dry mouth   []ET Tube/Trach  []Oral lesions  PULMONARY:   []Clear []Tachypnea  []Audible excessive secretions   []Rhonchi        []Right []Left []Bilateral  []Crackles        []Right []Left []Bilateral  []Wheezing     []Right []Left []Bilateral  CARDIOVASCULAR:    []Regular []Irregular []Tachy  []Sudeep []Murmur []Other  GASTROINTESTINAL:  []Soft  []Distended   []+BS  []Non tender []Tender  []PEG []OGT/ NGT  Last BM:     GENITOURINARY:  []Normal [] Incontinent   []Oliguria/Anuria   []Jeter  MUSCULOSKELETAL:   []Normal   []Weakness  []Bed/Wheelchair bound []Edema  NEUROLOGIC:   []No focal deficits  []Cognitive impairment  []Dysphagia []Dysarthria []Paresis []Encephalopathic   SKIN:   []Normal   []Pressure ulcer(s)  []Rash    CRITICAL CARE:  [ ]Shock Present  [ ]Septic [ ]Cardiogenic [ ]Neurologic [ ]Hypovolemic  [ ]Vasopressors [ ]Inotropes   [ ]Respiratory failure present [ ]Mechanical Ventilation [ ]Non-invasive ventilatory support [ ]High-Flow  [ ]Acute  [ ]Chronic [ ]Hypoxic  [ ]Hypercarbic  [ ]Other organ failure    Vital Signs Last 24 Hrs  T(C): 36.4 (01 Oct 2021 12:00), Max: 37.1 (30 Sep 2021 17:28)  T(F): 97.5 (01 Oct 2021 12:00), Max: 98.7 (30 Sep 2021 17:28)  HR: 52 (01 Oct 2021 12:00) (52 - 59)  BP: 93/56 (01 Oct 2021 12:00) (91/57 - 105/64)  BP(mean): --  RR: 18 (01 Oct 2021 12:00) (16 - 20)  SpO2: 95% (01 Oct 2021 12:00) (94% - 95%) I&O's Summary    30 Sep 2021 07:01  -  01 Oct 2021 07:00  --------------------------------------------------------  IN: 1160 mL / OUT: 0 mL / NET: 1160 mL        LABS:                        9.3    9.86  )-----------( 112      ( 01 Oct 2021 09:34 )             31.0   10-01    133<L>  |  102  |  6<L>  ----------------------------<  98  3.9   |  24  |  0.88    Ca    8.5      01 Oct 2021 09:34        RADIOLOGY & ADDITIONAL STUDIES:      PROTEIN CALORIE MALNUTRITION PRESENT: [ ]mild [ ]moderate [ ]severe [ ]underweight [ ]morbid obesity  []PPSV2 < or = to 30% []significant weight loss  []poor nutritional intake []catabolic state []anasarca     Artificial Nutrition []     REFERRALS:  [x]Social Work  []Case management []PT/OT []Chaplaincy  []Hospice  []Patient/Family Support    DISCUSSION OF CASE: Family - to obtain additional history and to provide emotional support; ( ) -     Care Coordination/Goals of Care Document:                                          Progress Notes    PROGRESS NOTE  Date & Time of Note   2021-09-30 11:30    Notes    Notes: Chart reviewed. Pt discussed during IDR.  As per medical team, pt is NPO, on IV Zosyn, s/s eval pending.  D/c plan: back to [facility] following hospital course.  CM will remain available.       Electronically signed by:  Laura Musa  Electronically signed on:  2021-09-30  11:33           PALLIATIVE MEDICINE COORDINATION OF CARE DOCUMENTATION: [x] Inpatient Consult  Non-Face-to-Face prolonged service provided that relates to (face-to-face) care that has or will occur and ongoing patient management, including one or more of the following: - Reviewed documentation from other physicians and other health care professional services - Reviewed medical records and diagnostic / radiology study results - Coordination with patient's support system  ************************************************************************  MEDICATION REVIEW:  - See Medication List Above    ISTOP REFERENCE:   - no active Rx's / see ISTOP Chart Note  - PRN usage: NO PRN'S  ------------------------------------------------------------------------  COORDINATION OF CARE:  - Palliative Care consulted for: GOC / Symptom Management  - Patient (to be) assessed:  - Patient previously seen by Palliative Care service: NO    ADVANCE CARE PLANNING  - Code status: DNR/DNI  - MOLST reviewed in chart  - HCP/Surrogate: Reed Devlin   - GO documents: NONE found on [place]  - HCP/Living will/Other Advanced Directives in [place]: NONE found on [facility]  ------------------------------------------------------------------------  CARE PROVIDER DOCUMENTATION:  - SW/CM notes: Remains medically active  -   -   -     PLAN OF CARE  - Known admissions in past year:  - Current admit date:  - LOS:  - LACE score:   - Current dispo plan: TO BE DETERMINED    09-27-21 (4d)  ------------------------------------------------------------------------  - Time Spent/Chart reviewed: 31 Minutes [including time used to gather, review and transfer data]  - Start:  - End:    Prolonged services rendered, as part of this patient's care provided by Palliative Medicine, include: i. chart review for provider and ancillary service documentation, ii. pertinent diagnostics including laboratory and imaging studies, iii. medication review including PRN use, iv. admission history including previous palliative care encounters and [facility] notes, v. advance care planning documents including HCP and MOLST forms in Alpha. Part of Palliative Medicine extended evaluation and management also involves coordination of care with our IDT, the primary and consulting teams, and unit CM/SW and Hospice if eligible. Recommendations based on the information gathered and discussed are outlined in the A/P of Palliative notes.   [facility] and Pall[facility]  Reza Shay, Palliative Care Attending  Contact Info: Call [phone] (HEAL Line) or message on Microsoft Teams (Reza Shay)    HPI:  Ms. Devlin is a 56yo F w/ PMH AIDS (currently on Biktarvy and Bactrim), Hepatitis B/C, COPD (not on home O2, actively smokes), rectal cancer s/p radiation therapy, tongue cancer s/p resection, h/o crack use (last use 8/2021) presenting to ED from nursing home complaining of 6 days of worsening dysphagia, throat pain and cough. Pt reports being diagnosed with oral thrush at her nursing home 5 days ago and has since been on nystatin with some improvement in her throat pain. She first had trouble swallowing solids, then progressed to difficulty swallowing liquids and patient reports not having eaten in 5 days. Talking exacerbates pain and she does not have pain if she is not swallowing or talking. Pt is also notably hoarse which she says is not her baseline. She also has a cough that is sometimes productive. All of her symptoms began 5-6 days ago.  Pt has COPD and is not on home O2, however she had SpO2 88% on admission and was put on 1-1.5L NC  Of note, patient was admitted to [facility] in early September for failure to thrive. Pt had not been compliant with Biktarvy prior to hospitalization but has taken it at her nursing home since discharge.  Last known CD4 37, ,732 (8/29/2021)  Pt received 1 dose of Moderna vaccine 7/2021, did not receive second because she was high (had been using crack at the time) (27 Sep 2021 18:24)    PERTINENT PM/SXH:   HIV disease  Advanced COPD  Tongue cancer  Rectal cancer  Hepatitis B immune  Hepatitis B  Hepatitis C    FAMILY HISTORY:  FH: heart disease (Mother)    ITEMS NOT CHECKED ARE NOT PRESENT    SOCIAL HISTORY:   Significant other/partner:  [X]  Children: son  []  Christianity/Spirituality:  Substance hx:  [X]   Tobacco hx:  []   Alcohol hx: []   Home Opioid hx:  [] I-Stop Reference No: [id number]  - no active Rx's  Living Situation: []Home  [X]Long term care  []Rehab []Other    ADVANCE DIRECTIVES:    []MOLST  []Living Will  DECISION MAKER(s):  [X] Health Care Proxy(s)  [] Surrogate(s)  [] Guardian           Name(s)/Phone Number(s): Dulce Devlin    BASELINE (I)ADLs (prior to admission):  [place]: []Total  [x] Moderate []Dependent    ALLERGIES:  No Known Allergies    MEDICATIONS  (STANDING):  atorvastatin 40 milliGRAM(s) Oral at bedtime  bictegravir 50 mG/emtricitabine 200 mG/tenofovir alafenamide 25 mG (BIKTARVY) 1 Tablet(s) Oral daily  dextrose 5% + sodium chloride 0.9%. 1000 milliLiter(s) (80 mL/Hr) IV Continuous <Continuous>  enoxaparin Injectable 30 milliGRAM(s) SubCutaneous every 24 hours  fluconAZOLE IVPB 400 milliGRAM(s) IV Intermittent every 24 hours  melatonin 5 milliGRAM(s) Oral at bedtime  metroNIDAZOLE  IVPB 500 milliGRAM(s) IV Intermittent every 12 hours  mirtazapine 15 milliGRAM(s) Oral at bedtime  nicotine -  14 mG/24Hr(s) Patch 1 patch Transdermal daily  OLANZapine Disintegrating Tablet 5 milliGRAM(s) Oral every 24 hours  piperacillin/tazobactam IVPB... 3.375 Gram(s) IV Intermittent every 6 hours  polyethylene glycol 3350 17 Gram(s) Oral daily  senna 2 Tablet(s) Oral at bedtime  sodium chloride 3%  Inhalation 3 milliLiter(s) Inhalation every 6 hours  tiotropium 2.5 MICROgram(s)/olodaterol 2.5 MICROgram(s) (STIOLTO) Inhaler 2 Puff(s) Inhalation daily  trimethoprim / sulfamethoxazole IVPB 160 milliGRAM(s) IV Intermittent every 24 hours  vancomycin  IVPB 750 milliGRAM(s) IV Intermittent every 12 hours    MEDICATIONS  (PRN):  ALBUTerol    0.083% 2.5 milliGRAM(s) Nebulizer every 6 hours PRN Shortness of Breath and/or Wheezing  HYDROmorphone  Injectable 1 milliGRAM(s) IV Push every 6 hours PRN Severe Pain (7 - 10)    PRESENT SYMPTOMS: []Unable to obtain due to poor mentation/encephalopathy  Source if other than patient:  []Family   []Team     Pain: [x] yes [ ] no  QOL impact -   Location -                    Aggravating Factors -  Quality -  Radiation -  Timing -  Severity (0-10 scale) -   Minimal Acceptable Level (0-10 scale) -    Dyspnea:                           []Mild  []Moderate []Severe  Anxiety:                             []Mild []Moderate []Severe  Fatigue:                             []Mild []Moderate []Severe  Nausea:                             []Mild []Moderate []Severe  Loss of Appetite:              []Mild []Moderate []Severe  Constipation:                    []Mild []Moderate []Severe    Other Symptoms:  [x]All other review of systems negative     Palliative Performance Status Version 2:  40%    http://[email or web address].org/files/news/palliative_performance_scale_[email or web address].pdf    PHYSICAL EXAM:  GENERAL:  [X]Alert  [X]Oriented x3   []Lethargic  []Cachexia  []Unarousable  [X]Verbal  []Non-Verbal  Behavioral:   []Anxiety  []Delirium []Agitation [X]Cooperative  HEENT:  []Normal   []Dry mouth   []ET Tube/Trach  [X]Oral lesions  PULMONARY:   [X]Clear []Tachypnea  []Audible excessive secretions   []Rhonchi        []Right []Left []Bilateral  []Crackles        []Right []Left []Bilateral  []Wheezing     []Right []Left []Bilateral  CARDIOVASCULAR:    [X]Regular []Irregular []Tachy  []Sudeep []Murmur []Other  GASTROINTESTINAL:  [X]Soft  []Distended   [X]+BS  [X]Non tender []Tender  []PEG []OGT/ NGT  Last BM:   GENITOURINARY:  [X]Normal [] Incontinent   []Oliguria/Anuria   []Jeter  MUSCULOSKELETAL:   []Normal   [X]Weakness  []Bed/Wheelchair bound []Edema  NEUROLOGIC:   []No focal deficits  []Cognitive impairment  [X]Dysphagia []Dysarthria []Paresis []Encephalopathic   SKIN:   [X]Normal   []Pressure ulcer(s)  []Rash    CRITICAL CARE:  [ ]Shock Present  [ ]Septic [ ]Cardiogenic [ ]Neurologic [ ]Hypovolemic  [ ]Vasopressors [ ]Inotropes   [ ]Respiratory failure present [ ]Mechanical Ventilation [ ]Non-invasive ventilatory support [ ]High-Flow  [ ]Acute  [ ]Chronic [ ]Hypoxic  [ ]Hypercarbic  [ ]Other organ failure    Vital Signs Last 24 Hrs  T(C): 36.4 (01 Oct 2021 12:00), Max: 37.1 (30 Sep 2021 17:28)  T(F): 97.5 (01 Oct 2021 12:00), Max: 98.7 (30 Sep 2021 17:28)  HR: 52 (01 Oct 2021 12:00) (52 - 59)  BP: 93/56 (01 Oct 2021 12:00) (91/57 - 105/64)  BP(mean): --  RR: 18 (01 Oct 2021 12:00) (16 - 20)  SpO2: 95% (01 Oct 2021 12:00) (94% - 95%) I&O's Summary    30 Sep 2021 07:01  -  01 Oct 2021 07:00  --------------------------------------------------------  IN: 1160 mL / OUT: 0 mL / NET: 1160 mL    LABS:                        9.3    9.86  )-----------( 112      ( 01 Oct 2021 09:34 )             31.0   10-01    133<L>  |  102  |  6<L>  ----------------------------<  98  3.9   |  24  |  0.88    Ca    8.5      01 Oct 2021 09:34    RADIOLOGY & ADDITIONAL STUDIES:  < from: Xray Cinesophagram Swallow Function w/ Contrast (09.28.21 @ 12:00) >  Thin Liquid:  Penetration: Positive.  Aspiration: Positive.  Eliminated with posture/technique: Not applicable.    Nectar Thick Liquid:  Penetration: Positive.  Aspiration: Positive.  Eliminated with posture/technique: Not applicable.    Honey Thick Liquid:  Penetration: Positive.  Aspiration: Positive.  Eliminated with posture/technique: Not applicable.    Anatomical findings: Within normal limits.    IMPRESSION: As above. Please see speech pathology report in the patient's chart for complete details regarding swallow function.    PROTEIN CALORIE MALNUTRITION PRESENT: [ ]mild [ ]moderate [x]severe [ ]underweight [ ]morbid obesity  []PPSV2 < or = to 30% [x]significant weight loss  [x]poor nutritional intake []catabolic state []anasarca     Artificial Nutrition []     REFERRALS:  [x]Social Work  []Case management []PT/OT []Chaplaincy  []Hospice  []Patient/Family Support    DISCUSSION OF CASE: Family - to obtain additional history and to provide emotional support; ( ) -     Care Coordination/Goals of Care Document:     PROGRESS NOTE  Date & Time of Note   2021-09-30 11:30  Notes: Chart reviewed. Pt discussed during IDR.  As per medical team, pt is NPO, on IV Zosyn, s/s eval pending.  D/c plan: back to [facility] following hospital course.  CM will remain available.  Electronically signed by:  Laura Musa  Electronically signed on:  2021-09-30  11:33      PALLIATIVE MEDICINE COORDINATION OF CARE DOCUMENTATION: [x] Inpatient Consult  Non-Face-to-Face prolonged service provided that relates to (face-to-face) care that has or will occur and ongoing patient management, including one or more of the following: - Reviewed documentation from other physicians and other health care professional services - Reviewed medical records and diagnostic / radiology study results - Coordination with patient's support system  ************************************************************************  MEDICATION REVIEW:  - See Medication List Above    ISTOP REFERENCE: [id number]  - no active Rx's  - PRN usage: Dilaudid x4  ------------------------------------------------------------------------  COORDINATION OF CARE:  - Palliative Care consulted for: GOC / Symptom Management  - Patient (to be) assessed: 10/1/21  - Patient previously seen by Palliative Care service: YES    ADVANCE CARE PLANNING  - Code status: DNR/DNI  - MOLST reviewed in chart  - HCP/Surrogate: Reed Devlin   - GOC documents: NONE found on [place]  - HCP/Living will/Other Advanced Directives in Alpha: NONE found on Alpha  ------------------------------------------------------------------------  CARE PROVIDER DOCUMENTATION:  - SW/CM notes: Remains medically active, return to SNF when medically clear  - S&S notes: aspiration/penetration across all consistencies    PLAN OF CARE  - Known admissions in past year:  - Current admit date:  - LOS:  - LACE score:   - Current dispo plan: TO BE DETERMINED    09-27-21 (4d)  ------------------------------------------------------------------------  - Time Spent/Chart reviewed: 31 Minutes [including time used to gather, review and transfer data]  - Start:  - End:    Prolonged services rendered, as part of this patient's care provided by Palliative Medicine, include: i. chart review for provider and ancillary service documentation, ii. pertinent diagnostics including laboratory and imaging studies, iii. medication review including PRN use, iv. admission history including previous palliative care encounters and GOC notes, v. advance care planning documents including HCP and MOLST forms in Alpha. Part of Palliative Medicine extended evaluation and management also involves coordination of care with our IDT, the primary and consulting teams, and unit CM/SW and Hospice if eligible. Recommendations based on the information gathered and discussed are outlined in the A/P of Palliative notes.

## 2021-10-01 NOTE — CONSULT NOTE ADULT - CONVERSATION DETAILS
Discussed with patient goals of care. She would like to continue PO diet and does not want any artificial feeding, including PEG tube. She is aware of aspiration risk with oral feeding including infection, respiratory failure, and death. She would like to be made DNR/ DNI. Plan discussed with primary team, ANA filled out and scanned in chart.

## 2021-10-01 NOTE — PROGRESS NOTE ADULT - SUBJECTIVE AND OBJECTIVE BOX
PULMONARY CONSULT SERVICE FOLLOW-UP NOTE    INTERVAL HPI:  Reviewed chart and overnight events; patient seen and examined at bedside. Patient this morning states that she is feeling better and is going to try eating food today. Cough is at baseline with no sputum production and no oxygen requirement.     MEDICATIONS:  Pulmonary:  ALBUTerol    0.083% 2.5 milliGRAM(s) Nebulizer every 6 hours PRN  sodium chloride 3%  Inhalation 3 milliLiter(s) Inhalation every 6 hours  tiotropium 2.5 MICROgram(s)/olodaterol 2.5 MICROgram(s) (STIOLTO) Inhaler 2 Puff(s) Inhalation daily    Antimicrobials:  bictegravir 50 mG/emtricitabine 200 mG/tenofovir alafenamide 25 mG (BIKTARVY) 1 Tablet(s) Oral daily  fluconAZOLE IVPB 400 milliGRAM(s) IV Intermittent every 24 hours  metroNIDAZOLE  IVPB 500 milliGRAM(s) IV Intermittent every 12 hours  piperacillin/tazobactam IVPB... 3.375 Gram(s) IV Intermittent every 6 hours  trimethoprim / sulfamethoxazole IVPB 160 milliGRAM(s) IV Intermittent every 24 hours  vancomycin  IVPB 750 milliGRAM(s) IV Intermittent every 12 hours    Anticoagulants:  enoxaparin Injectable 30 milliGRAM(s) SubCutaneous every 24 hours    Cardiac:      Allergies    No Known Allergies    Intolerances        Vital Signs Last 24 Hrs  T(C): 36.6 (01 Oct 2021 06:07), Max: 37.1 (30 Sep 2021 17:28)  T(F): 97.9 (01 Oct 2021 06:07), Max: 98.7 (30 Sep 2021 17:28)  HR: 54 (01 Oct 2021 06:07) (54 - 61)  BP: 105/64 (01 Oct 2021 06:07) (91/57 - 105/64)  BP(mean): --  RR: 18 (01 Oct 2021 06:07) (15 - 20)  SpO2: 95% (01 Oct 2021 06:07) (94% - 99%)    09-30 @ 07:01  -  10-01 @ 07:00  --------------------------------------------------------  IN: 1160 mL / OUT: 0 mL / NET: 1160 mL          PHYSICAL EXAM:  Constitutional: WDWN  HEENT: NC/AT; PERRL, anicteric sclera; MMM  Neck: supple  Cardiovascular: +S1/S2, RRR  Respiratory: CTA B/L; no W/R/R  Gastrointestinal: soft, NT/ND  Extremities: WWP; no edema, clubbing or cyanosis  Vascular: 2+ radial pulses B/L  Neurological: awake and alert; CHOUDHARY    LABS:      CBC Full  -  ( 01 Oct 2021 09:34 )  WBC Count : 9.86 K/uL  RBC Count : 3.45 M/uL  Hemoglobin : 9.3 g/dL  Hematocrit : 31.0 %  Platelet Count - Automated : 112 K/uL  Mean Cell Volume : 89.9 fl  Mean Cell Hemoglobin : 27.0 pg  Mean Cell Hemoglobin Concentration : 30.0 gm/dL  Auto Neutrophil # : x  Auto Lymphocyte # : x  Auto Monocyte # : x  Auto Eosinophil # : x  Auto Basophil # : x  Auto Neutrophil % : x  Auto Lymphocyte % : x  Auto Monocyte % : x  Auto Eosinophil % : x  Auto Basophil % : x    10-01    133<L>  |  102  |  6<L>  ----------------------------<  98  3.9   |  24  |  0.88    Ca    8.5      01 Oct 2021 09:34                        RADIOLOGY & ADDITIONAL STUDIES:

## 2021-10-01 NOTE — PROGRESS NOTE ADULT - PROBLEM SELECTOR PLAN 10
Pt may have h/o Hep C (documented in ED note). Not currently on medication. Unknown at this time whether she was ever treated in the past. Liver panel wnl this admission.  -Can consider sending hepatitis panel to verify    #H/o Hep B  -Pt may have h/o Hep B (documented in ED note).  -Can consider sending hepatitis panel to verify Hb 8.3, MCV 91.9 (9/29). Hb lower this admission than on prior admission (Hb 10-11 at that time). Pt has dry mucus membranes suspected 2/2 poor PO intake, did not allow exam of conjunctiva for pallor. Potentially anemia of chronic disease.    - Active T&S; Transfuse if Hb < 7  - Ferritin elevated at 274  - Iron decreased at 29  - Iron total binding decreased at 166  - Transferrin decreased at 140    #HLD  -Patient has known h/o HLD, takes atorvastatin 40mg qd  -Restarted atorvastatin 40mg      #Depression  -Takes olanzapine 5mg qd and mirtazapine 15mg qd at home  -Restarted home meds

## 2021-10-01 NOTE — CONSULT NOTE ADULT - PROBLEM SELECTOR RECOMMENDATION 3
Pt presented with respiratory difficulty, new cough, dysphagia and throat pain. Pt afebrile w/ leukocytosis & tachypnea. CXR showed bilateral lower lobe opacity. Pt had recent hospitalization at [facility] at end of August-early September for failure to thrive and lives in a nursing home; is at increased risk for HAP.    -CXR (9/27) revealed b/l lower lobe opacities concerning for PNA;  HAP vs aspiration PNA given recent hospitalization at [facility] + pt lives in nursing home  -MRSA positive; continue vancomycin  -Legionella, Fungitell Chlamydia, GC & RVP results are all Negative  -c/w zosyn and vancomycin for HAP coverage; vanc trough for 9/30 (8pm)  -Per Pulm reccs, f/u ambulatory O2 stats, c/w duonebs, and d/c symbicort started Stiolto  -Pt on 1.5L O2, wean down as tolerated; Sp%O2 was 97% on 2L  -F/u galactomannan, LDH, fungal bcx.

## 2021-10-01 NOTE — CONSULT NOTE ADULT - REASON FOR ADMISSION
Dysphagia, decreased PO intake, generalized malaise

## 2021-10-01 NOTE — PROGRESS NOTE ADULT - PROBLEM SELECTOR PLAN 6
Pt UA positive for few trichomonas  -Started metronidazole 500mg BID x7d (currently day 3/7) Pt has known diagnosis of AIDS, follows w/ Dr. Romaine Nava at [facility]. Pt had not been compliant with Biktarvy, but since recent hospitalization (discharged 9/9/2021) has been on Biktarvy and Bactrim. CD4 count 59, viral load 142 (9/28/2021).     -Continue ppx Bactrim  -Per HIV team recs:    - CMV PCR negative last visit makes CMV esophagitis less likely.  Symptoms likely 2/2 thrush.     - C/w fluconazole. Can continue Biktarvy once no longer NPO; if pt needs crushable regiment, switch to Truvada/Trivicy   - Repeat OI w/u is not necessary at this time given her symptoms.    - If she does not improve w/ treatment for HAP / Aspiration PNA, would consider bronch to r/o PCP.    - C/w PCP PPx at this time.     - Trichomonas on UA: On flagyl.    - Hep C: Will need viral load and genotyping in the future as outpatient.

## 2021-10-01 NOTE — PROGRESS NOTE ADULT - PROBLEM SELECTOR PLAN 1
Pt diagnosed with oral thrush 5d prior to admission at nursing home. Started on nystatin with minimal improvement in throat pain. Pt reports difficulty swallowing both solids and liquids. Potential etiologies include opportunistic infection 2/2 AIDS w/ CD4<200 vs 2/2 COPD inhaler use if she has not been rinsing after inhaler use. Pt has been able to swallow oral medications.    -Fluconazole - 400mg IV (9/27), received 200mg (9/28 &29) and tolerated well, increased back to 400mg IV until cleared to tolerate PO; f/u EKG 10/1 to check QTc (QTc 417 on 9/27)  -Restarted oxycodone 15mg q6h PRN for severe pain (home medication)  -Converted majority of pt's medications to IV  -Oral hygiene for symptom improvement, consider viscous lidocaine swabs for comfort  -chest PT and mucomyst improved pt secretions and dysphagia   -Motivational interview w/ pt re: nutrition    -Speech and Swallow evaluated pt, and MBS revealed pt is aspirating everything; S&S recc NGT for medications and nutrition  -Pt adamently refused NGT, stating "I don't need that". She insisted on eating despite explaining to her the results of the MBS in layman's terms.  -S&S re-evaluated pt, performed FEES: baseline pooling of secretions, 2 small white focal lesions appreciated with copious thick secretions in hypopharynx. Cleared after many attempts of cued coughing and oral suction. (appreciate FEES note for full results)  -ENT consulted: pt lost to f/u for her tongue CA since 2017, recc f/u w/ Dr. Saunders in [place] Pt diagnosed with oral thrush 5d prior to admission at nursing home. Started on nystatin with minimal improvement in throat pain. Pt reports difficulty swallowing both solids and liquids. Potential etiologies include opportunistic infection 2/2 AIDS w/ CD4<200 vs 2/2 COPD inhaler use if she has not been rinsing after inhaler use. Pt has been able to swallow oral medications.    -Fluconazole - 400mg IV (9/27), received 200mg (9/28 &29) and tolerated well, increased back to 400mg IV until cleared to tolerate PO; f/u EKG 10/1: QTc 451 (QTc 417 on 9/27)  -Restarted oxycodone 15mg q6h PRN for severe pain (home medication)  -Converted majority of pt's medications to IV  -Oral hygiene for symptom improvement, consider viscous lidocaine swabs for comfort  -chest PT and mucomyst improved pt secretions and dysphagia   -Motivational interview w/ pt re: nutrition    -Speech and Swallow evaluated pt, and MBS revealed pt is aspirating everything; S&S recc NGT for medications and nutrition  -Pt adamently refused NGT, stating "I don't need that". She insisted on eating despite explaining to her the results of the MBS in layman's terms.  -S&S re-evaluated pt, performed FEES: baseline pooling of secretions, 2 small white focal lesions appreciated with copious thick secretions in hypopharynx. Cleared after many attempts of cued coughing and oral suction. (appreciate FEES note for full results)  -ENT consulted: pt lost to f/u for her tongue CA since 2017, recc f/u w/ Dr. Saunders in [place]

## 2021-10-01 NOTE — PROGRESS NOTE ADULT - ASSESSMENT
54yo F w/ PMH HIV/AIDS (currently on Biktarvy and Bactrim), Hepatitis B/C, COPD (not on home O2, active smoker), rectal cancer s/p radiation therapy, tongue cancer s/p resection, h/o substance use disorder (crack cocaine last use 8/2021) presenting with dysphagia and cough.     #possible HAP  #aspiration  #COPD    Assessment per initial consult note. Ambulatory sats wnl yesterday. HAP vs aspiration PNA most likely. Negative infectious work up thus far. Not on O2, clear lungs.     -agree with abx (currently on vanc/zosyn/ for HAP and flagyl for trichomonas) and antifungal (fluconazole for potential esophagitis) per primary team  -continue Bactrim pcp ppx  -fungitell elevated likely from candidal esophagitis. Negative galactomannan and no pulmonary symptoms making aspergillus unlikely.  -Please also request PCP PCR on sputum.   -hold off on bronchoscopy at this time  -continue stiolto   -continue albuterol prn  -aspiration precautions  -if unable to take PO safely--consider goals of care discussion or assessment of understanding of taking PO with aspiration risk    -will need 2nd covid vaccine at discharge   -encourage smoking cessation    54yo F w/ PMH HIV/AIDS (currently on Biktarvy and Bactrim), Hepatitis B/C, COPD (not on home O2, active smoker), rectal cancer s/p radiation therapy, tongue cancer s/p resection, h/o substance use disorder (crack cocaine last use 8/2021) presenting with dysphagia and cough.     #possible HAP  #aspiration  #COPD    Assessment per initial consult note. Ambulatory sats wnl yesterday. HAP vs aspiration PNA most likely. Negative infectious work up thus far. Not on O2, clear lungs.     -agree with abx (currently on vanc/zosyn/ for HAP and flagyl for trichomonas) and antifungal (fluconazole for potential esophagitis) per primary team  -continue Bactrim pcp ppx  -fungitell elevated likely from candidal esophagitis. Negative galactomannan and no pulmonary symptoms making aspergillus unlikely.  -Please also request PCP PCR on sputum.   -hold off on bronchoscopy at this time  -continue stiolto   -continue albuterol prn  -aspiration precautions  -if unable to take PO safely--consider goals of care discussion or assessment of understanding of taking PO with aspiration risk    -will need 2nd covid vaccine at discharge   -encourage smoking cessation     - Pulmonary will sign off at this time please call with any questions or changes

## 2021-10-01 NOTE — PROGRESS NOTE ADULT - PROBLEM SELECTOR PLAN 12
F: D5 1/2 NS @ 80cc/hr (maintenance dose)  E: replete as needed  Diet: NPO, MBS found pt is silently aspirating everything   GI ppx: n/a  DVT ppx: 30mg lovenox subq qd  Code status: FULL CODE  Dispo: [facility]

## 2021-10-02 NOTE — PROGRESS NOTE ADULT - PROBLEM SELECTOR PLAN 12
F: D5 1/2 NS @ 80cc/hr (maintenance dose)  E: replete as needed  Diet: NPO, MBS found pt is silently aspirating everything   GI ppx: n/a  DVT ppx: 30mg lovenox subq qd  Code status: FULL CODE  Dispo: [facility]

## 2021-10-02 NOTE — PROGRESS NOTE ADULT - PROBLEM SELECTOR PLAN 2
Pt presented with respiratory difficulty, new cough, dysphagia and throat pain. Pt afebrile w/ leukocytosis & tachypnea. CXR showed bilateral lower lobe opacity. Pt had recent hospitalization at [facility] at end of August-early September for failure to thrive and lives in a nursing home; is at increased risk for HAP.    -CXR (9/27) revealed b/l lower lobe opacities concerning for PNA;  HAP vs aspiration PNA given recent hospitalization at [facility] + pt lives in nursing home  -MRSA positive; continue vancomycin  -Legionella, Fungitell Chlamydia, GC & RVP results are all Negative  -c/w zosyn and vancomycin for HAP coverage  -10/2 AM vanc trough subtherapeutic, started vanc 1250mg q12h  -Repeat vanc trough  -Per Pulm reccs, f/u ambulatory O2 stats, c/w duonebs, and d/c symbicort started Stiolto  -Pt on 1.5L O2, wean down as tolerated; Sp%O2 was 97% on 2L  -F/u galactomannan, LDH, fungal bcx

## 2021-10-02 NOTE — PROGRESS NOTE ADULT - PROBLEM SELECTOR PLAN 1
Pt diagnosed with oral thrush 5d prior to admission at nursing home. Started on nystatin with minimal improvement in throat pain. Pt reports difficulty swallowing both solids and liquids. Potential etiologies include opportunistic infection 2/2 AIDS w/ CD4<200 vs 2/2 COPD inhaler use if she has not been rinsing after inhaler use. Pt has been able to swallow oral medications.    -Fluconazole - 400mg IV (9/27), received 200mg (9/28 &29) and tolerated well, increased back to 400mg IV until cleared to tolerate PO; f/u EKG 10/1: QTc 451 (QTc 417 on 9/27)  -Restarted oxycodone 15mg q6h PRN for severe pain (home medication)  -Converted majority of pt's medications to IV  -Oral hygiene for symptom improvement, consider viscous lidocaine swabs for comfort  -chest PT and mucomyst improved pt secretions and dysphagia   -Motivational interview w/ pt re: nutrition    -Speech and Swallow evaluated pt, and MBS revealed pt is aspirating everything; S&S recc NGT for medications and nutrition  -Pt adamently refused NGT, stating "I don't need that". She insisted on eating despite explaining to her the results of the MBS in layman's terms.  -S&S re-evaluated pt, performed FEES: baseline pooling of secretions, 2 small white focal lesions appreciated with copious thick secretions in hypopharynx. Cleared after many attempts of cued coughing and oral suction. (appreciate FEES note for full results)  -ENT consulted: pt lost to f/u for her tongue CA since 2017, recc f/u w/ Dr. Saunders in [place]

## 2021-10-02 NOTE — PROGRESS NOTE ADULT - SUBJECTIVE AND OBJECTIVE BOX
OVERNIGHT EVENTS: No overnight event.    SUBJECTIVE / INTERVAL HPI: Patient seen and examined at bedside. Patient reports feeling better and wondering when can go home. Improvement of dysphagia/odynophagia. No new complaints at this time. Denies headache, dizziness, fever, chills, chest pain, SOB, abd pain, LE pain, or any new weakness.    VITAL SIGNS:  Vital Signs Last 24 Hrs  T(C): 36.6 (02 Oct 2021 13:31), Max: 36.8 (02 Oct 2021 06:12)  T(F): 97.8 (02 Oct 2021 13:31), Max: 98.2 (02 Oct 2021 06:12)  HR: 76 (02 Oct 2021 13:31) (56 - 81)  BP: 98/55 (02 Oct 2021 13:31) (77/47 - 98/55)  BP(mean): --  RR: 18 (02 Oct 2021 13:31) (18 - 18)  SpO2: 95% (02 Oct 2021 13:31) (92% - 95%)    PHYSICAL EXAM:    General: cachectic appearing women lying in bed in no acute distress  HEENT: NC/AT; PERRL, anicteric sclera; dry MM, neck supple  Cardiovascular: +S1/S2, RRR, no murmurs, rubs, gallops  Respiratory: CTA B/L; no W/R/R  Gastrointestinal: soft, NT/ND; +BSx4  Extremities: WWP; no edema, clubbing or cyanosis  Vascular: 2+ radial, DP/PT pulses B/L  Neurological: AAOx3; no focal deficits    MEDICATIONS:  MEDICATIONS  (STANDING):  atorvastatin 40 milliGRAM(s) Oral at bedtime  bictegravir 50 mG/emtricitabine 200 mG/tenofovir alafenamide 25 mG (BIKTARVY) 1 Tablet(s) Oral daily  dextrose 5% + sodium chloride 0.9%. 1000 milliLiter(s) (80 mL/Hr) IV Continuous <Continuous>  enoxaparin Injectable 30 milliGRAM(s) SubCutaneous every 24 hours  fluconAZOLE   Tablet 200 milliGRAM(s) Oral every 24 hours  melatonin 5 milliGRAM(s) Oral at bedtime  metroNIDAZOLE  IVPB 500 milliGRAM(s) IV Intermittent every 12 hours  mirtazapine 15 milliGRAM(s) Oral at bedtime  nicotine -  14 mG/24Hr(s) Patch 1 patch Transdermal daily  OLANZapine Disintegrating Tablet 5 milliGRAM(s) Oral every 24 hours  piperacillin/tazobactam IVPB... 3.375 Gram(s) IV Intermittent every 6 hours  polyethylene glycol 3350 17 Gram(s) Oral daily  senna 2 Tablet(s) Oral at bedtime  sodium chloride 3%  Inhalation 3 milliLiter(s) Inhalation every 6 hours  tiotropium 2.5 MICROgram(s)/olodaterol 2.5 MICROgram(s) (STIOLTO) Inhaler 2 Puff(s) Inhalation daily  trimethoprim  160 mG/sulfamethoxazole 800 mG 1 Tablet(s) Oral every 24 hours    MEDICATIONS  (PRN):  ALBUTerol    0.083% 2.5 milliGRAM(s) Nebulizer every 6 hours PRN Shortness of Breath and/or Wheezing  HYDROmorphone   Tablet 4 milliGRAM(s) Oral every 6 hours PRN Severe Pain (7 - 10)      ALLERGIES:  Allergies    No Known Allergies    Intolerances        LABS:                        9.1    10.49 )-----------( 115      ( 02 Oct 2021 07:03 )             30.1     10-02    135  |  104  |  9   ----------------------------<  101<H>  4.1   |  24  |  0.80    Ca    8.7      02 Oct 2021 07:03          CAPILLARY BLOOD GLUCOSE          RADIOLOGY & ADDITIONAL TESTS: Reviewed.

## 2021-10-02 NOTE — PROGRESS NOTE ADULT - PROBLEM SELECTOR PLAN 4
Palliative consulted:  - GOC discussed w/ pt. Pt wants to be DNR/DNI and receive comfort feeds (requests "mashed potatoes")  - Patient acknowledged risk of aspiration with feeds.

## 2021-10-02 NOTE — PROGRESS NOTE ADULT - REASON FOR ADMISSION
Dysphagia, decreased PO intake, generalized malaise

## 2021-10-02 NOTE — PROGRESS NOTE ADULT - ASSESSMENT
56yo F w/ PMH AIDS (currently on Biktarvy and Bactrim), Hepatitis B/C, COPD (not on home O2, actively smokes), rectal cancer s/p radiation therapy, tongue cancer s/p resection, h/o crack use (last use 8/2021) presenting to ED from nursing home complaining of 6 days of worsening dysphagia, throat pain and cough, found to be hypoxic to 88% and has oral thrush. Admitted to [facility] for treatment of hypercapnic, hypoxic respiratory failure, sepsis 2/2 presumed PNA and recent dysphagia.

## 2021-10-02 NOTE — PROGRESS NOTE ADULT - PROBLEM SELECTOR PLAN 10
Hb 8.3, MCV 91.9 (9/29). Hb lower this admission than on prior admission (Hb 10-11 at that time). Pt has dry mucus membranes suspected 2/2 poor PO intake, did not allow exam of conjunctiva for pallor. Potentially anemia of chronic disease.    - Active T&S; Transfuse if Hb < 7  - Ferritin elevated at 274  - Iron decreased at 29  - Iron total binding decreased at 166  - Transferrin decreased at 140    #HLD  -Patient has known h/o HLD, takes atorvastatin 40mg qd  -Restarted atorvastatin 40mg      #Depression  -Takes olanzapine 5mg qd and mirtazapine 15mg qd at home  -Restarted home meds

## 2021-10-02 NOTE — PROGRESS NOTE ADULT - ATTENDING COMMENTS
Agree w Abx for bacterial pneumonia and have low suspicion for PJP. Is clinically improving.
Patient seen and examined with house-staff during bedside rounds.  Resident note read, including vitals, physical findings, laboratory data, and radiological reports.   Revisions included below.  Direct personal management at bed side and extensive interpretation of the data.  Plan was outlined and discussed in details with the housestaff.  Decision making of high complexity  Action taken for acute disease activity to reflect the level of care provided:  - medication reconciliation  - review laboratory data  she is refusing NGT and PT.  The patient is aspirating.  She is being treated for aspiration pneumonia.  I discussed with IM regarding palliative care
Patient was seen and examined with the resident team today.  I agree with the above assessment and plan with the following exceptions/additions:     Briefly, this is a 56yo woman, active smoker, with a PMH of AIDs c/b medication non-adherence (1993, CD4 52, on Biktarvy), chronic HBV/HCV, rectal CA s/p RT c/b a chronic stage 3 sacral ulcer, tongue CA s/p resection, COPD and BPD who p/w dysphagia and SOB, found to have gram-negative PNA (HAP) c/b sepsis, as well as oral and presumed esophageal candidiasis.  Clinically improving on antibiotics and antifungal but NPO after a FEES was performed on 9/30.  Needs GOC discussions to address comfort feeds w/risks of aspiration.      #Gram-negative PNA c/b sepsis- low suspicion for PCP at this time; c/w Zosyn and Vanc (MRSA swab +) until 10/3 (7 days); aggressive pulmonary toilet   #Oral and esophageal candidiasis - had been on Nystatin at facility upon admission, which would not treat esophagitis; c/w Fluconazole x 14 days (day 5 today);   #Dysphagia - NPO per SLP; pt refusing NGT; c/w oral hygiene; request Pall Care speak to her about comfort feeds (known to their service)   #Emphysematous COPD - c/w home regimen  #AIDS - c/w home regimen once enteric access obtained   #Chronic stage 3 sacral ulcer - wound care, address nutrition as per dysphagia mgmt   #Tongue CA and rectal CA s/p resection - c/w chronic pain meds (on IV while NPO)   #Chronic HCV /HBV - no active issues; can resume care with outpt clinic   #Trichomonas - c/w Flagyl IV  #Severe protein calorie malnutrition - BMI 14; c/w IVF's and continue to address nutrition as per above   #Bipolar Disorder - c/w home regimen on enteric access obtained   #DVT PPx - Lovenox  #Dispo - back to long-term care; completes IV abx on 10/3 so if taking PO by then, can discharge Sunday    Nat Valdes  [phone]
Patient discussed with resident team and plan of care reviewed. I have personally reviewed all pertinent labs and imaging and performed an independent history and physical. Resident note personally reviewed, and I agree with above resident note with the following additions:    Patient desiring comfort feeds which have been initiated - she understands risks. Does not want artificial feeding. Finishes IV abx tomorrow, can d/c home tomorrow after.
Patient was seen and examined with the resident team today.  I agree with the above assessment and plan with the following exceptions/additions:     Briefly, this is a 54yo woman, active smoker, with a PMH of AIDs c/b medication non-adherence (1993, CD4 52, on Biktarvy), chronic HBV/HCV, rectal CA s/p RT c/b a chronic stage 3 sacral ulcer, tongue CA s/p resection, COPD and BPD who p/w dysphagia and SOB, found to have gram-negative PNA (HAP) c/b sepsis, as well as oral and presumed esophageal candidiasis.      #Gram-negative PNA c/b sepsis- low suspicion for PCP at this time but will continue to re-assess daily; c/w Zosyn and Vanc (MRSA swab +)  #Oral and esophageal candidiasis - had been on Nystatin at facility upon admission, which would not treat esophagitis; c/w Fluconazole x 14 days (day 2 today);   #Dysphagia - NPO per SLP; pt refusing NGT; given she's only received 1 dose of Fluconazole, would continue with medical mgmt for now to assess improvement, would be premature to offer PEG; please start oral hygiene   #Emphysematous COPD - c/w home regimen  #AIDS - c/w home regimen once enteric access obtained   #Chronic stage 3 sacral ulcer - wound care, address nutrition as per above  #Tongue CA and rectal CA s/p resection - c/w chronic pain meds   #Chronic HCV /HBV - no active issues; can resume care with outpt clinic   #Trichomonas - c/w Flagyl IV  #Severe protein calorie malnutrition - BMI 14; c/w IVF's and continue to address nutrition as per above   #Bipolar Disorder - c/w home regimen on enteric access obtained   #DVT PPx - Lovenox  #Dispo - back to long-term care once dysphagia resolved     Nat Valdes  [phone]
Patient was seen and examined with the resident team today.  I agree with the above assessment and plan with the following exceptions/additions:     Briefly, this is a 56yo woman, active smoker, with a PMH of AIDs c/b medication non-adherence (1993, CD4 52, on Biktarvy), chronic HBV/HCV, rectal CA s/p RT c/b a chronic stage 3 sacral ulcer, tongue CA s/p resection, COPD and BPD who p/w dysphagia and SOB, found to have gram-negative PNA (HAP) c/b sepsis, as well as oral and presumed esophageal candidiasis.  Clinically improving on antibiotics and antifungal but remains with wet cough and NPO; however, she is asking for food today (which is the first time all week).     #Gram-negative PNA c/b sepsis- low suspicion for PCP at this time but will continue to re-assess daily; c/w Zosyn and Vanc (MRSA swab +); aggressive pulmonary toilet   #Oral and esophageal candidiasis - had been on Nystatin at facility upon admission, which would not treat esophagitis; c/w Fluconazole x 14 days (day 3 today);   #Dysphagia - NPO per SLP; pt refusing NGT; c/w oral hygiene; as appears much better today and requesting PO, would ask for Speech re-eval  #Emphysematous COPD - c/w home regimen  #AIDS - c/w home regimen once enteric access obtained   #Chronic stage 3 sacral ulcer - wound care, address nutrition as per dysphagia mgmt   #Tongue CA and rectal CA s/p resection - c/w chronic pain meds (on IV while NPO)   #Chronic HCV /HBV - no active issues; can resume care with outpt clinic   #Trichomonas - c/w Flagyl IV  #Severe protein calorie malnutrition - BMI 14; c/w IVF's and continue to address nutrition as per above   #Bipolar Disorder - c/w home regimen on enteric access obtained   #DVT PPx - Lovenox  #Dispo - back to long-term care once dysphagia resolved     Nat Valdes  [phone]
Patient was seen and examined with the resident team today.  I agree with the above assessment and plan with the following exceptions/additions:     Briefly, this is a 54yo woman, active smoker, with a PMH of AIDs c/b medication non-adherence (1993, CD4 52, on Biktarvy), chronic HBV/HCV, rectal CA s/p RT c/b a chronic stage 3 sacral ulcer, tongue CA s/p resection, COPD and BPD who p/w dysphagia and SOB, found to have gram-negative PNA (HAP) c/b sepsis.      #Gram-negative PNA c/b sepsis- c/w Zosyn; please check MRSA and stop Vanc if negative; would hold off on treatment dose of Bactrim for PCP until seen by Pulm, f/u LDB  #Oral thrush - had been on Nystatin at facility upon admission; c/w Fluconazole x 14 days  #Dysphagia - NPO per SLP; re-assess with additional doses of Fluconazole and improvement in mentation   #Emphysematous COPD - c/w home regimen  #AIDS - c/w home regimen   #Chronic stage 3 sacral ulcer - wound care   #Tongue CA and rectal CA s/p resection - c/w chronic pain meds   #Chronic HCV /HBV - no active issues; can resume care with outpt clinic   #Trichomonas - c/w Flagyl  #Severe protein calorie malnutrition - BMI 14; c/w IVF's and if unable to take PO in the next 24 hours would place NGT  #Bipolar Disorder - c/w home regimen   #DVT PPx - Lovenox  #Dispo - TBD    Nat Valdes  [phone]

## 2021-10-02 NOTE — PROGRESS NOTE ADULT - PROBLEM SELECTOR PLAN 5
Pt met 2/4 SIRS criteria in the ED (RR 21, WBC 14.53), w/ CXR 9/27 showing b/l lower lobe infiltrates. Therefore PNA confirmed source of infection. Pt is s/p 1.5L NS, zosyn 3.375g x1, vancomycin 750mg x1 in the ED. Lactate 1.7.    -Pt WBC 15.6, RR 16; pt no longer meets SIRS  -c/w D5 1/2 NS @ 80cc/hr maintenance fluids  -C/w ppx bactrim for PCP (AIDS, last CD4 <200)  -c/w zosyn 3.375g q6h  -c/w vancomycin 1250mg q12h, MRSA swab positive  -f/u next Vanc trough. Last trough (10/2): 7.7, aim for range of 15-20 ug/ml

## 2021-10-02 NOTE — PROGRESS NOTE ADULT - NUTRITIONAL ASSESSMENT
This patient has been assessed with a concern for Malnutrition and has been determined to have a diagnosis/diagnoses of Severe protein-calorie malnutrition and Underweight (BMI < 19).    This patient is being managed with:   Diet Dysphagia 2 Mechanical Soft-Thin Liquids-  Entered: Oct  1 2021  3:08PM    
This patient has been assessed with a concern for Malnutrition and has been determined to have a diagnosis/diagnoses of Severe protein-calorie malnutrition and Underweight (BMI < 19).    This patient is being managed with:   Diet NPO-  Entered: Sep 28 2021 12:59PM    

## 2021-10-02 NOTE — PROGRESS NOTE ADULT - PROBLEM SELECTOR PLAN 6
Pt has known diagnosis of AIDS, follows w/ Dr. Romaine Nava at [facility]. Pt had not been compliant with Biktarvy, but since recent hospitalization (discharged 9/9/2021) has been on Biktarvy and Bactrim. CD4 count 59, viral load 142 (9/28/2021).     -Continue ppx Bactrim  -Per HIV team recs:    - CMV PCR negative last visit makes CMV esophagitis less likely.  Symptoms likely 2/2 thrush.     - C/w fluconazole. Can continue Biktarvy once no longer NPO; if pt needs crushable regiment, switch to Truvada/Trivicy   - Repeat OI w/u is not necessary at this time given her symptoms.    - If she does not improve w/ treatment for HAP / Aspiration PNA, would consider bronch to r/o PCP.    - C/w PCP PPx at this time.     - Trichomonas on UA: On flagyl.    - Hep C: Will need viral load and genotyping in the future as outpatient.

## 2021-10-03 NOTE — CHART NOTE - NSCHARTNOTEFT_GEN_A_CORE
Called by Rn to replace IV access as the current IV access was not functional and the patient was due to PM IV antibiotics vancomycin, Zosyn and metronidazole. Pt was seen and examined at bedside. Pt refused replacement of new IV line and stated that  'missing a dose of antibiotics wont do any harm'. The benefits of getting antibiotics on scheduled time were extensively and repeatedly discussed with the patient in details. However, the patient still did not want a new IV line. Hence PM IV vancomycin, Zosyn and metronidazole could not be given to the patient. Called by Rn to replace IV access as the current IV access was not functional and the patient was due to PM IV antibiotics vancomycin, Zosyn and metronidazole. Pt was seen and examined at bedside. Pt refused replacement of new IV line and stated that  'missing a dose of antibiotics wont do any harm'. The benefits of getting antibiotics on scheduled time was extensively and repeatedly discussed with the patient in details. However, the patient still did not want a new IV line. Hence PM IV vancomycin, Zosyn and metronidazole could not be given to the patient.

## 2021-10-03 NOTE — DISCHARGE NOTE NURSING/CASE MANAGEMENT/SOCIAL WORK - PATIENT PORTAL LINK FT
You can access the FollowMyHealth Patient Portal offered by [facility] by registering at the following website: [email or web address]. By joining [employer]’s FollowMyHealth portal, you will also be able to view your health information using other applications (apps) compatible with our system.

## 2021-10-03 NOTE — DISCHARGE NOTE NURSING/CASE MANAGEMENT/SOCIAL WORK - NSDCFUADDAPPT_GEN_ALL_CORE_FT
You have an appointment at [facility] for Wednesday 10/6/2021 @2pm to receive your 2nd dose of Moderna COVID vaccine  [id number] [place] [address]      You have an appointment with Dr. Saunders, a head and throat doctor, on Nov 15, 2021 at 10:30 AM.   [address], [address], [place] [address]

## 2021-10-03 NOTE — CHART NOTE - NSCHARTNOTEFT_GEN_A_CORE
Patient should follow up with Amparo Saunders MD (ENT) following discharge.   [address] [place] [address], [facility], [place] [address]  Phone: [phone]

## 2021-10-03 NOTE — DISCHARGE NOTE NURSING/CASE MANAGEMENT/SOCIAL WORK - NSDCPEFALRISK_GEN_ALL_CORE
For information on Fall & injury Prevention, visit [email or web address]
[email or web address]

## 2021-10-04 NOTE — ED ADULT NURSE NOTE - PAIN: BODY LOCATION
Detail Level: Zone Initiate Treatment: Fluocinonide CP/ vaginal/ rectal Initiate Treatment: Mupirocin

## 2021-10-08 NOTE — ED ADULT NURSE NOTE - NSIMPLEMENTINTERV_GEN_ALL_ED
Implemented All Fall Risk Interventions:  [place] to call system. Call bell, personal items and telephone within reach. Instruct patient to call for assistance. Room bathroom lighting operational. Non-slip footwear when patient is off stretcher. Physically safe environment: no spills, clutter or unnecessary equipment. Stretcher in lowest position, wheels locked, appropriate side rails in place. Provide visual cue, wrist band, yellow gown, etc. Monitor gait and stability. Monitor for mental status changes and reorient to person, place, and time. Review medications for side effects contributing to fall risk. Reinforce activity limits and safety measures with patient and family.

## 2021-10-08 NOTE — H&P ADULT - NSHPSOCIALHISTORY_GEN_ALL_CORE
Lives in nursing home. Current smoker (6 cigarettes/day, has cut down and has been smoking since she was 12). H/o crack use (reported last use 8/2021)

## 2021-10-08 NOTE — ED ADULT NURSE NOTE - NSSEPSISSUSPECTED_ED_A_ED
[Fever] : no fever [Chills] : no chills [Nasal Discharge] : nasal discharge [Sore Throat] : sore throat [Postnasal Drip] : postnasal drip [Negative] : Heme/Lymph No

## 2021-10-08 NOTE — CONSULT NOTE ADULT - ASSESSMENT
A/P    NEURO  Intubated and sedated    CARDIOVASCULAR  #Sinus tachycardia  - EKG with , QTc 428ms. Trop-T negative. Likely elevated in setting of severe sepsis from PNA.   - POCUS without obvious signs of right heart strain  - ID plan as below     PULMONARY  #Acute hypoxic respiratory failure 2/2 severe sepsis from likely PNA  - Intubated in the ED after trial of NRB due to increased work of breathing. Currently on VC with FiO2 set at 100%. Likely secondary to PNA. Recent hospitalization for MRSA for which she was on a 6 day regimen of vancomycin.   - c/w vanc + pseudomonal zosyn   - perform MRSA swab  - obtain sputum cultures including PCP PCR  - f/up blood cultures, urine cultures    RENAL  #ML   - BUN 32/Cr 1.38 on admission with baseline 9/0.8 per prior charts. Likely i/s/o severe sepsis.   - s/p 2L IVF  - renally dose meds, avoid nephrotoxic agents  - monitor I/Os     ENDO  - mISS while on steroids    GI  - PPI while on steroids    HEME/ONC  #Anemia  - Hgb 7.1/Hct 22.7 on admission with prior levels in 9.0 range. Possibly AoCD from known AIDS and cancer diagnoses.   - recommend transfusing one unit pRBC given severe sepsis  - goal Hgb > 7, maintain active T&S    ID  #Severe Sepsis  - Meeting 3/4 SIRS criteria (Temp, HR, RR) with SBP < 90 and likely lung source (PNA). Patient with copious amounts of thick yellow secretions. Recent hospitalization for acute hypoxic resp failure 2/2 MRSA pneumonia for which she completed a 6 day course of vancomycin.   - c/w vanc + pseudomonal zosyn   - starting PCP treatment with bactrim and steroids   - plan for bronchoscopy tomorrow by MICU team  - f/up sputum cx, BCx, UCx    Nutrition & Prophylaxis  F: s/p 2L NS bolus in ED  E: replete K<4, Mg<2  N: NPO   DVT ppx: hep SubQ    Code status: Full Code after discussion with ED provider      Dispo: MICU 54yo F w/ PMH AIDS (Sept 2021 CD4 57,  currently on Biktarvy and Bactrim ppx), Hepatitis B/C, COPD (not on home O2, actively smokes), rectal cancer s/p radiation therapy, tongue cancer s/p resection, h/o crack use (last use 8/2021) [facility]IBEMS from [facility] due to altered mental status. Admitted for acute hypoxic respiratory failure secondary to severe sepsis likely secondary to HAP with suspicion for superimposed PCP pneumonia. Currently intubated and sedated requiring levophed, fentanyl, and propofol gtt. Admitted to MICU for further management.      NEURO  Intubated and sedated  - currently on fentanyl and propofol gtt  - RASS goal -2     CARDIOVASCULAR  #Sinus tachycardia  - EKG with , QTc 428ms. Trop-T negative. Likely elevated in setting of severe sepsis from PNA.   - POCUS without obvious signs of right heart strain  - f/up d-dimer  - ID plan as below     PULMONARY  #Acute hypoxic respiratory failure 2/2 severe sepsis from likely PNA  - Intubated in the ED after trial of NRB due to increased work of breathing. Currently on VC with FiO2 set at 100%. Likely secondary to PNA. Recent hospitalization for MRSA for which she was on a 6 day regimen of vancomycin.   - c/w vanc + pseudomonal zosyn   - perform MRSA swab  - obtain sputum cultures including PCP PCR  - f/up blood cultures, urine cultures    #COPD  Known h/o COPD. Current smoker (cut down to 6 cigarettes/day but has smoked since she was 11yo). Recently discharged on stiolto.   - c/w duonebs q6h   - resume nicotine patch 14mg/24hwhen clinically appropriate    RENAL  #ML   - BUN 32/Cr 1.38 on admission with baseline 9/0.8 per prior charts. Likely i/s/o severe sepsis.   - s/p 2L IVF  - renally dose meds, avoid nephrotoxic agents  - monitor I/Os     ENDO  - mISS while on steroids    GI  - PPI while on steroids    HEME/ONC  #Anemia  - Hgb 7.1/Hct 22.7 on admission with prior levels in 9.0 range. Possibly AoCD from known AIDS and cancer diagnoses.   - recommend transfusing one unit pRBC given severe sepsis  - goal Hgb > 7, maintain active T&S    #Tongue Cancer  Pt has h/o tongue cancer s/p resection +/- graft placement. Unable to assess throat on physical exam given intubation/sedation.  - no acute interventions at this time    ID  #Severe Sepsis  - Meeting 3/4 SIRS criteria (Temp, HR, RR) with SBP < 90 and likely lung source (PNA). Patient with copious amounts of thick yellow secretions. Recent hospitalization for acute hypoxic resp failure 2/2 MRSA pneumonia for which she completed a 6 day course of vancomycin.   - c/w vanc + pseudomonal zosyn   - starting PCP treatment with bactrim and steroids   - plan for bronchoscopy tomorrow by MICU team  - f/up sputum cx, BCx, UCx    #AIDS  - Latest CD4 59, . Takes Biktarvy and bactrim ppx at home  - HIV consult in AM    DERM  #Known Stage 4 sacral ulcer  Pt has stage 4 pressure ulcer on R sacrum measuring 4cm x 2cm x 0.2cm - staged and managed last admission by wound care. Last admission wound care recs: Aquacel Extra, cut piece to fit over wound, cover with foam dressing  - Wound care consult in AM    Nutrition & Prophylaxis  F: s/p 2L NS bolus in ED  E: replete K<4, Mg<2  N: NPO   DVT ppx: hep SubQ    Code status: Full Code after discussion with ED provider      Dispo: MICU   54yo F w/ PMH AIDS (Sept 2021 CD4 57,  currently on Biktarvy and Bactrim ppx), Hepatitis B/C, COPD (not on home O2, actively smokes), rectal cancer s/p radiation therapy, tongue cancer s/p resection, h/o crack use (last use 8/2021) BIBEMS from [facility] due to altered mental status. Admitted for acute hypoxic respiratory failure secondary to severe sepsis likely secondary to HAP with suspicion for superimposed PCP pneumonia. Currently intubated and sedated requiring levophed, fentanyl, and propofol gtt. Admitted to MICU for further management.      NEURO  Intubated and sedated  - currently on fentanyl and propofol gtt  - RASS goal -2     BEHAVIORAL  #Depression  - Takes olanzapine 5mg qd and mirtazapine 15mg qd at home  - holding PO meds at this time.    CARDIOVASCULAR  #Sinus tachycardia  - EKG with , QTc 428ms. Trop-T negative. Likely elevated in setting of severe sepsis from PNA.   - POCUS without obvious signs of right heart strain  - f/up d-dimer  - ID plan as below     #Hypotension  - Secondary to likely severe sepsis and sedation  - c/w levophed gtt  - MAP goal > 65    #HLD  - takes home atorvastatin 40mg qhs   - holding PO meds at this time     PULMONARY  #Acute hypoxic respiratory failure 2/2 severe sepsis from likely PNA  - Intubated in the ED after trial of NRB due to increased work of breathing. Currently on VC with FiO2 set at 100%. Likely secondary to PNA. Recent hospitalization for MRSA for which she was on a 6 day regimen of vancomycin.   - c/w vanc + pseudomonal zosyn   - perform MRSA swab  - obtain sputum cultures including PCP PCR  - f/up blood cultures, urine cultures  - plan for bedside bronchoscopy in AM     #COPD  Known h/o COPD. Current smoker (cut down to 6 cigarettes/day but has smoked since she was 11yo). Recently discharged on stiolto.   - c/w duonebs q6h   - resume nicotine patch 14mg/24hwhen clinically appropriate    RENAL  #ML   - BUN 32/Cr 1.38 on admission with baseline 9/0.8 per prior charts. Likely i/s/o severe sepsis.   - s/p 2L IVF  - renally dose meds, avoid nephrotoxic agents  - monitor I/Os     ENDO  - mISS while on steroids    GI  - PPI while on steroids    #? H/o Hep C  - Not currently on medication. Unknown at this time whether she was ever treated in the past.   - HepC Ab + with negative RNA from recent admission`    #H/o Hep B  -Pt may have h/o Hep B (documented in ED note).  -Can consider sending hepatitis panel to verify.      HEME/ONC  #Anemia  - Hgb 7.1/Hct 22.7 on admission with prior levels in 9.0 range. Possibly AoCD from known AIDS and cancer diagnoses.   - recommend transfusing one unit pRBC given severe sepsis  - goal Hgb > 7, maintain active T&S    #Tongue Cancer  Pt has h/o tongue cancer s/p resection +/- graft placement. Unable to assess throat on physical exam given intubation/sedation.  - no acute interventions at this time    ID  #Severe Sepsis  - Meeting 3/4 SIRS criteria (Temp, HR, RR) with SBP < 90 and likely lung source (PNA). Patient with copious amounts of thick yellow secretions. Recent hospitalization for acute hypoxic resp failure 2/2 MRSA pneumonia for which she completed a 6 day course of vancomycin.   - c/w vanc + pseudomonal zosyn   - starting PCP treatment with bactrim and steroids   - plan for bronchoscopy tomorrow by MICU team  - f/up sputum cx, BCx, UCx    #AIDS  - Latest CD4 59, . Takes Biktarvy and bactrim ppx at home  - HIV consult in AM    DERM  #Known Stage 4 sacral ulcer  Pt has stage 4 pressure ulcer on R sacrum measuring 4cm x 2cm x 0.2cm - staged and managed last admission by wound care. Last admission wound care recs: Aquacel Extra, cut piece to fit over wound, cover with foam dressing  - Wound care consult in AM    Nutrition & Prophylaxis  F: s/p 2L NS bolus in ED  E: replete K<4, Mg<2  N: NPO   DVT ppx: hep SubQ    Code status: Full Code after patient GOC discussion with ED provider      Dispo: MICU

## 2021-10-08 NOTE — ED ADULT TRIAGE NOTE - CHIEF COMPLAINT QUOTE
Pt BIBA from [facility]. Staff called EMS due to altered mental status. Per EMS, pt offered no complaints on arrival. Pt found to be hypoxic on RA to 70's and hypotensive SBP 70-80. Pt currently endorsing +SOB, on NRB 15L 96% pox. Pt deneis CP, chills, bodyaches, AAOX2-3. Pt cachetic, ill appearing. +tachypnea. Hx of HIV/AIDS, oral/rectal CA.

## 2021-10-08 NOTE — ED PROVIDER NOTE - OBJECTIVE STATEMENT
55 F w HIV/aids, anal ca, tongue ca- NH called for ams- px 55 F w HIV/aids, anal ca, tongue ca- NH called for ams- px found to be alert but hypoxic and hypotensive- hr 132- bp 81/57 in the field- px only co  is sob- had one shot of Pfizer in the summer - no cough no loss of taste smell  no n/v no f/c  SEVERITY --severe 55 F w HIV/aids, anal ca, tongue ca- NH called for ams- px found to be alert but hypoxic and hypotensive- hr 132- bp 81/57 in the field- px only co of cough and sob, now w hacking cough- had one shot of Pfizer in the summer - no loss of taste smell  no n/v no f/c  SEVERITY --severe

## 2021-10-08 NOTE — CONSULT NOTE ADULT - SUBJECTIVE AND OBJECTIVE BOX
Patient is a 55y old  Female who presents with a chief complaint of     Consult reason:  ED Course  Vitals:   Labs: notable for  CXR:  EKG:   Interventions:    HPI:      ROS  General: No weakness, fevers, or chills  Pulm: No cough, wheezing, hemoptysis; No shortness of breath  CV: No chest pain or palpitations  GI: No abdominal or epigastric pain. No nausea, vomiting, or hematemesis; No diarrhea  or constipation. No melena or hematochezia   : No dysuria, frequency or hematuria  Neuro: No numbness or weakness  NEUROLOGICAL: No numbness or weakness  Heme: No bleeding or bruising  Skin: No itching, rashes      PAST MEDICAL & SURGICAL HISTORY:  HIV disease    Advanced COPD    Tongue cancer    Rectal cancer    Hepatitis B    Hepatitis C    No significant past surgical history        Home Medications:  albuterol 90 mcg/inh inhalation aerosol: 2 puff(s) inhaled every 6 hours, As Needed (29 Aug 2021 05:47)  atorvastatin 40 mg oral tablet: 1 tab(s) orally once a day (29 Aug 2021 05:47)  Bactrim 400 mg-80 mg oral tablet: 1 tab(s) orally once a day (29 Aug 2021 05:47)  Biktarvy oral tablet: 1 tab(s) orally once a day (29 Aug 2021 05:47)  diphenhydrAMINE 25 mg oral capsule: 1 cap(s) orally every 4 hours, As needed, Rash and/or Itching (09 Sep 2021 12:12)  fluconazole 200 mg oral tablet: 1 tab(s) orally every 24 hours. Last day 10/10 (03 Oct 2021 11:53)  melatonin 3 mg oral tablet: 1 tab(s) orally once a day (at bedtime), As needed, Insomnia (09 Sep 2021 12:12)  metroNIDAZOLE 500 mg oral tablet: 1 tab(s) orally every 12 hours. Last day 10/4 (03 Oct 2021 11:55)  midodrine 5 mg oral tablet: 1 tab(s) orally once a day (29 Aug 2021 05:47)  mirtazapine 15 mg oral tablet: 1 tab(s) orally once a day (at bedtime) (29 Aug 2021 05:47)  nicotine 7 mg/24 hr transdermal film, extended release: 7 mg/24h transdermal once a day, As Needed (09 Sep 2021 12:12)  OLANZapine 5 mg oral tablet: 1 tab(s) orally once a day (29 Aug 2021 05:47)  oxyCODONE 15 mg oral tablet: 1 tab(s) orally every 6 hours, As Needed (03 Oct 2021 12:02)  tiotropium-olodaterol 2.5 mcg-2.5 mcg/inh inhalation aerosol: 2 puff(s) inhaled once a day (03 Oct 2021 11:55)    MEDICATIONS  (STANDING):  albuterol/ipratropium for Nebulization.. 3 milliLiter(s) Nebulizer every 20 minutes  fentaNYL    Injectable 50 MICROGram(s) IV Push Once  propofol Infusion 5 MICROgram(s)/kG/Min (1.5 mL/Hr) IV Continuous <Continuous>  vancomycin  IVPB. 1000 milliGRAM(s) IV Intermittent once    MEDICATIONS  (PRN):      Allergies    No Known Allergies    Intolerances        SOCIAL HX:       FAMILY HISTORY  FAMILY HISTORY:  FH: heart disease (Mother)    :      PHYSICAL EXAM:    ICU Vital Signs Last 24 Hrs  T(C): 37.7 (08 Oct 2021 16:46), Max: 37.9 (08 Oct 2021 13:37)  T(F): 99.8 (08 Oct 2021 16:46), Max: 100.2 (08 Oct 2021 13:37)  HR: 123 (08 Oct 2021 16:46) (120 - 123)  BP: 112/51 (08 Oct 2021 16:46) (83/53 - 112/51)  BP(mean): --  ABP: --  ABP(mean): --  RR: 24 (08 Oct 2021 16:46) (24 - 25)  SpO2: 98% (08 Oct 2021 16:46) (96% - 98%)      General: frail, cachectic female, intubated and sedated  HEENT:  NC/AT, sclera anicteric, PERRL, +dolls eyes, +ET tube in place with copious secretions       Lungs: coarse bilateral breath sounds, worse at the bases, no wheezing, thin chest  Cardiovascular: tachycardic with regular rhythm; +s1, s2, no M/R/G appreciated  Gastrointestinal: thin abdomen soft, NTND, bowel sounds present  Musculoskeletal: could not assess due to clinical status  Extremities: no peripheral edema, no clubbing, no cyanosis  Skin: Warm, dry, intact. No rashes noted.  Neurological: intubated and sedated        LABS                          7.1    8.74  )-----------( 106      ( 08 Oct 2021 15:44 )             22.7                                               10-08    132<L>  |  99  |  32<H>  ----------------------------<  207<H>  4.8   |  22  |  1.38<H>    Ca    8.6      08 Oct 2021 15:44    TPro  7.4  /  Alb  2.7<L>  /  TBili  0.3  /  DBili  x   /  AST  37  /  ALT  11  /  AlkPhos  55  10-08      PT/INR - ( 08 Oct 2021 15:44 )   PT: 15.1 sec;   INR: 1.27          PTT - ( 08 Oct 2021 15:44 )  PTT:31.8 sec                                           CARDIAC MARKERS ( 08 Oct 2021 15:44 )  x     / 0.01 ng/mL / x     / x     / x                                                LIVER FUNCTIONS - ( 08 Oct 2021 15:44 )  Alb: 2.7 g/dL / Pro: 7.4 g/dL / ALK PHOS: 55 U/L / ALT: 11 U/L / AST: 37 U/L / GGT: x                                                                                                                                        Patient is a 55y old  Female who presents with a chief complaint of     Consult reason: AHRF requiring intubation   ED Course: Vanc/zsoyn, solumedrol 125mg x1, 2L NS  Vitals: 100.2 oral, -123, BP 88/53-->112/57, RR 24, 98% 15L NRB-->intubated to FiO2 50%-->100%  Labs: notable for Hgb 7.1, Plt 106, BUN 32, Cr 1.38, Alb 2.7, INR 1.27, BNP 3930  CXR:  EKG:   Interventions:    HPI:      ROS  General: No weakness, fevers, or chills  Pulm: No cough, wheezing, hemoptysis; No shortness of breath  CV: No chest pain or palpitations  GI: No abdominal or epigastric pain. No nausea, vomiting, or hematemesis; No diarrhea  or constipation. No melena or hematochezia   : No dysuria, frequency or hematuria  Neuro: No numbness or weakness  NEUROLOGICAL: No numbness or weakness  Heme: No bleeding or bruising  Skin: No itching, rashes      PAST MEDICAL & SURGICAL HISTORY:  HIV disease    Advanced COPD    Tongue cancer    Rectal cancer    Hepatitis B    Hepatitis C    No significant past surgical history        Home Medications:  albuterol 90 mcg/inh inhalation aerosol: 2 puff(s) inhaled every 6 hours, As Needed (29 Aug 2021 05:47)  atorvastatin 40 mg oral tablet: 1 tab(s) orally once a day (29 Aug 2021 05:47)  Bactrim 400 mg-80 mg oral tablet: 1 tab(s) orally once a day (29 Aug 2021 05:47)  Biktarvy oral tablet: 1 tab(s) orally once a day (29 Aug 2021 05:47)  diphenhydrAMINE 25 mg oral capsule: 1 cap(s) orally every 4 hours, As needed, Rash and/or Itching (09 Sep 2021 12:12)  fluconazole 200 mg oral tablet: 1 tab(s) orally every 24 hours. Last day 10/10 (03 Oct 2021 11:53)  melatonin 3 mg oral tablet: 1 tab(s) orally once a day (at bedtime), As needed, Insomnia (09 Sep 2021 12:12)  metroNIDAZOLE 500 mg oral tablet: 1 tab(s) orally every 12 hours. Last day 10/4 (03 Oct 2021 11:55)  midodrine 5 mg oral tablet: 1 tab(s) orally once a day (29 Aug 2021 05:47)  mirtazapine 15 mg oral tablet: 1 tab(s) orally once a day (at bedtime) (29 Aug 2021 05:47)  nicotine 7 mg/24 hr transdermal film, extended release: 7 mg/24h transdermal once a day, As Needed (09 Sep 2021 12:12)  OLANZapine 5 mg oral tablet: 1 tab(s) orally once a day (29 Aug 2021 05:47)  oxyCODONE 15 mg oral tablet: 1 tab(s) orally every 6 hours, As Needed (03 Oct 2021 12:02)  tiotropium-olodaterol 2.5 mcg-2.5 mcg/inh inhalation aerosol: 2 puff(s) inhaled once a day (03 Oct 2021 11:55)    MEDICATIONS  (STANDING):  albuterol/ipratropium for Nebulization.. 3 milliLiter(s) Nebulizer every 20 minutes  fentaNYL    Injectable 50 MICROGram(s) IV Push Once  propofol Infusion 5 MICROgram(s)/kG/Min (1.5 mL/Hr) IV Continuous <Continuous>  vancomycin  IVPB. 1000 milliGRAM(s) IV Intermittent once    MEDICATIONS  (PRN):      Allergies    No Known Allergies    Intolerances        SOCIAL HX:       FAMILY HISTORY  FAMILY HISTORY:  FH: heart disease (Mother)    :      PHYSICAL EXAM:    ICU Vital Signs Last 24 Hrs  T(C): 37.7 (08 Oct 2021 16:46), Max: 37.9 (08 Oct 2021 13:37)  T(F): 99.8 (08 Oct 2021 16:46), Max: 100.2 (08 Oct 2021 13:37)  HR: 123 (08 Oct 2021 16:46) (120 - 123)  BP: 112/51 (08 Oct 2021 16:46) (83/53 - 112/51)  BP(mean): --  ABP: --  ABP(mean): --  RR: 24 (08 Oct 2021 16:46) (24 - 25)  SpO2: 98% (08 Oct 2021 16:46) (96% - 98%)      General: frail, cachectic female, intubated and sedated  HEENT:  NC/AT, sclera anicteric, PERRL, +dolls eyes, +ET tube in place with copious secretions       Lungs: coarse bilateral breath sounds, worse at the bases, no wheezing, thin chest  Cardiovascular: tachycardic with regular rhythm; +s1, s2, no M/R/G appreciated  Gastrointestinal: thin abdomen soft, NTND, bowel sounds present  Musculoskeletal: could not assess due to clinical status  Extremities: no peripheral edema, no clubbing, no cyanosis  Skin: Warm, dry, intact. No rashes noted.  Neurological: intubated and sedated        LABS                          7.1    8.74  )-----------( 106      ( 08 Oct 2021 15:44 )             22.7                                               10-08    132<L>  |  99  |  32<H>  ----------------------------<  207<H>  4.8   |  22  |  1.38<H>    Ca    8.6      08 Oct 2021 15:44    TPro  7.4  /  Alb  2.7<L>  /  TBili  0.3  /  DBili  x   /  AST  37  /  ALT  11  /  AlkPhos  55  10-08      PT/INR - ( 08 Oct 2021 15:44 )   PT: 15.1 sec;   INR: 1.27          PTT - ( 08 Oct 2021 15:44 )  PTT:31.8 sec                                           CARDIAC MARKERS ( 08 Oct 2021 15:44 )  x     / 0.01 ng/mL / x     / x     / x                                                LIVER FUNCTIONS - ( 08 Oct 2021 15:44 )  Alb: 2.7 g/dL / Pro: 7.4 g/dL / ALK PHOS: 55 U/L / ALT: 11 U/L / AST: 37 U/L / GGT: x                                                                                                                                        Patient is a 55y old  Female who presents with a chief complaint of shortness of breath    Consult reason: AHRF requiring intubation   ED Course: Vanc/zsoyn, solumedrol 125mg x1, 2L NS  Vitals: 100.2 oral, -123, BP 88/53-->112/57, RR 24, 98% 15L NRB-->intubated to FiO2 50%-->100%  Labs: notable for Hgb 7.1, Plt 106, BUN 32, Cr 1.38, Alb 2.7, INR 1.27, BNP 3930  CXR: hyperinflated, bilateral opacities  EKG: sinus tach , QTc 428ms  Interventions: 2L NS bolus, tylenol 650mg PO x1, vanc 1g, zosyn 3.375g x1, solumedrol 125mg IVP x1, etomidate 15mg IVP, fentanyl 50mcg x3, propofol gtt, levophed gtt, fentanyl gtt    HPI:  54yo F w/ PMH AIDS (Sept 2021 CD4 57,  currently on Biktarvy and Bactrim ppx), Hepatitis B/C, COPD (not on home O2, actively smokes), rectal cancer s/p radiation therapy, tongue cancer s/p resection, h/o crack use (last use 8/2021) [facility] due to altered mental status. Majority of history obtained from chart review as patient was intubated upon ICU team consult. Per EMS patient was found to be hypoxic on RA to 70s and hypotensive SBP 70-80. Upon ED arrival, she was admitting to shortness of breath while on NRB 15L satting 96%. She denied any chest pain, chills, body aches at the time, however appeared ill and with accessory muscle use. Upon chart review, patient from recent hospitalization expressed desire to be DNR/DNI (MOLST in chart), however, ED provider readdressed patient's wishes and she made a decision to rescind her code status and become full code including intubation. Patient was recently discharged this past October 3rd after complaining of 6 days worsening dysphagia, throat pain and cough. She was admitted for hypercapnic, hypoxic respiratory failure secondary to sepsis from presumed MRSA pneumonia along with trichomonas and potential esophagitis. She was treated with vancomycin and discharged on remaining days of fluconazole for presumed oral thrush. Pulmonary consult service also evaluated the patient for HAP, aspiration, and COPD. No bronchoscopy was performed at the time.     ROS: cannot be obtained due to deterioration of clinical status     PAST MEDICAL & SURGICAL HISTORY:  HIV disease    Advanced COPD    Tongue cancer    Rectal cancer    Hepatitis B    Hepatitis C    No significant past surgical history        Home Medications:  albuterol 90 mcg/inh inhalation aerosol: 2 puff(s) inhaled every 6 hours, As Needed (29 Aug 2021 05:47)  atorvastatin 40 mg oral tablet: 1 tab(s) orally once a day (29 Aug 2021 05:47)  Bactrim 400 mg-80 mg oral tablet: 1 tab(s) orally once a day (29 Aug 2021 05:47)  Biktarvy oral tablet: 1 tab(s) orally once a day (29 Aug 2021 05:47)  diphenhydrAMINE 25 mg oral capsule: 1 cap(s) orally every 4 hours, As needed, Rash and/or Itching (09 Sep 2021 12:12)  fluconazole 200 mg oral tablet: 1 tab(s) orally every 24 hours. Last day 10/10 (03 Oct 2021 11:53)  melatonin 3 mg oral tablet: 1 tab(s) orally once a day (at bedtime), As needed, Insomnia (09 Sep 2021 12:12)  metroNIDAZOLE 500 mg oral tablet: 1 tab(s) orally every 12 hours. Last day 10/4 (03 Oct 2021 11:55)  midodrine 5 mg oral tablet: 1 tab(s) orally once a day (29 Aug 2021 05:47)  mirtazapine 15 mg oral tablet: 1 tab(s) orally once a day (at bedtime) (29 Aug 2021 05:47)  nicotine 7 mg/24 hr transdermal film, extended release: 7 mg/24h transdermal once a day, As Needed (09 Sep 2021 12:12)  OLANZapine 5 mg oral tablet: 1 tab(s) orally once a day (29 Aug 2021 05:47)  oxyCODONE 15 mg oral tablet: 1 tab(s) orally every 6 hours, As Needed (03 Oct 2021 12:02)  tiotropium-olodaterol 2.5 mcg-2.5 mcg/inh inhalation aerosol: 2 puff(s) inhaled once a day (03 Oct 2021 11:55)    MEDICATIONS  (STANDING):  albuterol/ipratropium for Nebulization.. 3 milliLiter(s) Nebulizer every 20 minutes  fentaNYL    Injectable 50 MICROGram(s) IV Push Once  propofol Infusion 5 MICROgram(s)/kG/Min (1.5 mL/Hr) IV Continuous <Continuous>  vancomycin  IVPB. 1000 milliGRAM(s) IV Intermittent once    MEDICATIONS  (PRN):      Allergies    No Known Allergies    Intolerances        SOCIAL HX: Lives in nursing home. Current smoker (6 cigarettes/day, has cut down and has been smoking since she was 12). H/o crack use (reported last use 8/2021)    FAMILY HISTORY  FAMILY HISTORY:  FH: heart disease (Mother)    :      PHYSICAL EXAM:    ICU Vital Signs Last 24 Hrs  T(C): 37.7 (08 Oct 2021 16:46), Max: 37.9 (08 Oct 2021 13:37)  T(F): 99.8 (08 Oct 2021 16:46), Max: 100.2 (08 Oct 2021 13:37)  HR: 123 (08 Oct 2021 16:46) (120 - 123)  BP: 112/51 (08 Oct 2021 16:46) (83/53 - 112/51)  BP(mean): --  ABP: --  ABP(mean): --  RR: 24 (08 Oct 2021 16:46) (24 - 25)  SpO2: 98% (08 Oct 2021 16:46) (96% - 98%)      General: frail, cachectic female, intubated and sedated  HEENT:  NC/AT, sclera anicteric, PERRL, +dolls eyes, +ET tube in place with copious secretions       Lungs: coarse bilateral breath sounds, worse at the bases, no wheezing, thin chest  Cardiovascular: tachycardic with regular rhythm; +s1, s2, no M/R/G appreciated  Gastrointestinal: thin abdomen soft, NTND, bowel sounds present  Musculoskeletal: could not assess due to clinical status  Extremities: no peripheral edema, no clubbing, no cyanosis  Skin: Warm, dry, intact. No rashes noted.  Neurological: intubated and sedated        LABS                          7.1    8.74  )-----------( 106      ( 08 Oct 2021 15:44 )             22.7                                               10-08    132<L>  |  99  |  32<H>  ----------------------------<  207<H>  4.8   |  22  |  1.38<H>    Ca    8.6      08 Oct 2021 15:44    TPro  7.4  /  Alb  2.7<L>  /  TBili  0.3  /  DBili  x   /  AST  37  /  ALT  11  /  AlkPhos  55  10-08      PT/INR - ( 08 Oct 2021 15:44 )   PT: 15.1 sec;   INR: 1.27          PTT - ( 08 Oct 2021 15:44 )  PTT:31.8 sec                                           CARDIAC MARKERS ( 08 Oct 2021 15:44 )  x     / 0.01 ng/mL / x     / x     / x                                                LIVER FUNCTIONS - ( 08 Oct 2021 15:44 )  Alb: 2.7 g/dL / Pro: 7.4 g/dL / ALK PHOS: 55 U/L / ALT: 11 U/L / AST: 37 U/L / GGT: x                                                                                                                                          Patient is a 55y old  Female who presents with a chief complaint of shortness of breath    Consult reason: AHRF requiring intubation   ED Course: Vanc/zsoyn, solumedrol 125mg x1, 2L NS  Vitals: 100.2 oral, -123, BP 88/53-->112/57, RR 24, 98% 15L NRB-->intubated to FiO2 50%-->100%  Labs: notable for Hgb 7.1, Plt 106, BUN 32, Cr 1.38, Alb 2.7, INR 1.27, BNP 3930, lactate 1.9  CXR: hyperinflated, bilateral opacities  EKG: sinus tach , QTc 428ms  Interventions: 2L NS bolus, tylenol 650mg PO x1, vanc 1g, zosyn 3.375g x1, solumedrol 125mg IVP x1, etomidate 15mg IVP, fentanyl 50mcg x3, propofol gtt, levophed gtt, fentanyl gtt    HPI:  54yo F w/ PMH AIDS (Sept 2021 CD4 57,  currently on Biktarvy and Bactrim ppx), Hepatitis B/C, COPD (not on home O2, actively smokes), rectal cancer s/p radiation therapy, tongue cancer s/p resection, h/o crack use (last use 8/2021) [facility]IBEMS from [facility] due to altered mental status. Majority of history obtained from chart as patient was intubated upon ICU team consult. Per EMS patient was found to be hypoxic on RA to 70s and hypotensive SBP 70-80. Upon ED arrival, she was admitting to shortness of breath while on NRB 15L satting 96%. She denied any chest pain, chills, body aches at the time, however appeared ill and with accessory muscle use. Upon chart review, patient from recent hospitalization (9/27 - 10/3) expressed desire to be DNR/DNI (MOLST in chart). However, today the ED provider readdressed patient's wishes and she made a decision to rescind her code status and become full code including intubation. Of note, prior to 9/27 hospitalization patient c/o 6 days worsening dysphagia, throat pain and cough. She was admitted for hypercapnic, hypoxic respiratory failure secondary to sepsis from presumed MRSA pneumonia along with trichomonas and potential esophagitis. She was treated with vancomycin and discharged on remaining days of fluconazole for presumed oral thrush. Pulmonary consult service also evaluated the patient for HAP, aspiration, and COPD. No bronchoscopy was performed at the time.     ROS: cannot be obtained due to deterioration of clinical status     PAST MEDICAL & SURGICAL HISTORY:  HIV disease    Advanced COPD    Tongue cancer    Rectal cancer    Hepatitis B    Hepatitis C    No significant past surgical history        Home Medications:  albuterol 90 mcg/inh inhalation aerosol: 2 puff(s) inhaled every 6 hours, As Needed (29 Aug 2021 05:47)  atorvastatin 40 mg oral tablet: 1 tab(s) orally once a day (29 Aug 2021 05:47)  Bactrim 400 mg-80 mg oral tablet: 1 tab(s) orally once a day (29 Aug 2021 05:47)  Biktarvy oral tablet: 1 tab(s) orally once a day (29 Aug 2021 05:47)  diphenhydrAMINE 25 mg oral capsule: 1 cap(s) orally every 4 hours, As needed, Rash and/or Itching (09 Sep 2021 12:12)  fluconazole 200 mg oral tablet: 1 tab(s) orally every 24 hours. Last day 10/10 (03 Oct 2021 11:53)  melatonin 3 mg oral tablet: 1 tab(s) orally once a day (at bedtime), As needed, Insomnia (09 Sep 2021 12:12)  metroNIDAZOLE 500 mg oral tablet: 1 tab(s) orally every 12 hours. Last day 10/4 (03 Oct 2021 11:55)  midodrine 5 mg oral tablet: 1 tab(s) orally once a day (29 Aug 2021 05:47)  mirtazapine 15 mg oral tablet: 1 tab(s) orally once a day (at bedtime) (29 Aug 2021 05:47)  nicotine 7 mg/24 hr transdermal film, extended release: 7 mg/24h transdermal once a day, As Needed (09 Sep 2021 12:12)  OLANZapine 5 mg oral tablet: 1 tab(s) orally once a day (29 Aug 2021 05:47)  oxyCODONE 15 mg oral tablet: 1 tab(s) orally every 6 hours, As Needed (03 Oct 2021 12:02)  tiotropium-olodaterol 2.5 mcg-2.5 mcg/inh inhalation aerosol: 2 puff(s) inhaled once a day (03 Oct 2021 11:55)    MEDICATIONS  (STANDING):  albuterol/ipratropium for Nebulization.. 3 milliLiter(s) Nebulizer every 20 minutes  fentaNYL    Injectable 50 MICROGram(s) IV Push Once  propofol Infusion 5 MICROgram(s)/kG/Min (1.5 mL/Hr) IV Continuous <Continuous>  vancomycin  IVPB. 1000 milliGRAM(s) IV Intermittent once    MEDICATIONS  (PRN):      Allergies    No Known Allergies    Intolerances        SOCIAL HX: Lives in nursing home. Current smoker (6 cigarettes/day, has cut down and has been smoking since she was 12). H/o crack use (reported last use 8/2021)    FAMILY HISTORY  FAMILY HISTORY:  FH: heart disease (Mother)    :      PHYSICAL EXAM:    ICU Vital Signs Last 24 Hrs  T(C): 37.7 (08 Oct 2021 16:46), Max: 37.9 (08 Oct 2021 13:37)  T(F): 99.8 (08 Oct 2021 16:46), Max: 100.2 (08 Oct 2021 13:37)  HR: 123 (08 Oct 2021 16:46) (120 - 123)  BP: 112/51 (08 Oct 2021 16:46) (83/53 - 112/51)  BP(mean): --  ABP: --  ABP(mean): --  RR: 24 (08 Oct 2021 16:46) (24 - 25)  SpO2: 98% (08 Oct 2021 16:46) (96% - 98%)      General: frail, cachectic female, intubated and sedated  HEENT:  NC/AT, sclera anicteric, PERRL, +dolls eyes, +ET tube in place with copious secretions       Lungs: coarse bilateral breath sounds, worse at the bases, no wheezing, thin chest  Cardiovascular: tachycardic with regular rhythm; +s1, s2, no M/R/G appreciated  Gastrointestinal: thin abdomen soft, NTND, bowel sounds present  Musculoskeletal: could not assess due to clinical status  Extremities: no peripheral edema, no clubbing, no cyanosis  Skin: Warm, dry, intact. No rashes noted.  Neurological: intubated and sedated        LABS                          7.1    8.74  )-----------( 106      ( 08 Oct 2021 15:44 )             22.7                                               10-08    132<L>  |  99  |  32<H>  ----------------------------<  207<H>  4.8   |  22  |  1.38<H>    Ca    8.6      08 Oct 2021 15:44    TPro  7.4  /  Alb  2.7<L>  /  TBili  0.3  /  DBili  x   /  AST  37  /  ALT  11  /  AlkPhos  55  10-08      PT/INR - ( 08 Oct 2021 15:44 )   PT: 15.1 sec;   INR: 1.27          PTT - ( 08 Oct 2021 15:44 )  PTT:31.8 sec                                           CARDIAC MARKERS ( 08 Oct 2021 15:44 )  x     / 0.01 ng/mL / x     / x     / x                                                LIVER FUNCTIONS - ( 08 Oct 2021 15:44 )  Alb: 2.7 g/dL / Pro: 7.4 g/dL / ALK PHOS: 55 U/L / ALT: 11 U/L / AST: 37 U/L / GGT: x                                                                                                                                          Patient is a 55y old  Female who presents with a chief complaint of shortness of breath    Consult reason: AHRF requiring intubation   ED Course: Vanc/zsoyn, solumedrol 125mg x1, 2L NS  Vitals: 100.2 oral, -123, BP 88/53-->112/57, RR 24, 98% 15L NRB-->intubated to FiO2 50%-->100%  Labs: notable for Hgb 7.1, Plt 106, BUN 32, Cr 1.38, Alb 2.7, INR 1.27, BNP 3930, lactate 1.9  CXR: hyperinflated, bilateral opacities  EKG: sinus tach , QTc 428ms  Interventions: 2L NS bolus, tylenol 650mg PO x1, vanc 1g, zosyn 3.375g x1, solumedrol 125mg IVP x1, etomidate 15mg IVP, fentanyl 50mcg x3, propofol gtt, levophed gtt, fentanyl gtt    HPI:  54yo F w/ PMH AIDS (Sept 2021 CD4 57,  currently on Biktarvy and Bactrim ppx), Hepatitis B/C, COPD (not on home O2, actively smokes), rectal cancer s/p radiation therapy, tongue cancer s/p resection, h/o crack use (last use 8/2021) BIBEMS from [facility] due to altered mental status. Majority of history obtained from chart as patient was intubated upon ICU team consult. Per EMS patient was found to be hypoxic on RA to 70s and hypotensive SBP 70-80. Upon ED arrival, she was admitting to shortness of breath while on NRB 15L satting 96%. She denied any chest pain, chills, body aches at the time, however appeared ill and with accessory muscle use. Upon chart review, patient from recent hospitalization (9/27 - 10/3) expressed desire to be DNR/DNI (MOLST in chart). However, today the ED provider readdressed patient's wishes and she made a decision to rescind her code status and become full code including intubation. Of note, prior to 9/27 hospitalization patient c/o 6 days worsening dysphagia, throat pain and cough. She was admitted for hypercapnic, hypoxic respiratory failure secondary to sepsis from presumed MRSA pneumonia along with trichomonas and potential esophagitis. She was treated with vancomycin and discharged on remaining days of fluconazole for presumed oral thrush. Pulmonary consult service also evaluated the patient for HAP, aspiration, and COPD. No bronchoscopy was performed at the time.     ROS: cannot be obtained due to deterioration of clinical status     PAST MEDICAL & SURGICAL HISTORY:  HIV disease    Advanced COPD    Tongue cancer    Rectal cancer    Hepatitis B    Hepatitis C    No significant past surgical history        Home Medications:  albuterol 90 mcg/inh inhalation aerosol: 2 puff(s) inhaled every 6 hours, As Needed (29 Aug 2021 05:47)  atorvastatin 40 mg oral tablet: 1 tab(s) orally once a day (29 Aug 2021 05:47)  Bactrim 400 mg-80 mg oral tablet: 1 tab(s) orally once a day (29 Aug 2021 05:47)  Biktarvy oral tablet: 1 tab(s) orally once a day (29 Aug 2021 05:47)  diphenhydrAMINE 25 mg oral capsule: 1 cap(s) orally every 4 hours, As needed, Rash and/or Itching (09 Sep 2021 12:12)  fluconazole 200 mg oral tablet: 1 tab(s) orally every 24 hours. Last day 10/10 (03 Oct 2021 11:53)  melatonin 3 mg oral tablet: 1 tab(s) orally once a day (at bedtime), As needed, Insomnia (09 Sep 2021 12:12)  metroNIDAZOLE 500 mg oral tablet: 1 tab(s) orally every 12 hours. Last day 10/4 (03 Oct 2021 11:55)  midodrine 5 mg oral tablet: 1 tab(s) orally once a day (29 Aug 2021 05:47)  mirtazapine 15 mg oral tablet: 1 tab(s) orally once a day (at bedtime) (29 Aug 2021 05:47)  nicotine 7 mg/24 hr transdermal film, extended release: 7 mg/24h transdermal once a day, As Needed (09 Sep 2021 12:12)  OLANZapine 5 mg oral tablet: 1 tab(s) orally once a day (29 Aug 2021 05:47)  oxyCODONE 15 mg oral tablet: 1 tab(s) orally every 6 hours, As Needed (03 Oct 2021 12:02)  tiotropium-olodaterol 2.5 mcg-2.5 mcg/inh inhalation aerosol: 2 puff(s) inhaled once a day (03 Oct 2021 11:55)    MEDICATIONS  (STANDING):  albuterol/ipratropium for Nebulization.. 3 milliLiter(s) Nebulizer every 20 minutes  fentaNYL    Injectable 50 MICROGram(s) IV Push Once  propofol Infusion 5 MICROgram(s)/kG/Min (1.5 mL/Hr) IV Continuous <Continuous>  vancomycin  IVPB. 1000 milliGRAM(s) IV Intermittent once    MEDICATIONS  (PRN):      Allergies    No Known Allergies    Intolerances        SOCIAL HX: Lives in nursing home. Current smoker (6 cigarettes/day, has cut down and has been smoking since she was 12). H/o crack use (reported last use 8/2021)    FAMILY HISTORY  FAMILY HISTORY:  FH: heart disease (Mother)    :      PHYSICAL EXAM:    ICU Vital Signs Last 24 Hrs  T(C): 37.7 (08 Oct 2021 16:46), Max: 37.9 (08 Oct 2021 13:37)  T(F): 99.8 (08 Oct 2021 16:46), Max: 100.2 (08 Oct 2021 13:37)  HR: 123 (08 Oct 2021 16:46) (120 - 123)  BP: 112/51 (08 Oct 2021 16:46) (83/53 - 112/51)  BP(mean): --  ABP: --  ABP(mean): --  RR: 24 (08 Oct 2021 16:46) (24 - 25)  SpO2: 98% (08 Oct 2021 16:46) (96% - 98%)      General: frail, cachectic female, intubated and sedated  HEENT:  NC/AT, sclera anicteric, PERRL, +dolls eyes, +ET tube in place with copious secretions       Lungs: coarse bilateral breath sounds, worse at the bases, no wheezing, thin chest  Cardiovascular: tachycardic with regular rhythm; +s1, s2, no M/R/G appreciated  Gastrointestinal: thin abdomen soft, NTND, bowel sounds present  Musculoskeletal: could not assess due to clinical status  Extremities: no peripheral edema, no clubbing, no cyanosis  Skin: Warm, dry. Could not assess sacral wound due to clinical condition  Neurological: intubated and sedated        LABS                          7.1    8.74  )-----------( 106      ( 08 Oct 2021 15:44 )             22.7                                               10-08    132<L>  |  99  |  32<H>  ----------------------------<  207<H>  4.8   |  22  |  1.38<H>    Ca    8.6      08 Oct 2021 15:44    TPro  7.4  /  Alb  2.7<L>  /  TBili  0.3  /  DBili  x   /  AST  37  /  ALT  11  /  AlkPhos  55  10-08      PT/INR - ( 08 Oct 2021 15:44 )   PT: 15.1 sec;   INR: 1.27          PTT - ( 08 Oct 2021 15:44 )  PTT:31.8 sec                                           CARDIAC MARKERS ( 08 Oct 2021 15:44 )  x     / 0.01 ng/mL / x     / x     / x                                                LIVER FUNCTIONS - ( 08 Oct 2021 15:44 )  Alb: 2.7 g/dL / Pro: 7.4 g/dL / ALK PHOS: 55 U/L / ALT: 11 U/L / AST: 37 U/L / GGT: x                                                                                                                                          Patient is a 55y old  Female who presents with a chief complaint of shortness of breath    Consult reason: AHRF requiring intubation   ED Course  Vitals: 100.2F oral, -123, BP 88/53-->112/57, RR 24, 98% 15L NRB-->intubated to FiO2 50%-->100%  Labs: notable for Hgb 7.1, Plt 106, BUN 32, Cr 1.38, Alb 2.7, INR 1.27, BNP 3930, lactate 1.9  CXR: hyperinflated, bilateral opacities  EKG: sinus tach , QTc 428ms  Interventions: 2L NS bolus, tylenol 650mg PO x1, vanc 1g, zosyn 3.375g x1, solumedrol 125mg IVP x1, etomidate 15mg IVP, fentanyl 50mcg x3, propofol gtt, levophed gtt, fentanyl gtt    HPI:  54yo F w/ PMH AIDS (Sept 2021 CD4 57,  currently on Biktarvy and Bactrim ppx), Hepatitis B/C, COPD (not on home O2, actively smokes), rectal cancer s/p radiation therapy, tongue cancer s/p resection, h/o crack use (last use 8/2021) BIBEMS from [facility] due to altered mental status. Majority of history obtained from chart as patient was intubated upon ICU team consult. Per EMS patient was found to be hypoxic on RA to 70s and hypotensive SBP 70-80. Upon ED arrival, she was admitting to shortness of breath while on NRB 15L satting 96%. She denied any chest pain, chills, body aches at the time, however appeared ill and with accessory muscle use. Upon chart review, patient from recent hospitalization (9/27 - 10/3) expressed desire to be DNR/DNI (MOLST in chart). However, today the ED provider readdressed patient's wishes and she made a decision to rescind her code status and become full code including intubation. Of note, prior to 9/27 hospitalization patient c/o 6 days worsening dysphagia, throat pain and cough. She was admitted for hypercapnic, hypoxic respiratory failure secondary to sepsis from presumed MRSA pneumonia along with trichomonas and potential esophagitis. She was treated with vancomycin and discharged on remaining days of fluconazole for presumed oral thrush. Pulmonary consult service also evaluated the patient for HAP, aspiration, and COPD. No bronchoscopy was performed at the time.     ROS: cannot be obtained due to deterioration of clinical status     PAST MEDICAL & SURGICAL HISTORY:  HIV disease    Advanced COPD    Tongue cancer    Rectal cancer    Hepatitis B    Hepatitis C    No significant past surgical history        Home Medications:  albuterol 90 mcg/inh inhalation aerosol: 2 puff(s) inhaled every 6 hours, As Needed (29 Aug 2021 05:47)  atorvastatin 40 mg oral tablet: 1 tab(s) orally once a day (29 Aug 2021 05:47)  Bactrim 400 mg-80 mg oral tablet: 1 tab(s) orally once a day (29 Aug 2021 05:47)  Biktarvy oral tablet: 1 tab(s) orally once a day (29 Aug 2021 05:47)  diphenhydrAMINE 25 mg oral capsule: 1 cap(s) orally every 4 hours, As needed, Rash and/or Itching (09 Sep 2021 12:12)  fluconazole 200 mg oral tablet: 1 tab(s) orally every 24 hours. Last day 10/10 (03 Oct 2021 11:53)  melatonin 3 mg oral tablet: 1 tab(s) orally once a day (at bedtime), As needed, Insomnia (09 Sep 2021 12:12)  metroNIDAZOLE 500 mg oral tablet: 1 tab(s) orally every 12 hours. Last day 10/4 (03 Oct 2021 11:55)  midodrine 5 mg oral tablet: 1 tab(s) orally once a day (29 Aug 2021 05:47)  mirtazapine 15 mg oral tablet: 1 tab(s) orally once a day (at bedtime) (29 Aug 2021 05:47)  nicotine 7 mg/24 hr transdermal film, extended release: 7 mg/24h transdermal once a day, As Needed (09 Sep 2021 12:12)  OLANZapine 5 mg oral tablet: 1 tab(s) orally once a day (29 Aug 2021 05:47)  oxyCODONE 15 mg oral tablet: 1 tab(s) orally every 6 hours, As Needed (03 Oct 2021 12:02)  tiotropium-olodaterol 2.5 mcg-2.5 mcg/inh inhalation aerosol: 2 puff(s) inhaled once a day (03 Oct 2021 11:55)    MEDICATIONS  (STANDING):  albuterol/ipratropium for Nebulization.. 3 milliLiter(s) Nebulizer every 20 minutes  fentaNYL    Injectable 50 MICROGram(s) IV Push Once  propofol Infusion 5 MICROgram(s)/kG/Min (1.5 mL/Hr) IV Continuous <Continuous>  vancomycin  IVPB. 1000 milliGRAM(s) IV Intermittent once    MEDICATIONS  (PRN):      Allergies    No Known Allergies    Intolerances        SOCIAL HX: Lives in nursing home. Current smoker (6 cigarettes/day, has cut down and has been smoking since she was 12). H/o crack use (reported last use 8/2021)    FAMILY HISTORY  FAMILY HISTORY:  FH: heart disease (Mother)    :      PHYSICAL EXAM:    ICU Vital Signs Last 24 Hrs  T(C): 37.7 (08 Oct 2021 16:46), Max: 37.9 (08 Oct 2021 13:37)  T(F): 99.8 (08 Oct 2021 16:46), Max: 100.2 (08 Oct 2021 13:37)  HR: 123 (08 Oct 2021 16:46) (120 - 123)  BP: 112/51 (08 Oct 2021 16:46) (83/53 - 112/51)  BP(mean): --  ABP: --  ABP(mean): --  RR: 24 (08 Oct 2021 16:46) (24 - 25)  SpO2: 98% (08 Oct 2021 16:46) (96% - 98%)      General: frail, cachectic female, intubated and sedated  HEENT:  NC/AT, sclera anicteric, PERRL, +dolls eyes, +ET tube in place with copious secretions       Lungs: coarse bilateral breath sounds, worse at the bases, no wheezing, thin chest  Cardiovascular: tachycardic with regular rhythm; +s1, s2, no M/R/G appreciated  Gastrointestinal: thin abdomen soft, NTND, bowel sounds present  Musculoskeletal: could not assess due to clinical status  Extremities: no peripheral edema, no clubbing, no cyanosis  Skin: Warm, dry. Could not assess sacral wound due to clinical condition  Neurological: intubated and sedated        LABS                          7.1    8.74  )-----------( 106      ( 08 Oct 2021 15:44 )             22.7                                               10-08    132<L>  |  99  |  32<H>  ----------------------------<  207<H>  4.8   |  22  |  1.38<H>    Ca    8.6      08 Oct 2021 15:44    TPro  7.4  /  Alb  2.7<L>  /  TBili  0.3  /  DBili  x   /  AST  37  /  ALT  11  /  AlkPhos  55  10-08      PT/INR - ( 08 Oct 2021 15:44 )   PT: 15.1 sec;   INR: 1.27          PTT - ( 08 Oct 2021 15:44 )  PTT:31.8 sec                                           CARDIAC MARKERS ( 08 Oct 2021 15:44 )  x     / 0.01 ng/mL / x     / x     / x                                                LIVER FUNCTIONS - ( 08 Oct 2021 15:44 )  Alb: 2.7 g/dL / Pro: 7.4 g/dL / ALK PHOS: 55 U/L / ALT: 11 U/L / AST: 37 U/L / GGT: x

## 2021-10-08 NOTE — ED PROVIDER NOTE - CLINICAL SUMMARY MEDICAL DECISION MAKING FREE TEXT BOX
severe illness - px cachectic- [id number] wob severe illness - px cachectic- [id number] wob - asked px x 2- states she does want an intubation - even though prior molst said no intubation  -call to HCP- no answer- px did well on Bipap- but refused to wear mask

## 2021-10-08 NOTE — ED PROVIDER NOTE - CARE PLAN
1 Principal Discharge DX:	Acute respiratory failure with hypoxia  Secondary Diagnosis:	Anemia in neoplastic disease

## 2021-10-08 NOTE — H&P ADULT - HISTORY OF PRESENT ILLNESS
HPI:  ** INCOMPLETE **     56yo F w/ PMH AIDS (Sept 2021 CD4 57,  currently on Biktarvy and Bactrim ppx), Hepatitis B/C, COPD (not on home O2, actively smokes), rectal cancer s/p radiation therapy, tongue cancer s/p resection, h/o crack use (last use 8/2021) BIBEMS from [facility] due to altered mental status. Majority of history obtained from chart as patient was intubated upon ICU team consult. Per EMS patient was found to be hypoxic on RA to 70s and hypotensive SBP 70-80. Upon ED arrival, she was admitting to shortness of breath while on NRB 15L satting 96%. She denied any chest pain, chills, body aches at the time, however appeared ill and with accessory muscle use. Upon chart review, patient from recent hospitalization (9/27 - 10/3) expressed desire to be DNR/DNI (MOLST in chart). However, today the ED provider readdressed patient's wishes and she made a decision to rescind her code status and become full code including intubation. Of note, prior to 9/27 hospitalization patient c/o 6 days worsening dysphagia, throat pain and cough. She was admitted for hypercapnic, hypoxic respiratory failure secondary to sepsis from presumed MRSA pneumonia along with trichomonas and potential esophagitis. She was treated with vancomycin and discharged on remaining days of fluconazole for presumed oral thrush. Pulmonary consult service also evaluated the patient for HAP, aspiration, and COPD. No bronchoscopy was performed at the time.      56yo F w/ PMH AIDS (Sept 2021 CD4 57,  currently on Biktarvy and Bactrim ppx), Hepatitis B/C, COPD (not on home O2, actively smokes), rectal cancer s/p radiation therapy, tongue cancer s/p resection, h/o crack use (last use 8/2021) BIBEMS from [facility] due to altered mental status. Majority of history obtained from chart as patient was intubated upon ICU team consult. Per EMS patient was found to be hypoxic on RA to 70s and hypotensive SBP 70-80. Upon ED arrival, she was admitting to shortness of breath while on NRB 15L satting 96%. She denied any chest pain, chills, body aches at the time, however appeared ill and with accessory muscle use. Upon chart review, patient from recent hospitalization (9/27 - 10/3) expressed desire to be DNR/DNI (MOLST in chart). However, today the ED provider readdressed patient's wishes and she made a decision to rescind her code status and become full code including intubation. Of note, prior to 9/27 hospitalization patient c/o 6 days worsening dysphagia, throat pain and cough. She was admitted for hypercapnic, hypoxic respiratory failure secondary to sepsis from presumed MRSA pneumonia along with trichomonas and potential esophagitis. She was treated with vancomycin and discharged on remaining days of fluconazole for presumed oral thrush. Pulmonary consult service also evaluated the patient for HAP, aspiration, and COPD. No bronchoscopy was performed at the time.     ED Vitals: 100.2F oral, -123, BP 88/53-->112/57, RR 24, 98% 15L NRB-->intubated to FiO2 50%-->100%  ED Labs: notable for Hgb 7.1, Plt 106, BUN 32, Cr 1.38, Alb 2.7, INR 1.27, BNP 3930, lactate 1.9  CXR: hyperinflated, bilateral opacities  EKG: sinus tach , QTc 428ms  ED Interventions: 2L NS bolus, tylenol 650mg PO x1, vanc 1g, zosyn 3.375g x1, solumedrol 125mg IVP x1, etomidate 15mg IVP, fentanyl 50mcg x3, propofol gtt, levophed gtt, fentanyl gtt

## 2021-10-08 NOTE — ED ADULT NURSE NOTE - CHIEF COMPLAINT QUOTE
Pt BIBA from [facility]. Staff called EMS due to altered mental status. Per EMS, pt offered no complaints on arrival. Pt found to be hypoxic on RA to 70's and hypotensive SBP 70-80. Pt currently endorsing +SOB, on NRB 15L 96% pox. Pt deneis CP, chills, bodyaches, AAOX2-3. Pt cachetic, ill appearing. +tachypnea. Hx of HIV/AIDS, oral/rectal CA.

## 2021-10-08 NOTE — H&P ADULT - NSHPLABSRESULTS_GEN_ALL_CORE
LABS:                        7.1    8.74  )-----------( 106      ( 08 Oct 2021 15:44 )             22.7     10-08    132<L>  |  99  |  32<H>  ----------------------------<  207<H>  4.8   |  22  |  1.38<H>    Ca    8.6      08 Oct 2021 15:44    TPro  7.4  /  Alb  2.7<L>  /  TBili  0.3  /  DBili  x   /  AST  37  /  ALT  11  /  AlkPhos  55  10-08    PT/INR - ( 08 Oct 2021 15:44 )   PT: 15.1 sec;   INR: 1.27          PTT - ( 08 Oct 2021 15:44 )  PTT:31.8 sec    RADIOLOGY & ADDITIONAL TESTS: Reviewed. LABS:                      7.1    8.74  )-----------( 106      ( 08 Oct 2021 15:44 )             22.7     10-08    132<L>  |  99  |  32<H>  ----------------------------<  207<H>  4.8   |  22  |  1.38<H>    Ca    8.6      08 Oct 2021 15:44    TPro  7.4  /  Alb  2.7<L>  /  TBili  0.3  /  DBili  x   /  AST  37  /  ALT  11  /  AlkPhos  55  10-08    PT/INR - ( 08 Oct 2021 15:44 )   PT: 15.1 sec;   INR: 1.27          PTT - ( 08 Oct 2021 15:44 )  PTT:31.8 sec    RADIOLOGY & ADDITIONAL TESTS: Reviewed.

## 2021-10-08 NOTE — H&P ADULT - NSHPPHYSICALEXAM_GEN_ALL_CORE
General: frail, cachectic female, intubated and sedated  HEENT:  NC/AT, sclera anicteric, PERRL, +dolls eyes, +ET tube in place with copious secretions       Lungs: coarse bilateral breath sounds, worse at the bases, no wheezing, thin chest  Cardiovascular: tachycardic with regular rhythm; +s1, s2, no M/R/G appreciated  Gastrointestinal: thin abdomen soft, NTND, bowel sounds present  Musculoskeletal: could not assess due to clinical status  Extremities: no peripheral edema, no clubbing, no cyanosis  Skin: Warm, dry. Could not assess sacral wound due to clinical condition  Neurological: intubated and sedated

## 2021-10-09 NOTE — DIETITIAN INITIAL EVALUATION ADULT. - ADD RECOMMEND
symmetrical
1. Align nutrition with GOC at all times 2. Trend wts 3. Monitor lytes and replete 4. Consult RD as needed

## 2021-10-09 NOTE — DIETITIAN INITIAL EVALUATION ADULT. - OTHER CALCULATIONS
ABW used for calculations as pt between % of IBW (81%), adjusted for age, PCM, vent ABW used for calculations as pt <100% of IBW in critical care setting (81%), adjusted for age, PCM, vent

## 2021-10-09 NOTE — PROGRESS NOTE ADULT - ASSESSMENT
56yo F w/ PMH AIDS (Sept 2021 CD4 57,  currently on Biktarvy and Bactrim ppx), Hepatitis B/C, COPD (not on home O2, actively smokes), rectal cancer s/p radiation therapy, tongue cancer s/p resection, h/o crack use (last use 8/2021) BIBEMS from [facility] due to altered mental status. Admitted for acute hypoxic respiratory failure secondary to severe sepsis likely secondary to HAP with suspicion for superimposed PCP pneumonia. Currently intubated and sedated requiring levophed, fentanyl, and propofol gtt. Admitted to MICU for further management.      NEURO  Intubated and sedated  - currently on fentanyl and propofol gtt  - RASS goal -2     CARDIOVASCULAR  #septic shock 2/2 HAP  - s/p 2L in ED. currently on 0.05mg/kg/hr levophed  - Titrate levo to MAP goal of 65-70  - will place central line    # sinus Tach  - EKG with , QTc 428ms. Trop-T negative. Likely elevated in setting of severe sepsis from PNA.   - POCUS without obvious signs of right heart strain  - d-dimer 1086  -- bedside POCUS showing no heart strain   - ID plan as below     PULMONARY    #Acute hypoxic respiratory failure 2/2 severe sepsis from likely HAP vs PCP i/s/o immunocompromised state  Intubated in the ED after trial of NRB due to increased work of breathing. Currently on VC with FiO2 set at 100%. Likely secondary to PNA. Recent hospitalization for MRSA for which she was on a 6 day regimen of vancomycin.   - FIO2 decreased to 40%  - c/w vanc + pseudomonal zosyn and bactrim 250mg IV Q8 + methylpred 32mg IV Q12 for PCP  - 2 physician consent obtained for bronchoscopy to r/o PCP as pt is hyperglycemic and would benefit from being off steroids  - obtain sputum cultures including PCP PCR  - f/up blood cultures, urine cultures  -    #COPD  Known h/o COPD. Current smoker (cut down to 6 cigarettes/day but has smoked since she was 11yo). Recently discharged on stiolto.   - c/w duonebs q6h   - resume nicotine patch 14mg/24hwhen clinically appropriate    RENAL  #ML   - BUN 32/Cr 1.38 on admission with baseline 9/0.8 per prior charts. Likely i/s/o severe sepsis. Also on bactrim  - Improving today  - renally dose meds, avoid nephrotoxic agents  - monitor I/Os     ENDO  - Required SS and 10U NPH overnight for hyperglycemia. Worsening sugars i/s/o steroids given with bactrim  - started regular insulin 5U subQ Q6  - c/w mISS    GI  - PPI while on steroids    HEME/ONC  #Anemia  - Hgb 7.1/Hct 22.7 on admission with prior levels in 9.0 range. Possibly AoCD from known AIDS and cancer diagnoses.   - Hgb 6.8 this AM. 1 unit Prbc given  - goal Hgb > 7, maintain active T&S    #thrombocytopenia  PLT 32  - 1 unit Platelets given  - can consider switching from zosyn to less thrombocytopenic agent    #Tongue Cancer  Pt has h/o tongue cancer s/p resection +/- graft placement. Unable to assess throat on physical exam given intubation/sedation.  - no acute interventions at this time    ID  #Severe Sepsis 2/2 HAP and/or PCP PNA   Patient with copious amounts of thick yellow secretions. Recent hospitalization for acute hypoxic resp failure 2/2 MRSA pneumonia for which she completed a 6 day course of vancomycin.   - c/w vanc + pseudomonal zosyn   - c/w PCP treatment with bactrim and steroids   - bronchoscopy today  - f/up sputum cx, BCx, UCx    #AIDS  - Latest CD4 57, . Takes Biktarvy and bactrim ppx at home  - consider HIV consult    DERM  #Known Stage 4 sacral ulcer  Pt has stage 4 pressure ulcer on R sacrum measuring 4cm x 2cm x 0.2cm - staged and managed last admission by wound care. Last admission wound care recs: Aquacel Extra, cut piece to fit over wound, cover with foam dressing  - Consider wound care consult    Nutrition & Prophylaxis  F: s/p 2L NS bolus in ED  E: replete K<4, Mg<2  N: NPO   DVT ppx: hep SubQ  Code status: Full Code after discussion with ED provider      Dispo: MICU

## 2021-10-09 NOTE — CHART NOTE - NSCHARTNOTEFT_GEN_A_CORE
MTB PRE-BRONCHOSCOPY RISK ASSESSMENT  ------------------------------------------------------------    Procedure Date: 10/9    Provider Name: Dr. Rausch    Reason for Bronchoscopy: Rule out PCP    Location of Procedure (check one):   [  ] Endoscopy  [  ] Emergency Department  [ x ] Intensive Care Unit  [  ] Operating Room  [  ] Other: _________    RISK ASSESSMENT  I. Patient symptoms (check all that apply): >/ 3 of these = SIGNIFICANT RISK for TB  [  ] Coughing > 2 to 3 weeks                 [  ] Unexplained fever >/ 2 weeks   [  ] Unusual weakness or fatigue  [  ] Unexplained weight loss > 10lbs.  [  ] Hemoptysis                                   [  ] Unusual or night sweating  [ x ] NON-APPLICABLE    II. TB history (Check all that apply): >/ 1 of these = SIGNIFICANT RISK for TB  [  ] Sputum smear/culture (+) for acid fast bacilli (AFB)                           [  ] Abnormal CXR or CT suggestive of TB; date(s) & description __________  [  ] Positive TB skin or blood test; date: __________                               [  ] On medication for latent TB or disease; list medication(s) ____________  [  ] TB diagnosed in the past; year treated: _______                                [  ] Inadequately treated TB  [  ] Current close contact of a person known or suspected to have TB  [  x] NON-APPLICABLE    III. Additional Risk factors for TB (Check all that apply): Consider these in relation to symptoms and history  [  ] Person has conditions placing them at higher risk for TB disease (i.e. immunosuppressive therapy)  [  ] Person has lived in a country for 3 months or more where TB is common  [  ] Person lives in a high risk environment for TB (i.e. long term care, health care worker, incarcerated, homeless)  [  ] Person injects illicit drugs  [ x ] Person is HIV positive or at high risk for HIV    ****************************************************************  BASED ON THE TB RISK ASSESSMENT ABOVE, THE PATIENT'S RISK FOR TB IS:                       [ x ] LOW RISK FOR TB            [  ] SIGNIFICANT RISK FOR TB  ****************************************************************    IV. Based on the Determined Risk for TB, the following Action(s) are Recommended:  [ x ] Low risk for TB infection --> Proceed with the diagnostic procedure    [  ] Significant risk for TB infection:  1. Perform the procedure in a negative pressure room, with appropriate personal protective equipment (PPE) for healthcare personnel (i.e. N95 respirator)    2. If it is not feasible to move the patient or defer the procedure:    a. Use a single-bedded room in a low traffic area to perform the bronchoscopy procedure    b. Place a portable high-efficiency particulate air (HEPA) filter in the space prior to starting the procedure and keep the door closed. Refer to Infection Control policy titled "Tuberculosis Control Strategy Plan" for additional information.    c. All healthcare personnel in the procedure room shall wear and N95 disposible respirator.    3. Documentation of the tuberculosis risk assessment is to be included in the patient's medical record.

## 2021-10-09 NOTE — PROCEDURE NOTE - NSICDXPROCEDURE_GEN_ALL_CORE_FT
PROCEDURES:  Central venous catheter placement with ultrasound guidance 09-Oct-2021 17:34:04  Valeriano Vargas

## 2021-10-09 NOTE — DIETITIAN NUTRITION RISK NOTIFICATION - ADDITIONAL COMMENTS/DIETITIAN RECOMMENDATIONS
If plan to start TF recommend Glucerna 1.2 via NGT @goal rate of 48ml/hr x24hrs (provides 1152ml TV, 1501kcal (at current propofol rate), 69gprotein, 927ml free water) +additional water per team    1. Align nutrition with GOC at all times   2. Trend wts   3. Monitor lytes and replete   4. Consult RD as needed

## 2021-10-09 NOTE — DIETITIAN INITIAL EVALUATION ADULT. - OTHER INFO
54yo F w/ PMH AIDS (Sept 2021 CD4 57,  currently on Biktarvy and Bactrim ppx), Hepatitis B/C, COPD (not on home O2, actively smokes), rectal cancer s/p radiation therapy, tongue cancer s/p resection, h/o crack use (last use 8/2021) BIBEMS from [facility] due to altered mental status. Admitted for acute hypoxic respiratory failure secondary to severe sepsis likely secondary to HAP with suspicion for superimposed PCP pneumonia. Currently intubated and sedated requiring levophed, fentanyl, and propofol gtt. Admitted to MICU for further management.      Pt seen in room, intubated on VC/AC vent setting. Propofol running at 4.5ml/hr (119kcal x24hrs). Pt known to RD service from previous admission. Pt with severe dysphagia, had FEES done 9/30 recommending establishing nutrition related GOC. Per GOC note 10/1, pt stating she would like to eat PO and "does not want any artificial feeding, including PEG tube" despite aspiration risk. Recommend continue to monitor nutrition related GOC and align at all times. Per ASPEN guidelines, pt meets criteria for severe PCM 2/2 NFPE findings. Last BM 10/8. Skin with unstageable pressure injury to sacrum. Nonverbal pain indicators absent. Please see full recs below. Will continue to follow per RD protocol.

## 2021-10-09 NOTE — PROGRESS NOTE ADULT - SUBJECTIVE AND OBJECTIVE BOX
*INCOMPLETE* INTERVAL HPI/OVERNIGHT EVENTS:  gave SS and 10U NPH. straight cath 260cc    SUBJECTIVE: Patient seen and examined at bedside. Unable to participate in subjective exam    OBJECTIVE:    VITAL SIGNS:  ICU Vital Signs Last 24 Hrs  T(C): 34.9 (09 Oct 2021 12:00), Max: 38.4 (08 Oct 2021 19:15)  T(F): 94.8 (09 Oct 2021 12:00), Max: 101.1 (08 Oct 2021 19:15)  HR: 66 (09 Oct 2021 13:00) (54 - 123)  BP: 99/50 (09 Oct 2021 13:00) (83/53 - 128/58)  BP(mean): 72 (09 Oct 2021 13:00) (70 - 87)  ABP: --  ABP(mean): --  RR: 16 (09 Oct 2021 13:00) (10 - 25)  SpO2: 99% (09 Oct 2021 13:00) (94% - 100%)    Mode: AC/ CMV (Assist Control/ Continuous Mandatory Ventilation), RR (machine): 16, TV (machine): 450, FiO2: 60, PEEP: 5, ITime: 1, MAP: 9, PIP: 20    10-08 @ :  -  10-09 @ 07:00  --------------------------------------------------------  IN: 890 mL / OUT: 260 mL / NET: 630 mL    10-09 @ 07:  -  10-09 @ 13:32  --------------------------------------------------------  IN: 409.5 mL / OUT: 0 mL / NET: 409.5 mL      CAPILLARY BLOOD GLUCOSE      POCT Blood Glucose.: 158 mg/dL (09 Oct 2021 11:15)      Physical Exam:   General: frail, cachectic female, intubated and sedated  HEENT:  NC/AT, sclera anicteric, PERRL, +dolls eyes, +ET tube in place with copious secretions       Lungs: coarse bilateral breath sounds, worse at the bases, no wheezing, thin chest  Cardiovascular: tachycardic with regular rhythm; +s1, s2, no M/R/G appreciated  Gastrointestinal: thin abdomen soft, NTND, bowel sounds present  Musculoskeletal: could not assess due to clinical status  Extremities: no peripheral edema, no clubbing, no cyanosis  Skin: Warm, dry. Could not assess sacral wound due to clinical condition  Neurological: intubated and sedated    MEDICATIONS:  MEDICATIONS  (STANDING):  albuterol/ipratropium for Nebulization.. 3 milliLiter(s) Nebulizer every 20 minutes  artificial  tears Solution 1 Drop(s) Both EYES two times a day  chlorhexidine 0.12% Liquid 15 milliLiter(s) Oral Mucosa every 12 hours  chlorhexidine 2% Cloths 1 Application(s) Topical daily  dextrose 40% Gel 15 Gram(s) Oral once  dextrose 5%. 1000 milliLiter(s) (50 mL/Hr) IV Continuous <Continuous>  dextrose 5%. 1000 milliLiter(s) (100 mL/Hr) IV Continuous <Continuous>  dextrose 50% Injectable 25 Gram(s) IV Push once  dextrose 50% Injectable 12.5 Gram(s) IV Push once  dextrose 50% Injectable 25 Gram(s) IV Push once  fentaNYL    Injectable 50 MICROGram(s) IV Push once  fentaNYL   Infusion. 0.5 MICROgram(s)/kG/Hr (2.5 mL/Hr) IV Continuous <Continuous>  glucagon  Injectable 1 milliGRAM(s) IntraMuscular once  insulin lispro (ADMELOG) corrective regimen sliding scale   SubCutaneous every 6 hours  insulin regular  human recombinant 5 Unit(s) SubCutaneous every 6 hours  methylPREDNISolone sodium succinate Injectable 32 milliGRAM(s) IV Push every 12 hours  norepinephrine Infusion 0.05 MICROgram(s)/kG/Min (4.68 mL/Hr) IV Continuous <Continuous>  pantoprazole  Injectable 40 milliGRAM(s) IV Push daily  piperacillin/tazobactam IVPB.. 3.375 Gram(s) IV Intermittent every 6 hours  propofol Infusion 5 MICROgram(s)/kG/Min (1.5 mL/Hr) IV Continuous <Continuous>  trimethoprim / sulfamethoxazole IVPB 250 milliGRAM(s) IV Intermittent every 8 hours    MEDICATIONS  (PRN):      ALLERGIES:  Allergies    No Known Allergies    Intolerances        LABS:                        6.8    3.18  )-----------( 32       ( 09 Oct 2021 06:02 )             22.0     10    134<L>  |  103  |  27<H>  ----------------------------<  254<H>  4.4   |  23  |  1.13    Ca    8.2<L>      09 Oct 2021 06:02  Phos  3.1     10  Mg     2.4     10    TPro  7.2  /  Alb  2.1<L>  /  TBili  0.2  /  DBili  x   /  AST  47<H>  /  ALT  10  /  AlkPhos  47  10    PT/INR - ( 09 Oct 2021 06:02 )   PT: 14.7 sec;   INR: 1.24          PTT - ( 09 Oct 2021 06:02 )  PTT:33.9 sec  Urinalysis Basic - ( 09 Oct 2021 01:16 )    Color: Yellow / Appearance: Clear / S.010 / pH: x  Gluc: x / Ketone: NEGATIVE  / Bili: Negative / Urobili: 0.2 E.U./dL   Blood: x / Protein: Trace mg/dL / Nitrite: NEGATIVE   Leuk Esterase: NEGATIVE / RBC: < 5 /HPF / WBC < 5 /HPF   Sq Epi: x / Non Sq Epi: 0-5 /HPF / Bacteria: Present /HPF        RADIOLOGY & ADDITIONAL TESTS: Reviewed.

## 2021-10-09 NOTE — BRIEF OPERATIVE NOTE - OPERATION/FINDINGS
Procedure:  Informed consent was obtained. Timeout was performed. Large flexible bronchoscope was inserted through ETT and advanced to distal trachea. Bilateral airways were examined to segmental and subsegmental levels. Bronchial lavage was performed in the RML lobe and BAL was performed in the inferior segment of the Lingula with adequate return of fluid. The bronchoscope was subsequently withdrawn and the procedure was completed.    Findings:  Myrna were sharp. Tracheal and bronchial mucosa appeared hyperemic in the left lung diffusely. Right lung had thick secretions present in the upper lobes, middle lobe and lower lobe segmental bronchi. Segmental and subsegmental airways were patent bilaterally. There was normal dynamic airway collapse.

## 2021-10-10 NOTE — PROGRESS NOTE ADULT - ASSESSMENT
56yo F w/ PMH AIDS (Sept 2021 CD4 57,  currently on Biktarvy and Bactrim ppx), Hepatitis B/C, COPD (not on home O2, actively smokes), rectal cancer s/p radiation therapy, tongue cancer s/p resection, h/o crack use (last use 8/2021) BIBEMS from [facility] due to altered mental status. Admitted for acute hypoxic respiratory failure secondary to severe sepsis likely secondary to HAP with suspicion for superimposed PCP pneumonia. Currently intubated and sedated requiring levophed, fentanyl, and propofol gtt. Admitted to MICU for further management.      NEURO  Intubated and sedated  - currently on fentanyl and propofol gtt  - started precedex to wean sedation  - RASS goal -2     CARDIOVASCULAR  #septic shock 2/2 HAP  - s/p 2L in ED. currently on 0.05mg/kg/hr levophed  - Titrate levo to MAP goal of 65-70  - will place central line    #sinus Tach  - EKG with , QTc 428ms. Trop-T negative. Likely elevated in setting of severe sepsis from PNA.   - POCUS without obvious signs of right heart strain  - d-dimer 1086  -- bedside POCUS showing no heart strain   - ID plan as below     PULMONARY  #Acute hypoxic respiratory failure 2/2 severe sepsis from likely HAP vs PCP i/s/o immunocompromised state  Intubated in the ED after trial of NRB due to increased work of breathing. Currently on VC with FiO2 set at 100%. Likely secondary to PNA. Recent hospitalization for MRSA for which she was on a 6 day regimen of vancomycin.   - FIO2 decreased to 40%  - c/w vanc   - c/w PCP prophylaxis - Bactrim DS qD per ID  - c/w home Biktarvy per ID  - 2 physician consent obtained for bronchoscopy to r/o PCP as pt is hyperglycemic and would benefit from being off steroids  - obtain sputum cultures including PCP PCR  - f/up blood cultures, urine cultures    #COPD  Known h/o COPD. Current smoker (cut down to 6 cigarettes/day but has smoked since she was 11yo). Recently discharged on stiolto.   - c/w duonebs q6h   - resume nicotine patch 14mg/24h when clinically appropriate    RENAL  #ML   - BUN 32/Cr 1.38 on admission with baseline 9/0.8 per prior charts. Likely i/s/o severe sepsis.   - per ID, need for Bactrim > risk of ML  - Improving today  - renally dose meds, avoid nephrotoxic agents  - monitor I/Os     ENDO  - Required SS and 10U NPH overnight for hyperglycemia. Worsening sugars i/s/o steroids given with bactrim  - started regular insulin 5U subQ Q6  - c/w mISS    GI  #Feeds  - NGT placed  - Can give PO meds by NGT  - PPI while on steroids    HEME/ONC  #Anemia  - Hgb 7.1/Hct 22.7 on admission with prior levels in 9.0 range. Possibly AoCD from known AIDS and cancer diagnoses.   - Hgb 6.8 this AM. 1 unit Prbc given  - goal Hgb > 7, maintain active T&S    #thrombocytopenia  PLT 32  - 1 unit Platelets given  - can consider switching from zosyn to less thrombocytopenic agent    #Tongue Cancer  Pt has h/o tongue cancer s/p resection +/- graft placement. Unable to assess throat on physical exam given intubation/sedation.  - no acute interventions at this time    ID  #Severe Sepsis 2/2 HAP and/or PCP PNA   Patient with copious amounts of thick yellow secretions. Recent hospitalization for acute hypoxic resp failure 2/2 MRSA pneumonia for which she completed a 6 day course of vancomycin.   - c/w vanc  - c/w PCP ppx bactrim DS qd  - bronchoscopy on 10/9  - BAL Cx - MRSA+  - BCx - MRSA+    #AIDS  - Latest CD4 57, . Takes Biktarvy and bactrim ppx at home  - Per ID, c/w home Biktarvy     DERM  #Known Stage 4 sacral ulcer  Pt has stage 4 pressure ulcer on R sacrum measuring 4cm x 2cm x 0.2cm - staged and managed last admission by wound care. Last admission wound care recs: Aquacel Extra, cut piece to fit over wound, cover with foam dressing  - Consider wound care consult    Nutrition & Prophylaxis  F: s/p 2L NS bolus in ED  E: replete K<4, Mg<2  N: NPO   DVT ppx: hep SubQ  Code status: Full Code after discussion with ED provider      Dispo: MICU     56yo F w/ PMH AIDS (Sept 2021 CD4 57,  currently on Biktarvy and Bactrim ppx), Hepatitis B/C, COPD (not on home O2, actively smokes), rectal cancer s/p radiation therapy, tongue cancer s/p resection, h/o crack use (last use 8/2021) BIBEMS from [facility] due to altered mental status. Admitted for acute hypoxic respiratory failure secondary to severe sepsis likely secondary to HAP with suspicion for superimposed PCP pneumonia. Currently intubated and sedated requiring levophed, fentanyl, and propofol gtt. Admitted to MICU for further management.      NEURO  Intubated and sedated  - currently on fentanyl and propofol gtt  - started precedex to wean sedation  - RASS goal -2     CARDIOVASCULAR  #septic shock 2/2 HAP  - s/p 2L in ED. currently on 0.11mg/kg/hr levophed  - Titrate levo to MAP goal of 65-70  - will place central line    #sinus Tach  - EKG with , QTc 428ms. Trop-T negative. Likely elevated in setting of severe sepsis from PNA.   - POCUS without obvious signs of right heart strain  - d-dimer 1086  -- bedside POCUS showing no heart strain   - ID plan as below     PULMONARY  #Acute hypoxic respiratory failure 2/2 severe sepsis from likely HAP vs PCP i/s/o immunocompromised state  Intubated in the ED after trial of NRB due to increased work of breathing. Currently on VC with FiO2 set at 100%. Likely secondary to PNA. Recent hospitalization for MRSA for which she was on a 6 day regimen of vancomycin.   - FIO2 decreased to 40%  - c/w vanc   - c/w PCP prophylaxis - Bactrim DS qD per ID  - c/w home Biktarvy per ID  - 2 physician consent obtained for bronchoscopy to r/o PCP as pt is hyperglycemic and would benefit from being off steroids  - obtain sputum cultures including PCP PCR  - f/up blood cultures, urine cultures    #COPD  Known h/o COPD. Current smoker (cut down to 6 cigarettes/day but has smoked since she was 11yo). Recently discharged on stiolto.   - c/w duonebs q6h   - resume nicotine patch 14mg/24h when clinically appropriate    RENAL  #ML   - BUN 32/Cr 1.38 on admission with baseline 9/0.8 per prior charts. Likely i/s/o severe sepsis.   - per ID, need for Bactrim despite ML  - Improving today  - renally dose meds, avoid nephrotoxic agents  - monitor I/Os     ENDO  - Required SS and 10U NPH overnight for hyperglycemia. Worsening sugars i/s/o steroids given with bactrim  - started regular insulin 5U subQ Q6  - c/w mISS    GI  #Feeds  - NGT placed  - Can give PO meds by NGT  - PPI while on steroids    HEME/ONC  #Anemia  - Hgb 7.1/Hct 22.7 on admission with prior levels in 9.0 range. Possibly AoCD from known AIDS and cancer diagnoses.   - Hgb 6.8 this AM. 1 unit Prbc given  - goal Hgb > 7, maintain active T&S    #thrombocytopenia  PLT 32  - 1 unit Platelets given  - monitor for now  - D/c'd zosyn    #Tongue Cancer  Pt has h/o tongue cancer s/p resection +/- graft placement. Unable to assess throat on physical exam given intubation/sedation.  - no acute interventions at this time    ID  #Severe Sepsis 2/2 HAP and/or PCP PNA   Patient with copious amounts of thick yellow secretions. Recent hospitalization for acute hypoxic resp failure 2/2 MRSA pneumonia for which she completed a 6 day course of vancomycin.   - c/w vanc  - c/w PCP ppx bactrim DS qd  - bronchoscopy on 10/9  - BAL Cx - MRSA+  - BCx - MRSA+    #AIDS  - Latest CD4 57, . Takes Biktarvy and bactrim ppx at home  - Per ID, c/w home Biktarvy     DERM  #Known Stage 4 sacral ulcer  Pt has stage 4 pressure ulcer on R sacrum measuring 4cm x 2cm x 0.2cm - staged and managed last admission by wound care. Last admission wound care recs: Aquacel Extra, cut piece to fit over wound, cover with foam dressing  - Consider wound care consult    Nutrition & Prophylaxis  F: s/p 2L NS bolus in ED  E: replete K<4, Mg<2  N: NPO   DVT ppx: hep SubQ  Code status: Full Code after discussion with ED provider      Dispo: MICU

## 2021-10-10 NOTE — CONSULT NOTE ADULT - ASSESSMENT
56 yo female with AIDS, tongue and rectal ca, trach, recent admission for esophagitis, here with MRSA pneumonia and associated BSI.  - surveillance bcx  - f/u MRSA susceptibility (gerri vancomycin DARRON)  - TTE  - continue vancomycin--would dose 750mg IV q24h in setting of ML and check trough before 3rd dose  - start TMP/SMX 1 DS daily ppx  - start Biktarvy daily     d/w primary team

## 2021-10-10 NOTE — PROVIDER CONTACT NOTE (CRITICAL VALUE NOTIFICATION) - TEST AND RESULT REPORTED:
Bronchial Wash with Staph Aureus
blood culture drawn 10/8, positive for gram positive cocci in clusters

## 2021-10-10 NOTE — PROGRESS NOTE ADULT - SUBJECTIVE AND OBJECTIVE BOX
** INCOMPLETE **     INTERVAL HPI/OVERNIGHT EVENTS:    SUBJECTIVE: Patient seen and examined at bedside.    VITAL SIGNS:  ICU Vital Signs Last 24 Hrs  T(C): 37.6 (10 Oct 2021 01:05), Max: 37.6 (10 Oct 2021 01:05)  T(F): 99.6 (10 Oct 2021 01:05), Max: 99.6 (10 Oct 2021 01:05)  HR: 56 (10 Oct 2021 06:00) (54 - 80)  BP: 100/58 (10 Oct 2021 06:00) (75/50 - 117/59)  BP(mean): 76 (10 Oct 2021 06:00) (57 - 82)  ABP: --  ABP(mean): --  RR: 16 (10 Oct 2021 06:00) (14 - 34)  SpO2: 96% (10 Oct 2021 06:) (91% - 100%)    Mode: AC/ CMV (Assist Control/ Continuous Mandatory Ventilation), RR (machine): 16, TV (machine): 450, FiO2: 40, PEEP: 5, ITime: 1, MAP: 9.5, PIP: 22    10-08 @ 07:  -  10-09 @ 07:00  --------------------------------------------------------  IN: 890 mL / OUT: 260 mL / NET: 630 mL    10-09 @ 07:01  -  10-10 @ 06:45  --------------------------------------------------------  IN: 2258.6 mL / OUT: 200 mL / NET: 2058.6 mL      CAPILLARY BLOOD GLUCOSE      POCT Blood Glucose.: 132 mg/dL (10 Oct 2021 05:47)      PHYSICAL EXAM:    Constitutional: NAD  HEENT: NC/AT; PERRL, anicteric sclera; MMM  Neck: supple, no JVD  Cardiovascular: +S1/S2, RRR  Respiratory: CTA B/L, no W/R/R  Gastrointestinal: abdomen soft, NT/ND; no rebound or guarding; +BSx4  Genitourinary: no suprapubic tenderness or fullness  Extremities: WWP; no LE edema; no clubbing or cyanosis  Vascular: 2+ radial, DP/PT and femoral pulses B/L  Dermatologic: normal color and turgor; no visible rashes  Neurological:     MEDICATIONS:  MEDICATIONS  (STANDING):  albuterol/ipratropium for Nebulization.. 3 milliLiter(s) Nebulizer every 20 minutes  artificial  tears Solution 1 Drop(s) Both EYES two times a day  chlorhexidine 0.12% Liquid 15 milliLiter(s) Oral Mucosa every 12 hours  chlorhexidine 2% Cloths 1 Application(s) Topical daily  dextrose 40% Gel 15 Gram(s) Oral once  dextrose 5%. 1000 milliLiter(s) (50 mL/Hr) IV Continuous <Continuous>  dextrose 5%. 1000 milliLiter(s) (100 mL/Hr) IV Continuous <Continuous>  dextrose 50% Injectable 25 Gram(s) IV Push once  dextrose 50% Injectable 12.5 Gram(s) IV Push once  dextrose 50% Injectable 25 Gram(s) IV Push once  fentaNYL    Injectable 50 MICROGram(s) IV Push once  fentaNYL   Infusion. 0.5 MICROgram(s)/kG/Hr (2.5 mL/Hr) IV Continuous <Continuous>  glucagon  Injectable 1 milliGRAM(s) IntraMuscular once  insulin lispro (ADMELOG) corrective regimen sliding scale   SubCutaneous every 6 hours  insulin regular  human recombinant 5 Unit(s) SubCutaneous every 6 hours  norepinephrine Infusion 0.05 MICROgram(s)/kG/Min (4.68 mL/Hr) IV Continuous <Continuous>  pantoprazole  Injectable 40 milliGRAM(s) IV Push daily  piperacillin/tazobactam IVPB.. 3.375 Gram(s) IV Intermittent every 6 hours  propofol Infusion 5 MICROgram(s)/kG/Min (1.5 mL/Hr) IV Continuous <Continuous>    MEDICATIONS  (PRN):      ALLERGIES:  Allergies    No Known Allergies    Intolerances        LABS:                        8.7    5.19  )-----------( 86       ( 10 Oct 2021 04:34 )             26.3     10-10    136  |  102  |  34<H>  ----------------------------<  120<H>  4.9   |  24  |  1.56<H>    Ca    8.3<L>      10 Oct 2021 04:34  Phos  4.0     10-10  Mg     2.8     10-10    TPro  7.8  /  Alb  2.6<L>  /  TBili  0.2  /  DBili  x   /  AST  68<H>  /  ALT  15  /  AlkPhos  68  10-10    PT/INR - ( 09 Oct 2021 06:02 )   PT: 14.7 sec;   INR: 1.24          PTT - ( 09 Oct 2021 06:02 )  PTT:33.9 sec  Urinalysis Basic - ( 09 Oct 2021 01:16 )    Color: Yellow / Appearance: Clear / S.010 / pH: x  Gluc: x / Ketone: NEGATIVE  / Bili: Negative / Urobili: 0.2 E.U./dL   Blood: x / Protein: Trace mg/dL / Nitrite: NEGATIVE   Leuk Esterase: NEGATIVE / RBC: < 5 /HPF / WBC < 5 /HPF   Sq Epi: x / Non Sq Epi: 0-5 /HPF / Bacteria: Present /HPF        RADIOLOGY & ADDITIONAL TESTS: Reviewed. INTERVAL HPI/OVERNIGHT EVENTS:  No acute events overnight. Duplex - neg for DVT. Pressor requirements unchanged.     SUBJECTIVE: Patient seen and examined at bedside.  Patient seen and examined at bedside. Unable to participate in subjective exam    VITAL SIGNS:  ICU Vital Signs Last 24 Hrs  T(C): 37.6 (10 Oct 2021 01:05), Max: 37.6 (10 Oct 2021 01:05)  T(F): 99.6 (10 Oct 2021 01:05), Max: 99.6 (10 Oct 2021 01:05)  HR: 56 (10 Oct 2021 06:00) (54 - 80)  BP: 100/58 (10 Oct 2021 06:00) (75/50 - 117/59)  BP(mean): 76 (10 Oct 2021 06:00) (57 - 82)  ABP: --  ABP(mean): --  RR: 16 (10 Oct 2021 06:00) (14 - 34)  SpO2: 96% (10 Oct 2021 06:) (91% - 100%)    Mode: AC/ CMV (Assist Control/ Continuous Mandatory Ventilation), RR (machine): 16, TV (machine): 450, FiO2: 40, PEEP: 5, ITime: 1, MAP: 9.5, PIP: 22    10-08 @ :  -  10-09 @ 07:00  --------------------------------------------------------  IN: 890 mL / OUT: 260 mL / NET: 630 mL    10-09 @ 07:  -  10-10 @ 06:45  --------------------------------------------------------  IN: 2258.6 mL / OUT: 200 mL / NET: 2058.6 mL      CAPILLARY BLOOD GLUCOSE      POCT Blood Glucose.: 132 mg/dL (10 Oct 2021 05:47)      PHYSICAL EXAM:    Constitutional: NAD  General: frail, cachectic female, intubated and sedated  HEENT:  NC/AT, sclera anicteric, PERRL, +dolls eyes, +ET tube in place with copious secretions       Lungs: coarse bilateral breath sounds, worse at the bases, no wheezing, thin chest  Cardiovascular: tachycardic with regular rhythm; +s1, s2, no M/R/G appreciated  Gastrointestinal: thin abdomen soft, NTND, bowel sounds present  Musculoskeletal: could not assess due to clinical status  Extremities: no peripheral edema, no clubbing, no cyanosis  Skin: Warm, dry. Could not assess sacral wound due to clinical condition  Neurological: intubated and sedated    MEDICATIONS:  MEDICATIONS  (STANDING):  albuterol/ipratropium for Nebulization.. 3 milliLiter(s) Nebulizer every 20 minutes  artificial  tears Solution 1 Drop(s) Both EYES two times a day  chlorhexidine 0.12% Liquid 15 milliLiter(s) Oral Mucosa every 12 hours  chlorhexidine 2% Cloths 1 Application(s) Topical daily  dextrose 40% Gel 15 Gram(s) Oral once  dextrose 5%. 1000 milliLiter(s) (50 mL/Hr) IV Continuous <Continuous>  dextrose 5%. 1000 milliLiter(s) (100 mL/Hr) IV Continuous <Continuous>  dextrose 50% Injectable 25 Gram(s) IV Push once  dextrose 50% Injectable 12.5 Gram(s) IV Push once  dextrose 50% Injectable 25 Gram(s) IV Push once  fentaNYL    Injectable 50 MICROGram(s) IV Push once  fentaNYL   Infusion. 0.5 MICROgram(s)/kG/Hr (2.5 mL/Hr) IV Continuous <Continuous>  glucagon  Injectable 1 milliGRAM(s) IntraMuscular once  insulin lispro (ADMELOG) corrective regimen sliding scale   SubCutaneous every 6 hours  insulin regular  human recombinant 5 Unit(s) SubCutaneous every 6 hours  norepinephrine Infusion 0.05 MICROgram(s)/kG/Min (4.68 mL/Hr) IV Continuous <Continuous>  pantoprazole  Injectable 40 milliGRAM(s) IV Push daily  piperacillin/tazobactam IVPB.. 3.375 Gram(s) IV Intermittent every 6 hours  propofol Infusion 5 MICROgram(s)/kG/Min (1.5 mL/Hr) IV Continuous <Continuous>    MEDICATIONS  (PRN):      ALLERGIES:  Allergies    No Known Allergies    Intolerances        LABS:                        8.7    5.19  )-----------( 86       ( 10 Oct 2021 04:34 )             26.3     10-10    136  |  102  |  34<H>  ----------------------------<  120<H>  4.9   |  24  |  1.56<H>    Ca    8.3<L>      10 Oct 2021 04:34  Phos  4.0     10-10  Mg     2.8     10-10    TPro  7.8  /  Alb  2.6<L>  /  TBili  0.2  /  DBili  x   /  AST  68<H>  /  ALT  15  /  AlkPhos  68  10-10    PT/INR - ( 09 Oct 2021 06:02 )   PT: 14.7 sec;   INR: 1.24          PTT - ( 09 Oct 2021 06:02 )  PTT:33.9 sec  Urinalysis Basic - ( 09 Oct 2021 01:16 )    Color: Yellow / Appearance: Clear / S.010 / pH: x  Gluc: x / Ketone: NEGATIVE  / Bili: Negative / Urobili: 0.2 E.U./dL   Blood: x / Protein: Trace mg/dL / Nitrite: NEGATIVE   Leuk Esterase: NEGATIVE / RBC: < 5 /HPF / WBC < 5 /HPF   Sq Epi: x / Non Sq Epi: 0-5 /HPF / Bacteria: Present /HPF        RADIOLOGY & ADDITIONAL TESTS: Reviewed.

## 2021-10-10 NOTE — CONSULT NOTE ADULT - SUBJECTIVE AND OBJECTIVE BOX
HPI:  54yo F w/ PMH AIDS (2021 CD4 59,  currently on Biktarvy and Bactrim ppx), Hepatitis B/C, COPD (not on home O2, actively smokes), rectal cancer s/p radiation therapy, tongue cancer s/p resection, h/o crack use (last use 2021) BIBEMS from [facility] due to altered mental status. Majority of history obtained from chart as patient was intubated upon ICU team consult. Per EMS patient was found to be hypoxic on RA to 70s and hypotensive SBP 70-80. Upon ED arrival, she was admitting to shortness of breath while on NRB 15L satting 96%. She denied any chest pain, chills, body aches at the time, however appeared ill and with accessory muscle use. Upon chart review, patient from recent hospitalization ( - 10/3) expressed desire to be DNR/DNI (MOLST in chart). However, today the ED provider readdressed patient's wishes and she made a decision to rescind her code status and become full code including intubation. Of note, prior to  hospitalization patient c/o 6 days worsening dysphagia, throat pain and cough. She was admitted for hypercapnic, hypoxic respiratory failure secondary to sepsis from presumed MRSA pneumonia along with trichomonas and potential esophagitis. She was treated with vancomycin and discharged on remaining days of fluconazole for presumed oral thrush. Pulmonary consult service also evaluated the patient for HAP, aspiration, and COPD. No bronchoscopy was performed at the time.     ED Vitals: 100.2F oral, -123, BP 88/53-->112/57, RR 24, 98% 15L NRB-->intubated to FiO2 50%-->100%  ED Labs: notable for Hgb 7.1, Plt 106, BUN 32, Cr 1.38, Alb 2.7, INR 1.27, BNP 3930, lactate 1.9  CXR: hyperinflated, bilateral opacities  EKG: sinus tach , QTc 428ms  ED Interventions: 2L NS bolus, tylenol 650mg PO x1, vanc 1g, zosyn 3.375g x1, solumedrol 125mg IVP x1, etomidate 15mg IVP, fentanyl 50mcg x3, propofol gtt, levophed gtt, fentanyl gtt         (08 Oct 2021 19:47)      PAST MEDICAL & SURGICAL HISTORY:  HIV disease    Advanced COPD    Tongue cancer    Rectal cancer    Hepatitis B    Hepatitis C    No significant past surgical history          REVIEW OF SYSTEMS:    UTO      ANTIBIOTICS:  MEDICATIONS  (STANDING):  albuterol/ipratropium for Nebulization.. 3 milliLiter(s) Nebulizer every 20 minutes  artificial  tears Solution 1 Drop(s) Both EYES two times a day  chlorhexidine 0.12% Liquid 15 milliLiter(s) Oral Mucosa every 12 hours  chlorhexidine 2% Cloths 1 Application(s) Topical daily  dexMEDEtomidine Infusion 0.08 MICROgram(s)/kG/Hr (1 mL/Hr) IV Continuous <Continuous>  dextrose 40% Gel 15 Gram(s) Oral once  dextrose 5%. 1000 milliLiter(s) (50 mL/Hr) IV Continuous <Continuous>  dextrose 5%. 1000 milliLiter(s) (100 mL/Hr) IV Continuous <Continuous>  dextrose 50% Injectable 25 Gram(s) IV Push once  dextrose 50% Injectable 12.5 Gram(s) IV Push once  dextrose 50% Injectable 25 Gram(s) IV Push once  fentaNYL    Injectable 50 MICROGram(s) IV Push once  fentaNYL   Infusion. 0.5 MICROgram(s)/kG/Hr (2.5 mL/Hr) IV Continuous <Continuous>  glucagon  Injectable 1 milliGRAM(s) IntraMuscular once  insulin lispro (ADMELOG) corrective regimen sliding scale   SubCutaneous every 6 hours  insulin regular  human recombinant 5 Unit(s) SubCutaneous every 6 hours  norepinephrine Infusion 0.05 MICROgram(s)/kG/Min (4.68 mL/Hr) IV Continuous <Continuous>  pantoprazole  Injectable 40 milliGRAM(s) IV Push daily  propofol Infusion 5 MICROgram(s)/kG/Min (1.5 mL/Hr) IV Continuous <Continuous>  vancomycin  IVPB 750 milliGRAM(s) IV Intermittent once    MEDICATIONS  (PRN):      Allergies    No Known Allergies    Intolerances        SOCIAL HISTORY:    FAMILY HISTORY:  FH: heart disease (Mother)        Vital Signs Last 24 Hrs  T(C): 36.8 (10 Oct 2021 14:54), Max: 37.6 (10 Oct 2021 01:05)  T(F): 98.2 (10 Oct 2021 14:54), Max: 99.6 (10 Oct 2021 01:05)  HR: 54 (10 Oct 2021 14:00) (51 - 80)  BP: 107/56 (10 Oct 2021 14:00) (75/50 - 110/58)  BP(mean): 76 (10 Oct 2021 14:00) (57 - 81)  RR: 16 (10 Oct 2021 14:00) (14 - 34)  SpO2: 97% (10 Oct 2021 14:00) (91% - 100%)    PHYSICAL EXAM:  Constitutional:Well-developed, well nourished  Eyes:SANTOS, EOMI  Ear/Nose/Throat: no oral lesion, no sinus tenderness on percussion	  Neck:no JVD, no lymphadenopathy, supple  Respiratory: CTA abner  Cardiovascular: S1S2 RRR, no murmurs  Gastrointestinal:soft, (+) BS, no HSM  Extremities:no e/e/c  Vascular: DP Pulse:	right normal; left normal            LABS:                        8.7    5.19  )-----------( 86       ( 10 Oct 2021 04:34 )             26.3     10-10    136  |  102  |  34<H>  ----------------------------<  120<H>  4.9   |  24  |  1.56<H>    Ca    8.3<L>      10 Oct 2021 04:34  Phos  4.0     10-10  Mg     2.8     10-10    TPro  7.8  /  Alb  2.6<L>  /  TBili  0.2  /  DBili  x   /  AST  68<H>  /  ALT  15  /  AlkPhos  68  10-10    PT/INR - ( 09 Oct 2021 06:02 )   PT: 14.7 sec;   INR: 1.24          PTT - ( 09 Oct 2021 06:02 )  PTT:33.9 sec  Urinalysis Basic - ( 09 Oct 2021 01:16 )    Color: Yellow / Appearance: Clear / S.010 / pH: x  Gluc: x / Ketone: NEGATIVE  / Bili: Negative / Urobili: 0.2 E.U./dL   Blood: x / Protein: Trace mg/dL / Nitrite: NEGATIVE   Leuk Esterase: NEGATIVE / RBC: < 5 /HPF / WBC < 5 /HPF   Sq Epi: x / Non Sq Epi: 0-5 /HPF / Bacteria: Present /HPF        MICROBIOLOGY: Culture - Bronchial (10.09.21 @ 21:17)    Gram Stain:   No epithelial cells seen  Rare WBC's  No organisms seen    Specimen Source: Lavage lingula    Culture Results:   60,000 CFU/ml Staphylococcus aureus presumptive Methicillin resistant  Confirmation to follow within 24 hours  Result called to and read back by_ JAI Cherry NP  10/10/2021 09:00:29  Susceptibility to follow.  No routine respiratory hilda Isolated to date    Culture - Blood (10.08.21 @ 17:25)    -  Methicillin resistant Staphylococcus aureus (MRSA): [facility] Floor notified 10/09/2021 15:15:55 EDT (LONG Burks RN)    Gram Stain:   Aerobic Bottle: Gram Positive Cocci in Clusters  Result called to and read back by_ Ms. LONG Burks RN  10/09/2021 13:25:44  Anaerobic Bottle: Gram Positive Cocci in Clusters  Floor previously notified.  ***Blood Panel PCR results on this specimen are available  approximately 3 hours after the Gram stain result.***  Gram stain, PCR, and/or culture results may not always  correspond due to difference in methodologies.  ************************************************************  This PCR assay was performed using [employer].  The following targets are tested for: Enterococcus,  vancomycin resistant enterococci, Listeria monocytogenes,  coagulase negative staphylococci, S. aureus,  methicillin resistant S. aureus, Streptococcus agalactiae  (Group B), S. pneumoniae, S. pyogenes (Group A),  Acinetobacter baumannii, Enterobacter cloacae, E. coli,  Klebsiella oxytoca, K. pneumoniae, Proteus sp.,  Serratia marcescens, Haemophilus influenzae,  Neisseria meningitidis, Pseudomonas aeruginosa, Candida  albicans, C. glabrata, C krusei, C parapsilosis,  C. tropicalis and the KPC resistance gene.    Specimen Source: .Blood Blood-Peripheral    Organism: Blood Culture PCR    Culture Results:   Growth in aerobic and anaerobic bottles: Staphylococcus aureus  presumptive Methicillin resistant  Confirmation to follow within 24 hours  Floor previously notified.    Organism Identification: Blood Culture PCR    Method Type: PCR      RADIOLOGY & ADDITIONAL STUDIES: reviewed

## 2021-10-11 NOTE — PROGRESS NOTE ADULT - SUBJECTIVE AND OBJECTIVE BOX
INTERVAL HPI/OVERNIGHT EVENTS: DALI. Remains on pressor.     ROS: UTO      ANTIBIOTICS/RELEVANT:    MEDICATIONS  (STANDING):  albuterol/ipratropium for Nebulization.. 3 milliLiter(s) Nebulizer every 20 minutes  artificial  tears Solution 1 Drop(s) Both EYES two times a day  bictegravir 50 mG/emtricitabine 200 mG/tenofovir alafenamide 25 mG (BIKTARVY) 1 Tablet(s) Oral daily  chlorhexidine 0.12% Liquid 15 milliLiter(s) Oral Mucosa every 12 hours  chlorhexidine 2% Cloths 1 Application(s) Topical daily  dexMEDEtomidine Infusion 0.08 MICROgram(s)/kG/Hr (1 mL/Hr) IV Continuous <Continuous>  dextrose 40% Gel 15 Gram(s) Oral once  dextrose 5%. 1000 milliLiter(s) (50 mL/Hr) IV Continuous <Continuous>  dextrose 5%. 1000 milliLiter(s) (100 mL/Hr) IV Continuous <Continuous>  dextrose 50% Injectable 25 Gram(s) IV Push once  dextrose 50% Injectable 12.5 Gram(s) IV Push once  dextrose 50% Injectable 25 Gram(s) IV Push once  fentaNYL   Infusion. 0.5 MICROgram(s)/kG/Hr (2.5 mL/Hr) IV Continuous <Continuous>  glucagon  Injectable 1 milliGRAM(s) IntraMuscular once  heparin   Injectable 5000 Unit(s) SubCutaneous every 8 hours  insulin lispro (ADMELOG) corrective regimen sliding scale   SubCutaneous every 6 hours  insulin regular  human recombinant 5 Unit(s) SubCutaneous every 6 hours  norepinephrine Infusion 0.05 MICROgram(s)/kG/Min (4.68 mL/Hr) IV Continuous <Continuous>  pantoprazole  Injectable 40 milliGRAM(s) IV Push daily  propofol Infusion 5 MICROgram(s)/kG/Min (1.5 mL/Hr) IV Continuous <Continuous>  trimethoprim  160 mG/sulfamethoxazole 800 mG 1 Tablet(s) Oral daily    MEDICATIONS  (PRN):        Vital Signs Last 24 Hrs  T(C): 36.7 (11 Oct 2021 14:26), Max: 37.5 (11 Oct 2021 06:05)  T(F): 98 (11 Oct 2021 14:26), Max: 99.5 (11 Oct 2021 06:05)  HR: 54 (11 Oct 2021 15:00) (50 - 63)  BP: 97/53 (11 Oct 2021 15:00) (71/42 - 111/55)  BP(mean): 68 (11 Oct 2021 15:00) (51 - 80)  RR: 17 (11 Oct 2021 15:00) (16 - 55)  SpO2: 96% (11 Oct 2021 15:00) (90% - 97%)    PHYSICAL EXAM:  Constitutional:Well-developed, well nourished  Eyes:SANTOS, EOMI  Ear/Nose/Throat: no oral lesion, no sinus tenderness on percussion	  Neck:no JVD, no lymphadenopathy, supple  Respiratory: CTA abner  Cardiovascular: S1S2 RRR, no murmurs  Gastrointestinal:soft, (+) BS, no HSM  Extremities:no e/e/c  Vascular: DP Pulse:	right normal; left normal      LABS:                        9.0    4.70  )-----------( 70       ( 11 Oct 2021 05:35 )             28.0     10-11    138  |  105  |  35<H>  ----------------------------<  100<H>  5.1   |  23  |  1.22    Ca    8.7      11 Oct 2021 05:35  Phos  2.4     10-11  Mg     2.4     10-11    TPro  7.6  /  Alb  2.8<L>  /  TBili  0.3  /  DBili  x   /  AST  55<H>  /  ALT  12  /  AlkPhos  67  10-11          MICROBIOLOGY: Culture - Bronchial (10.09.21 @ 21:17)    -  Trimethoprim/Sulfamethoxazole: R >2/38    -  Vancomycin: S 2    Gram Stain:   No epithelial cells seen  Rare WBC's  No organisms seen    -  Cefazolin: R 16    -  Clindamycin: R >4    -  Daptomycin: S 0.5    -  Erythromycin: R >4    -  Linezolid: S 2    -  Oxacillin: R >2    -  RIF- Rifampin: S <=1 Should not be used as monotherapy    -  Tetra/Doxy: S <=1    Specimen Source: Lavage lingula    Culture Results:   60,000 CFU/ml Methicillin Resistant Staphylococcus aureus  Result called to and read back byTimmy Cherry NP  10/10/2021 09:00:29  No routine respiratory hilda Isolated    Organism Identification: Methicillin resistant Staphylococcus aureus  Methicillin resistant Staphylococcus aureus    Organism: Methicillin resistant Staphylococcus aureus    Organism: Methicillin resistant Staphylococcus aureus    Method Type: ETEST    Method Type: DARRON        Culture - Blood (10.08.21 @ 17:25)    -  Vancomycin: S 1.5    Gram Stain:   Aerobic Bottle: Gram Positive Cocci in Clusters  Floor previously notified.  Growth in anaerobic bottle: Gram Positive Cocci in Clusters  Floor previously notified.    -  Cefazolin: R 8    -  Clindamycin: R >4    -  Daptomycin: S 0.5    -  Erythromycin: R >4    -  Linezolid: S 2    -  Oxacillin: R >2    -  RIF- Rifampin: S <=1 Should not be used as monotherapy    -  Tetra/Doxy: S <=1    -  Trimethoprim/Sulfamethoxazole: R >2/38    Specimen Source: .Blood Blood-Peripheral    Organism: Methicillin resistant Staphylococcus aureus    Organism: Methicillin resistant Staphylococcus aureus    Culture Results:   Growth in aerobic bottle: Methicillin Resistant Staphylococcus aureus  Floor previously notified.  Culture in progress    Organism Identification: Methicillin resistant Staphylococcus aureus  Methicillin resistant Staphylococcus aureus    Method Type: ETEST    Method Type: DARRON    RADIOLOGY & ADDITIONAL STUDIES: < from: TTE Echo Complete w/o Contrast w/ Doppler (10.11.21 @ 10:22) >  CONCLUSIONS:     1. Technically difficult study.   2. Normal left and right ventricular size and systolic function.   3. Mitral valve is diffusely thickened; there are vague echodensities on the anterior and posterior leaflet that was not well visualized.   4.At least moderate eccentric mitral regurgitation seen at a blood pressure of 98/56 mmHg. Severity may be underestimated due to jet eccentricity.   5. Pulmonary hypertension present, pulmonary artery systolic pressure is 37 mmHg.   6. No prior echo isavailable for comparison.   7. Consider MARY to better visualize the mitral valve and elucidate the mechanism of mitral regurgitation, if clinically indicated.   8. Findings were discussed with Medicine Team on 10/11/2021 at 12:10PM.    < end of copied text >

## 2021-10-11 NOTE — PROGRESS NOTE ADULT - ASSESSMENT
54yo F w/ PMH AIDS (Sept 2021 CD4 59,  currently on Biktarvy and Bactrim ppx), Hepatitis B/C, COPD (not on home O2, actively smokes), rectal cancer s/p radiation therapy, tongue cancer s/p resection, h/o crack use (last use 8/2021) BIBEMS from [facility] due to altered mental status. Admitted for acute hypoxic respiratory failure secondary to severe sepsis likely secondary to HAP with suspicion for superimposed PCP pneumonia. Currently intubated and sedated requiring levophed, fentanyl, and propofol gtt. Admitted to MICU for further management.      NEURO  Intubated and sedated  - currently on fentanyl and propofol gtt  - started precedex to wean sedation  - RASS goal -2     CARDIOVASCULAR  #septic shock 2/2 HAP  - s/p 2L in ED. currently on 0.11mg/kg/hr levophed  - Titrate levo to MAP goal of 65-70  - will place central line    #sinus Tach  - EKG with , QTc 428ms. Trop-T negative. Likely elevated in setting of severe sepsis from PNA.   - POCUS without obvious signs of right heart strain  - d-dimer 1086  -- bedside POCUS showing no heart strain   - ID plan as below     PULMONARY  #Acute hypoxic respiratory failure 2/2 severe sepsis from likely HAP vs PCP i/s/o immunocompromised state  Intubated in the ED after trial of NRB due to increased work of breathing. Currently on VC with FiO2 set at 100%. Likely secondary to PNA. Recent hospitalization for MRSA for which she was on a 6 day regimen of vancomycin.   - FIO2 decreased to 40%  - c/w vanc   - c/w PCP prophylaxis - Bactrim DS qD per ID  - c/w home Biktarvy per ID  - 2 physician consent obtained for bronchoscopy to r/o PCP as pt is hyperglycemic and would benefit from being off steroids  - obtain sputum cultures including PCP PCR  - f/up blood cultures, urine cultures  - f/u CT Chest     #COPD  Known h/o COPD. Current smoker (cut down to 6 cigarettes/day but has smoked since she was 13yo). Recently discharged on stiolto.   - c/w duonebs q6h   - resume nicotine patch 14mg/24h when clinically appropriate    RENAL  #ML   - BUN 32/Cr 1.38 on admission with baseline 9/0.8 per prior charts. Likely i/s/o severe sepsis.   - per ID, need for Bactrim despite ML  - Improving today  - renally dose meds, avoid nephrotoxic agents  - monitor I/Os     ENDO  - Required SS and 10U NPH overnight for hyperglycemia. Worsening sugars i/s/o steroids given with bactrim  - started regular insulin 5U subQ Q6  - c/w mISS    GI  #Feeds  - NGT placed  - Can give PO meds by NGT  - PPI while on steroids    HEME/ONC  #Anemia  - Hgb 7.1/Hct 22.7 on admission with prior levels in 9.0 range. Possibly AoCD from known AIDS and cancer diagnoses.   - Hgb 6.8 this AM. 1 unit Prbc given  - goal Hgb > 7, maintain active T&S    #thrombocytopenia  PLT 32  - 1 unit Platelets given  - monitor for now  - D/c'd zosyn    #Tongue Cancer  Pt has h/o tongue cancer s/p resection +/- graft placement. Unable to assess throat on physical exam given intubation/sedation.  - no acute interventions at this time    ID  #Severe Sepsis 2/2 HAP and/or PCP PNA   Patient with copious amounts of thick yellow secretions. Recent hospitalization for acute hypoxic resp failure 2/2 MRSA pneumonia for which she completed a 6 day course of vancomycin.   - c/w vanc  - c/w PCP ppx bactrim DS qd  - bronchoscopy on 10/9  - BAL Cx - MRSA+  - BCx - MRSA+  - TTE performed and unable to visualize vegitations. MARY ordered.    #AIDS  - Latest CD4 59, . Takes Biktarvy and bactrim ppx at home  - Per ID, c/w home Biktarvy     DERM  #Known Stage 4 sacral ulcer  Pt has stage 4 pressure ulcer on R sacrum measuring 4cm x 2cm x 0.2cm - staged and managed last admission by wound care. Last admission wound care recs: Aquacel Extra, cut piece to fit over wound, cover with foam dressing  - Consider wound care consult    Nutrition & Prophylaxis  F: s/p 2L NS bolus in ED  E: replete K<4, Mg<2  N: NPO   DVT ppx: hep SubQ  Code status: Full Code after discussion with ED provider      Dispo: MICU

## 2021-10-11 NOTE — PROGRESS NOTE ADULT - SUBJECTIVE AND OBJECTIVE BOX
** INCOMPLETE **     INTERVAL HPI/OVERNIGHT EVENTS:    SUBJECTIVE: Patient seen and examined at bedside.    VITAL SIGNS:  ICU Vital Signs Last 24 Hrs  T(C): 36.9 (11 Oct 2021 01:02), Max: 36.9 (11 Oct 2021 01:02)  T(F): 98.5 (11 Oct 2021 01:02), Max: 98.5 (11 Oct 2021 01:02)  HR: 50 (11 Oct 2021 05:00) (50 - 59)  BP: 95/54 (11 Oct 2021 05:00) (94/50 - 111/55)  BP(mean): 71 (11 Oct 2021 05:00) (66 - 80)  ABP: --  ABP(mean): --  RR: 16 (11 Oct 2021 05:00) (16 - 45)  SpO2: 92% (11 Oct 2021 05:00) (92% - 98%)    Mode: AC/ CMV (Assist Control/ Continuous Mandatory Ventilation), RR (machine): 16, TV (machine): 450, FiO2: 40, PEEP: 5, ITime: 1, MAP: 8.6, PIP: 18    10-09 @ 07:01  -  10-10 @ 07:00  --------------------------------------------------------  IN: 2194.8 mL / OUT: 500 mL / NET: 1694.8 mL    10-10 @ 07:01  -  10-11 @ 05:41  --------------------------------------------------------  IN: 466.5 mL / OUT: 450 mL / NET: 16.5 mL      CAPILLARY BLOOD GLUCOSE      POCT Blood Glucose.: 102 mg/dL (11 Oct 2021 05:21)      PHYSICAL EXAM:    Constitutional: NAD  HEENT: NC/AT; PERRL, anicteric sclera; MMM  Neck: supple, no JVD  Cardiovascular: +S1/S2, RRR  Respiratory: CTA B/L, no W/R/R  Gastrointestinal: abdomen soft, NT/ND; no rebound or guarding; +BSx4  Genitourinary: no suprapubic tenderness or fullness  Extremities: WWP; no LE edema; no clubbing or cyanosis  Vascular: 2+ radial, DP/PT and femoral pulses B/L  Dermatologic: normal color and turgor; no visible rashes  Neurological:     MEDICATIONS:  MEDICATIONS  (STANDING):  albuterol/ipratropium for Nebulization.. 3 milliLiter(s) Nebulizer every 20 minutes  artificial  tears Solution 1 Drop(s) Both EYES two times a day  bictegravir 50 mG/emtricitabine 200 mG/tenofovir alafenamide 25 mG (BIKTARVY) 1 Tablet(s) Oral daily  chlorhexidine 0.12% Liquid 15 milliLiter(s) Oral Mucosa every 12 hours  chlorhexidine 2% Cloths 1 Application(s) Topical daily  dexMEDEtomidine Infusion 0.08 MICROgram(s)/kG/Hr (1 mL/Hr) IV Continuous <Continuous>  dextrose 40% Gel 15 Gram(s) Oral once  dextrose 5%. 1000 milliLiter(s) (50 mL/Hr) IV Continuous <Continuous>  dextrose 5%. 1000 milliLiter(s) (100 mL/Hr) IV Continuous <Continuous>  dextrose 50% Injectable 25 Gram(s) IV Push once  dextrose 50% Injectable 12.5 Gram(s) IV Push once  dextrose 50% Injectable 25 Gram(s) IV Push once  fentaNYL    Injectable 50 MICROGram(s) IV Push once  fentaNYL   Infusion. 0.5 MICROgram(s)/kG/Hr (2.5 mL/Hr) IV Continuous <Continuous>  glucagon  Injectable 1 milliGRAM(s) IntraMuscular once  heparin   Injectable 5000 Unit(s) SubCutaneous every 8 hours  insulin lispro (ADMELOG) corrective regimen sliding scale   SubCutaneous every 6 hours  insulin regular  human recombinant 5 Unit(s) SubCutaneous every 6 hours  norepinephrine Infusion 0.05 MICROgram(s)/kG/Min (4.68 mL/Hr) IV Continuous <Continuous>  pantoprazole  Injectable 40 milliGRAM(s) IV Push daily  propofol Infusion 5 MICROgram(s)/kG/Min (1.5 mL/Hr) IV Continuous <Continuous>  trimethoprim  160 mG/sulfamethoxazole 800 mG 1 Tablet(s) Oral daily    MEDICATIONS  (PRN):      ALLERGIES:  Allergies    No Known Allergies    Intolerances        LABS:                        8.7    5.19  )-----------( 86       ( 10 Oct 2021 04:34 )             26.3     10-10    136  |  102  |  34<H>  ----------------------------<  120<H>  4.9   |  24  |  1.56<H>    Ca    8.3<L>      10 Oct 2021 04:34  Phos  4.0     10-10  Mg     2.8     10-10    TPro  7.8  /  Alb  2.6<L>  /  TBili  0.2  /  DBili  x   /  AST  68<H>  /  ALT  15  /  AlkPhos  68  10-10    PT/INR - ( 09 Oct 2021 06:02 )   PT: 14.7 sec;   INR: 1.24          PTT - ( 09 Oct 2021 06:02 )  PTT:33.9 sec      RADIOLOGY & ADDITIONAL TESTS: Reviewed. INTERVAL HPI/OVERNIGHT EVENTS:  No acute events overnight.     SUBJECTIVE:   Patient seen and examined at bedside.    VITAL SIGNS:  ICU Vital Signs Last 24 Hrs  T(C): 36.9 (11 Oct 2021 01:02), Max: 36.9 (11 Oct 2021 01:02)  T(F): 98.5 (11 Oct 2021 01:02), Max: 98.5 (11 Oct 2021 01:02)  HR: 50 (11 Oct 2021 05:00) (50 - 59)  BP: 95/54 (11 Oct 2021 05:00) (94/50 - 111/55)  BP(mean): 71 (11 Oct 2021 05:00) (66 - 80)  ABP: --  ABP(mean): --  RR: 16 (11 Oct 2021 05:00) (16 - 45)  SpO2: 92% (11 Oct 2021 05:00) (92% - 98%)    Mode: AC/ CMV (Assist Control/ Continuous Mandatory Ventilation), RR (machine): 16, TV (machine): 450, FiO2: 40, PEEP: 5, ITime: 1, MAP: 8.6, PIP: 18    10-09 @ 07:01  -  10-10 @ 07:00  --------------------------------------------------------  IN: 2194.8 mL / OUT: 500 mL / NET: 1694.8 mL    10-10 @ 07:01  -  10-11 @ 05:41  --------------------------------------------------------  IN: 466.5 mL / OUT: 450 mL / NET: 16.5 mL      CAPILLARY BLOOD GLUCOSE      POCT Blood Glucose.: 102 mg/dL (11 Oct 2021 05:21)      PHYSICAL EXAM:    Constitutional: NAD  HEENT: NC/AT; PERRL, anicteric sclera; MMM  Neck: supple, no JVD  Cardiovascular: +S1/S2, RRR  Respiratory: CTA B/L, no W/R/R  Gastrointestinal: abdomen soft, NT/ND; no rebound or guarding; +BSx4  Genitourinary: no suprapubic tenderness or fullness  Extremities: WWP; no LE edema; no clubbing or cyanosis  Vascular: 2+ radial, DP/PT and femoral pulses B/L  Dermatologic: normal color and turgor; no visible rashes  Neurological:     MEDICATIONS:  MEDICATIONS  (STANDING):  albuterol/ipratropium for Nebulization.. 3 milliLiter(s) Nebulizer every 20 minutes  artificial  tears Solution 1 Drop(s) Both EYES two times a day  bictegravir 50 mG/emtricitabine 200 mG/tenofovir alafenamide 25 mG (BIKTARVY) 1 Tablet(s) Oral daily  chlorhexidine 0.12% Liquid 15 milliLiter(s) Oral Mucosa every 12 hours  chlorhexidine 2% Cloths 1 Application(s) Topical daily  dexMEDEtomidine Infusion 0.08 MICROgram(s)/kG/Hr (1 mL/Hr) IV Continuous <Continuous>  dextrose 40% Gel 15 Gram(s) Oral once  dextrose 5%. 1000 milliLiter(s) (50 mL/Hr) IV Continuous <Continuous>  dextrose 5%. 1000 milliLiter(s) (100 mL/Hr) IV Continuous <Continuous>  dextrose 50% Injectable 25 Gram(s) IV Push once  dextrose 50% Injectable 12.5 Gram(s) IV Push once  dextrose 50% Injectable 25 Gram(s) IV Push once  fentaNYL    Injectable 50 MICROGram(s) IV Push once  fentaNYL   Infusion. 0.5 MICROgram(s)/kG/Hr (2.5 mL/Hr) IV Continuous <Continuous>  glucagon  Injectable 1 milliGRAM(s) IntraMuscular once  heparin   Injectable 5000 Unit(s) SubCutaneous every 8 hours  insulin lispro (ADMELOG) corrective regimen sliding scale   SubCutaneous every 6 hours  insulin regular  human recombinant 5 Unit(s) SubCutaneous every 6 hours  norepinephrine Infusion 0.05 MICROgram(s)/kG/Min (4.68 mL/Hr) IV Continuous <Continuous>  pantoprazole  Injectable 40 milliGRAM(s) IV Push daily  propofol Infusion 5 MICROgram(s)/kG/Min (1.5 mL/Hr) IV Continuous <Continuous>  trimethoprim  160 mG/sulfamethoxazole 800 mG 1 Tablet(s) Oral daily    MEDICATIONS  (PRN):      ALLERGIES:  Allergies    No Known Allergies    Intolerances        LABS:                        8.7    5.19  )-----------( 86       ( 10 Oct 2021 04:34 )             26.3     10-10    136  |  102  |  34<H>  ----------------------------<  120<H>  4.9   |  24  |  1.56<H>    Ca    8.3<L>      10 Oct 2021 04:34  Phos  4.0     10-10  Mg     2.8     10-10    TPro  7.8  /  Alb  2.6<L>  /  TBili  0.2  /  DBili  x   /  AST  68<H>  /  ALT  15  /  AlkPhos  68  10-10    PT/INR - ( 09 Oct 2021 06:02 )   PT: 14.7 sec;   INR: 1.24          PTT - ( 09 Oct 2021 06:02 )  PTT:33.9 sec      RADIOLOGY & ADDITIONAL TESTS: Reviewed. INTERVAL HPI/OVERNIGHT EVENTS:  No acute events overnight.     SUBJECTIVE:   Patient seen and examined at bedside. Patient is intubated. Patient currently being weaned off sedation and is arousable.    VITAL SIGNS:  ICU Vital Signs Last 24 Hrs  T(C): 36.9 (11 Oct 2021 01:02), Max: 36.9 (11 Oct 2021 01:02)  T(F): 98.5 (11 Oct 2021 01:02), Max: 98.5 (11 Oct 2021 01:02)  HR: 50 (11 Oct 2021 05:00) (50 - 59)  BP: 95/54 (11 Oct 2021 05:00) (94/50 - 111/55)  BP(mean): 71 (11 Oct 2021 05:00) (66 - 80)  ABP: --  ABP(mean): --  RR: 16 (11 Oct 2021 05:00) (16 - 45)  SpO2: 92% (11 Oct 2021 05:00) (92% - 98%)    Mode: AC/ CMV (Assist Control/ Continuous Mandatory Ventilation), RR (machine): 16, TV (machine): 450, FiO2: 40, PEEP: 5, ITime: 1, MAP: 8.6, PIP: 18    10-09 @ 07:01  -  10-10 @ 07:00  --------------------------------------------------------  IN: 2194.8 mL / OUT: 500 mL / NET: 1694.8 mL    10-10 @ 07:01  -  10-11 @ 05:41  --------------------------------------------------------  IN: 466.5 mL / OUT: 450 mL / NET: 16.5 mL      CAPILLARY BLOOD GLUCOSE      POCT Blood Glucose.: 102 mg/dL (11 Oct 2021 05:21)      PHYSICAL EXAM:    Constitutional: NAD  HEENT: NC/AT; PERRL, anicteric sclera; MMM  Neck: supple, no JVD  Cardiovascular: +S1/S2, RRR  Respiratory: CTA B/L, no W/R/R  Gastrointestinal: abdomen soft, NT/ND; no rebound or guarding; +BSx4  Genitourinary: no suprapubic tenderness or fullness  Extremities: WWP; no LE edema; no clubbing or cyanosis  Vascular: 2+ radial, DP/PT and femoral pulses B/L  Dermatologic: normal color and turgor; no visible rashes  Neurological:     MEDICATIONS:  MEDICATIONS  (STANDING):  albuterol/ipratropium for Nebulization.. 3 milliLiter(s) Nebulizer every 20 minutes  artificial  tears Solution 1 Drop(s) Both EYES two times a day  bictegravir 50 mG/emtricitabine 200 mG/tenofovir alafenamide 25 mG (BIKTARVY) 1 Tablet(s) Oral daily  chlorhexidine 0.12% Liquid 15 milliLiter(s) Oral Mucosa every 12 hours  chlorhexidine 2% Cloths 1 Application(s) Topical daily  dexMEDEtomidine Infusion 0.08 MICROgram(s)/kG/Hr (1 mL/Hr) IV Continuous <Continuous>  dextrose 40% Gel 15 Gram(s) Oral once  dextrose 5%. 1000 milliLiter(s) (50 mL/Hr) IV Continuous <Continuous>  dextrose 5%. 1000 milliLiter(s) (100 mL/Hr) IV Continuous <Continuous>  dextrose 50% Injectable 25 Gram(s) IV Push once  dextrose 50% Injectable 12.5 Gram(s) IV Push once  dextrose 50% Injectable 25 Gram(s) IV Push once  fentaNYL    Injectable 50 MICROGram(s) IV Push once  fentaNYL   Infusion. 0.5 MICROgram(s)/kG/Hr (2.5 mL/Hr) IV Continuous <Continuous>  glucagon  Injectable 1 milliGRAM(s) IntraMuscular once  heparin   Injectable 5000 Unit(s) SubCutaneous every 8 hours  insulin lispro (ADMELOG) corrective regimen sliding scale   SubCutaneous every 6 hours  insulin regular  human recombinant 5 Unit(s) SubCutaneous every 6 hours  norepinephrine Infusion 0.05 MICROgram(s)/kG/Min (4.68 mL/Hr) IV Continuous <Continuous>  pantoprazole  Injectable 40 milliGRAM(s) IV Push daily  propofol Infusion 5 MICROgram(s)/kG/Min (1.5 mL/Hr) IV Continuous <Continuous>  trimethoprim  160 mG/sulfamethoxazole 800 mG 1 Tablet(s) Oral daily    MEDICATIONS  (PRN):      ALLERGIES:  Allergies    No Known Allergies    Intolerances        LABS:                        8.7    5.19  )-----------( 86       ( 10 Oct 2021 04:34 )             26.3     10-10    136  |  102  |  34<H>  ----------------------------<  120<H>  4.9   |  24  |  1.56<H>    Ca    8.3<L>      10 Oct 2021 04:34  Phos  4.0     10-10  Mg     2.8     10-10    TPro  7.8  /  Alb  2.6<L>  /  TBili  0.2  /  DBili  x   /  AST  68<H>  /  ALT  15  /  AlkPhos  68  10-10    PT/INR - ( 09 Oct 2021 06:02 )   PT: 14.7 sec;   INR: 1.24          PTT - ( 09 Oct 2021 06:02 )  PTT:33.9 sec      RADIOLOGY & ADDITIONAL TESTS: Reviewed.

## 2021-10-11 NOTE — PROGRESS NOTE ADULT - ASSESSMENT
56 yo female with AIDS, tongue and rectal ca, trach, recent admission for esophagitis, here with MRSA pneumonia and associated BSI. TTE with MR MV thickening and ?echodensities.   - f/u surveillance bcx 10/10  - f/u MARY; even if found to have infective endocarditis, doubt she would be a CTS candidate given advanced comorbidities and FTT  - continue vancomycin 750mg IV q24h check trough before 3rd dose  - continue TMP/SMX 1 DS daily ppx  - continue Biktarvy daily   - advise palliative care evaluation given advanced AIDS and FTT    d/w primary team    ID Team 2

## 2021-10-12 NOTE — PROGRESS NOTE ADULT - ASSESSMENT
56yo F w/ PMH AIDS (Sept 2021 CD4 57,  currently on Biktarvy and Bactrim ppx), Hepatitis B/C, COPD (not on home O2, actively smokes), rectal cancer s/p radiation therapy, tongue cancer s/p resection, h/o crack use (last use 8/2021) BIBEMS from [facility] due to altered mental status. Admitted for acute hypoxic respiratory failure secondary to severe sepsis likely secondary to HAP with suspicion for superimposed PCP pneumonia. Currently intubated and sedated requiring levophed, fentanyl, and propofol gtt. Admitted to MICU for further management.      NEURO  Intubated and sedated  - currently on fentanyl and propofol gtt, plan to wean to after MARY  - precedex stopped overnight due to bradycardia  - RASS goal -2     CARDIOVASCULAR  #septic shock 2/2 HAP  Recent Hx with MRSA PNA treated w/ Vanc. Sepsis 2/2 HAP. s/p 2L NS in ED  - Currently on levophed, needs increased overnight  - Titrate levo to MAP goal of 65-70    #r/o Endocarditis  IC patient with MRSA+ in BCx and BAL Cx.   - TTE - unable to visualize vegetations  - Obtain MARY for endocarditis evaluation    #sinus Tach  - EKG with , QTc 428ms. Trop-T negative. Likely elevated in setting of severe sepsis from PNA  - d-dimer 1086  -- bedside POCUS showing no right heart strain   - ID plan as below     PULMONARY  #Acute hypoxic respiratory failure 2/2 severe sepsis from likely HAP vs PCP i/s/o immunocompromised state  Intubated in the ED after trial of NRB due to increased work of breathing. Currently on VC with FiO2 set at 100%. Likely secondary to PNA. Recent hospitalization for MRSA for which she was on a 6 day regimen of vancomycin.   - FIO2 decreased to 40%  - c/w vanc   - c/w PCP prophylaxis - Bactrim DS qD per ID  - c/w home Biktarvy per ID  - 2PC obtained for Bronchoscopy (10/9)  - BCx (10/8) - MRSA+  - BAL Cx (10/9) - MRSA+  - Surveillance BCx (10/11) - G+ cocci clusters    #COPD  Known h/o COPD. Current smoker (cut down to 6 cigarettes/day but has smoked since she was 13yo). Recently discharged on stiolto.   - c/w duonebs q6h   - resume nicotine patch 14mg/24h when clinically appropriate    RENAL  #ML   - BUN 32/Cr 1.38 on admission with baseline 9/0.8 per prior charts. Likely i/s/o severe sepsis.   - Repeat BUN/Cr 29/1.09, improving today  - per ID, need for Bactrim despite ML  - Jeter placed today  - renally dose meds, avoid nephrotoxic agents  - monitor I/Os     ENDO  - Required SS and 10U NPH overnight for hyperglycemia. Worsening sugars i/s/o steroids given with bactrim  - started regular insulin 5U subQ Q6  - c/w mISS    GI  #Feeds  - NGT placed  - Can give PO meds by NGT  - PPI while on steroids    HEME/ONC  #Anemia  - Hgb 7.1/Hct 22.7 on admission with prior levels in 9.0 range. Possibly AoCD from known AIDS and cancer diagnoses. S/p 1 pRBC  - goal Hgb > 7, maintain active T&S    #thrombocytopenia  PLT 32  - s/p 1U platelets given  - monitor for now  - D/c'd zosyn    #Tongue Cancer  Pt has h/o tongue cancer s/p resection +/- graft placement. Unable to assess throat on physical exam given intubation/sedation.  - no acute interventions at this time    ID  #Severe Sepsis 2/2 HAP and/or PCP PNA   Patient with copious amounts of thick yellow secretions. Recent hospitalization for acute hypoxic resp failure 2/2 MRSA pneumonia for which she completed a 6 day course of vancomycin.   - c/w vanc  - c/w PCP ppx bactrim DS Qd  - bronchoscopy on 10/9  - BAL Cx - MRSA+  - BCx - MRSA+  - Palliative consulted - prognostication dependent on MARY results - long term ABx vs palliative care    #AIDS  - Latest CD4 57, . Takes Biktarvy and bactrim ppx at home  - Per ID, c/w home Biktarvy     DERM  #Known Stage 4 sacral ulcer  Pt has stage 4 pressure ulcer on R sacrum measuring 4cm x 2cm x 0.2cm - staged and managed last admission by wound care. Last admission wound care recs: Aquacel Extra, cut piece to fit over wound, cover with foam dressing  - Per wound care consult, probe to bone, recommended Aquacel and wound packing.    Right IJ (10/9)    Nutrition & Prophylaxis  F: s/p 2L NS bolus in ED  E: replete K<4, Mg<2  N: NPO   DVT ppx: hep SubQ  Code status: Full Code after discussion with ED provider      Dispo: MICU

## 2021-10-12 NOTE — ADVANCED PRACTICE NURSE CONSULT - REASON FOR CONSULT
Stage-3 Sacrum, perineal/perivaginal erosion    HPI:  54yo F w/ PMH AIDS (Sept 2021 CD4 57,  currently on Biktarvy and Bactrim ppx), Hepatitis B/C, COPD (not on home O2, actively smokes), rectal cancer s/p radiation therapy, tongue cancer s/p resection, h/o crack use (last use 8/2021) BIBEMS from [facility] due to altered mental status. Majority of history obtained from chart as patient was intubated upon ICU team consult. Per EMS patient was found to be hypoxic on RA to 70s and hypotensive SBP 70-80. Upon ED arrival, she was admitting to shortness of breath while on NRB 15L satting 96%. She denied any chest pain, chills, body aches at the time, however appeared ill and with accessory muscle use. Upon chart review, patient from recent hospitalization (9/27 - 10/3) expressed desire to be DNR/DNI (MOLST in chart). However, today the ED provider readdressed patient's wishes and she made a decision to rescind her code status and become full code including intubation. Of note, prior to 9/27 hospitalization patient c/o 6 days worsening dysphagia, throat pain and cough. She was admitted for hypercapnic, hypoxic respiratory failure secondary to sepsis from presumed MRSA pneumonia along with trichomonas and potential esophagitis. She was treated with vancomycin and discharged on remaining days of fluconazole for presumed oral thrush. Pulmonary consult service also evaluated the patient for HAP, aspiration, and COPD. No bronchoscopy was performed at the time.

## 2021-10-12 NOTE — PROGRESS NOTE ADULT - SUBJECTIVE AND OBJECTIVE BOX
** INCOMPLETE **     INTERVAL HPI/OVERNIGHT EVENTS:    SUBJECTIVE: Patient seen and examined at bedside.    VITAL SIGNS:  ICU Vital Signs Last 24 Hrs  T(C): 38 (12 Oct 2021 01:40), Max: 38 (12 Oct 2021 01:40)  T(F): 100.4 (12 Oct 2021 01:40), Max: 100.4 (12 Oct 2021 01:40)  HR: 82 (12 Oct 2021 05:00) (49 - 82)  BP: 90/62 (12 Oct 2021 04:00) (71/42 - 103/59)  BP(mean): 70 (12 Oct 2021 04:00) (51 - 76)  ABP: --  ABP(mean): --  RR: 22 (12 Oct 2021 05:00) (16 - 55)  SpO2: 97% (12 Oct 2021 05:00) (90% - 97%)    Mode: AC/ CMV (Assist Control/ Continuous Mandatory Ventilation), RR (machine): 16, TV (machine): 450, FiO2: 40, PEEP: 5, ITime: 1, MAP: 9.2, PIP: 19    10-10 @ 07:01  -  10-11 @ 07:00  --------------------------------------------------------  IN: 580.3 mL / OUT: 700 mL / NET: -119.7 mL    10-11 @ 07:01  -  10-12 @ 05:30  --------------------------------------------------------  IN: 556.6 mL / OUT: 500 mL / NET: 56.6 mL      CAPILLARY BLOOD GLUCOSE      POCT Blood Glucose.: 108 mg/dL (11 Oct 2021 23:27)      PHYSICAL EXAM:    Constitutional: NAD  HEENT: NC/AT; PERRL, anicteric sclera; MMM  Neck: supple, no JVD  Cardiovascular: +S1/S2, RRR  Respiratory: CTA B/L, no W/R/R  Gastrointestinal: abdomen soft, NT/ND; no rebound or guarding; +BSx4  Genitourinary: no suprapubic tenderness or fullness  Extremities: WWP; no LE edema; no clubbing or cyanosis  Vascular: 2+ radial, DP/PT and femoral pulses B/L  Dermatologic: normal color and turgor; no visible rashes  Neurological:     MEDICATIONS:  MEDICATIONS  (STANDING):  albuterol/ipratropium for Nebulization.. 3 milliLiter(s) Nebulizer every 20 minutes  artificial  tears Solution 1 Drop(s) Both EYES two times a day  bictegravir 50 mG/emtricitabine 200 mG/tenofovir alafenamide 25 mG (BIKTARVY) 1 Tablet(s) Oral daily  chlorhexidine 0.12% Liquid 15 milliLiter(s) Oral Mucosa every 12 hours  chlorhexidine 2% Cloths 1 Application(s) Topical daily  dexMEDEtomidine Infusion 0.08 MICROgram(s)/kG/Hr (1 mL/Hr) IV Continuous <Continuous>  dextrose 40% Gel 15 Gram(s) Oral once  dextrose 5%. 1000 milliLiter(s) (50 mL/Hr) IV Continuous <Continuous>  dextrose 5%. 1000 milliLiter(s) (100 mL/Hr) IV Continuous <Continuous>  dextrose 50% Injectable 25 Gram(s) IV Push once  dextrose 50% Injectable 12.5 Gram(s) IV Push once  dextrose 50% Injectable 25 Gram(s) IV Push once  fentaNYL   Infusion. 0.5 MICROgram(s)/kG/Hr (2.5 mL/Hr) IV Continuous <Continuous>  glucagon  Injectable 1 milliGRAM(s) IntraMuscular once  heparin   Injectable 5000 Unit(s) SubCutaneous every 8 hours  insulin lispro (ADMELOG) corrective regimen sliding scale   SubCutaneous every 6 hours  insulin regular  human recombinant 5 Unit(s) SubCutaneous every 6 hours  norepinephrine Infusion 0.05 MICROgram(s)/kG/Min (4.68 mL/Hr) IV Continuous <Continuous>  pantoprazole  Injectable 40 milliGRAM(s) IV Push daily  propofol Infusion 5 MICROgram(s)/kG/Min (1.5 mL/Hr) IV Continuous <Continuous>  trimethoprim  160 mG/sulfamethoxazole 800 mG 1 Tablet(s) Oral daily  vancomycin  IVPB 750 milliGRAM(s) IV Intermittent every 24 hours    MEDICATIONS  (PRN):      ALLERGIES:  Allergies    No Known Allergies    Intolerances        LABS:                        9.0    4.70  )-----------( 70       ( 11 Oct 2021 05:35 )             28.0     10-11    138  |  105  |  35<H>  ----------------------------<  100<H>  5.1   |  23  |  1.22    Ca    8.7      11 Oct 2021 05:35  Phos  2.4     10-11  Mg     2.4     10-11    TPro  7.6  /  Alb  2.8<L>  /  TBili  0.3  /  DBili  x   /  AST  55<H>  /  ALT  12  /  AlkPhos  67  10-11          RADIOLOGY & ADDITIONAL TESTS: Reviewed. INTERVAL HPI/OVERNIGHT EVENTS:  Patient had 101.2F overnight. BCx drawn earlier in the day so no need for repeat. Tylenol given. AM FSG 51 - given 1/2 amp D50.     SUBJECTIVE:   Patient seen and examined at bedside. Patient is intubated and sedated. Precedex was stopped. Patient unable to participate in subjective history.      VITAL SIGNS:  ICU Vital Signs Last 24 Hrs  T(C): 38 (12 Oct 2021 01:40), Max: 38 (12 Oct 2021 01:40)  T(F): 100.4 (12 Oct 2021 01:40), Max: 100.4 (12 Oct 2021 01:40)  HR: 82 (12 Oct 2021 05:00) (49 - 82)  BP: 90/62 (12 Oct 2021 04:00) (71/42 - 103/59)  BP(mean): 70 (12 Oct 2021 04:00) (51 - 76)  ABP: --  ABP(mean): --  RR: 22 (12 Oct 2021 05:00) (16 - 55)  SpO2: 97% (12 Oct 2021 05:00) (90% - 97%)    Mode: AC/ CMV (Assist Control/ Continuous Mandatory Ventilation), RR (machine): 16, TV (machine): 450, FiO2: 40, PEEP: 5, ITime: 1, MAP: 9.2, PIP: 19    10-10 @ 07:01  -  10-11 @ 07:00  --------------------------------------------------------  IN: 580.3 mL / OUT: 700 mL / NET: -119.7 mL    10-11 @ 07:01  -  10-12 @ 05:30  --------------------------------------------------------  IN: 556.6 mL / OUT: 500 mL / NET: 56.6 mL      CAPILLARY BLOOD GLUCOSE    POCT Blood Glucose.: 108 mg/dL (11 Oct 2021 23:27)    PHYSICAL EXAM:  Constitutional: NAD  General: frail, cachectic female, intubated and sedated  HEENT:  NC/AT, sclera anicteric, PERRL, +ET tube in place with copious secretions       Lungs: coarse bilateral breath sounds, worse at the bases, no wheezing, thin chest  Cardiovascular: regular rate rhythm; +s1, s2, no MGR   Gastrointestinal: thin abdomen soft, NTND, bowel sounds present  Extremities: Thin extremities, no peripheral edema, no clubbing, no cyanosis  Skin: Warm, dry. Could not assess sacral wound due to clinical condition  Neurological: intubated and sedated    MEDICATIONS:  MEDICATIONS  (STANDING):  albuterol/ipratropium for Nebulization.. 3 milliLiter(s) Nebulizer every 20 minutes  artificial  tears Solution 1 Drop(s) Both EYES two times a day  bictegravir 50 mG/emtricitabine 200 mG/tenofovir alafenamide 25 mG (BIKTARVY) 1 Tablet(s) Oral daily  chlorhexidine 0.12% Liquid 15 milliLiter(s) Oral Mucosa every 12 hours  chlorhexidine 2% Cloths 1 Application(s) Topical daily  dexMEDEtomidine Infusion 0.08 MICROgram(s)/kG/Hr (1 mL/Hr) IV Continuous <Continuous>  dextrose 40% Gel 15 Gram(s) Oral once  dextrose 5%. 1000 milliLiter(s) (50 mL/Hr) IV Continuous <Continuous>  dextrose 5%. 1000 milliLiter(s) (100 mL/Hr) IV Continuous <Continuous>  dextrose 50% Injectable 25 Gram(s) IV Push once  dextrose 50% Injectable 12.5 Gram(s) IV Push once  dextrose 50% Injectable 25 Gram(s) IV Push once  fentaNYL   Infusion. 0.5 MICROgram(s)/kG/Hr (2.5 mL/Hr) IV Continuous <Continuous>  glucagon  Injectable 1 milliGRAM(s) IntraMuscular once  heparin   Injectable 5000 Unit(s) SubCutaneous every 8 hours  insulin lispro (ADMELOG) corrective regimen sliding scale   SubCutaneous every 6 hours  insulin regular  human recombinant 5 Unit(s) SubCutaneous every 6 hours  norepinephrine Infusion 0.05 MICROgram(s)/kG/Min (4.68 mL/Hr) IV Continuous <Continuous>  pantoprazole  Injectable 40 milliGRAM(s) IV Push daily  propofol Infusion 5 MICROgram(s)/kG/Min (1.5 mL/Hr) IV Continuous <Continuous>  trimethoprim  160 mG/sulfamethoxazole 800 mG 1 Tablet(s) Oral daily  vancomycin  IVPB 750 milliGRAM(s) IV Intermittent every 24 hours    MEDICATIONS  (PRN):    ALLERGIES:  Allergies    No Known Allergies    Intolerances      LABS:                        9.0    4.70  )-----------( 70       ( 11 Oct 2021 05:35 )             28.0     10-11    138  |  105  |  35<H>  ----------------------------<  100<H>  5.1   |  23  |  1.22    Ca    8.7      11 Oct 2021 05:35  Phos  2.4     10-11  Mg     2.4     10-11    TPro  7.6  /  Alb  2.8<L>  /  TBili  0.3  /  DBili  x   /  AST  55<H>  /  ALT  12  /  AlkPhos  67  10-11          RADIOLOGY & ADDITIONAL TESTS: Reviewed.

## 2021-10-12 NOTE — ADVANCED PRACTICE NURSE CONSULT - ASSESSMENT
Patient opens eyes and grimacing with cleansing of perineal/perivaginal area. Over sacrum with a stage-4 pressure injury measuring 5x3.5x1.5cm with undermining from 6-8o'clock with greatest length 6o'clock measuring 3.5cm. wound bed is dry dermal tissue, with palpable bone. No drainage noted. Edges are rolled. Periwound intact.   Perianal area stretching to urethra: patient with severe denuded skin that is moist and erythematous/pink. Likely related to hx of radiation to this area.   Bilateral heel protector boots in place; jacqueline prominences offloaded with pillows.

## 2021-10-12 NOTE — PROGRESS NOTE ADULT - ASSESSMENT
54 yo female with AIDS, tongue and rectal ca, trach, recent admission for esophagitis, here with MRSA pneumonia and associated BSI. TTE with MR MV thickening and ?echodensities.   - f/u surveillance bcx 10/10, 10/11--growing GPC; repeat surveillance bcx 10/12  - f/u MARY; even if found to have infective endocarditis, doubt she would be a CTS candidate given advanced comorbidities and FTT; however MARY may be informative for GOC (i.e. if found to have large vegetation or severe MR and not able to go for CTS, then would consider hospice)  - continue vancomycin 750mg IV q24h; decision to switch to salvage therapy to depend on MARY findings AND GOC discussion   - continue TMP/SMX 1 DS daily ppx  - continue Biktarvy daily   - advise palliative care evaluation given advanced AIDS and FTT    d/w primary team    ID Team 2

## 2021-10-12 NOTE — ADVANCED PRACTICE NURSE CONSULT - RECOMMEDATIONS
Sacral stage-4: Cleanse with NS. Apply WounDres Hydrogel to wound bed and lightly fill all dead space/undermining with dry gauze moistened with the hydrogel. Apply Cavilon to periwound. Cover with foam.   Perianal/perivaginal denuded skin: cleanse gently with bath wipes. apply Triad. Do not try to remove all of Triad; only remove what is soiled and reapply. Apply daily and as needed for soiling.

## 2021-10-12 NOTE — PROGRESS NOTE ADULT - SUBJECTIVE AND OBJECTIVE BOX
INTERVAL HPI/OVERNIGHT EVENTS: DALI.    ROS: UTO      ANTIBIOTICS/RELEVANT:    MEDICATIONS  (STANDING):  artificial  tears Solution 1 Drop(s) Both EYES two times a day  bictegravir 50 mG/emtricitabine 200 mG/tenofovir alafenamide 25 mG (BIKTARVY) 1 Tablet(s) Oral daily  chlorhexidine 0.12% Liquid 15 milliLiter(s) Oral Mucosa every 12 hours  chlorhexidine 2% Cloths 1 Application(s) Topical daily  dextrose 40% Gel 15 Gram(s) Oral once  dextrose 5%. 1000 milliLiter(s) (50 mL/Hr) IV Continuous <Continuous>  dextrose 5%. 1000 milliLiter(s) (100 mL/Hr) IV Continuous <Continuous>  dextrose 50% Injectable 25 Gram(s) IV Push once  dextrose 50% Injectable 12.5 Gram(s) IV Push once  dextrose 50% Injectable 25 Gram(s) IV Push once  enoxaparin Injectable 40 milliGRAM(s) SubCutaneous every 24 hours  fentaNYL   Infusion. 0.5 MICROgram(s)/kG/Hr (2.5 mL/Hr) IV Continuous <Continuous>  glucagon  Injectable 1 milliGRAM(s) IntraMuscular once  insulin lispro (ADMELOG) corrective regimen sliding scale   SubCutaneous every 6 hours  midazolam Injectable 2 milliGRAM(s) IV Push once  norepinephrine Infusion 0.05 MICROgram(s)/kG/Min (4.68 mL/Hr) IV Continuous <Continuous>  pantoprazole  Injectable 40 milliGRAM(s) IV Push daily  propofol Infusion 5 MICROgram(s)/kG/Min (1.5 mL/Hr) IV Continuous <Continuous>  trimethoprim  160 mG/sulfamethoxazole 800 mG 1 Tablet(s) Oral daily    MEDICATIONS  (PRN):        Vital Signs Last 24 Hrs  T(C): 37.7 (12 Oct 2021 15:00), Max: 38.8 (12 Oct 2021 10:00)  T(F): 99.8 (12 Oct 2021 15:00), Max: 101.9 (12 Oct 2021 10:00)  HR: 57 (12 Oct 2021 15:30) (49 - 112)  BP: 97/56 (12 Oct 2021 15:30) (83/53 - 106/60)  BP(mean): 73 (12 Oct 2021 15:30) (63 - 78)  RR: 16 (12 Oct 2021 15:30) (16 - 37)  SpO2: 96% (12 Oct 2021 15:30) (77% - 100%)    PHYSICAL EXAM:  Constitutional:Well-developed, well nourished  Eyes:SANTOS, EOMI  Ear/Nose/Throat: no oral lesion, no sinus tenderness on percussion	  Neck:no JVD, no lymphadenopathy, supple  Respiratory: CTA abner  Cardiovascular: S1S2 RRR, no murmurs  Gastrointestinal:soft, (+) BS, no HSM  Extremities:no e/e/c  Vascular: DP Pulse:	right normal; left normal      LABS:                        9.2    7.39  )-----------( 63       ( 12 Oct 2021 05:51 )             29.5     10-12    139  |  107  |  29<H>  ----------------------------<  60<L>  4.3   |  24  |  1.06    Ca    8.8      12 Oct 2021 05:51  Phos  2.4     10-12  Mg     2.0     10-12    TPro  7.7  /  Alb  2.6<L>  /  TBili  0.4  /  DBili  x   /  AST  47<H>  /  ALT  12  /  AlkPhos  71  10-12          MICROBIOLOGY: reviewed    RADIOLOGY & ADDITIONAL STUDIES: reviewed

## 2021-10-13 NOTE — CONSULT NOTE ADULT - SUBJECTIVE AND OBJECTIVE BOX
[facility] Geriatrics and Palliative Care  Reza Shay, Palliative Care Attending  Contact Info: Call [phone] (HEAL Line) or message on Microsoft Teams (Reza Shay)    HPI:  54yo F w/ PMH AIDS (Sept 2021 CD4 57,  currently on Biktarvy and Bactrim ppx), Hepatitis B/C, COPD (not on home O2, actively smokes), rectal cancer s/p radiation therapy, tongue cancer s/p resection, h/o crack use (last use 8/2021) BIBEMS from [facility] due to altered mental status. Majority of history obtained from chart as patient was intubated upon ICU team consult. Per EMS patient was found to be hypoxic on RA to 70s and hypotensive SBP 70-80. Upon ED arrival, she was admitting to shortness of breath while on NRB 15L satting 96%. She denied any chest pain, chills, body aches at the time, however appeared ill and with accessory muscle use. Upon chart review, patient from recent hospitalization (9/27 - 10/3) expressed desire to be DNR/DNI (MOLST in chart). However, today the ED provider readdressed patient's wishes and she made a decision to rescind her code status and become full code including intubation. Of note, prior to 9/27 hospitalization patient c/o 6 days worsening dysphagia, throat pain and cough. She was admitted for hypercapnic, hypoxic respiratory failure secondary to sepsis from presumed MRSA pneumonia along with trichomonas and potential esophagitis. She was treated with vancomycin and discharged on remaining days of fluconazole for presumed oral thrush. Pulmonary consult service also evaluated the patient for HAP, aspiration, and COPD. No bronchoscopy was performed at the time.  (08 Oct 2021 19:47)    PERTINENT PM/SXH:   HIV disease  Advanced COPD  Tongue cancer  Rectal cancer  Hepatitis B immune  Hepatitis B  Hepatitis C    FAMILY HISTORY:  FH: heart disease (Mother)    ITEMS NOT CHECKED ARE NOT PRESENT    SOCIAL HISTORY:   Significant other/partner:  []  Children:  [x]  Jew/Spirituality:  Substance hx:  [x]   Tobacco hx:  []   Alcohol hx: []   Home Opioid hx:  [] I-Stop Reference No: [id number]  - no active Rx's  Living Situation: []Home  [x]Long term care  []Rehab []Other    ADVANCE DIRECTIVES:    []MOLST  []Living Will  DECISION MAKER(s):  [x] Health Care Proxy(s)  [] Surrogate(s)  [] Guardian           Name(s)/Phone Number(s): Reed Devlin, [phone]    BASELINE (I)ADLs (prior to admission):  [place]: []Total  [x] Moderate []Dependent    ALLERGIES:  No Known Allergies    MEDICATIONS  (STANDING):  artificial  tears Solution 1 Drop(s) Both EYES two times a day  bictegravir 50 mG/emtricitabine 200 mG/tenofovir alafenamide 25 mG (BIKTARVY) 1 Tablet(s) Oral daily  chlorhexidine 0.12% Liquid 15 milliLiter(s) Oral Mucosa every 12 hours  chlorhexidine 2% Cloths 1 Application(s) Topical daily  dexMEDEtomidine Infusion 0.2 MICROgram(s)/kG/Hr (2.5 mL/Hr) IV Continuous <Continuous>  dextrose 40% Gel 15 Gram(s) Oral once  dextrose 5%. 1000 milliLiter(s) (50 mL/Hr) IV Continuous <Continuous>  dextrose 5%. 1000 milliLiter(s) (100 mL/Hr) IV Continuous <Continuous>  dextrose 50% Injectable 25 Gram(s) IV Push once  dextrose 50% Injectable 12.5 Gram(s) IV Push once  dextrose 50% Injectable 25 Gram(s) IV Push once  enoxaparin Injectable 40 milliGRAM(s) SubCutaneous every 24 hours  fentaNYL   Infusion. 0.5 MICROgram(s)/kG/Hr (2.5 mL/Hr) IV Continuous <Continuous>  glucagon  Injectable 1 milliGRAM(s) IntraMuscular once  insulin lispro (ADMELOG) corrective regimen sliding scale   SubCutaneous every 6 hours  norepinephrine Infusion 0.05 MICROgram(s)/kG/Min (4.68 mL/Hr) IV Continuous <Continuous>  pantoprazole  Injectable 40 milliGRAM(s) IV Push daily  trimethoprim  160 mG/sulfamethoxazole 800 mG 1 Tablet(s) Oral daily    MEDICATIONS  (PRN):    PRESENT SYMPTOMS: [x]Unable to obtain due to poor mentation/encephalopathy  Source if other than patient:  []Family   []Team     Pain: [ ] yes [ ] no  QOL impact -   Location -                    Aggravating Factors -  Quality -  Radiation -  Timing -  Severity (0-10 scale) -   Minimal Acceptable Level (0-10 scale) -    PAIN AD Score: 1  http://geriatrictoolkit.[facility]/cog/painad.pdf (press ctrl +  left click to view)    Dyspnea:                           []Mild  []Moderate []Severe  Anxiety:                             []Mild []Moderate []Severe  Fatigue:                             []Mild []Moderate []Severe  Nausea:                             []Mild []Moderate []Severe  Loss of Appetite:              []Mild []Moderate []Severe  Constipation:                    []Mild []Moderate []Severe    Other Symptoms:  []All other review of systems negative     Palliative Performance Status Version 2:  10%    http://[email or web address].org/files/news/palliative_performance_scale_[email or web address].pdf    PHYSICAL EXAM:  GENERAL:  []Alert  []Oriented x   []Lethargic  []Cachexia  [x]Unarousable  []Verbal  [x]Non-Verbal  Behavioral:   []Anxiety  [x]Delirium []Agitation []Cooperative  HEENT:  []Normal   []Dry mouth   [x]ET Tube/Trach  []Oral lesions  PULMONARY:   []Clear [x]Tachypnea  []Audible excessive secretions   [x]Rhonchi        []Right []Left [x]Bilateral  []Crackles        []Right []Left []Bilateral  []Wheezing     []Right []Left []Bilateral  CARDIOVASCULAR:    [x]Regular []Irregular []Tachy  []Sudeep []Murmur []Other  GASTROINTESTINAL:  [x]Soft  []Distended   [x]+BS  []Non tender []Tender  []PEG [x]OGT/ NGT  Last BM:   GENITOURINARY:  []Normal [] Incontinent   []Oliguria/Anuria   [x]Jeter  MUSCULOSKELETAL:   []Normal   []Weakness  [x]Bed/Wheelchair bound []Edema  NEUROLOGIC:   []No focal deficits  []Cognitive impairment  [x]Dysphagia []Dysarthria []Paresis [x]Encephalopathic   SKIN:   []Normal   [x]Pressure ulcer(s)  []Rash    CRITICAL CARE:  [ ]Shock Present  [ ]Septic [ ]Cardiogenic [ ]Neurologic [ ]Hypovolemic  [ ]Vasopressors [ ]Inotropes   [x]Respiratory failure present [x]Mechanical Ventilation [ ]Non-invasive ventilatory support [ ]High-Flow  [x]Acute  [ ]Chronic [x]Hypoxic  [ ]Hypercarbic  [ ]Other organ failure    Vital Signs Last 24 Hrs  T(C): 38.1 (13 Oct 2021 16:00), Max: 38.1 (13 Oct 2021 16:00)  T(F): 100.6 (13 Oct 2021 16:00), Max: 100.6 (13 Oct 2021 16:00)  HR: 63 (13 Oct 2021 16:00) (53 - 128)  BP: 113/62 (13 Oct 2021 16:00) (93/52 - 136/63)  BP(mean): 82 (13 Oct 2021 16:00) (67 - 90)  RR: 20 (13 Oct 2021 16:00) (16 - 38)  SpO2: 97% (13 Oct 2021 16:00) (93% - 99%) I&O's Summary    12 Oct 2021 07:01  -  13 Oct 2021 07:00  --------------------------------------------------------  IN: 1430.9 mL / OUT: 1396 mL / NET: 34.9 mL    13 Oct 2021 07:01  -  13 Oct 2021 17:23  --------------------------------------------------------  IN: 479.5 mL / OUT: 531 mL / NET: -51.5 mL    LABS:                        8.8    10.91 )-----------( 51       ( 13 Oct 2021 05:22 )             28.4   10-13    142  |  111<H>  |  19  ----------------------------<  124<H>  4.6   |  22  |  0.86    Ca    8.3<L>      13 Oct 2021 05:22  Phos  3.1     10-13  Mg     1.9     10-13    TPro  7.4  /  Alb  2.1<L>  /  TBili  0.4  /  DBili  x   /  AST  34  /  ALT  9<L>  /  AlkPhos  66  10-13    RADIOLOGY & ADDITIONAL STUDIES:  < from: CT Chest No Cont (10.11.21 @ 16:01) >  New and increased extent of nodules and consolidations in both lungs predominantly in the right, bronchiectasis and mucous plugging. Findings likely reflect bronchopneumonia.  No evidence of pneumocystis pneumonia.    PROTEIN CALORIE MALNUTRITION PRESENT: [ ]mild [ ]moderate [ ]severe [ ]underweight [ ]morbid obesity  []PPSV2 < or = to 30% []significant weight loss  []poor nutritional intake []catabolic state []anasarca     Artificial Nutrition []     REFERRALS:  [x]Social Work  []Case management []PT/OT []Chaplaincy  []Hospice  []Patient/Family Support    DISCUSSION OF CASE: Reed Devlin (son)- to obtain additional history and to provide emotional support    Care Coordination/Goals of Care Document:     PROGRESS NOTE  Date & Time of Note   2021-10-13 14:43  Notes: Case discussed in IDR. Per medical team, patient possibly to be  extubated today. Anticipated dispo plan: patient is long term resident at  [facility] since Sept 2021; possible return to this facility  however, pending medical course/progress and med team clearance. CM remains  available.  Electronically signed by:  Sharyn Guerrero  Electronically signed on:  2021-10-13  14:45    PALLIATIVE MEDICINE COORDINATION OF CARE DOCUMENTATION: [x] Inpatient Consult  Non-Face-to-Face prolonged service provided that relates to (face-to-face) care that has or will occur and ongoing patient management, including one or more of the following: - Reviewed documentation from other physicians and other health care professional services - Reviewed medical records and diagnostic / radiology study results - Coordination with patient's support system  ************************************************************************  MEDICATION REVIEW:  - See Medication List Above    ISTOP REFERENCE:   - no active Rx's / see ISTOP Chart Note  - PRN usage: NO PRN'S  ------------------------------------------------------------------------  COORDINATION OF CARE:  - Palliative Care consulted for: GOC  - Patient (to be) assessed:  - Patient previously seen by Palliative Care service: YES    ADVANCE CARE PLANNING  - Code status: FULL  - MOLST reviewed in chart: NONE; None found on Alpha  - HCP/Surrogate:  - GOC documents: NONE found on [place]  - HCP/Living will/Other Advanced Directives in Alpha: NONE found on Alpha  ------------------------------------------------------------------------  CARE PROVIDER DOCUMENTATION:  - SW/CM notes: Remains medically active  -   -   -     PLAN OF CARE  - Known admissions in past year:  - Current admit date:  - LOS:  - LACE score:   - Current dispo plan: TO BE DETERMINED    10-08-21 (5d)  ------------------------------------------------------------------------  - Time Spent/Chart reviewed: 31 Minutes [including time used to gather, review and transfer data]  - Start:  - End:    Prolonged services rendered, as part of this patient's care provided by Palliative Medicine, include: i. chart review for provider and ancillary service documentation, ii. pertinent diagnostics including laboratory and imaging studies, iii. medication review including PRN use, iv. admission history including previous palliative care encounters and GOC notes, v. advance care planning documents including HCP and MOLST forms in Alpha. Part of Palliative Medicine extended evaluation and management also involves coordination of care with our IDT, the primary and consulting teams, and unit CM/SW and Hospice if eligible. Recommendations based on the information gathered and discussed are outlined in the A/P of Palliative notes. [facility] Geriatrics and Palliative Care  Reza Shay, Palliative Care Attending  Contact Info: Call [phone] (HEAL Line) or message on Microsoft Teams (Reza Shay)    HPI:  54yo F w/ PMH AIDS (Sept 2021 CD4 57,  currently on Biktarvy and Bactrim ppx), Hepatitis B/C, COPD (not on home O2, actively smokes), rectal cancer s/p radiation therapy, tongue cancer s/p resection, h/o crack use (last use 8/2021) BIBEMS from [facility] due to altered mental status. Majority of history obtained from chart as patient was intubated upon ICU team consult. Per EMS patient was found to be hypoxic on RA to 70s and hypotensive SBP 70-80. Upon ED arrival, she was admitting to shortness of breath while on NRB 15L satting 96%. She denied any chest pain, chills, body aches at the time, however appeared ill and with accessory muscle use. Upon chart review, patient from recent hospitalization (9/27 - 10/3) expressed desire to be DNR/DNI (MOLST in chart). However, today the ED provider readdressed patient's wishes and she made a decision to rescind her code status and become full code including intubation. Of note, prior to 9/27 hospitalization patient c/o 6 days worsening dysphagia, throat pain and cough. She was admitted for hypercapnic, hypoxic respiratory failure secondary to sepsis from presumed MRSA pneumonia along with trichomonas and potential esophagitis. She was treated with vancomycin and discharged on remaining days of fluconazole for presumed oral thrush. Pulmonary consult service also evaluated the patient for HAP, aspiration, and COPD. No bronchoscopy was performed at the time.  (08 Oct 2021 19:47)    PERTINENT PM/SXH:   HIV disease  Advanced COPD  Tongue cancer  Rectal cancer  Hepatitis B immune  Hepatitis B  Hepatitis C    FAMILY HISTORY:  FH: heart disease (Mother)    ITEMS NOT CHECKED ARE NOT PRESENT    SOCIAL HISTORY:   Significant other/partner:  []  Children:  [x]  Taoist/Spirituality:  Substance hx:  [x]   Tobacco hx:  []   Alcohol hx: []   Home Opioid hx:  [] I-Stop Reference No: [id number]  - no active Rx's  Living Situation: []Home  [x]Long term care  []Rehab []Other    ADVANCE DIRECTIVES:    []MOLST  []Living Will  DECISION MAKER(s):  [x] Health Care Proxy(s)  [] Surrogate(s)  [] Guardian           Name(s)/Phone Number(s): Reed Devlin, [phone]    BASELINE (I)ADLs (prior to admission):  [place]: []Total  [x] Moderate []Dependent    ALLERGIES:  No Known Allergies    MEDICATIONS  (STANDING):  artificial  tears Solution 1 Drop(s) Both EYES two times a day  bictegravir 50 mG/emtricitabine 200 mG/tenofovir alafenamide 25 mG (BIKTARVY) 1 Tablet(s) Oral daily  chlorhexidine 0.12% Liquid 15 milliLiter(s) Oral Mucosa every 12 hours  chlorhexidine 2% Cloths 1 Application(s) Topical daily  dexMEDEtomidine Infusion 0.2 MICROgram(s)/kG/Hr (2.5 mL/Hr) IV Continuous <Continuous>  dextrose 40% Gel 15 Gram(s) Oral once  dextrose 5%. 1000 milliLiter(s) (50 mL/Hr) IV Continuous <Continuous>  dextrose 5%. 1000 milliLiter(s) (100 mL/Hr) IV Continuous <Continuous>  dextrose 50% Injectable 25 Gram(s) IV Push once  dextrose 50% Injectable 12.5 Gram(s) IV Push once  dextrose 50% Injectable 25 Gram(s) IV Push once  enoxaparin Injectable 40 milliGRAM(s) SubCutaneous every 24 hours  fentaNYL   Infusion. 0.5 MICROgram(s)/kG/Hr (2.5 mL/Hr) IV Continuous <Continuous>  glucagon  Injectable 1 milliGRAM(s) IntraMuscular once  insulin lispro (ADMELOG) corrective regimen sliding scale   SubCutaneous every 6 hours  norepinephrine Infusion 0.05 MICROgram(s)/kG/Min (4.68 mL/Hr) IV Continuous <Continuous>  pantoprazole  Injectable 40 milliGRAM(s) IV Push daily  trimethoprim  160 mG/sulfamethoxazole 800 mG 1 Tablet(s) Oral daily    MEDICATIONS  (PRN):    PRESENT SYMPTOMS: [x]Unable to obtain due to poor mentation/encephalopathy  Source if other than patient:  []Family   []Team     Pain: [ ] yes [ ] no  QOL impact -   Location -                    Aggravating Factors -  Quality -  Radiation -  Timing -  Severity (0-10 scale) -   Minimal Acceptable Level (0-10 scale) -    PAIN AD Score: 1  http://geriatrictoolkit.[facility]/cog/painad.pdf (press ctrl +  left click to view)    Dyspnea:                           []Mild  []Moderate []Severe  Anxiety:                             []Mild []Moderate []Severe  Fatigue:                             []Mild []Moderate []Severe  Nausea:                             []Mild []Moderate []Severe  Loss of Appetite:              []Mild []Moderate []Severe  Constipation:                    []Mild []Moderate []Severe    Other Symptoms:  []All other review of systems negative     Palliative Performance Status Version 2:  10%    [email or web address]/[email or web address]_performance_scale_[email or web address].pdf    PHYSICAL EXAM:  GENERAL:  []Alert  []Oriented x   []Lethargic  []Cachexia  [x]Unarousable  []Verbal  [x]Non-Verbal  Behavioral:   []Anxiety  [x]Delirium []Agitation []Cooperative  HEENT:  []Normal   []Dry mouth   [x]ET Tube/Trach  []Oral lesions  PULMONARY:   []Clear [x]Tachypnea  []Audible excessive secretions   [x]Rhonchi        []Right []Left [x]Bilateral  []Crackles        []Right []Left []Bilateral  []Wheezing     []Right []Left []Bilateral  CARDIOVASCULAR:    [x]Regular []Irregular []Tachy  []Sudeep []Murmur []Other  GASTROINTESTINAL:  [x]Soft  []Distended   [x]+BS  []Non tender []Tender  []PEG [x]OGT/ NGT  Last BM:   GENITOURINARY:  []Normal [] Incontinent   []Oliguria/Anuria   [x]Jeter  MUSCULOSKELETAL:   []Normal   []Weakness  [x]Bed/Wheelchair bound []Edema  NEUROLOGIC:   []No focal deficits  []Cognitive impairment  [x]Dysphagia []Dysarthria []Paresis [x]Encephalopathic   SKIN:   []Normal   [x]Pressure ulcer(s)  []Rash    CRITICAL CARE:  [ ]Shock Present  [ ]Septic [ ]Cardiogenic [ ]Neurologic [ ]Hypovolemic  [ ]Vasopressors [ ]Inotropes   [x]Respiratory failure present [x]Mechanical Ventilation [ ]Non-invasive ventilatory support [ ]High-Flow  [x]Acute  [ ]Chronic [x]Hypoxic  [ ]Hypercarbic  [ ]Other organ failure    Vital Signs Last 24 Hrs  T(C): 38.1 (13 Oct 2021 16:00), Max: 38.1 (13 Oct 2021 16:00)  T(F): 100.6 (13 Oct 2021 16:00), Max: 100.6 (13 Oct 2021 16:00)  HR: 63 (13 Oct 2021 16:00) (53 - 128)  BP: 113/62 (13 Oct 2021 16:00) (93/52 - 136/63)  BP(mean): 82 (13 Oct 2021 16:00) (67 - 90)  RR: 20 (13 Oct 2021 16:00) (16 - 38)  SpO2: 97% (13 Oct 2021 16:00) (93% - 99%) I&O's Summary    12 Oct 2021 07:01  -  13 Oct 2021 07:00  --------------------------------------------------------  IN: 1430.9 mL / OUT: 1396 mL / NET: 34.9 mL    13 Oct 2021 07:01  -  13 Oct 2021 17:23  --------------------------------------------------------  IN: 479.5 mL / OUT: 531 mL / NET: -51.5 mL    LABS:                        8.8    10.91 )-----------( 51       ( 13 Oct 2021 05:22 )             28.4   10-13    142  |  111<H>  |  19  ----------------------------<  124<H>  4.6   |  22  |  0.86    Ca    8.3<L>      13 Oct 2021 05:22  Phos  3.1     10-13  Mg     1.9     10-13    TPro  7.4  /  Alb  2.1<L>  /  TBili  0.4  /  DBili  x   /  AST  34  /  ALT  9<L>  /  AlkPhos  66  10-13    RADIOLOGY & ADDITIONAL STUDIES:  < from: CT Chest No Cont (10.11.21 @ 16:01) >  New and increased extent of nodules and consolidations in both lungs predominantly in the right, bronchiectasis and mucous plugging. Findings likely reflect bronchopneumonia.  No evidence of pneumocystis pneumonia.    PROTEIN CALORIE MALNUTRITION PRESENT: [ ]mild [ ]moderate [ ]severe [ ]underweight [ ]morbid obesity  []PPSV2 < or = to 30% []significant weight loss  []poor nutritional intake []catabolic state []anasarca     Artificial Nutrition []     REFERRALS:  [x]Social Work  []Case management []PT/OT []Chaplaincy  []Hospice  []Patient/Family Support    DISCUSSION OF CASE: Reed Devlin (son)- to obtain additional history and to provide emotional support    Care Coordination/Goals of Care Document:     PROGRESS NOTE  Date & Time of Note   2021-10-13 14:43  Notes: Case discussed in IDR. Per medical team, patient possibly to be  extubated today. Anticipated dispo plan: patient is long term resident at  [facility] since Sept 2021; possible return to this facility  however, pending medical course/progress and med team clearance. CM remains  available.  Electronically signed by:  Sharyn Guerrero  Electronically signed on:  2021-10-13  14:45    PALLIATIVE MEDICINE COORDINATION OF CARE DOCUMENTATION: [x] Inpatient Consult  Non-Face-to-Face prolonged service provided that relates to (face-to-face) care that has or will occur and ongoing patient management, including one or more of the following: - Reviewed documentation from other physicians and other health care professional services - Reviewed medical records and diagnostic / radiology study results - Coordination with patient's support system  ************************************************************************  MEDICATION REVIEW:  - See Medication List Above    ISTOP REFERENCE: [id number]  - no active Rx's  - PRN usage: NO PRN'S  ------------------------------------------------------------------------  COORDINATION OF CARE:  - Palliative Care consulted for: [facility]  - Patient (to be) assessed: 10/13/21  - Patient previously seen by Palliative Care service: YES    ADVANCE CARE PLANNING  - Code status: FULL  - MOLST reviewed in chart: RESCINDED; found on Alpha ([facility] Visit 9/27/21 Page 4,5)  - HCP/Surrogate: Reed Devlin, [phone]  - [facility] documents: found on [place] (8/31/21, 10/1/21)  - HCP/Living will/Other Advanced Directives in Alpha: NONE found on Alpha  ------------------------------------------------------------------------  CARE PROVIDER DOCUMENTATION:  - PHOEBE/MARILEE notes: Remains medically active  - ICU notes: failed SBT today, ongoing attempts for extubation  - ID notes: f/u Bld Cx's to see if MRSA bacteremia is clearing    PLAN OF CARE  - Known admissions in past year: 2  - Current admit date: 10/8/21  - LOS: 5  - LACE score: 14  - Current dispo plan: TO BE DETERMINED  ------------------------------------------------------------------------  - Time Spent/Chart reviewed: 31 Minutes [including time used to gather, review and transfer data]  - Start: 10:00AM  - End: 10:31AM    Prolonged services rendered, as part of this patient's care provided by Palliative Medicine, include: i. chart review for provider and ancillary service documentation, ii. pertinent diagnostics including laboratory and imaging studies, iii. medication review including PRN use, iv. admission history including previous palliative care encounters and [facility] notes, v. advance care planning documents including HCP and MOLST forms in Alpha. Part of Palliative Medicine extended evaluation and management also involves coordination of care with our IDT, the primary and consulting teams, and unit CM/SW and Hospice if eligible. Recommendations based on the information gathered and discussed are outlined in the A/P of Palliative notes. [facility] Geriatrics and Palliative Care  Reza Shay, Palliative Care Attending  Contact Info: Call [phone] (HEAL Line) or message on [employer] Teams (Rzea Shay)    HPI:  54yo F w/ PMH AIDS (Sept 2021 CD4 57,  currently on Biktarvy and Bactrim ppx), Hepatitis B/C, COPD (not on home O2, actively smokes), rectal cancer s/p radiation therapy, tongue cancer s/p resection, h/o crack use (last use 8/2021) BIBEMS from [facility] due to altered mental status. Majority of history obtained from chart as patient was intubated upon ICU team consult. Per EMS patient was found to be hypoxic on RA to 70s and hypotensive SBP 70-80. Upon ED arrival, she was admitting to shortness of breath while on NRB 15L satting 96%. She denied any chest pain, chills, body aches at the time, however appeared ill and with accessory muscle use. Upon chart review, patient from recent hospitalization (9/27 - 10/3) expressed desire to be DNR/DNI (MOLST in chart). However, today the ED provider readdressed patient's wishes and she made a decision to rescind her code status and become full code including intubation. Of note, prior to 9/27 hospitalization patient c/o 6 days worsening dysphagia, throat pain and cough. She was admitted for hypercapnic, hypoxic respiratory failure secondary to sepsis from presumed MRSA pneumonia along with trichomonas and potential esophagitis. She was treated with vancomycin and discharged on remaining days of fluconazole for presumed oral thrush. Pulmonary consult service also evaluated the patient for HAP, aspiration, and COPD. No bronchoscopy was performed at the time.  (08 Oct 2021 19:47)    PERTINENT PM/SXH:   HIV disease  Advanced COPD  Tongue cancer  Rectal cancer  Hepatitis B immune  Hepatitis B  Hepatitis C    FAMILY HISTORY:  FH: heart disease (Mother)    ITEMS NOT CHECKED ARE NOT PRESENT    SOCIAL HISTORY:   Significant other/partner:  []  Children:  [x]  Uatsdin/Spirituality:  Substance hx:  [x]   Tobacco hx:  []   Alcohol hx: []   Home Opioid hx:  [] I-Stop Reference No: [id number]  - no active Rx's  Living Situation: []Home  [x]Long term care  []Rehab []Other    ADVANCE DIRECTIVES:    []MOLST  []Living Will  DECISION MAKER(s):  [x] Health Care Proxy(s)  [] Surrogate(s)  [] Guardian           Name(s)/Phone Number(s): Reed Devlin, [phone]    BASELINE (I)ADLs (prior to admission):  [place]: []Total  [x] Moderate []Dependent    ALLERGIES:  No Known Allergies    MEDICATIONS  (STANDING):  artificial  tears Solution 1 Drop(s) Both EYES two times a day  bictegravir 50 mG/emtricitabine 200 mG/tenofovir alafenamide 25 mG (BIKTARVY) 1 Tablet(s) Oral daily  chlorhexidine 0.12% Liquid 15 milliLiter(s) Oral Mucosa every 12 hours  chlorhexidine 2% Cloths 1 Application(s) Topical daily  dexMEDEtomidine Infusion 0.2 MICROgram(s)/kG/Hr (2.5 mL/Hr) IV Continuous <Continuous>  dextrose 40% Gel 15 Gram(s) Oral once  dextrose 5%. 1000 milliLiter(s) (50 mL/Hr) IV Continuous <Continuous>  dextrose 5%. 1000 milliLiter(s) (100 mL/Hr) IV Continuous <Continuous>  dextrose 50% Injectable 25 Gram(s) IV Push once  dextrose 50% Injectable 12.5 Gram(s) IV Push once  dextrose 50% Injectable 25 Gram(s) IV Push once  enoxaparin Injectable 40 milliGRAM(s) SubCutaneous every 24 hours  fentaNYL   Infusion. 0.5 MICROgram(s)/kG/Hr (2.5 mL/Hr) IV Continuous <Continuous>  glucagon  Injectable 1 milliGRAM(s) IntraMuscular once  insulin lispro (ADMELOG) corrective regimen sliding scale   SubCutaneous every 6 hours  norepinephrine Infusion 0.05 MICROgram(s)/kG/Min (4.68 mL/Hr) IV Continuous <Continuous>  pantoprazole  Injectable 40 milliGRAM(s) IV Push daily  trimethoprim  160 mG/sulfamethoxazole 800 mG 1 Tablet(s) Oral daily    MEDICATIONS  (PRN):    PRESENT SYMPTOMS: [x]Unable to obtain due to poor mentation/encephalopathy  Source if other than patient:  []Family   []Team     Pain: [ ] yes [ ] no  QOL impact -   Location -                    Aggravating Factors -  Quality -  Radiation -  Timing -  Severity (0-10 scale) -   Minimal Acceptable Level (0-10 scale) -    PAIN AD Score: 1  [email or web address] (press ctrl +  left click to view)    Dyspnea:                           []Mild  []Moderate []Severe  Anxiety:                             []Mild []Moderate []Severe  Fatigue:                             []Mild []Moderate []Severe  Nausea:                             []Mild []Moderate []Severe  Loss of Appetite:              []Mild []Moderate []Severe  Constipation:                    []Mild []Moderate []Severe    Other Symptoms:  []All other review of systems negative     Palliative Performance Status Version 2:  10%    http://[email or web address]/news/[email or web address]_performance_scale_[email or web address]    PHYSICAL EXAM:  GENERAL:  []Alert  []Oriented x   []Lethargic  []Cachexia  [x]Unarousable  []Verbal  [x]Non-Verbal  Behavioral:   []Anxiety  [x]Delirium []Agitation []Cooperative  HEENT:  []Normal   []Dry mouth   [x]ET Tube/Trach  []Oral lesions  PULMONARY:   []Clear [x]Tachypnea  []Audible excessive secretions   [x]Rhonchi        []Right []Left [x]Bilateral  []Crackles        []Right []Left []Bilateral  []Wheezing     []Right []Left []Bilateral  CARDIOVASCULAR:    [x]Regular []Irregular []Tachy  []Sudeep []Murmur []Other  GASTROINTESTINAL:  [x]Soft  []Distended   [x]+BS  []Non tender []Tender  []PEG [x]OGT/ NGT  Last BM: ?  GENITOURINARY:  []Normal [] Incontinent   []Oliguria/Anuria   [x]Jeter  MUSCULOSKELETAL:   []Normal   []Weakness  [x]Bed/Wheelchair bound []Edema  NEUROLOGIC:   []No focal deficits  []Cognitive impairment  [x]Dysphagia []Dysarthria []Paresis [x]Encephalopathic   SKIN:   []Normal   [x]Pressure ulcer(s)  []Rash    CRITICAL CARE:  [ ]Shock Present  [ ]Septic [ ]Cardiogenic [ ]Neurologic [ ]Hypovolemic  [ ]Vasopressors [ ]Inotropes   [x]Respiratory failure present [x]Mechanical Ventilation [ ]Non-invasive ventilatory support [ ]High-Flow  [x]Acute  [ ]Chronic [x]Hypoxic  [ ]Hypercarbic  [ ]Other organ failure    Vital Signs Last 24 Hrs  T(C): 38.1 (13 Oct 2021 16:00), Max: 38.1 (13 Oct 2021 16:00)  T(F): 100.6 (13 Oct 2021 16:00), Max: 100.6 (13 Oct 2021 16:00)  HR: 63 (13 Oct 2021 16:00) (53 - 128)  BP: 113/62 (13 Oct 2021 16:00) (93/52 - 136/63)  BP(mean): 82 (13 Oct 2021 16:00) (67 - 90)  RR: 20 (13 Oct 2021 16:00) (16 - 38)  SpO2: 97% (13 Oct 2021 16:00) (93% - 99%) I&O's Summary    12 Oct 2021 07:01  -  13 Oct 2021 07:00  --------------------------------------------------------  IN: 1430.9 mL / OUT: 1396 mL / NET: 34.9 mL    13 Oct 2021 07:01  -  13 Oct 2021 17:23  --------------------------------------------------------  IN: 479.5 mL / OUT: 531 mL / NET: -51.5 mL    LABS:                        8.8    10.91 )-----------( 51       ( 13 Oct 2021 05:22 )             28.4   10-13    142  |  111<H>  |  19  ----------------------------<  124<H>  4.6   |  22  |  0.86    Ca    8.3<L>      13 Oct 2021 05:22  Phos  3.1     10-13  Mg     1.9     10-13    TPro  7.4  /  Alb  2.1<L>  /  TBili  0.4  /  DBili  x   /  AST  34  /  ALT  9<L>  /  AlkPhos  66  10-13    RADIOLOGY & ADDITIONAL STUDIES:  < from: CT Chest No Cont (10.11.21 @ 16:01) >  New and increased extent of nodules and consolidations in both lungs predominantly in the right, bronchiectasis and mucous plugging. Findings likely reflect bronchopneumonia.  No evidence of pneumocystis pneumonia.    PROTEIN CALORIE MALNUTRITION PRESENT: [ ]mild [ ]moderate [ ]severe [ ]underweight [ ]morbid obesity  []PPSV2 < or = to 30% []significant weight loss  []poor nutritional intake []catabolic state []anasarca     Artificial Nutrition []     REFERRALS:  [x]Social Work  []Case management []PT/OT []Chaplaincy  []Hospice  []Patient/Family Support    DISCUSSION OF CASE: Reed Devlin (son)- to obtain additional history and to provide emotional support    Care Coordination/Goals of Care Document:     PROGRESS NOTE  Date & Time of Note   2021-10-13 14:43  Notes: Case discussed in IDR. Per medical team, patient possibly to be  extubated today. Anticipated dispo plan: patient is long term resident at  [facility] since Sept 2021; possible return to this facility  however, pending medical course/progress and med team clearance. CM remains  available.  Electronically signed by:  Sharyn Guerrero  Electronically signed on:  2021-10-13  14:45    PALLIATIVE MEDICINE COORDINATION OF CARE DOCUMENTATION: [x] Inpatient Consult  Non-Face-to-Face prolonged service provided that relates to (face-to-face) care that has or will occur and ongoing patient management, including one or more of the following: - Reviewed documentation from other physicians and other health care professional services - Reviewed medical records and diagnostic / radiology study results - Coordination with patient's support system  ************************************************************************  MEDICATION REVIEW:  - See Medication List Above    ISTOP REFERENCE: [id number]  - no active Rx's  - PRN usage: NO PRN'S  ------------------------------------------------------------------------  COORDINATION OF CARE:  - Palliative Care consulted for: [facility]  - Patient (to be) assessed: 10/13/21  - Patient previously seen by Palliative Care service: YES    ADVANCE CARE PLANNING  - Code status: FULL  - MOLST reviewed in chart: RESCINDED; found on [facility] ([facility] Visit 9/27/21 Page 4,5)  - HCP/Surrogate: Reed Devlin, [phone]  - [facility] documents: found on [facility] (8/31/21, 10/1/21)  - HCP/Living will/Other Advanced Directives in Alpha: NONE found on Alpha  ------------------------------------------------------------------------  CARE PROVIDER DOCUMENTATION:  - SW/MARILEE notes: Remains medically active  - ICU notes: failed SBT today, ongoing attempts for extubation  - ID notes: f/u Bld Cx's to see if MRSA bacteremia is clearing    PLAN OF CARE  - Known admissions in past year: 2  - Current admit date: 10/8/21  - LOS: 5  - LACE score: 14  - Current dispo plan: TO BE DETERMINED  ------------------------------------------------------------------------  - Time Spent/Chart reviewed: 31 Minutes [including time used to gather, review and transfer data]  - Start: 10:00AM  - End: 10:31AM    Prolonged services rendered, as part of this patient's care provided by Palliative Medicine, include: i. chart review for provider and ancillary service documentation, ii. pertinent diagnostics including laboratory and imaging studies, iii. medication review including PRN use, iv. admission history including previous palliative care encounters and [facility] notes, v. advance care planning documents including HCP and MOLST forms in Alpha. Part of Palliative Medicine extended evaluation and management also involves coordination of care with our IDT, the primary and consulting teams, and unit CM/SW and Hospice if eligible. Recommendations based on the information gathered and discussed are outlined in the A/P of Palliative notes.

## 2021-10-13 NOTE — CONSULT NOTE ADULT - REASON FOR ADMISSION
Acute hypoxic respiratory failure 2/2 severe sepsis
Acute hypoxic respiratory failure 2/2 severe sepsis

## 2021-10-13 NOTE — CONSULT NOTE ADULT - PROBLEM SELECTOR RECOMMENDATION 4
.  Chronic history, diagnosed in 1993  -h/o non-adherence with medications  -frequently admitted at [facility]

## 2021-10-13 NOTE — PROGRESS NOTE ADULT - SUBJECTIVE AND OBJECTIVE BOX
INTERVAL HPI/OVERNIGHT EVENTS: DALI.    ROS: UTO      ANTIBIOTICS/RELEVANT:    MEDICATIONS  (STANDING):  artificial  tears Solution 1 Drop(s) Both EYES two times a day  bictegravir 50 mG/emtricitabine 200 mG/tenofovir alafenamide 25 mG (BIKTARVY) 1 Tablet(s) Oral daily  chlorhexidine 0.12% Liquid 15 milliLiter(s) Oral Mucosa every 12 hours  chlorhexidine 2% Cloths 1 Application(s) Topical daily  dexMEDEtomidine Infusion 0.2 MICROgram(s)/kG/Hr (2.5 mL/Hr) IV Continuous <Continuous>  dextrose 40% Gel 15 Gram(s) Oral once  dextrose 5%. 1000 milliLiter(s) (50 mL/Hr) IV Continuous <Continuous>  dextrose 5%. 1000 milliLiter(s) (100 mL/Hr) IV Continuous <Continuous>  dextrose 50% Injectable 25 Gram(s) IV Push once  dextrose 50% Injectable 12.5 Gram(s) IV Push once  dextrose 50% Injectable 25 Gram(s) IV Push once  enoxaparin Injectable 40 milliGRAM(s) SubCutaneous every 24 hours  fentaNYL   Infusion. 0.5 MICROgram(s)/kG/Hr (2.5 mL/Hr) IV Continuous <Continuous>  glucagon  Injectable 1 milliGRAM(s) IntraMuscular once  insulin lispro (ADMELOG) corrective regimen sliding scale   SubCutaneous every 6 hours  norepinephrine Infusion 0.05 MICROgram(s)/kG/Min (4.68 mL/Hr) IV Continuous <Continuous>  pantoprazole  Injectable 40 milliGRAM(s) IV Push daily  trimethoprim  160 mG/sulfamethoxazole 800 mG 1 Tablet(s) Oral daily    MEDICATIONS  (PRN):        Vital Signs Last 24 Hrs  T(C): 37.2 (13 Oct 2021 14:06), Max: 37.2 (13 Oct 2021 14:06)  T(F): 99 (13 Oct 2021 14:06), Max: 99 (13 Oct 2021 14:06)  HR: 62 (13 Oct 2021 15:00) (53 - 128)  BP: 111/63 (13 Oct 2021 15:00) (93/52 - 136/63)  BP(mean): 81 (13 Oct 2021 15:00) (67 - 90)  RR: 21 (13 Oct 2021 15:00) (16 - 38)  SpO2: 97% (13 Oct 2021 15:00) (82% - 100%)    PHYSICAL EXAM:  Constitutional:Well-developed, well nourished  Eyes:SANTOS, EOMI  Ear/Nose/Throat: no oral lesion, no sinus tenderness on percussion	  Neck:no JVD, no lymphadenopathy, supple  Respiratory: CTA abner  Cardiovascular: S1S2 RRR, no murmurs  Gastrointestinal:soft, (+) BS, no HSM  Extremities:no e/e/c  Vascular: DP Pulse:	right normal; left normal      LABS:                        8.8    10.91 )-----------( 51       ( 13 Oct 2021 05:22 )             28.4     10-13    142  |  111<H>  |  19  ----------------------------<  124<H>  4.6   |  22  |  0.86    Ca    8.3<L>      13 Oct 2021 05:22  Phos  3.1     10-13  Mg     1.9     10-13    TPro  7.4  /  Alb  2.1<L>  /  TBili  0.4  /  DBili  x   /  AST  34  /  ALT  9<L>  /  AlkPhos  66  10-13          MICROBIOLOGY: Culture - Blood (10.12.21 @ 14:11)    Specimen Source: .Blood Blood    Culture Results:   No growth at 1 day.        RADIOLOGY & ADDITIONAL STUDIES: < from: MARY w/Doppler (10.12.21 @ 16:46) >  CONCLUSIONS:     1. Normal left ventricular systolic function.   2. Probably normal right ventricular systolic function.   3. No LA/RA/LATRELL/RAA thrombus seen.   4. No evidence of an intracardiac shunt.   5. There is mild prolapse of the posterior leaflet with focal thickening that likely represents myxomatous changes. There is mild-to-moderate mitral regurgitation seen at a blood pressure of 100/60 mmHg.   6. No echocardiographic evidence of vegetations.   7. No pericardial effusion.    < end of copied text >

## 2021-10-13 NOTE — CONSULT NOTE ADULT - PROBLEM SELECTOR RECOMMENDATION 5
.  Patient previously requested DNR/DNI status and MOLST was completed  -documentation states DNI status was rescinded but does not specifically say if DNR was addressed  -began a discussion with patient's son/HCP

## 2021-10-13 NOTE — CONSULT NOTE ADULT - ASSESSMENT
54yo F with PMH of HIV/AIDS, Hepatitis B/C, COPD, h/o rectal and tongue Maile p/w AMS and respiratory failure and found to have multifocal PNA and bacteremia. Palliative consulted for complex medical decision making in the setting of critical illness.    ·	introduced the role of Palliative Medicine to patient's son/HCP for symptom management, advanced care planning, and support  ·	ED documentation states patient decided to pursue intubation but unclear if DNR/CPR was specifically addressed, will advise son/HCP to reinstate DNR based on the previous conversations  ·	patient would meet hospice criteria at this time, await clinical course before discussing further    During previous conversations, patient was scared to address end of life care but eventually was amenable to DNR/DNI status. She had expressed to me in the past that she would not want to be kept alive indefinitely on life support. ED documentation suggests she was amenable to trial of intubation in the setting of respiratory distress but do not know if there was conversation to specifically readdress CPR or trach status.

## 2021-10-13 NOTE — PROGRESS NOTE ADULT - ASSESSMENT
56yo F w/ PMH AIDS (Sept 2021 CD4 59,  currently on Biktarvy and Bactrim ppx), Hepatitis B/C, COPD (not on home O2, actively smokes), rectal cancer s/p radiation therapy, tongue cancer s/p resection, h/o crack use (last use 8/2021) BIBEMS from [facility] due to altered mental status. Admitted for acute hypoxic respiratory failure secondary to severe sepsis likely secondary to HAP with suspicion for superimposed PCP pneumonia. Currently intubated and sedated requiring levophed, fentanyl, and propofol gtt. Admitted to MICU for further management.      NEURO  Intubated and sedated  - weaning off sedation, planned for extubation today  - resumed precedex in AM  - RASS goal -2     CARDIOVASCULAR  #septic shock 2/2 HAP  Recent Hx with MRSA PNA treated w/ Vanc. Sepsis 2/2 HAP. s/p 2L NS in ED  - Currently on levophed gtt  - Titrate levo to MAP goal of 65-70    #r/o Endocarditis  IC patient with MRSA+ in BCx and BAL Cx.   - TTE: unable to visualize vegetations  - MARY: No LA/RA/LATRELL/RAA thrombus seen, no vegetations visualized     #sinus Tach  - EKG with , QTc 428ms. Trop-T negative. Likely elevated in setting of severe sepsis from PNA  - d-dimer 1086  -- bedside POCUS showing no right heart strain   - ID plan as below     PULMONARY  #Acute hypoxic respiratory failure 2/2 severe sepsis from likely HAP vs PCP i/s/o immunocompromised state  Intubated in the ED after trial of NRB due to increased work of breathing. Currently on VC with FiO2 set at 100%. Likely secondary to PNA. Recent hospitalization for MRSA for which she was on a 6 day regimen of vancomycin.   - FIO2 decreased to 40%  - c/w Vanc 750mg Qd, next vanc trough 9/16 7AM  - c/w PCP prophylaxis - Bactrim DS qD per ID  - c/w home Biktarvy per ID  - 2PC obtained for Bronchoscopy (10/9)  - BCx (10/8) - MRSA+  - BAL Cx (10/9) - MRSA+  - Surveillance BCx (10/11) - G+ cocci clusters    #COPD  Known h/o COPD. Current smoker (cut down to 6 cigarettes/day but has smoked since she was 11yo). Recently discharged on stiolto.   - c/w duonebs q6h   - resume nicotine patch 14mg/24h when clinically appropriate    RENAL  #ML   - BUN 32/Cr 1.38 on admission with baseline 9/0.8 per prior charts. Likely i/s/o severe sepsis.   - Repeat BUN/Cr 29/1.09, improving today  - per ID, need for Bactrim despite ML  - Jeter placed today  - renally dose meds, avoid nephrotoxic agents  - monitor I/Os     ENDO  - Required SS and 10U NPH overnight for hyperglycemia. Worsening sugars i/s/o steroids given with bactrim  - started regular insulin 5U subQ Q6  - c/w mISS    GI  #Feeds  - NGT removed for MARY  - Patient will require NGT if patient is not extubated today  - If NGT placed today, resume home meds    HEME/ONC  #Anemia  - Hgb 7.1/Hct 22.7 on admission with prior levels in 9.0 range. Possibly AoCD from known AIDS and cancer diagnoses. S/p 1 pRBC  - goal Hgb > 7, maintain active T&S    #thrombocytopenia  S/p 1U platelets during admission.   PLT today 51  - monitor for now  - D/c'd zosyn    #Tongue Cancer  Pt has h/o tongue cancer s/p resection +/- graft placement. Unable to assess throat on physical exam given intubation/sedation.  - no acute interventions at this time    ID  #Severe Sepsis 2/2 HAP and/or PCP PNA   Patient with copious amounts of thick yellow secretions. Recent hospitalization for acute hypoxic resp failure 2/2 MRSA pneumonia for which she completed a 6 day course of vancomycin.   - c/w Vanc 750mg Qd, next vanc trough 9/16 7AM  - c/w PCP ppx bactrim DS Qd  - bronchoscopy on 10/9  - BAL Cx - MRSA+  - BCx - MRSA+  - Palliative consulted - prognostication dependent on MARY results - long term ABx vs palliative care    #AIDS  - Latest CD4 59, . Takes Biktarvy and bactrim ppx at home  - Per ID, c/w home Biktarvy     DERM  #Known Stage 4 sacral ulcer  Pt has stage 4 pressure ulcer on R sacrum measuring 4cm x 2cm x 0.2cm - staged and managed last admission by wound care. Last admission wound care recs: Aquacel Extra, cut piece to fit over wound, cover with foam dressing  - Per wound care consult, probe to bone, recommended Aquacel and wound packing.    Right IJ (10/9)    Nutrition & Prophylaxis  F: s/p 2L NS bolus in ED  E: replete K<4, Mg<2  N: NPO   DVT ppx: hep SubQ  Code status: Full Code after discussion with ED provider      Dispo: MICU

## 2021-10-13 NOTE — CONSULT NOTE ADULT - PROBLEM SELECTOR RECOMMENDATION 6
.  Complex medical decision making in the setting of critical illness.    Will continue to follow for ongoing GOC discussion.   Emotional support provided, questions answered.    For new or uncontrolled symptoms, please call Palliative Care at [phone]. The service is available 24/7 (including nights & weekends) to provide symptom management recommendations over the phone as appropriate

## 2021-10-13 NOTE — PROGRESS NOTE ADULT - ASSESSMENT
54 yo female with AIDS, tongue and rectal ca, trach, recent admission for esophagitis, here with MRSA pneumonia and associated BSI. MARY 10/12 without vegetations; mild MVp and myxomatous changes seen.   - f/u surveillance bcx 10/10, 10/11--growing MRSA; f/u surveillance bcx 10/12  - continue vancomycin 750mg IV q24h  - continue TMP/SMX 1 DS daily ppx  - continue Biktarvy daily   - f/u palliative care evaluation given advanced AIDS, cancer and FTT--hospice seems appropriate     d/w primary team    Dr. Taylor assumes care Thursday 10/14    ID Team 2

## 2021-10-13 NOTE — PROGRESS NOTE ADULT - SUBJECTIVE AND OBJECTIVE BOX
** INCOMPLETE **     INTERVAL HPI/OVERNIGHT EVENTS:    SUBJECTIVE: Patient seen and examined at bedside.    VITAL SIGNS:  ICU Vital Signs Last 24 Hrs  T(C): 36.2 (13 Oct 2021 06:07), Max: 38.8 (12 Oct 2021 12:00)  T(F): 97.2 (13 Oct 2021 06:07), Max: 101.8 (12 Oct 2021 12:00)  HR: 79 (13 Oct 2021 10:00) (53 - 98)  BP: 100/66 (13 Oct 2021 08:00) (91/52 - 136/63)  BP(mean): 79 (13 Oct 2021 08:00) (66 - 90)  ABP: --  ABP(mean): --  RR: 21 (13 Oct 2021 10:00) (16 - 34)  SpO2: 98% (13 Oct 2021 10:00) (82% - 100%)    Mode: AC/ CMV (Assist Control/ Continuous Mandatory Ventilation), RR (machine): 16, TV (machine): 450, FiO2: 40, PEEP: 5, ITime: 1, MAP: 17, PIP: 28    10-12 @ 07:01  -  10-13 @ 07:00  --------------------------------------------------------  IN: 1430.9 mL / OUT: 1396 mL / NET: 34.9 mL    10-13 @ 07:01  -  10-13 @ 11:11  --------------------------------------------------------  IN: 281 mL / OUT: 241 mL / NET: 40 mL      CAPILLARY BLOOD GLUCOSE      POCT Blood Glucose.: 124 mg/dL (13 Oct 2021 05:15)      PHYSICAL EXAM:    Constitutional: NAD  HEENT: NC/AT; PERRL, anicteric sclera; MMM  Neck: supple, no JVD  Cardiovascular: +S1/S2, RRR  Respiratory: CTA B/L, no W/R/R  Gastrointestinal: abdomen soft, NT/ND; no rebound or guarding; +BSx4  Genitourinary: no suprapubic tenderness or fullness  Extremities: WWP; no LE edema; no clubbing or cyanosis  Vascular: 2+ radial, DP/PT and femoral pulses B/L  Dermatologic: normal color and turgor; no visible rashes  Neurological:     MEDICATIONS:  MEDICATIONS  (STANDING):  artificial  tears Solution 1 Drop(s) Both EYES two times a day  bictegravir 50 mG/emtricitabine 200 mG/tenofovir alafenamide 25 mG (BIKTARVY) 1 Tablet(s) Oral daily  chlorhexidine 0.12% Liquid 15 milliLiter(s) Oral Mucosa every 12 hours  chlorhexidine 2% Cloths 1 Application(s) Topical daily  dexMEDEtomidine Infusion 0.2 MICROgram(s)/kG/Hr (2.5 mL/Hr) IV Continuous <Continuous>  dextrose 40% Gel 15 Gram(s) Oral once  dextrose 5%. 1000 milliLiter(s) (50 mL/Hr) IV Continuous <Continuous>  dextrose 5%. 1000 milliLiter(s) (100 mL/Hr) IV Continuous <Continuous>  dextrose 50% Injectable 25 Gram(s) IV Push once  dextrose 50% Injectable 12.5 Gram(s) IV Push once  dextrose 50% Injectable 25 Gram(s) IV Push once  enoxaparin Injectable 40 milliGRAM(s) SubCutaneous every 24 hours  fentaNYL   Infusion. 0.5 MICROgram(s)/kG/Hr (2.5 mL/Hr) IV Continuous <Continuous>  glucagon  Injectable 1 milliGRAM(s) IntraMuscular once  insulin lispro (ADMELOG) corrective regimen sliding scale   SubCutaneous every 6 hours  norepinephrine Infusion 0.05 MICROgram(s)/kG/Min (4.68 mL/Hr) IV Continuous <Continuous>  pantoprazole  Injectable 40 milliGRAM(s) IV Push daily  trimethoprim  160 mG/sulfamethoxazole 800 mG 1 Tablet(s) Oral daily    MEDICATIONS  (PRN):      ALLERGIES:  Allergies    No Known Allergies    Intolerances        LABS:                        8.8    10.91 )-----------( 51       ( 13 Oct 2021 05:22 )             28.4     10-13    142  |  111<H>  |  19  ----------------------------<  124<H>  4.6   |  22  |  0.86    Ca    8.3<L>      13 Oct 2021 05:22  Phos  3.1     10-13  Mg     1.9     10-13    TPro  7.4  /  Alb  2.1<L>  /  TBili  0.4  /  DBili  x   /  AST  34  /  ALT  9<L>  /  AlkPhos  66  10-13          RADIOLOGY & ADDITIONAL TESTS: Reviewed. INTERVAL HPI/OVERNIGHT EVENTS:  No acute events overnight. Propofol and Fentanyl weaned overnight.    SUBJECTIVE:   Patient seen and examined at bedside. Patient is intubated and sedated. Precedex started in AM. Patient can track sound, follow simple commands (nods, squeezes hands, etc). Alert intermittently. Patient unable to participate in subjective history.    VITAL SIGNS:  ICU Vital Signs Last 24 Hrs  T(C): 36.2 (13 Oct 2021 06:07), Max: 38.8 (12 Oct 2021 12:00)  T(F): 97.2 (13 Oct 2021 06:07), Max: 101.8 (12 Oct 2021 12:00)  HR: 79 (13 Oct 2021 10:00) (53 - 98)  BP: 100/66 (13 Oct 2021 08:00) (91/52 - 136/63)  BP(mean): 79 (13 Oct 2021 08:00) (66 - 90)  ABP: --  ABP(mean): --  RR: 21 (13 Oct 2021 10:00) (16 - 34)  SpO2: 98% (13 Oct 2021 10:00) (82% - 100%)    Mode: AC/ CMV (Assist Control/ Continuous Mandatory Ventilation), RR (machine): 16, TV (machine): 450, FiO2: 40, PEEP: 5, ITime: 1, MAP: 17, PIP: 28    10-12 @ 07:01  -  10-13 @ 07:00  --------------------------------------------------------  IN: 1430.9 mL / OUT: 1396 mL / NET: 34.9 mL    10-13 @ 07:01  -  10-13 @ 11:11  --------------------------------------------------------  IN: 281 mL / OUT: 241 mL / NET: 40 mL      CAPILLARY BLOOD GLUCOSE      POCT Blood Glucose.: 124 mg/dL (13 Oct 2021 05:15)      PHYSICAL EXAM:  Constitutional: NAD  General: frail, cachectic female, intubated and sedated  HEENT:  NC/AT, sclera anicteric, PERRL, +ET tube, site in place, copious secretions present     Lungs: coarse breath sounds b/L, no wheezing, thin chest  Cardiovascular: RRR; +s1, s2, no MGR   Gastrointestinal: thin abdomen soft, NT/ND, BS x4  Extremities: Thin extremities, no peripheral edema, no clubbing, no cyanosis  Skin: Warm, dry. Stage 4 sacral decub, packed and dressed  Neurological: intubated and sedated    MEDICATIONS:  MEDICATIONS  (STANDING):  artificial  tears Solution 1 Drop(s) Both EYES two times a day  bictegravir 50 mG/emtricitabine 200 mG/tenofovir alafenamide 25 mG (BIKTARVY) 1 Tablet(s) Oral daily  chlorhexidine 0.12% Liquid 15 milliLiter(s) Oral Mucosa every 12 hours  chlorhexidine 2% Cloths 1 Application(s) Topical daily  dexMEDEtomidine Infusion 0.2 MICROgram(s)/kG/Hr (2.5 mL/Hr) IV Continuous <Continuous>  dextrose 40% Gel 15 Gram(s) Oral once  dextrose 5%. 1000 milliLiter(s) (50 mL/Hr) IV Continuous <Continuous>  dextrose 5%. 1000 milliLiter(s) (100 mL/Hr) IV Continuous <Continuous>  dextrose 50% Injectable 25 Gram(s) IV Push once  dextrose 50% Injectable 12.5 Gram(s) IV Push once  dextrose 50% Injectable 25 Gram(s) IV Push once  enoxaparin Injectable 40 milliGRAM(s) SubCutaneous every 24 hours  fentaNYL   Infusion. 0.5 MICROgram(s)/kG/Hr (2.5 mL/Hr) IV Continuous <Continuous>  glucagon  Injectable 1 milliGRAM(s) IntraMuscular once  insulin lispro (ADMELOG) corrective regimen sliding scale   SubCutaneous every 6 hours  norepinephrine Infusion 0.05 MICROgram(s)/kG/Min (4.68 mL/Hr) IV Continuous <Continuous>  pantoprazole  Injectable 40 milliGRAM(s) IV Push daily  trimethoprim  160 mG/sulfamethoxazole 800 mG 1 Tablet(s) Oral daily    MEDICATIONS  (PRN):      ALLERGIES:  Allergies    No Known Allergies    Intolerances        LABS:                        8.8    10.91 )-----------( 51       ( 13 Oct 2021 05:22 )             28.4     10-13    142  |  111<H>  |  19  ----------------------------<  124<H>  4.6   |  22  |  0.86    Ca    8.3<L>      13 Oct 2021 05:22  Phos  3.1     10-13  Mg     1.9     10-13    TPro  7.4  /  Alb  2.1<L>  /  TBili  0.4  /  DBili  x   /  AST  34  /  ALT  9<L>  /  AlkPhos  66  10-13          RADIOLOGY & ADDITIONAL TESTS: Reviewed.   27-Dec-2017 16:59

## 2021-10-13 NOTE — CONSULT NOTE ADULT - CONSULT REASON
Acute hypoxic respiratory failure 2/2 severe sepsis
Complex Medical Decision Making/GOC in the setting of Critical Illness
SAB

## 2021-10-13 NOTE — CONSULT NOTE ADULT - PROBLEM SELECTOR RECOMMENDATION 9
.  -during prior hospitalization, patient had expressed that she wouldn't want long-term feeding tube

## 2021-10-14 NOTE — PROGRESS NOTE ADULT - ASSESSMENT
IMPRESSION:  55 year old female with AIDS, tongue and rectal ca, trach, recent admission for esophagitis, here with MRSA pneumonia and associated BSI. MARY 10/12 without vegetations; mild MVp and myxomatous changes seen.     Recommend:  1.  Continue vancomycin 750mg IV q24h.  Check trough 30 minutes before the next dose  2.  Continue TMP/SMX 1 DS daily ppx  3.  Continue Biktarvy daily       ID team 2 will follow

## 2021-10-14 NOTE — PROGRESS NOTE ADULT - ASSESSMENT
56yo F w/ PMH AIDS (Sept 2021 CD4 59,  currently on Biktarvy and Bactrim ppx), Hepatitis B/C, COPD (not on home O2, actively smokes), rectal cancer s/p radiation therapy, tongue cancer s/p resection, h/o crack use (last use 8/2021) BIBEMS from [facility] due to altered mental status. Admitted for acute hypoxic respiratory failure secondary to severe sepsis likely secondary to HAP with suspicion for superimposed PCP pneumonia. Currently intubated and sedated requiring levophed, fentanyl, and propofol gtt. Admitted to MICU for further management.      NEURO  Intubated and sedated  - currently on propofol and precedex gtt  - attempted to wean, but patient became progressively more agitated  - Patient failed CPAP trials and was started on sedation again as not ready for extubation  - RASS goal -2     CARDIOVASCULAR  #septic shock 2/2 HAP  Recent Hx with MRSA PNA treated w/ Vanc. Sepsis 2/2 HAP. s/p 2L NS in ED  - Currently on levophed gtt  - Titrate levo to MAP goal of 65-70    #r/o Endocarditis  IC patient with MRSA+ in BCx and BAL Cx   - TTE: unable to visualize vegetations  - MARY: No LA/RA/LATRELL/RAA thrombus seen, no vegetations visualized     #sinus Tach  - EKG with , QTc 428ms. Trop-T negative. Likely elevated in setting of severe sepsis from PNA  - d-dimer 1086  -- bedside POCUS showing no right heart strain   - ID plan as below     PULMONARY  #Acute hypoxic respiratory failure 2/2 severe sepsis from likely HAP vs PCP i/s/o immunocompromised state  Intubated in the ED after trial of NRB due to increased work of breathing. Currently on VC with FiO2 set at 100%. Likely secondary to PNA. Recent hospitalization for MRSA for which she was on a 6 day regimen of vancomycin.   - FIO2 decreased to 40%  - c/w Vanc 750mg Qd, next vanc trough 9/15  - c/w PCP prophylaxis - Bactrim DS qD per ID  - c/w home Biktarvy per ID  - 2PC obtained for Bronchoscopy (10/9)  - BCx (10/8) - MRSA+  - BAL Cx (10/9) - MRSA+  - Surveillance BCx (10/12) - NGTD    #COPD  Known h/o COPD. Current smoker (cut down to 6 cigarettes/day but has smoked since she was 11yo). Recently discharged on stiolto.   - c/w duonebs q6h   - resume nicotine patch 14mg/24h when clinically appropriate    RENAL  #ML   - BUN 32/Cr 1.38 on admission with baseline 9/0.8 per prior charts. Likely i/s/o severe sepsis.   - Repeat BUN/Cr 21/0.9, improving today  - per ID, need for Bactrim despite ML  - Jeter in place  - renally dose meds, avoid nephrotoxic agents  - monitor I/Os     ENDO  - Required SS and 10U NPH overnight for hyperglycemia. Worsening sugars i/s/o steroids given with bactrim  - started regular insulin 5U subQ Q6  - c/w mISS    GI  #Feeds  - NGT replaced on 10/13  - Resume home meds via NGT    HEME/ONC  #Anemia  - Hgb 7.1/Hct 22.7 on admission with prior levels in 9.0 range. Possibly AoCD from known AIDS and cancer diagnoses. S/p 1 pRBC  - goal Hgb > 7, maintain active T&S    #thrombocytopenia  S/p 1U platelets during admission.   PLT today 51  - monitor for now  - D/c'd zosyn    #Tongue Cancer  Pt has h/o tongue cancer s/p resection +/- graft placement. Unable to assess throat on physical exam given intubation/sedation.  - no acute interventions at this time    ID  #Severe Sepsis 2/2 HAP and/or PCP PNA   Patient with copious amounts of thick yellow secretions. Recent hospitalization for acute hypoxic resp failure 2/2 MRSA pneumonia for which she completed a 6 day course of vancomycin.   - c/w Vanc 750mg Qd, next vanc trough 9/16 7AM  - c/w PCP ppx bactrim DS Qd  - bronchoscopy on 10/9  - BAL Cx - MRSA+, BCx (10/10) - MRSA+  - Surveillance BCx (10/12) - NGTD  - Palliative consulted - pending [facility] discussions with HCP    #AIDS  - Latest CD4 59, . Takes Biktarvy and bactrim ppx at home  - Per ID, c/w home Biktarvy     DERM  #Known Stage 4 sacral ulcer  Pt has stage 4 pressure ulcer on R sacrum measuring 4cm x 2cm x 0.2cm - staged and managed last admission by wound care. Last admission wound care recs: Aquacel Extra, cut piece to fit over wound, cover with foam dressing  - Per wound care consult, probe to bone, recommended Aquacel and wound packing.    Right IJ (10/9)    Nutrition & Prophylaxis  F: s/p 2L NS bolus in ED  E: replete K<4, Mg<2  N: NPO   DVT ppx: hep SubQ  Code status: Full Code after discussion with ED provider      Dispo: MICU

## 2021-10-14 NOTE — PROGRESS NOTE ADULT - SUBJECTIVE AND OBJECTIVE BOX
INTERVAL HPI/OVERNIGHT EVENTS:    Patient was seen and examined at bedside.  Intubated    ROS:    unable to obtain     ANTIBIOTICS/RELEVANT:    MEDICATIONS  (STANDING):  artificial  tears Solution 1 Drop(s) Both EYES two times a day  bictegravir 50 mG/emtricitabine 200 mG/tenofovir alafenamide 25 mG (BIKTARVY) 1 Tablet(s) Oral daily  chlorhexidine 0.12% Liquid 15 milliLiter(s) Oral Mucosa every 12 hours  chlorhexidine 2% Cloths 1 Application(s) Topical daily  dexMEDEtomidine Infusion 0.2 MICROgram(s)/kG/Hr (2.5 mL/Hr) IV Continuous <Continuous>  dextrose 40% Gel 15 Gram(s) Oral once  dextrose 5%. 1000 milliLiter(s) (50 mL/Hr) IV Continuous <Continuous>  dextrose 5%. 1000 milliLiter(s) (100 mL/Hr) IV Continuous <Continuous>  dextrose 50% Injectable 25 Gram(s) IV Push once  dextrose 50% Injectable 12.5 Gram(s) IV Push once  dextrose 50% Injectable 25 Gram(s) IV Push once  enoxaparin Injectable 40 milliGRAM(s) SubCutaneous every 24 hours  fentaNYL   Infusion. 0.5 MICROgram(s)/kG/Hr (2.5 mL/Hr) IV Continuous <Continuous>  glucagon  Injectable 1 milliGRAM(s) IntraMuscular once  insulin lispro (ADMELOG) corrective regimen sliding scale   SubCutaneous every 6 hours  norepinephrine Infusion 0.05 MICROgram(s)/kG/Min (4.68 mL/Hr) IV Continuous <Continuous>  pantoprazole  Injectable 40 milliGRAM(s) IV Push daily  propofol Infusion 10 MICROgram(s)/kG/Min (2.99 mL/Hr) IV Continuous <Continuous>  trimethoprim  160 mG/sulfamethoxazole 800 mG 1 Tablet(s) Oral daily  vancomycin  IVPB 750 milliGRAM(s) IV Intermittent every 24 hours    MEDICATIONS  (PRN):  acetaminophen    Suspension .. 650 milliGRAM(s) Enteral Tube every 6 hours PRN Temp greater or equal to 38C (100.4F)        Vital Signs Last 24 Hrs  T(C): 37.3 (14 Oct 2021 13:58), Max: 38.9 (14 Oct 2021 08:30)  T(F): 99.1 (14 Oct 2021 13:58), Max: 102 (14 Oct 2021 08:30)  HR: 56 (14 Oct 2021 14:46) (56 - 127)  BP: 92/50 (14 Oct 2021 13:00) (88/50 - 125/58)  BP(mean): 66 (14 Oct 2021 13:00) (63 - 85)  RR: 21 (14 Oct 2021 13:00) (20 - 38)  SpO2: 97% (14 Oct 2021 14:46) (92% - 99%)    PHYSICAL EXAM:  Constitutional:  intubated  Eyes:SANTOS, EOMI  Ear/Nose/Throat: no oral lesion, no sinus tenderness on percussion	  Neck:  supple  Respiratory: CTA abner  Cardiovascular: S1S2 RRR, no murmurs  Gastrointestinal:soft, (+) BS, no HSM  Extremities:no e/e/c  Vascular: DP Pulse:	right normal; left normal      LABS:                        8.5    13.18 )-----------( 51       ( 14 Oct 2021 05:00 )             27.2     10-14    142  |  111<H>  |  21  ----------------------------<  164<H>  4.4   |  22  |  0.93    Ca    8.6      14 Oct 2021 05:00  Phos  2.6     10-14  Mg     2.1     10-14    TPro  7.4  /  Alb  2.2<L>  /  TBili  0.5  /  DBili  x   /  AST  34  /  ALT  8<L>  /  AlkPhos  73  10-14          MICROBIOLOGY:    Culture - Blood (10.12.21 @ 14:11)    Specimen Source: .Blood Blood    Culture Results:   No growth at 2 days.    Culture - Blood (10.11.21 @ 12:16)    -  Erythromycin: R >4    -  Linezolid: S 2    -  Oxacillin: R >2    -  RIF- Rifampin: S <=1 Should not be used as monotherapy    -  Tetra/Doxy: S <=1    -  Trimethoprim/Sulfamethoxazole: R >2/38    -  Vancomycin: S 1.5    Gram Stain:   Aerobic Bottle: Gram Positive Cocci in Clusters  Result called to and read back byTimmy Park RN 10/12/2021 11:12:15  Anaerobic Bottle: Gram Positive Cocci in Clusters  Floor previously notified.    -  Cefazolin: R 8    -  Clindamycin: R >4    -  Daptomycin: S 0.5    Specimen Source: .Blood Blood    Organism: Methicillin resistant Staphylococcus aureus    Organism: Methicillin resistant Staphylococcus aureus    Culture Results:   Growth in aerobic and anaerobic bottles: Methicillin Resistant  Staphylococcus aureus  Floor previously notified.    Organism Identification: Methicillin resistant Staphylococcus aureus  Methicillin resistant Staphylococcus aureus    Method Type: ETEST    Method Type: DARRON        RADIOLOGY & ADDITIONAL STUDIES:    < from: MARY w/Doppler (10.12.21 @ 16:46) >   1. Normal left ventricular systolic function.   2. Probably normal right ventricular systolic function.   3. No LA/RA/LATRELL/RAA thrombus seen.   4. No evidence of an intracardiac shunt.   5. There is mild prolapse of the posterior leaflet with focal thickening that likely represents myxomatous changes. There is mild-to-moderate mitral regurgitation seen at a blood pressure of 100/60 mmHg.   6. No echocardiographic evidence of vegetations.   7. No pericardial effusion.    ---------------------------------------    < end of copied text >

## 2021-10-14 NOTE — PROGRESS NOTE ADULT - PROBLEM SELECTOR PLAN 3
.  Requiring mechanical ventilation, severe respiratory distress that poses a threat to life  -c/w Abx for PNA  -despite patient's decision to rescind DNI status on admission, I do not .  Requiring mechanical ventilation, severe respiratory distress that poses a threat to life  -c/w Abx for PNA  -despite patient's decision to rescind DNI status on admission, I do not think patient would have wanted long-term ventilator support

## 2021-10-14 NOTE — PROGRESS NOTE ADULT - SUBJECTIVE AND OBJECTIVE BOX
** INCOMPLETE **     INTERVAL HPI/OVERNIGHT EVENTS:    SUBJECTIVE: Patient seen and examined at bedside.    VITAL SIGNS:  ICU Vital Signs Last 24 Hrs  T(C): 37.2 (14 Oct 2021 06:23), Max: 38.4 (13 Oct 2021 18:20)  T(F): 99 (14 Oct 2021 06:23), Max: 101.1 (13 Oct 2021 18:20)  HR: 108 (14 Oct 2021 06:00) (57 - 138)  BP: 98/67 (14 Oct 2021 05:00) (90/51 - 125/699)  BP(mean): 78 (14 Oct 2021 05:00) (63 - 89)  ABP: --  ABP(mean): --  RR: 28 (14 Oct 2021 06:00) (16 - 48)  SpO2: 95% (14 Oct 2021 06:00) (92% - 100%)    Mode: AC/ CMV (Assist Control/ Continuous Mandatory Ventilation), RR (machine): 16, TV (machine): 450, FiO2: 40, PEEP: 5, ITime: 1, MAP: 16, PIP: 23    10-12 @ 07:01  -  10-13 @ 07:00  --------------------------------------------------------  IN: 1430.9 mL / OUT: 1396 mL / NET: 34.9 mL    10-13 @ 07:01  -  10-14 @ 06:27  --------------------------------------------------------  IN: 1132.4 mL / OUT: 867 mL / NET: 265.4 mL      CAPILLARY BLOOD GLUCOSE      POCT Blood Glucose.: 151 mg/dL (14 Oct 2021 05:22)      PHYSICAL EXAM:    Constitutional: NAD  HEENT: NC/AT; PERRL, anicteric sclera; MMM  Neck: supple, no JVD  Cardiovascular: +S1/S2, RRR  Respiratory: CTA B/L, no W/R/R  Gastrointestinal: abdomen soft, NT/ND; no rebound or guarding; +BSx4  Genitourinary: no suprapubic tenderness or fullness  Extremities: WWP; no LE edema; no clubbing or cyanosis  Vascular: 2+ radial, DP/PT and femoral pulses B/L  Dermatologic: normal color and turgor; no visible rashes  Neurological:     MEDICATIONS:  MEDICATIONS  (STANDING):  artificial  tears Solution 1 Drop(s) Both EYES two times a day  bictegravir 50 mG/emtricitabine 200 mG/tenofovir alafenamide 25 mG (BIKTARVY) 1 Tablet(s) Oral daily  chlorhexidine 0.12% Liquid 15 milliLiter(s) Oral Mucosa every 12 hours  chlorhexidine 2% Cloths 1 Application(s) Topical daily  dexMEDEtomidine Infusion 0.2 MICROgram(s)/kG/Hr (2.5 mL/Hr) IV Continuous <Continuous>  dextrose 40% Gel 15 Gram(s) Oral once  dextrose 5%. 1000 milliLiter(s) (50 mL/Hr) IV Continuous <Continuous>  dextrose 5%. 1000 milliLiter(s) (100 mL/Hr) IV Continuous <Continuous>  dextrose 50% Injectable 25 Gram(s) IV Push once  dextrose 50% Injectable 12.5 Gram(s) IV Push once  dextrose 50% Injectable 25 Gram(s) IV Push once  enoxaparin Injectable 40 milliGRAM(s) SubCutaneous every 24 hours  fentaNYL   Infusion. 0.5 MICROgram(s)/kG/Hr (2.5 mL/Hr) IV Continuous <Continuous>  glucagon  Injectable 1 milliGRAM(s) IntraMuscular once  insulin lispro (ADMELOG) corrective regimen sliding scale   SubCutaneous every 6 hours  norepinephrine Infusion 0.05 MICROgram(s)/kG/Min (4.68 mL/Hr) IV Continuous <Continuous>  pantoprazole  Injectable 40 milliGRAM(s) IV Push daily  propofol Infusion 10 MICROgram(s)/kG/Min (2.99 mL/Hr) IV Continuous <Continuous>  trimethoprim  160 mG/sulfamethoxazole 800 mG 1 Tablet(s) Oral daily  vancomycin  IVPB 750 milliGRAM(s) IV Intermittent every 24 hours    MEDICATIONS  (PRN):      ALLERGIES:  Allergies    No Known Allergies    Intolerances        LABS:                        8.5    13.18 )-----------( 51       ( 14 Oct 2021 05:00 )             27.2     10-14    142  |  111<H>  |  21  ----------------------------<  164<H>  4.4   |  22  |  0.93    Ca    8.6      14 Oct 2021 05:00  Phos  2.6     10-14  Mg     2.1     10-14    TPro  7.4  /  Alb  2.2<L>  /  TBili  0.5  /  DBili  x   /  AST  34  /  ALT  8<L>  /  AlkPhos  73  10-14          RADIOLOGY & ADDITIONAL TESTS: Reviewed. INTERVAL HPI/OVERNIGHT EVENTS:  Started on propofol for agitation (precedex weaned off). Tube feeds stopped at 6 am. Propofol was weaned in AM.    SUBJECTIVE:   Patient seen and examined at bedside. Patient is intubated and sedated. Patient can track sound, follow simple commands (nods, squeezes hands, etc). Patient failed CPAP trial. Patient unable to participate in subjective history.    VITAL SIGNS:  ICU Vital Signs Last 24 Hrs  T(C): 37.2 (14 Oct 2021 06:23), Max: 38.4 (13 Oct 2021 18:20)  T(F): 99 (14 Oct 2021 06:23), Max: 101.1 (13 Oct 2021 18:20)  HR: 108 (14 Oct 2021 06:00) (57 - 138)  BP: 98/67 (14 Oct 2021 05:00) (90/51 - 125/699)  BP(mean): 78 (14 Oct 2021 05:00) (63 - 89)  ABP: --  ABP(mean): --  RR: 28 (14 Oct 2021 06:00) (16 - 48)  SpO2: 95% (14 Oct 2021 06:00) (92% - 100%)    Mode: AC/ CMV (Assist Control/ Continuous Mandatory Ventilation), RR (machine): 16, TV (machine): 450, FiO2: 40, PEEP: 5, ITime: 1, MAP: 16, PIP: 23    10-12 @ 07:01  -  10-13 @ 07:00  --------------------------------------------------------  IN: 1430.9 mL / OUT: 1396 mL / NET: 34.9 mL    10-13 @ 07:01  -  10-14 @ 06:27  --------------------------------------------------------  IN: 1132.4 mL / OUT: 867 mL / NET: 265.4 mL      CAPILLARY BLOOD GLUCOSE      POCT Blood Glucose.: 151 mg/dL (14 Oct 2021 05:22)      PHYSICAL EXAM:  Constitutional: NAD  General: frail, cachectic female, intubated and sedated  HEENT:  NC/AT, sclera anicteric, PERRL, +ET tube, site in place, increased copious secretions    Lungs: coarse breath sounds b/L, no wheezing, thin chest  Cardiovascular: RRR; +s1, s2, no MGR   Gastrointestinal: thin abdomen soft, NT/ND, BS x4  Extremities: Thin extremities, no peripheral edema, no clubbing, no cyanosi  Skin: Warm, dry. Stage 4 sacral decub, packed and dressed, ulcerations on medial knees bilaterally, packed and dressed  Neurological: intubated and sedated    MEDICATIONS:  MEDICATIONS  (STANDING):  artificial  tears Solution 1 Drop(s) Both EYES two times a day  bictegravir 50 mG/emtricitabine 200 mG/tenofovir alafenamide 25 mG (BIKTARVY) 1 Tablet(s) Oral daily  chlorhexidine 0.12% Liquid 15 milliLiter(s) Oral Mucosa every 12 hours  chlorhexidine 2% Cloths 1 Application(s) Topical daily  dexMEDEtomidine Infusion 0.2 MICROgram(s)/kG/Hr (2.5 mL/Hr) IV Continuous <Continuous>  dextrose 40% Gel 15 Gram(s) Oral once  dextrose 5%. 1000 milliLiter(s) (50 mL/Hr) IV Continuous <Continuous>  dextrose 5%. 1000 milliLiter(s) (100 mL/Hr) IV Continuous <Continuous>  dextrose 50% Injectable 25 Gram(s) IV Push once  dextrose 50% Injectable 12.5 Gram(s) IV Push once  dextrose 50% Injectable 25 Gram(s) IV Push once  enoxaparin Injectable 40 milliGRAM(s) SubCutaneous every 24 hours  fentaNYL   Infusion. 0.5 MICROgram(s)/kG/Hr (2.5 mL/Hr) IV Continuous <Continuous>  glucagon  Injectable 1 milliGRAM(s) IntraMuscular once  insulin lispro (ADMELOG) corrective regimen sliding scale   SubCutaneous every 6 hours  norepinephrine Infusion 0.05 MICROgram(s)/kG/Min (4.68 mL/Hr) IV Continuous <Continuous>  pantoprazole  Injectable 40 milliGRAM(s) IV Push daily  propofol Infusion 10 MICROgram(s)/kG/Min (2.99 mL/Hr) IV Continuous <Continuous>  trimethoprim  160 mG/sulfamethoxazole 800 mG 1 Tablet(s) Oral daily  vancomycin  IVPB 750 milliGRAM(s) IV Intermittent every 24 hours    MEDICATIONS  (PRN):      ALLERGIES:  Allergies    No Known Allergies    Intolerances      LABS:                        8.5    13.18 )-----------( 51       ( 14 Oct 2021 05:00 )             27.2     10-14    142  |  111<H>  |  21  ----------------------------<  164<H>  4.4   |  22  |  0.93    Ca    8.6      14 Oct 2021 05:00  Phos  2.6     10-14  Mg     2.1     10-14    TPro  7.4  /  Alb  2.2<L>  /  TBili  0.5  /  DBili  x   /  AST  34  /  ALT  8<L>  /  AlkPhos  73  10-14      RADIOLOGY & ADDITIONAL TESTS: Reviewed.

## 2021-10-14 NOTE — PROGRESS NOTE ADULT - ASSESSMENT
54yo F with PMH of HIV/AIDS, Hepatitis B/C, COPD, h/o rectal and tongue Maile p/w AMS and respiratory failure and found to have multifocal PNA and bacteremia. Palliative consulted for complex medical decision making in the setting of critical illness.    ·	plan for family meeting/telephone call tomorrow at 1:30PMN to discuss GOC

## 2021-10-14 NOTE — PROGRESS NOTE ADULT - SUBJECTIVE AND OBJECTIVE BOX
[facility] and Pall[facility]  Reza Shay, Palliative Care Attending  Contact Info: Call [phone] ([facility] Line) or message on Microsoft Teams (Reza Shay)    SUBJECTIVE AND OBJECTIVE:  INTERVAL HPI/OVERNIGHT EVENTS: Interval events noted. Patient required PRNs of  in past 24hrs. No unexpected adverse effects of opiates noted: no sedation/dizziness/nausea.    ALLERGIES:  No Known Allergies    MEDICATIONS  (STANDING):  artificial  tears Solution 1 Drop(s) Both EYES two times a day  bictegravir 50 mG/emtricitabine 200 mG/tenofovir alafenamide 25 mG (BIKTARVY) 1 Tablet(s) Oral daily  chlorhexidine 0.12% Liquid 15 milliLiter(s) Oral Mucosa every 12 hours  chlorhexidine 2% Cloths 1 Application(s) Topical daily  dexMEDEtomidine Infusion 0.2 MICROgram(s)/kG/Hr (2.5 mL/Hr) IV Continuous <Continuous>  dextrose 40% Gel 15 Gram(s) Oral once  dextrose 5%. 1000 milliLiter(s) (50 mL/Hr) IV Continuous <Continuous>  dextrose 5%. 1000 milliLiter(s) (100 mL/Hr) IV Continuous <Continuous>  dextrose 50% Injectable 25 Gram(s) IV Push once  dextrose 50% Injectable 12.5 Gram(s) IV Push once  dextrose 50% Injectable 25 Gram(s) IV Push once  enoxaparin Injectable 40 milliGRAM(s) SubCutaneous every 24 hours  fentaNYL   Infusion. 0.5 MICROgram(s)/kG/Hr (2.5 mL/Hr) IV Continuous <Continuous>  glucagon  Injectable 1 milliGRAM(s) IntraMuscular once  insulin lispro (ADMELOG) corrective regimen sliding scale   SubCutaneous every 6 hours  norepinephrine Infusion 0.05 MICROgram(s)/kG/Min (4.68 mL/Hr) IV Continuous <Continuous>  pantoprazole  Injectable 40 milliGRAM(s) IV Push daily  propofol Infusion 10 MICROgram(s)/kG/Min (2.99 mL/Hr) IV Continuous <Continuous>  trimethoprim  160 mG/sulfamethoxazole 800 mG 1 Tablet(s) Oral daily  vancomycin  IVPB 750 milliGRAM(s) IV Intermittent every 24 hours    MEDICATIONS  (PRN):  acetaminophen    Suspension .. 650 milliGRAM(s) Enteral Tube every 6 hours PRN Temp greater or equal to 38C (100.4F)      ITEMS UNCHECKED ARE NOT PRESENT    PRESENT SYMPTOMS: []Unable to obtain due to poor mentation   Source if other than patient:  []Family   []Team         Vital Signs Last 24 Hrs  T(C): 38.5 (14 Oct 2021 21:00), Max: 38.9 (14 Oct 2021 08:30)  T(F): 101.3 (14 Oct 2021 21:00), Max: 102 (14 Oct 2021 08:30)  HR: 74 (14 Oct 2021 23:00) (54 - 127)  BP: 91/53 (14 Oct 2021 23:00) (84/52 - 111/62)  BP(mean): 67 (14 Oct 2021 23:00) (63 - 81)  RR: 28 (14 Oct 2021 23:00) (20 - 37)  SpO2: 95% (14 Oct 2021 23:00) (92% - 100%) I&O's Summary    13 Oct 2021 07:01  -  14 Oct 2021 07:00  --------------------------------------------------------  IN: 1667.4 mL / OUT: 1047 mL / NET: 620.4 mL    14 Oct 2021 07:01  -  14 Oct 2021 23:27  --------------------------------------------------------  IN: 454.1 mL / OUT: 555 mL / NET: -100.9 mL        LABS:                         8.5    13.18 )-----------( 51       ( 14 Oct 2021 05:00 )             27.2   10-14    142  |  111<H>  |  21  ----------------------------<  164<H>  4.4   |  22  |  0.93    Ca    8.6      14 Oct 2021 05:00  Phos  2.6     10-14  Mg     2.1     10-14    TPro  7.4  /  Alb  2.2<L>  /  TBili  0.5  /  DBili  x   /  AST  34  /  ALT  8<L>  /  AlkPhos  73  10-14      RADIOLOGY & ADDITIONAL STUDIES: None new    PROTEIN CALORIE MALNUTRITION PRESENT: [ ]mild [ ]moderate [ ]severe [ ]underweight [ ]morbid obesity  [] PPSV2 < or = 30% [] significant weight loss [] poor nutritional intake [] anasarca [] catabolic state    Artificial Nutrition []     REFERRALS:  [x]Social Work  []Case management []PT/OT []Chaplaincy  []Hospice  []Patient/Family Support    DISCUSSION OF CASE: Family - to obtain additional history and to provide emotional support; ( ) -     Goals of Care Document:   Care Coordination Document:                                        Progress Notes    PROGRESS NOTE  Date & Time of Note   2021-10-13 14:43    Notes    Notes: Case discussed in IDR. Per medical team, patient possibly to be  extubated today. Anticipated dispo plan: patient is long term resident at  [facility] since Sept 2021; possible return to this facility  however, pending medical course/progress and med team clearance. CM remains  available.       Electronically signed by:  Sharyn Guerrero  Electronically signed on:  2021-10-13  14:45       [facility] and Palliative [facility]  Reza Shay, Palliative Care Attending  Contact Info: Call [phone] ([facility] Line) or message on [employer] Teams (Reza Shay)    SUBJECTIVE AND OBJECTIVE:  INTERVAL HPI/OVERNIGHT EVENTS: Interval events noted. Difficulty extubating, primary team would like to readdress [facility]. Discussed with son, will coordinate a call tomorrow. Patient required no additional PRNs in past 24hrs. No unexpected adverse effects of opiates noted: no sedation/dizziness/nausea.    ALLERGIES:  No Known Allergies    MEDICATIONS  (STANDING):  artificial  tears Solution 1 Drop(s) Both EYES two times a day  bictegravir 50 mG/emtricitabine 200 mG/tenofovir alafenamide 25 mG (BIKTARVY) 1 Tablet(s) Oral daily  chlorhexidine 0.12% Liquid 15 milliLiter(s) Oral Mucosa every 12 hours  chlorhexidine 2% Cloths 1 Application(s) Topical daily  dexMEDEtomidine Infusion 0.2 MICROgram(s)/kG/Hr (2.5 mL/Hr) IV Continuous <Continuous>  dextrose 40% Gel 15 Gram(s) Oral once  dextrose 5%. 1000 milliLiter(s) (50 mL/Hr) IV Continuous <Continuous>  dextrose 5%. 1000 milliLiter(s) (100 mL/Hr) IV Continuous <Continuous>  dextrose 50% Injectable 25 Gram(s) IV Push once  dextrose 50% Injectable 12.5 Gram(s) IV Push once  dextrose 50% Injectable 25 Gram(s) IV Push once  enoxaparin Injectable 40 milliGRAM(s) SubCutaneous every 24 hours  fentaNYL   Infusion. 0.5 MICROgram(s)/kG/Hr (2.5 mL/Hr) IV Continuous <Continuous>  glucagon  Injectable 1 milliGRAM(s) IntraMuscular once  insulin lispro (ADMELOG) corrective regimen sliding scale   SubCutaneous every 6 hours  norepinephrine Infusion 0.05 MICROgram(s)/kG/Min (4.68 mL/Hr) IV Continuous <Continuous>  pantoprazole  Injectable 40 milliGRAM(s) IV Push daily  propofol Infusion 10 MICROgram(s)/kG/Min (2.99 mL/Hr) IV Continuous <Continuous>  trimethoprim  160 mG/sulfamethoxazole 800 mG 1 Tablet(s) Oral daily  vancomycin  IVPB 750 milliGRAM(s) IV Intermittent every 24 hours    MEDICATIONS  (PRN):  acetaminophen    Suspension .. 650 milliGRAM(s) Enteral Tube every 6 hours PRN Temp greater or equal to 38C (100.4F)      ITEMS UNCHECKED ARE NOT PRESENT    PRESENT SYMPTOMS: [x]Unable to obtain due to poor mentation/encephalopathy  Source if other than patient:  []Family   []Team     Pain: [ ] yes [ ] no  QOL impact -   Location -                    Aggravating Factors -  Quality -  Radiation -  Timing -  Severity (0-10 scale) -   Minimal Acceptable Level (0-10 scale) -    PAIN AD Score: 0  [email or web address] (press ctrl +  left click to view)    Dyspnea:                           []Mild  []Moderate []Severe  Anxiety:                             []Mild []Moderate []Severe  Fatigue:                             []Mild []Moderate []Severe  Nausea:                             []Mild []Moderate []Severe  Loss of Appetite:              []Mild []Moderate []Severe  Constipation:                    []Mild []Moderate []Severe    Other Symptoms:  []All other review of systems negative     Palliative Performance Status Version 2:  10%    [email or web address]/[email or web address]_performance_scale_[email or web address]    PHYSICAL EXAM:  GENERAL:  []Alert  []Oriented x   []Lethargic  []Cachexia  [x]Unarousable  []Verbal  [x]Non-Verbal  Behavioral:   []Anxiety  [x]Delirium []Agitation []Cooperative  HEENT:  []Normal   []Dry mouth   [x]ET Tube/Trach  []Oral lesions  PULMONARY:   []Clear [x]Tachypnea  []Audible excessive secretions   [x]Rhonchi        []Right []Left [x]Bilateral  []Crackles        []Right []Left []Bilateral  []Wheezing     []Right []Left []Bilateral  CARDIOVASCULAR:    [x]Regular []Irregular []Tachy  []Sudeep []Murmur []Other  GASTROINTESTINAL:  [x]Soft  []Distended   [x]+BS  []Non tender []Tender  []PEG [x]OGT/ NGT  Last BM: ?  GENITOURINARY:  []Normal [] Incontinent   []Oliguria/Anuria   [x]Jeter  MUSCULOSKELETAL:   []Normal   []Weakness  [x]Bed/Wheelchair bound []Edema  NEUROLOGIC:   []No focal deficits  []Cognitive impairment  [x]Dysphagia []Dysarthria []Paresis [x]Encephalopathic   SKIN:   []Normal   [x]Pressure ulcer(s)  []Rash    CRITICAL CARE:  [ ]Shock Present  [ ]Septic [ ]Cardiogenic [ ]Neurologic [ ]Hypovolemic  [ ]Vasopressors [ ]Inotropes   [x]Respiratory failure present [x]Mechanical Ventilation [ ]Non-invasive ventilatory support [ ]High-Flow  [x]Acute  [ ]Chronic [x]Hypoxic  [ ]Hypercarbic  [ ]Other organ failure      Vital Signs Last 24 Hrs  T(C): 38.5 (14 Oct 2021 21:00), Max: 38.9 (14 Oct 2021 08:30)  T(F): 101.3 (14 Oct 2021 21:00), Max: 102 (14 Oct 2021 08:30)  HR: 74 (14 Oct 2021 23:00) (54 - 127)  BP: 91/53 (14 Oct 2021 23:00) (84/52 - 111/62)  BP(mean): 67 (14 Oct 2021 23:00) (63 - 81)  RR: 28 (14 Oct 2021 23:00) (20 - 37)  SpO2: 95% (14 Oct 2021 23:00) (92% - 100%) I&O's Summary    13 Oct 2021 07:01  -  14 Oct 2021 07:00  --------------------------------------------------------  IN: 1667.4 mL / OUT: 1047 mL / NET: 620.4 mL    14 Oct 2021 07:01  -  14 Oct 2021 23:27  --------------------------------------------------------  IN: 454.1 mL / OUT: 555 mL / NET: -100.9 mL        LABS:                         8.5    13.18 )-----------( 51       ( 14 Oct 2021 05:00 )             27.2   10-14    142  |  111<H>  |  21  ----------------------------<  164<H>  4.4   |  22  |  0.93    Ca    8.6      14 Oct 2021 05:00  Phos  2.6     10-14  Mg     2.1     10-14    TPro  7.4  /  Alb  2.2<L>  /  TBili  0.5  /  DBili  x   /  AST  34  /  ALT  8<L>  /  AlkPhos  73  10-14      RADIOLOGY & ADDITIONAL STUDIES: None new    PROTEIN CALORIE MALNUTRITION PRESENT: [ ]mild [ ]moderate [ ]severe [ ]underweight [ ]morbid obesity  [] PPSV2 < or = 30% [] significant weight loss [] poor nutritional intake [] anasarca [] catabolic state    Artificial Nutrition []     REFERRALS:  [x]Social Work  []Case management []PT/OT []Chaplaincy  []Hospice  []Patient/Family Support

## 2021-10-14 NOTE — CHART NOTE - NSCHARTNOTEFT_GEN_A_CORE
Admitting Diagnosis:   Patient is a 55y old  Female who presents with a chief complaint of Acute hypoxic respiratory failure 2/2 severe sepsis (14 Oct 2021 06:27)      PAST MEDICAL & SURGICAL HISTORY:  HIV disease    Advanced COPD    Tongue cancer    Rectal cancer    Hepatitis B    Hepatitis C    No significant past surgical history        Current Nutrition Order: NGT feeds of Glucerna 1.2 48cc(presently running at 40cc with plans to increase)x24hrs(continues feeds) providincTV/1501kcal/69gmprotein and 927cc free water. propofol(10 m/g :264kcal additional )total kcal:1765(providing 117% of kcal @30kcal/kg of ABW and 98% of protein needs (@1.4gm/per kg /ABW    PO Intake: Good (%) [   ]  Fair (50-75%) [   ] Poor (<25%) [   ]NPO intubated /vented     GI Issues: BM10/    Pain: controlled/sedated    Skin Integrity :Stage IV sacrum    Labs:   10-14    142  |  111<H>  |  21  ----------------------------<  164<H>  4.4   |  22  |  0.93    Ca    8.6      14 Oct 2021 05:00  Phos  2.6     10-  Mg     2.1     10-14    TPro  7.4  /  Alb  2.2<L>  /  TBili  0.5  /  DBili  x   /  AST  34  /  ALT  8<L>  /  AlkPhos  73  10-14    CAPILLARY BLOOD GLUCOSE      POCT Blood Glucose.: 152 mg/dL (14 Oct 2021 12:06)  POCT Blood Glucose.: 151 mg/dL (14 Oct 2021 05:22)  POCT Blood Glucose.: 140 mg/dL (13 Oct 2021 23:24)  POCT Blood Glucose.: 131 mg/dL (13 Oct 2021 16:41)      Medications:  MEDICATIONS  (STANDING):  artificial  tears Solution 1 Drop(s) Both EYES two times a day  bictegravir 50 mG/emtricitabine 200 mG/tenofovir alafenamide 25 mG (BIKTARVY) 1 Tablet(s) Oral daily  chlorhexidine 0.12% Liquid 15 milliLiter(s) Oral Mucosa every 12 hours  chlorhexidine 2% Cloths 1 Application(s) Topical daily  dexMEDEtomidine Infusion 0.2 MICROgram(s)/kG/Hr (2.5 mL/Hr) IV Continuous <Continuous>  dextrose 40% Gel 15 Gram(s) Oral once  dextrose 5%. 1000 milliLiter(s) (50 mL/Hr) IV Continuous <Continuous>  dextrose 5%. 1000 milliLiter(s) (100 mL/Hr) IV Continuous <Continuous>  dextrose 50% Injectable 25 Gram(s) IV Push once  dextrose 50% Injectable 12.5 Gram(s) IV Push once  dextrose 50% Injectable 25 Gram(s) IV Push once  enoxaparin Injectable 40 milliGRAM(s) SubCutaneous every 24 hours  fentaNYL   Infusion. 0.5 MICROgram(s)/kG/Hr (2.5 mL/Hr) IV Continuous <Continuous>  glucagon  Injectable 1 milliGRAM(s) IntraMuscular once  insulin lispro (ADMELOG) corrective regimen sliding scale   SubCutaneous every 6 hours  norepinephrine Infusion 0.05 MICROgram(s)/kG/Min (4.68 mL/Hr) IV Continuous <Continuous>  pantoprazole  Injectable 40 milliGRAM(s) IV Push daily  propofol Infusion 10 MICROgram(s)/kG/Min (2.99 mL/Hr) IV Continuous <Continuous>  trimethoprim  160 mG/sulfamethoxazole 800 mG 1 Tablet(s) Oral daily  vancomycin  IVPB 750 milliGRAM(s) IV Intermittent every 24 hours    MEDICATIONS  (PRN):  acetaminophen    Suspension .. 650 milliGRAM(s) Enteral Tube every 6 hours PRN Temp greater or equal to 38C (100.4F)      Weight:49.9kg  Daily     Daily no updated weights    Weight Change: no updated weights.IBW:61.2kg,81% of IBW.BMI:17.2    Estimated energy needs: Based on ABW due to patient <100% of IBW in critical care setting and adjusted for Vent, Age/PU and PCM demands: ABW:49.9kcal n83-60olbs:1245-1494kcal and 1.4-1.6gmprotein:69.7-79.6gmprotein./fluids per team    Subjective: 56yo F w/ PMH AIDS (2021 CD4 57,  currently on Biktarvy and Bactrim ppx), Hepatitis B/C, COPD (not on home O2, actively smokes), rectal cancer s/p radiation therapy, tongue cancer s/p resection, h/o crack use (last use 2021) BIBEMS from [facility] due to altered mental status. Admitted for acute hypoxic respiratory failure secondary to severe sepsis likely secondary to HAP with suspicion for superimposed PCP pneumonia. Currently intubated and sedated requiring levophed, fentanyl, and propofol (10mc)gtt. Admitted to MICU for further management. Patient receiving TF via NGT of Glucerna 1.2 @40cc/hr ATC(plans to increase to 48cc/per team).MAP:16.TF @48cc provides without propofol:1152ccTV/1501kcal/69gmprotein and 927cc free water .With propofol(10mc)provides: additional 264kcal(total kcal provision:1765kcal.Patient identified at Severe PCM per ASPEN guidelines .SBT failed for attempts at extubation .Noted Palliative notes and patients wishes for no long-term feeding tube.    Previous Nutrition Diagnosis: Severe PCM r/t inadequate energy intake to meet needs 2/2 dysphagia AEB: NFPE findings    Active [ x  ]  Resolved [   ]Ongoing    If resolved, new PES: Inadequate energy intake r/t inability to meet EER via po AEB: enteral feeds 2/2 vent    Goal: Align nutrition with goals of care at all times  Recommendations:1.Trend weights 2.Monitor lytes 3.fluids per team 4.SLP evaluation prior to initiation of oral diet when extubated 5.Covering RD to adjust TF rate with propofol rate     Education: not indicated at this time due to intubation    Risk Level: High [ x  ] Moderate [   ] Low [   ] Admitting Diagnosis:   Patient is a 55y old  Female who presents with a chief complaint of Acute hypoxic respiratory failure 2/2 severe sepsis (14 Oct 2021 06:27)      PAST MEDICAL & SURGICAL HISTORY:  HIV disease    Advanced COPD    Tongue cancer    Rectal cancer    Hepatitis B    Hepatitis C    No significant past surgical history        Current Nutrition Order: NGT feeds of Glucerna 1.2 48cc(presently running at 40cc with plans to increase)x24hrs(continues feeds) providincTV/1501kcal/69gmprotein and 927cc free water. propofol(10 m/g :264kcal additional )total kcal:1765(providing 117% of kcal @30kcal/kg of ABW and 98% of protein needs (@1.4gm/per kg /ABW    PO Intake: Good (%) [   ]  Fair (50-75%) [   ] Poor (<25%) [   ]NPO intubated /vented     GI Issues: BM10/    Pain: controlled/sedated    Skin Integrity :Stage IV sacrum    Labs:   10-14    142  |  111<H>  |  21  ----------------------------<  164<H>  4.4   |  22  |  0.93    Ca    8.6      14 Oct 2021 05:00  Phos  2.6     10-  Mg     2.1     10-14    TPro  7.4  /  Alb  2.2<L>  /  TBili  0.5  /  DBili  x   /  AST  34  /  ALT  8<L>  /  AlkPhos  73  10-14    CAPILLARY BLOOD GLUCOSE      POCT Blood Glucose.: 152 mg/dL (14 Oct 2021 12:06)  POCT Blood Glucose.: 151 mg/dL (14 Oct 2021 05:22)  POCT Blood Glucose.: 140 mg/dL (13 Oct 2021 23:24)  POCT Blood Glucose.: 131 mg/dL (13 Oct 2021 16:41)      Medications:  MEDICATIONS  (STANDING):  artificial  tears Solution 1 Drop(s) Both EYES two times a day  bictegravir 50 mG/emtricitabine 200 mG/tenofovir alafenamide 25 mG (BIKTARVY) 1 Tablet(s) Oral daily  chlorhexidine 0.12% Liquid 15 milliLiter(s) Oral Mucosa every 12 hours  chlorhexidine 2% Cloths 1 Application(s) Topical daily  dexMEDEtomidine Infusion 0.2 MICROgram(s)/kG/Hr (2.5 mL/Hr) IV Continuous <Continuous>  dextrose 40% Gel 15 Gram(s) Oral once  dextrose 5%. 1000 milliLiter(s) (50 mL/Hr) IV Continuous <Continuous>  dextrose 5%. 1000 milliLiter(s) (100 mL/Hr) IV Continuous <Continuous>  dextrose 50% Injectable 25 Gram(s) IV Push once  dextrose 50% Injectable 12.5 Gram(s) IV Push once  dextrose 50% Injectable 25 Gram(s) IV Push once  enoxaparin Injectable 40 milliGRAM(s) SubCutaneous every 24 hours  fentaNYL   Infusion. 0.5 MICROgram(s)/kG/Hr (2.5 mL/Hr) IV Continuous <Continuous>  glucagon  Injectable 1 milliGRAM(s) IntraMuscular once  insulin lispro (ADMELOG) corrective regimen sliding scale   SubCutaneous every 6 hours  norepinephrine Infusion 0.05 MICROgram(s)/kG/Min (4.68 mL/Hr) IV Continuous <Continuous>  pantoprazole  Injectable 40 milliGRAM(s) IV Push daily  propofol Infusion 10 MICROgram(s)/kG/Min (2.99 mL/Hr) IV Continuous <Continuous>  trimethoprim  160 mG/sulfamethoxazole 800 mG 1 Tablet(s) Oral daily  vancomycin  IVPB 750 milliGRAM(s) IV Intermittent every 24 hours    MEDICATIONS  (PRN):  acetaminophen    Suspension .. 650 milliGRAM(s) Enteral Tube every 6 hours PRN Temp greater or equal to 38C (100.4F)      Weight:49.9kg  Daily     Daily no updated weights    Weight Change: no updated weights.IBW:61.2kg,81% of IBW.BMI:17.2    Estimated energy needs: Based on ABW due to patient <100% of IBW in critical care setting and adjusted for Vent, Age/PU and PCM demands: ABW:49.9kcal e37-61nawi:1245-1494kcal and 1.4-1.6gmprotein:69.7-79.6gmprotein./fluids per team    Subjective: 54yo F w/ PMH AIDS (2021 CD4 57,  currently on Biktarvy and Bactrim ppx), Hepatitis B/C, COPD (not on home O2, actively smokes), rectal cancer s/p radiation therapy, tongue cancer s/p resection, h/o crack use (last use 2021) BIBEMS from [facility] due to altered mental status. Admitted for acute hypoxic respiratory failure secondary to severe sepsis likely secondary to HAP with suspicion for superimposed PCP pneumonia. Currently intubated and sedated requiring levophed, fentanyl, and propofol (10mc)gtt. Admitted to MICU for further management. Patient receiving TF via NGT of Glucerna 1.2 @40cc/hr ATC(plans to increase to 48cc/per team).MAP:16.TF @48cc provides without propofol:1152ccTV/1501kcal/69gmprotein and 927cc free water .With propofol(10mc)provides: additional 264kcal(total kcal provision:1765kcal.Patient identified at Severe PCM per ASPEN guidelines .SBT failed for attempts at extubation .Noted Palliative notes and patients wishes for no long-term feeding tube.    Previous Nutrition Diagnosis: Severe PCM r/t inadequate energy intake to meet needs 2/2 dysphagia AEB: NFPE findings    Active [ x  ]  Resolved [   ]Ongoing    If resolved, new PES: Inadequate energy intake r/t inability to meet EER via po AEB: enteral feeds dependent 2/2 vent    Goal: Align nutrition with goals of care at all times  Recommendations:1.Trend weights 2.Monitor lytes 3.fluids per team 4.SLP evaluation prior to initiation of oral diet when extubated 5.Covering RD to adjust TF rate with propofol rate     Education: not indicated at this time due to intubation    Risk Level: High [ x  ] Moderate [   ] Low [   ]

## 2021-10-15 NOTE — PROGRESS NOTE ADULT - PROBLEM SELECTOR PLAN 2
.  PPSV = 10%, requires total assistance for all ADLs  -c/w supportive care
.  -during prior hospitalization, patient had expressed that she wouldn't want long-term feeding tube

## 2021-10-15 NOTE — PROGRESS NOTE ADULT - TIME BILLING
evaluation of MRSA bacteremia
treatment of MRSA bacteremia
Management of bacteremia and AIDS
Management of Staph aureus bacteremia
Management of bacteremia
Emotional Support/Supportive Care  Exploration of GOC  Clarifying Potential Disease Trajectories

## 2021-10-15 NOTE — PROGRESS NOTE ADULT - PROBLEM SELECTOR PLAN 6
.  DNR reinstated after discussion with son/HCP  -son will be coming in tomorrow to visit, he was advised that the patient is unstable and high risk for death  -when he arrives would recommend transitioning to comfort/symptom-directed care so that patient can die with dignity and comfort    In addition to the E/M visit, an advance care planning meeting was performed. Start time: 1:44PM; End time: 2:00PM; Total time: 16min. A meeting to discuss advance care planning was held today regarding: INEZ WOLF. Primary decision maker: Patient is unable to participate in decision making; Alternate/surrogate: Reed Wolf. Discussed advance directives including, but not limited to, healthcare proxy and code status. Decision regarding code status: DNR; Documentation completed today: [facility] note
.  Complex medical decision making in the setting of critical illness.    Will continue to follow for ongoing GOC discussion.   Emotional support provided, questions answered.    For new or uncontrolled symptoms, please call Palliative Care at [phone]. The service is available 24/7 (including nights & weekends) to provide symptom management recommendations over the phone as appropriate

## 2021-10-15 NOTE — PROGRESS NOTE ADULT - ASSESSMENT
IMPRESSION:  55 year old female with AIDS, tongue and rectal ca, trach, recent admission for esophagitis, here with MRSA pneumonia and associated BSI. MARY 10/12 without vegetations; mild MVp and myxomatous changes seen.     Recommend:  1.  If patient will be undergoing palliative extubation, I would be in favor of stopping vancomycin as there will be no therapeutic benefit.  Otherwise she should continue on Vancomycin x 4 weeks with day 1 being 10/14/21      ID team 2 will sign off.  Reconsult as needed    IMPRESSION:  55 year old female with AIDS, tongue and rectal ca, trach, recent admission for esophagitis, here with MRSA pneumonia and associated BSI. MARY 10/12 without vegetations; mild MVp and myxomatous changes seen.     Recommend:  1.  If patient will be undergoing palliative extubation, I would be in favor of stopping vancomycin as there will be no therapeutic benefit.    2.  Continue Vancomycin 750 mg IV q12.  Check trough before 4th dose  3.  Continue biktarvy for now  4.  Continue Bactrim 1 DS PO daily for now      ID team 2 will follow.  Dr. Florian will cover the service tonight and this weekend. I will return on 10/18. Please call ID on call if there are any issues or questions over the weekend

## 2021-10-15 NOTE — PROGRESS NOTE ADULT - PROBLEM SELECTOR PLAN 5
.  Chronic history, diagnosed in 1993  -h/o non-adherence with medications  -frequently admitted at [facility]
.  Patient previously requested DNR/DNI status and MOLST was completed  -documentation states DNI status was rescinded but does not specifically say if DNR was addressed  -ongoing discussion with patient's son/HCP, will discuss general GOC and possible palliative extubation

## 2021-10-15 NOTE — PROGRESS NOTE ADULT - ASSESSMENT
56yo F with PMH of HIV/AIDS, Hepatitis B/C, COPD, h/o rectal and tongue Maile p/w AMS and respiratory failure and found to have multifocal PNA and bacteremia. Palliative consulted for complex medical decision making in the setting of critical illness.    ·	discussion with son/HCP: DNR reinstated, he understands patient is doing poorly and may die -> advised to come and visit the patient he will be in tomorrow  ·	no decision made yet to palliative extubate but son understands that the patient is unstable; seems reasonable to continue escalating pressor support if indicated to ensure son can come visit tomorrow but he understands that this needs to be balanced with patient's comfort 54yo F with PMH of HIV/AIDS, Hepatitis B/C, COPD, h/o rectal and tongue Maile p/w AMS and respiratory failure and found to have multifocal PNA and bacteremia. Palliative consulted for complex medical decision making in the setting of critical illness.    ·	discussion with son/HCP: DNR reinstated, he understands patient is doing poorly and may die -> advised to come and visit the patient he will be in tomorrow  ·	no decision made yet to palliative extubate but son understands that the patient is unstable; seems reasonable to continue escalating pressor support if indicated to ensure son can come visit tomorrow but he understands that this needs to be balanced with patient's comfort     1:44-2 54yo F with PMH of HIV/AIDS, Hepatitis B/C, COPD, h/o rectal and tongue Maile p/w AMS and respiratory failure and found to have multifocal PNA and bacteremia. Palliative consulted for complex medical decision making in the setting of critical illness.    ·	discussion with son/HCP: DNR reinstated, he understands patient is doing poorly and may die -> advised to come and visit the patient he will be in tomorrow  ·	no decision made yet to palliative extubate but son understands that the patient is unstable; seems reasonable to continue escalating pressor support if indicated to ensure son can come visit tomorrow but he understands that this needs to be balanced with patient's comfort  -when son visits, would advise him to transition to symptom-directed care so patient can die comfortably

## 2021-10-15 NOTE — PROGRESS NOTE ADULT - CONVERSATION DETAILS
Reviewed patient's hospital course and recent developments. Discussed the patient's inability to be weaned from vent and increasing hemodynamic instability. Son understands the patient is critically ill and could die at any time. He has not been able to see the patient in a long time, he plans to visit tomorrow. He agreed with reinstating DNR status but no decision made about palliative extubation. Will readdress after son comes to visit and clinical course unfolds. Reviewed patient's hospital course and recent developments. Discussed the patient's inability to be weaned from vent and increasing hemodynamic instability. Son understands the patient is critically ill and could die at any time. He has not been able to see the patient in a long time, he plans to visit tomorrow. He agreed with reinstating DNR status but no decision made about palliative extubation. Will readdress after son comes to visit and clinical course unfolds. Braced son that if patient continues to decompensate that we will recommend transitioning to full comfort/symptom-directed care, he understood.

## 2021-10-15 NOTE — PROGRESS NOTE ADULT - PROBLEM SELECTOR PLAN 1
.  -during prior hospitalization, patient had expressed that she wouldn't want long-term feeding tube
.  -titrate sedation as appropriate to maintain adequate comfort level  -if the decision is made to transition to comfort measures, recommend the following medication orders:       -Robinul 0.4mg IV q4h ATC       -Dilaudid 1mg IV q1h PRN for RR >25       -Ativan 1mg IV q4h PRN for Agitation/Anxiety

## 2021-10-15 NOTE — PROGRESS NOTE ADULT - SUBJECTIVE AND OBJECTIVE BOX
INTERVAL HPI/OVERNIGHT EVENTS:    Patient was seen and examined at bedside.  Informed by team there are plans for palliative extubation    ROS:    unable to obtain       ANTIBIOTICS/RELEVANT:    MEDICATIONS  (STANDING):  artificial  tears Solution 1 Drop(s) Both EYES two times a day  bictegravir 50 mG/emtricitabine 200 mG/tenofovir alafenamide 25 mG (BIKTARVY) 1 Tablet(s) Oral daily  chlorhexidine 0.12% Liquid 15 milliLiter(s) Oral Mucosa every 12 hours  chlorhexidine 2% Cloths 1 Application(s) Topical daily  dextrose 40% Gel 15 Gram(s) Oral once  dextrose 5%. 1000 milliLiter(s) (50 mL/Hr) IV Continuous <Continuous>  dextrose 5%. 1000 milliLiter(s) (100 mL/Hr) IV Continuous <Continuous>  dextrose 50% Injectable 25 Gram(s) IV Push once  dextrose 50% Injectable 12.5 Gram(s) IV Push once  dextrose 50% Injectable 25 Gram(s) IV Push once  enoxaparin Injectable 40 milliGRAM(s) SubCutaneous every 24 hours  fentaNYL   Infusion. 0.5 MICROgram(s)/kG/Hr (2.5 mL/Hr) IV Continuous <Continuous>  glucagon  Injectable 1 milliGRAM(s) IntraMuscular once  insulin lispro (ADMELOG) corrective regimen sliding scale   SubCutaneous every 6 hours  norepinephrine Infusion 0.05 MICROgram(s)/kG/Min (4.68 mL/Hr) IV Continuous <Continuous>  pantoprazole  Injectable 40 milliGRAM(s) IV Push daily  propofol Infusion 10 MICROgram(s)/kG/Min (2.99 mL/Hr) IV Continuous <Continuous>  trimethoprim  160 mG/sulfamethoxazole 800 mG 1 Tablet(s) Oral daily  vancomycin  IVPB 750 milliGRAM(s) IV Intermittent every 12 hours    MEDICATIONS  (PRN):  acetaminophen    Suspension .. 650 milliGRAM(s) Enteral Tube every 6 hours PRN Temp greater or equal to 38C (100.4F)        Vital Signs Last 24 Hrs  T(C): 38.1 (15 Oct 2021 09:00), Max: 38.5 (14 Oct 2021 21:00)  T(F): 100.6 (15 Oct 2021 09:00), Max: 101.3 (14 Oct 2021 21:00)  HR: 101 (15 Oct 2021 09:15) (54 - 121)  BP: 79/57 (15 Oct 2021 09:15) (79/57 - 116/67)  BP(mean): 63 (15 Oct 2021 09:15) (61 - 86)  RR: 34 (15 Oct 2021 09:15) (20 - 39)  SpO2: 86% (15 Oct 2021 09:15) (86% - 100%)    PHYSICAL EXAM:  Constitutional: ill appearing, intubated  Eyes:  no icterus   Ear/Nose/Throat: no oral lesion   Neck:  supple  Respiratory: CTA abner  Cardiovascular: S1S2 RRR, no murmurs  Gastrointestinal:soft, (+) BS, no HSM  Extremities:no e/e/c  Vascular: DP Pulse:	right normal; left normal      LABS:                        8.6    10.17 )-----------( 42       ( 15 Oct 2021 04:42 )             27.4     10-15    144  |  113<H>  |  28<H>  ----------------------------<  174<H>  4.2   |  20<L>  |  0.99    Ca    8.6      15 Oct 2021 04:42  Phos  3.7     10-15  Mg     2.0     10-15    TPro  7.5  /  Alb  2.0<L>  /  TBili  0.6  /  DBili  x   /  AST  34  /  ALT  7<L>  /  AlkPhos  71  10-15          MICROBIOLOGY:    Culture - Blood (10.14.21 @ 16:16)    Specimen Source: .Blood Blood    Culture Results:   No growth at 12 hours    Culture - Blood (10.11.21 @ 12:16)    -  Erythromycin: R >4    -  Linezolid: S 2    -  Oxacillin: R >2    -  RIF- Rifampin: S <=1 Should not be used as monotherapy    -  Tetra/Doxy: S <=1    -  Trimethoprim/Sulfamethoxazole: R >2/38    -  Vancomycin: S 1.5    Gram Stain:   Aerobic Bottle: Gram Positive Cocci in Clusters  Result called to and read back byTimmy Park RN 10/12/2021 11:12:15  Anaerobic Bottle: Gram Positive Cocci in Clusters  Floor previously notified.    -  Cefazolin: R 8    -  Clindamycin: R >4    -  Daptomycin: S 0.5    Specimen Source: .Blood Blood    Organism: Methicillin resistant Staphylococcus aureus    Organism: Methicillin resistant Staphylococcus aureus    Culture Results:   Growth in aerobic and anaerobic bottles: Methicillin Resistant  Staphylococcus aureus  Floor previously notified.    Organism Identification: Methicillin resistant Staphylococcus aureus  Methicillin resistant Staphylococcus aureus    Method Type: ETEST    Method Type: DARRON        RADIOLOGY & ADDITIONAL STUDIES:

## 2021-10-15 NOTE — PROGRESS NOTE ADULT - PROBLEM SELECTOR PLAN 4
.  Chronic history, diagnosed in 1993  -h/o non-adherence with medications  -frequently admitted at [facility]
.  Requiring mechanical ventilation, severe respiratory distress that poses a threat to life  -c/w Abx for PNA, bacteremia does not appear to be clearing  -despite patient's decision to rescind DNI status on admission, I do not think patient would have wanted long-term ventilator support and she will not benefit from this intervention

## 2021-10-15 NOTE — PROGRESS NOTE ADULT - SUBJECTIVE AND OBJECTIVE BOX
** INCOMPLETE **     INTERVAL HPI/OVERNIGHT EVENTS:    SUBJECTIVE: Patient seen and examined at bedside.    VITAL SIGNS:  ICU Vital Signs Last 24 Hrs  T(C): 37.5 (15 Oct 2021 05:00), Max: 38.9 (14 Oct 2021 08:30)  T(F): 99.5 (15 Oct 2021 05:00), Max: 102 (14 Oct 2021 08:30)  HR: 102 (15 Oct 2021 05:15) (54 - 127)  BP: 96/60 (15 Oct 2021 05:00) (81/51 - 111/62)  BP(mean): 72 (15 Oct 2021 05:00) (61 - 81)  ABP: --  ABP(mean): --  RR: 28 (15 Oct 2021 05:15) (20 - 37)  SpO2: 93% (15 Oct 2021 05:15) (91% - 100%)    Mode: AC/ CMV (Assist Control/ Continuous Mandatory Ventilation), RR (machine): 16, TV (machine): 450, FiO2: 40, PEEP: 5, ITime: 1, MAP: 19, PIP: 27    10-13 @ 07:01  -  10-14 @ 07:00  --------------------------------------------------------  IN: 1667.4 mL / OUT: 1047 mL / NET: 620.4 mL    10-14 @ 07:01  -  10-15 @ 06:06  --------------------------------------------------------  IN: 747.1 mL / OUT: 690 mL / NET: 57.1 mL      CAPILLARY BLOOD GLUCOSE      POCT Blood Glucose.: 186 mg/dL (15 Oct 2021 05:12)      PHYSICAL EXAM:    Constitutional: NAD  HEENT: NC/AT; PERRL, anicteric sclera; MMM  Neck: supple, no JVD  Cardiovascular: +S1/S2, RRR  Respiratory: CTA B/L, no W/R/R  Gastrointestinal: abdomen soft, NT/ND; no rebound or guarding; +BSx4  Genitourinary: no suprapubic tenderness or fullness  Extremities: WWP; no LE edema; no clubbing or cyanosis  Vascular: 2+ radial, DP/PT and femoral pulses B/L  Dermatologic: normal color and turgor; no visible rashes  Neurological:     MEDICATIONS:  MEDICATIONS  (STANDING):  artificial  tears Solution 1 Drop(s) Both EYES two times a day  bictegravir 50 mG/emtricitabine 200 mG/tenofovir alafenamide 25 mG (BIKTARVY) 1 Tablet(s) Oral daily  chlorhexidine 0.12% Liquid 15 milliLiter(s) Oral Mucosa every 12 hours  chlorhexidine 2% Cloths 1 Application(s) Topical daily  dexMEDEtomidine Infusion 0.2 MICROgram(s)/kG/Hr (2.5 mL/Hr) IV Continuous <Continuous>  dextrose 40% Gel 15 Gram(s) Oral once  dextrose 5%. 1000 milliLiter(s) (50 mL/Hr) IV Continuous <Continuous>  dextrose 5%. 1000 milliLiter(s) (100 mL/Hr) IV Continuous <Continuous>  dextrose 50% Injectable 25 Gram(s) IV Push once  dextrose 50% Injectable 12.5 Gram(s) IV Push once  dextrose 50% Injectable 25 Gram(s) IV Push once  enoxaparin Injectable 40 milliGRAM(s) SubCutaneous every 24 hours  fentaNYL   Infusion. 0.5 MICROgram(s)/kG/Hr (2.5 mL/Hr) IV Continuous <Continuous>  glucagon  Injectable 1 milliGRAM(s) IntraMuscular once  insulin lispro (ADMELOG) corrective regimen sliding scale   SubCutaneous every 6 hours  norepinephrine Infusion 0.05 MICROgram(s)/kG/Min (4.68 mL/Hr) IV Continuous <Continuous>  pantoprazole  Injectable 40 milliGRAM(s) IV Push daily  propofol Infusion 10 MICROgram(s)/kG/Min (2.99 mL/Hr) IV Continuous <Continuous>  trimethoprim  160 mG/sulfamethoxazole 800 mG 1 Tablet(s) Oral daily  vancomycin  IVPB 750 milliGRAM(s) IV Intermittent every 24 hours    MEDICATIONS  (PRN):  acetaminophen    Suspension .. 650 milliGRAM(s) Enteral Tube every 6 hours PRN Temp greater or equal to 38C (100.4F)      ALLERGIES:  Allergies    No Known Allergies    Intolerances        LABS:                        8.6    10.17 )-----------( 42       ( 15 Oct 2021 04:42 )             27.4     10-15    144  |  113<H>  |  28<H>  ----------------------------<  174<H>  4.2   |  20<L>  |  0.99    Ca    8.6      15 Oct 2021 04:42  Phos  3.7     10-15  Mg     2.0     10-15    TPro  7.5  /  Alb  2.0<L>  /  TBili  0.6  /  DBili  x   /  AST  34  /  ALT  7<L>  /  AlkPhos  71  10-15          RADIOLOGY & ADDITIONAL TESTS: Reviewed. INTERVAL HPI/OVERNIGHT EVENTS:  Restarted feeds and stopped at AM for planned extubation trial.     SUBJECTIVE:  Patient seen and examined at bedside. Patient is intubated and sedated. Patient more lethargic . Patient failed CPAP trial. Patient unable to participate in subjective history.    VITAL SIGNS:  ICU Vital Signs Last 24 Hrs  T(C): 37.5 (15 Oct 2021 05:00), Max: 38.9 (14 Oct 2021 08:30)  T(F): 99.5 (15 Oct 2021 05:00), Max: 102 (14 Oct 2021 08:30)  HR: 102 (15 Oct 2021 05:15) (54 - 127)  BP: 96/60 (15 Oct 2021 05:00) (81/51 - 111/62)  BP(mean): 72 (15 Oct 2021 05:00) (61 - 81)  ABP: --  ABP(mean): --  RR: 28 (15 Oct 2021 05:15) (20 - 37)  SpO2: 93% (15 Oct 2021 05:15) (91% - 100%)    Mode: AC/ CMV (Assist Control/ Continuous Mandatory Ventilation), RR (machine): 16, TV (machine): 450, FiO2: 40, PEEP: 5, ITime: 1, MAP: 19, PIP: 27    10-13 @ 07:01  -  10-14 @ 07:00  --------------------------------------------------------  IN: 1667.4 mL / OUT: 1047 mL / NET: 620.4 mL    10-14 @ 07:01  -  10-15 @ 06:06  --------------------------------------------------------  IN: 747.1 mL / OUT: 690 mL / NET: 57.1 mL      CAPILLARY BLOOD GLUCOSE      POCT Blood Glucose.: 186 mg/dL (15 Oct 2021 05:12)      PHYSICAL EXAM:    Constitutional: NAD  HEENT: NC/AT; PERRL, anicteric sclera; MMM  Neck: supple, no JVD  Cardiovascular: +S1/S2, RRR  Respiratory: CTA B/L, no W/R/R  Gastrointestinal: abdomen soft, NT/ND; no rebound or guarding; +BSx4  Genitourinary: no suprapubic tenderness or fullness  Extremities: WWP; no LE edema; no clubbing or cyanosis  Vascular: 2+ radial, DP/PT and femoral pulses B/L  Dermatologic: normal color and turgor; no visible rashes  Neurological:     MEDICATIONS:  MEDICATIONS  (STANDING):  artificial  tears Solution 1 Drop(s) Both EYES two times a day  bictegravir 50 mG/emtricitabine 200 mG/tenofovir alafenamide 25 mG (BIKTARVY) 1 Tablet(s) Oral daily  chlorhexidine 0.12% Liquid 15 milliLiter(s) Oral Mucosa every 12 hours  chlorhexidine 2% Cloths 1 Application(s) Topical daily  dexMEDEtomidine Infusion 0.2 MICROgram(s)/kG/Hr (2.5 mL/Hr) IV Continuous <Continuous>  dextrose 40% Gel 15 Gram(s) Oral once  dextrose 5%. 1000 milliLiter(s) (50 mL/Hr) IV Continuous <Continuous>  dextrose 5%. 1000 milliLiter(s) (100 mL/Hr) IV Continuous <Continuous>  dextrose 50% Injectable 25 Gram(s) IV Push once  dextrose 50% Injectable 12.5 Gram(s) IV Push once  dextrose 50% Injectable 25 Gram(s) IV Push once  enoxaparin Injectable 40 milliGRAM(s) SubCutaneous every 24 hours  fentaNYL   Infusion. 0.5 MICROgram(s)/kG/Hr (2.5 mL/Hr) IV Continuous <Continuous>  glucagon  Injectable 1 milliGRAM(s) IntraMuscular once  insulin lispro (ADMELOG) corrective regimen sliding scale   SubCutaneous every 6 hours  norepinephrine Infusion 0.05 MICROgram(s)/kG/Min (4.68 mL/Hr) IV Continuous <Continuous>  pantoprazole  Injectable 40 milliGRAM(s) IV Push daily  propofol Infusion 10 MICROgram(s)/kG/Min (2.99 mL/Hr) IV Continuous <Continuous>  trimethoprim  160 mG/sulfamethoxazole 800 mG 1 Tablet(s) Oral daily  vancomycin  IVPB 750 milliGRAM(s) IV Intermittent every 24 hours    MEDICATIONS  (PRN):  acetaminophen    Suspension .. 650 milliGRAM(s) Enteral Tube every 6 hours PRN Temp greater or equal to 38C (100.4F)      ALLERGIES:  Allergies    No Known Allergies    Intolerances        LABS:                        8.6    10.17 )-----------( 42       ( 15 Oct 2021 04:42 )             27.4     10-15    144  |  113<H>  |  28<H>  ----------------------------<  174<H>  4.2   |  20<L>  |  0.99    Ca    8.6      15 Oct 2021 04:42  Phos  3.7     10-15  Mg     2.0     10-15    TPro  7.5  /  Alb  2.0<L>  /  TBili  0.6  /  DBili  x   /  AST  34  /  ALT  7<L>  /  AlkPhos  71  10-15          RADIOLOGY & ADDITIONAL TESTS: Reviewed.   INTERVAL HPI/OVERNIGHT EVENTS:  Restarted feeds and stopped at AM for planned extubation trial.     SUBJECTIVE:  Patient seen and examined at bedside. Patient is intubated and sedated. Patient is more lethargic. Patient failed CPAP trial. Patient is having more copious secretions requiring suctioning every 30 minutes. Patient unable to participate in subjective history.    VITAL SIGNS:  ICU Vital Signs Last 24 Hrs  T(C): 37.5 (15 Oct 2021 05:00), Max: 38.9 (14 Oct 2021 08:30)  T(F): 99.5 (15 Oct 2021 05:00), Max: 102 (14 Oct 2021 08:30)  HR: 102 (15 Oct 2021 05:15) (54 - 127)  BP: 96/60 (15 Oct 2021 05:00) (81/51 - 111/62)  BP(mean): 72 (15 Oct 2021 05:00) (61 - 81)  ABP: --  ABP(mean): --  RR: 28 (15 Oct 2021 05:15) (20 - 37)  SpO2: 93% (15 Oct 2021 05:15) (91% - 100%)    Mode: AC/ CMV (Assist Control/ Continuous Mandatory Ventilation), RR (machine): 16, TV (machine): 450, FiO2: 40, PEEP: 5, ITime: 1, MAP: 19, PIP: 27    10-13 @ 07:01  -  10-14 @ 07:00  --------------------------------------------------------  IN: 1667.4 mL / OUT: 1047 mL / NET: 620.4 mL    10-14 @ 07:01  -  10-15 @ 06:06  --------------------------------------------------------  IN: 747.1 mL / OUT: 690 mL / NET: 57.1 mL      CAPILLARY BLOOD GLUCOSE      POCT Blood Glucose.: 186 mg/dL (15 Oct 2021 05:12)      PHYSICAL EXAM:    Constitutional: NAD  HEENT: NC/AT; PERRL, anicteric sclera; MMM  Neck: supple, no JVD  Cardiovascular: +S1/S2, RRR  Respiratory: CTA B/L, no W/R/R  Gastrointestinal: abdomen soft, NT/ND; no rebound or guarding; +BSx4  Genitourinary: no suprapubic tenderness or fullness  Extremities: WWP; no LE edema; no clubbing or cyanosis  Vascular: 2+ radial, DP/PT and femoral pulses B/L  Dermatologic: normal color and turgor; no visible rashes  Neurological:     MEDICATIONS:  MEDICATIONS  (STANDING):  artificial  tears Solution 1 Drop(s) Both EYES two times a day  bictegravir 50 mG/emtricitabine 200 mG/tenofovir alafenamide 25 mG (BIKTARVY) 1 Tablet(s) Oral daily  chlorhexidine 0.12% Liquid 15 milliLiter(s) Oral Mucosa every 12 hours  chlorhexidine 2% Cloths 1 Application(s) Topical daily  dexMEDEtomidine Infusion 0.2 MICROgram(s)/kG/Hr (2.5 mL/Hr) IV Continuous <Continuous>  dextrose 40% Gel 15 Gram(s) Oral once  dextrose 5%. 1000 milliLiter(s) (50 mL/Hr) IV Continuous <Continuous>  dextrose 5%. 1000 milliLiter(s) (100 mL/Hr) IV Continuous <Continuous>  dextrose 50% Injectable 25 Gram(s) IV Push once  dextrose 50% Injectable 12.5 Gram(s) IV Push once  dextrose 50% Injectable 25 Gram(s) IV Push once  enoxaparin Injectable 40 milliGRAM(s) SubCutaneous every 24 hours  fentaNYL   Infusion. 0.5 MICROgram(s)/kG/Hr (2.5 mL/Hr) IV Continuous <Continuous>  glucagon  Injectable 1 milliGRAM(s) IntraMuscular once  insulin lispro (ADMELOG) corrective regimen sliding scale   SubCutaneous every 6 hours  norepinephrine Infusion 0.05 MICROgram(s)/kG/Min (4.68 mL/Hr) IV Continuous <Continuous>  pantoprazole  Injectable 40 milliGRAM(s) IV Push daily  propofol Infusion 10 MICROgram(s)/kG/Min (2.99 mL/Hr) IV Continuous <Continuous>  trimethoprim  160 mG/sulfamethoxazole 800 mG 1 Tablet(s) Oral daily  vancomycin  IVPB 750 milliGRAM(s) IV Intermittent every 24 hours    MEDICATIONS  (PRN):  acetaminophen    Suspension .. 650 milliGRAM(s) Enteral Tube every 6 hours PRN Temp greater or equal to 38C (100.4F)      ALLERGIES:  Allergies    No Known Allergies    Intolerances        LABS:                        8.6    10.17 )-----------( 42       ( 15 Oct 2021 04:42 )             27.4     10-15    144  |  113<H>  |  28<H>  ----------------------------<  174<H>  4.2   |  20<L>  |  0.99    Ca    8.6      15 Oct 2021 04:42  Phos  3.7     10-15  Mg     2.0     10-15    TPro  7.5  /  Alb  2.0<L>  /  TBili  0.6  /  DBili  x   /  AST  34  /  ALT  7<L>  /  AlkPhos  71  10-15      RADIOLOGY & ADDITIONAL TESTS: Reviewed.

## 2021-10-15 NOTE — PROGRESS NOTE ADULT - SUBJECTIVE AND OBJECTIVE BOX
[facility]  Reza Shay, Palliative Care Attending  Contact Info: Call [phone] (HEAL Line) or message on Microsoft Teams (Reza Shay)    SUBJECTIVE AND OBJECTIVE:  INTERVAL HPI/OVERNIGHT EVENTS: Interval events noted. Patient intermittently hypoxic and hypotensive, does not appear comfortable and cannot be extubated. GOC discussion with son planned for 1:30PM, details below. Patient required PRNs of Tylenol x2 in past 24hrs. On Propofol and Fentanyl gtts. No unexpected adverse effects of opiates noted.    ALLERGIES:  No Known Allergies    MEDICATIONS  (STANDING):  artificial  tears Solution 1 Drop(s) Both EYES two times a day  bictegravir 50 mG/emtricitabine 200 mG/tenofovir alafenamide 25 mG (BIKTARVY) 1 Tablet(s) Oral daily  chlorhexidine 0.12% Liquid 15 milliLiter(s) Oral Mucosa every 12 hours  chlorhexidine 2% Cloths 1 Application(s) Topical daily  dextrose 40% Gel 15 Gram(s) Oral once  dextrose 5%. 1000 milliLiter(s) (50 mL/Hr) IV Continuous <Continuous>  dextrose 5%. 1000 milliLiter(s) (100 mL/Hr) IV Continuous <Continuous>  dextrose 50% Injectable 25 Gram(s) IV Push once  dextrose 50% Injectable 12.5 Gram(s) IV Push once  dextrose 50% Injectable 25 Gram(s) IV Push once  enoxaparin Injectable 40 milliGRAM(s) SubCutaneous every 24 hours  fentaNYL   Infusion. 0.5 MICROgram(s)/kG/Hr (2.5 mL/Hr) IV Continuous <Continuous>  glucagon  Injectable 1 milliGRAM(s) IntraMuscular once  insulin lispro (ADMELOG) corrective regimen sliding scale   SubCutaneous every 6 hours  norepinephrine Infusion 0.05 MICROgram(s)/kG/Min (2.34 mL/Hr) IV Continuous <Continuous>  pantoprazole  Injectable 40 milliGRAM(s) IV Push daily  propofol Infusion 10 MICROgram(s)/kG/Min (2.99 mL/Hr) IV Continuous <Continuous>  trimethoprim  160 mG/sulfamethoxazole 800 mG 1 Tablet(s) Oral daily  vancomycin  IVPB 750 milliGRAM(s) IV Intermittent every 12 hours    MEDICATIONS  (PRN):  acetaminophen    Suspension .. 650 milliGRAM(s) Enteral Tube every 6 hours PRN Temp greater or equal to 38C (100.4F)      ITEMS UNCHECKED ARE NOT PRESENT    PRESENT SYMPTOMS: [x]Unable to obtain due to poor mentation   Source if other than patient:  []Family   []Team     PAIN AD Score: 4  [email or web address] (press ctrl +  left click to view)    Dyspnea:                           []Mild  []Moderate []Severe  Anxiety:                             []Mild []Moderate []Severe  Fatigue:                             []Mild []Moderate []Severe  Nausea:                             []Mild []Moderate []Severe  Loss of appetite:              []Mild []Moderate []Severe  Constipation:                    []Mild []Moderate []Severe    Other Symptoms:  []All other review of systems negative     Palliative Performance Status Version 2: 10%    PHYSICAL EXAM:  GENERAL:  []Alert  []Oriented x   []Lethargic  []Cachexia  [x]Unarousable  []Verbal  [x]Non-Verbal  Behavioral:   []Anxiety  [x]Delirium []Agitation []Cooperative  HEENT:  []Normal   []Dry mouth   [x]ET Tube/Trach  []Oral lesions  PULMONARY:   []Clear [x]Tachypnea  []Audible excessive secretions   [x]Rhonchi        []Right []Left [x]Bilateral  []Crackles        []Right []Left []Bilateral  []Wheezing     []Right []Left []Bilateral  CARDIOVASCULAR:    [x]Regular []Irregular []Tachy  []Sudeep []Murmur []Other  GASTROINTESTINAL:  [x]Soft  []Distended   [x]+BS  []Non tender []Tender  []PEG [x]OGT/ NGT  Last BM: ?  GENITOURINARY:  []Normal [] Incontinent   []Oliguria/Anuria   [x]Jeter  MUSCULOSKELETAL:   []Normal   []Weakness  [x]Bed/Wheelchair bound []Edema  NEUROLOGIC:   []No focal deficits  []Cognitive impairment  [x]Dysphagia []Dysarthria []Paresis [x]Encephalopathic   SKIN:   []Normal   [x]Pressure ulcer(s)  []Rash    CRITICAL CARE:  [x]Shock Present  [x]Septic [ ]Cardiogenic [ ]Neurologic [ ]Hypovolemic  [x]Vasopressors [ ]Inotropes   [x]Respiratory failure present [x]Mechanical Ventilation [ ]Non-invasive ventilatory support [ ]High-Flow  [x]Acute  [ ]Chronic [x]Hypoxic  [ ]Hypercarbic  [ ]Other organ failure    Vital Signs Last 24 Hrs  T(C): 38.7 (15 Oct 2021 23:00), Max: 38.7 (15 Oct 2021 23:00)  T(F): 101.7 (15 Oct 2021 23:00), Max: 101.7 (15 Oct 2021 23:00)  HR: 124 (15 Oct 2021 22:00) (72 - 124)  BP: 94/55 (15 Oct 2021 22:00) (75/52 - 116/67)  BP(mean): 69 (15 Oct 2021 22:00) (54 - 86)  RR: 35 (15 Oct 2021 22:00) (26 - 39)  SpO2: 87% (15 Oct 2021 22:00) (86% - 98%) I&O's Summary    14 Oct 2021 07:01  -  15 Oct 2021 07:00  --------------------------------------------------------  IN: 1138.1 mL / OUT: 765 mL / NET: 373.1 mL    15 Oct 2021 07:01  -  15 Oct 2021 23:32  --------------------------------------------------------  IN: 2130.5 mL / OUT: 475 mL / NET: 1655.5 mL    LABS:                         8.6    10.17 )-----------( 42       ( 15 Oct 2021 04:42 )             27.4   10-15    144  |  113<H>  |  28<H>  ----------------------------<  174<H>  4.2   |  20<L>  |  0.99    Ca    8.6      15 Oct 2021 04:42  Phos  3.7     10-15  Mg     2.0     10-15  TPro  7.5  /  Alb  2.0<L>  /  TBili  0.6  /  DBili  x   /  AST  34  /  ALT  7<L>  /  AlkPhos  71  10-15    RADIOLOGY & ADDITIONAL STUDIES:  < from: Xray Chest 1 View- PORTABLE-Routine (Xray Chest 1 View- PORTABLE-Routine in AM.) (10.15.21 @ 05:08) >  Scattered lung infiltrates similar to possibly increasing versus difference due to technical change compared to prior exam 10/14/2021. Endotracheal tube in good position. Nasogastric tube tip in stomach. Right venous catheter tip at atrial caval junction. No pneumothorax. No pleural effusion.    PROTEIN CALORIE MALNUTRITION PRESENT: [ ]mild [ ]moderate [ ]severe [ ]underweight [ ]morbid obesity  [] PPSV2 < or = 30% [] significant weight loss [] poor nutritional intake [] anasarca [] catabolic state    Artificial Nutrition []     REFERRALS:  [x]Social Work  []Case management []PT/OT []Chaplaincy  []Hospice  []Patient/Family Support

## 2021-10-15 NOTE — PROGRESS NOTE ADULT - ASSESSMENT
54yo F w/ PMH AIDS (Sept 2021 CD4 59,  currently on Biktarvy and Bactrim ppx), Hepatitis B/C, COPD (not on home O2, actively smokes), rectal cancer s/p radiation therapy, tongue cancer s/p resection, h/o crack use (last use 8/2021) BIBEMS from [facility] due to altered mental status. Admitted for acute hypoxic respiratory failure secondary to severe sepsis likely secondary to HAP with suspicion for superimposed PCP pneumonia. Currently intubated and sedated requiring levophed, fentanyl, and propofol gtt. Admitted to MICU for further management.      NEURO  Intubated and sedated  - currently on propofol and precedex gtt  - attempted to wean, but patient became progressively more agitated requiring increased sedative needs  - Patient failed CPAP trials and was started on sedation again as not ready for extubation  - RASS goal -2     CARDIOVASCULAR  #septic shock 2/2 HAP  Recent Hx with MRSA PNA treated w/ Vanc. Sepsis 2/2 HAP. s/p 2L NS in ED  - Currently on levophed gtt  - Titrate levo to MAP goal of 65-70    #r/o Endocarditis  IC patient with MRSA+ in BCx and BAL Cx   - TTE: unable to visualize vegetations  - MARY: No LA/RA/LATRELL/RAA thrombus seen, no vegetations visualized     #sinus Tach  - EKG with , QTc 428ms. Trop-T negative. Likely elevated in setting of severe sepsis from PNA  - d-dimer 1086  -- bedside POCUS showing no right heart strain   - ID plan as below     PULMONARY  #Acute hypoxic respiratory failure 2/2 severe sepsis from likely HAP vs PCP i/s/o immunocompromised state  Intubated in the ED after trial of NRB due to increased work of breathing. Currently on VC with FiO2 set at 100%. Likely secondary to PNA. Recent hospitalization for MRSA for which she was on a 6 day regimen of vancomycin.   - FIO2 increased to 60% due to increased WOB and patient satting at 87-88%  - c/w Vanc 750mg Qd  - c/w PCP prophylaxis - Bactrim DS qD per ID  - c/w home Biktarvy per ID  - 2PC obtained for Bronchoscopy (10/9)  - BCx (10/8) - MRSA+  - BAL Cx (10/9) - MRSA+  - Surveillance BCx (10/12) - NGTD    #COPD  Known h/o COPD. Current smoker (cut down to 6 cigarettes/day but has smoked since she was 11yo). Recently discharged on stiolto.   - c/w duonebs q6h   - resume nicotine patch 14mg/24h when clinically appropriate    RENAL  #ML   - BUN 32/Cr 1.38 on admission with baseline 9/0.8 per prior charts. Likely i/s/o severe sepsis.   - Repeat BUN/Cr 21/0.9, improving today  - per ID, need for Bactrim despite ML  - Jeter in place  - renally dose meds, avoid nephrotoxic agents  - monitor I/Os     ENDO  - Required SS and 10U NPH overnight for hyperglycemia. Worsening sugars i/s/o steroids given with bactrim  - started regular insulin 5U subQ Q6  - c/w mISS    GI  #Feeds  - NGT replaced on 10/13  - Resume home meds via NGT    HEME/ONC  #Anemia  - Hgb 7.1/Hct 22.7 on admission with prior levels in 9.0 range. Possibly AoCD from known AIDS and cancer diagnoses. S/p 1 pRBC  - goal Hgb > 7, maintain active T&S    #thrombocytopenia  S/p 1U platelets during admission.   PLT today 51  - monitor for now  - D/c'd zosyn    #Tongue Cancer  Pt has h/o tongue cancer s/p resection +/- graft placement. Unable to assess throat on physical exam given intubation/sedation.  - no acute interventions at this time    ID  #Severe Sepsis 2/2 HAP and/or PCP PNA   Patient with copious amounts of thick yellow secretions. Recent hospitalization for acute hypoxic resp failure 2/2 MRSA pneumonia for which she completed a 6 day course of vancomycin.   - c/w Vanc 750mg Qd, next vanc trough 9/16 7AM  - c/w PCP ppx bactrim DS Qd  - bronchoscopy on 10/9  - BAL Cx - MRSA+, BCx (10/10) - MRSA+  - Surveillance BCx (10/12) - NGTD  - Palliative following - initiated GOC discussions with son today. Son is planning to come see his mom on Sunday.     #AIDS  - Latest CD4 59, . Takes Biktarvy and bactrim ppx at home  - Per ID, c/w home Biktarvy     DERM  #Known Stage 4 sacral ulcer  Pt has stage 4 pressure ulcer on R sacrum measuring 4cm x 2cm x 0.2cm - staged and managed last admission by wound care. Last admission wound care recs: Aquacel Extra, cut piece to fit over wound, cover with foam dressing  - Per wound care consult, probe to bone, recommended Aquacel and wound packing.    Right IJ (10/9)    Nutrition & Prophylaxis  F: s/p 2L NS bolus in ED  E: replete K<4, Mg<2  N: NPO   DVT ppx: hep SubQ  Code status: Full Code after discussion with ED provider

## 2021-10-16 NOTE — DISCHARGE NOTE FOR THE EXPIRED PATIENT - HOSPITAL COURSE
54yo F w/ PMH AIDS (2021 CD4 59,  currently on Biktarvy and Bactrim ppx), Hepatitis B/C, COPD (not on home O2, actively smokes), rectal cancer s/p radiation therapy, tongue cancer s/p resection, h/o crack use (last use 2021) BIBEMS from [facility] due to altered mental status. Admitted for acute hypoxic respiratory failure secondary to severe sepsis likely secondary to HAP with suspicion for superimposed PCP pneumonia. Currently intubated and sedated requiring levophed, fentanyl, and propofol gtt. Admitted to MICU for further management from ED. Following admission patient required 1 unit of transfusion of pRBC and platelets. BCx obtained and growing MRSA. BAL performed and with MRSA. ID consulted and patient started on HAART and PCP ppx given CD4 count of <200. Multiple attempts made to wean patient off ventilator however patient failed trials due to tachypnea with low tidal volumes. MICU team discussed goals of care with patients son/HCP (Reed [phone]) and decision was made to keep her DNR and make her comfort measures only with no additional increase in pressors. Patient  on the basis of cardiopulmonary arrest at 1613 on 10/16. 56yo F w/ PMH AIDS (Sept 2021 CD4 57,  currently on Biktarvy and Bactrim ppx), Hepatitis B/C, COPD (not on home O2, actively smokes), rectal cancer s/p radiation therapy, tongue cancer s/p resection, h/o crack use (last use 8/2021) BIBEMS from [facility] due to altered mental status. Admitted for acute hypoxic respiratory failure secondary to severe sepsis likely secondary to HAP with suspicion for superimposed PCP pneumonia. Currently intubated and sedated requiring levophed, fentanyl, and propofol gtt. Admitted to MICU for further management from ED. Following admission patient required 1 unit of transfusion of pRBC and platelets. BCx obtained and growing MRSA. BAL performed and with MRSA. ID consulted and patient started on HAART and PCP ppx given CD4 count of <200. Multiple attempts made to wean patient off ventilator however patient failed trials due to tachypnea with low tidal volumes. MICU team discussed goals of care with patients son/HCP (Reed [phone]) and decision was made to keep her DNR and make her comfort measures only with no additional increase in pressors. Patient pronounced dead on the basis of cardiopulmonary arrest at 1613 on 10/16. Son notified.

## 2021-10-16 NOTE — PROVIDER CONTACT NOTE (OTHER) - ACTION/TREATMENT ORDERED:
D50 gave as ordered, will continue to monitor
no intervention at this time
will discuss goal of care with the son who is at bedside. no intervention at this time

## 2021-10-16 NOTE — PROGRESS NOTE ADULT - NUTRITIONAL ASSESSMENT
This patient has been assessed with a concern for Malnutrition and has been determined to have a diagnosis/diagnoses of Severe protein-calorie malnutrition and Underweight (BMI < 19).    This patient is being managed with:   Diet NPO with Tube Feed-  Tube Feeding Modality: Nasogastric  Glucerna 1.2 Tejinder (GLUCERNARTH)  Total Volume for 24 Hours (mL): 1152  Total Number of Cans: 5  Continuous  Starting Tube Feed Rate {mL per Hour}: 20  Increase Tube Feed Rate by (mL): 10     Every 4 hours  Until Goal Tube Feed Rate (mL per Hour): 48  Tube Feed Duration (in Hours): 24  Tube Feed Start Time: 17:00  Entered: Oct  9 2021  1:09PM    
This patient has been assessed with a concern for Malnutrition and has been determined to have a diagnosis/diagnoses of Severe protein-calorie malnutrition and Underweight (BMI < 19).    This patient is being managed with:   Diet NPO-  NPO for Procedure/Test     NPO Start Date: 09-Oct-2021   NPO Start Time: 13:15  Entered: Oct  9 2021  1:11PM    Diet NPO with Tube Feed-  Tube Feeding Modality: Nasogastric  Glucerna 1.2 Tejinder (GLUCERNARTH)  Total Volume for 24 Hours (mL): 1152  Total Number of Cans: 5  Continuous  Starting Tube Feed Rate {mL per Hour}: 20  Increase Tube Feed Rate by (mL): 10     Every 4 hours  Until Goal Tube Feed Rate (mL per Hour): 48  Tube Feed Duration (in Hours): 24  Tube Feed Start Time: 17:00  Entered: Oct  9 2021  1:09PM    
This patient has been assessed with a concern for Malnutrition and has been determined to have a diagnosis/diagnoses of Severe protein-calorie malnutrition and Underweight (BMI < 19).    This patient is being managed with:   Diet NPO-  NPO for Procedure/Test     NPO Start Date: 09-Oct-2021   NPO Start Time: 13:15  Entered: Oct  9 2021  1:11PM    Diet NPO with Tube Feed-  Tube Feeding Modality: Nasogastric  Glucerna 1.2 Tejinder (GLUCERNARTH)  Total Volume for 24 Hours (mL): 1152  Total Number of Cans: 5  Continuous  Starting Tube Feed Rate {mL per Hour}: 20  Increase Tube Feed Rate by (mL): 10     Every 4 hours  Until Goal Tube Feed Rate (mL per Hour): 48  Tube Feed Duration (in Hours): 24  Tube Feed Start Time: 17:00  Entered: Oct  9 2021  1:09PM    
This patient has been assessed with a concern for Malnutrition and has been determined to have a diagnosis/diagnoses of Severe protein-calorie malnutrition and Underweight (BMI < 19).    This patient is being managed with:   Diet NPO with Tube Feed-  Tube Feeding Modality: Nasogastric  Glucerna 1.2 Tejinder (GLUCERNARTH)  Total Volume for 24 Hours (mL): 1152  Total Number of Cans: 5  Continuous  Starting Tube Feed Rate {mL per Hour}: 20  Increase Tube Feed Rate by (mL): 10     Every 4 hours  Until Goal Tube Feed Rate (mL per Hour): 48  Tube Feed Duration (in Hours): 24  Tube Feed Start Time: 17:00  Entered: Oct  9 2021  1:09PM    
This patient has been assessed with a concern for Malnutrition and has been determined to have a diagnosis/diagnoses of Severe protein-calorie malnutrition and Underweight (BMI < 19).    This patient is being managed with:   Diet NPO with Tube Feed-  Tube Feeding Modality: Nasogastric  Glucerna 1.2 Tejinder (GLUCERNARTH)  Total Volume for 24 Hours (mL): 1152  Total Number of Cans: 5  Continuous  Starting Tube Feed Rate {mL per Hour}: 20  Increase Tube Feed Rate by (mL): 10     Every 4 hours  Until Goal Tube Feed Rate (mL per Hour): 48  Tube Feed Duration (in Hours): 24  Tube Feed Start Time: 17:00  Entered: Oct  9 2021  1:09PM    
This patient has been assessed with a concern for Malnutrition and has been determined to have a diagnosis/diagnoses of Severe protein-calorie malnutrition and Underweight (BMI < 19).    This patient is being managed with:   Diet NPO-  NPO for Procedure/Test     NPO Start Date: 09-Oct-2021   NPO Start Time: 13:15  Entered: Oct  9 2021  1:11PM    Diet NPO with Tube Feed-  Tube Feeding Modality: Nasogastric  Glucerna 1.2 Tejinder (GLUCERNARTH)  Total Volume for 24 Hours (mL): 1152  Total Number of Cans: 5  Continuous  Starting Tube Feed Rate {mL per Hour}: 20  Increase Tube Feed Rate by (mL): 10     Every 4 hours  Until Goal Tube Feed Rate (mL per Hour): 48  Tube Feed Duration (in Hours): 24  Tube Feed Start Time: 17:00  Entered: Oct  9 2021  1:09PM    
This patient has been assessed with a concern for Malnutrition and has been determined to have a diagnosis/diagnoses of Severe protein-calorie malnutrition and Underweight (BMI < 19).    This patient is being managed with:   Diet NPO-  NPO for Procedure/Test     NPO Start Date: 09-Oct-2021   NPO Start Time: 13:15  Entered: Oct  9 2021  1:11PM    Diet NPO with Tube Feed-  Tube Feeding Modality: Nasogastric  Glucerna 1.2 Tejinder (GLUCERNARTH)  Total Volume for 24 Hours (mL): 1152  Total Number of Cans: 5  Continuous  Starting Tube Feed Rate {mL per Hour}: 20  Increase Tube Feed Rate by (mL): 10     Every 4 hours  Until Goal Tube Feed Rate (mL per Hour): 48  Tube Feed Duration (in Hours): 24  Tube Feed Start Time: 17:00  Entered: Oct  9 2021  1:09PM

## 2021-10-16 NOTE — PROGRESS NOTE ADULT - REASON FOR ADMISSION
Acute hypoxic respiratory failure 2/2 severe sepsis

## 2021-10-16 NOTE — PROGRESS NOTE ADULT - SUBJECTIVE AND OBJECTIVE BOX
** INCOMPLETE **     INTERVAL HPI/OVERNIGHT EVENTS:    SUBJECTIVE: Patient seen and examined at bedside.    VITAL SIGNS:  ICU Vital Signs Last 24 Hrs  T(C): 37.8 (16 Oct 2021 01:00), Max: 38.7 (15 Oct 2021 23:00)  T(F): 100 (16 Oct 2021 01:00), Max: 101.7 (15 Oct 2021 23:00)  HR: 104 (16 Oct 2021 05:00) (98 - 124)  BP: 83/50 (16 Oct 2021 04:00) (75/52 - 116/82)  BP(mean): 62 (16 Oct 2021 04:00) (54 - 96)  ABP: --  ABP(mean): --  RR: 24 (16 Oct 2021 05:00) (24 - 39)  SpO2: 89% (16 Oct 2021 05:00) (86% - 98%)    Mode: AC/ CMV (Assist Control/ Continuous Mandatory Ventilation), RR (machine): 16, TV (machine): 450, FiO2: 70, PEEP: 5, ITime: 1, MAP: 16, PIP: 25    10-14 @ 07:01  -  10-15 @ 07:00  --------------------------------------------------------  IN: 1138.1 mL / OUT: 765 mL / NET: 373.1 mL    10-15 @ 07:01  -  10-16 @ 05:37  --------------------------------------------------------  IN: 2630 mL / OUT: 510 mL / NET: 2120 mL      CAPILLARY BLOOD GLUCOSE      POCT Blood Glucose.: 80 mg/dL (15 Oct 2021 23:41)      PHYSICAL EXAM:    Constitutional: NAD  HEENT: NC/AT; PERRL, anicteric sclera; MMM  Neck: supple, no JVD  Cardiovascular: +S1/S2, RRR  Respiratory: CTA B/L, no W/R/R  Gastrointestinal: abdomen soft, NT/ND; no rebound or guarding; +BSx4  Genitourinary: no suprapubic tenderness or fullness  Extremities: WWP; no LE edema; no clubbing or cyanosis  Vascular: 2+ radial, DP/PT and femoral pulses B/L  Dermatologic: normal color and turgor; no visible rashes  Neurological:     MEDICATIONS:  MEDICATIONS  (STANDING):  artificial  tears Solution 1 Drop(s) Both EYES two times a day  bictegravir 50 mG/emtricitabine 200 mG/tenofovir alafenamide 25 mG (BIKTARVY) 1 Tablet(s) Oral daily  chlorhexidine 0.12% Liquid 15 milliLiter(s) Oral Mucosa every 12 hours  chlorhexidine 2% Cloths 1 Application(s) Topical daily  dextrose 40% Gel 15 Gram(s) Oral once  dextrose 5%. 1000 milliLiter(s) (50 mL/Hr) IV Continuous <Continuous>  dextrose 5%. 1000 milliLiter(s) (100 mL/Hr) IV Continuous <Continuous>  dextrose 50% Injectable 25 Gram(s) IV Push once  dextrose 50% Injectable 12.5 Gram(s) IV Push once  dextrose 50% Injectable 25 Gram(s) IV Push once  enoxaparin Injectable 40 milliGRAM(s) SubCutaneous every 24 hours  fentaNYL   Infusion. 0.5 MICROgram(s)/kG/Hr (2.5 mL/Hr) IV Continuous <Continuous>  glucagon  Injectable 1 milliGRAM(s) IntraMuscular once  insulin lispro (ADMELOG) corrective regimen sliding scale   SubCutaneous every 6 hours  norepinephrine Infusion 0.05 MICROgram(s)/kG/Min (2.34 mL/Hr) IV Continuous <Continuous>  pantoprazole  Injectable 40 milliGRAM(s) IV Push daily  propofol Infusion 10 MICROgram(s)/kG/Min (2.99 mL/Hr) IV Continuous <Continuous>  trimethoprim  160 mG/sulfamethoxazole 800 mG 1 Tablet(s) Oral daily  vancomycin  IVPB 750 milliGRAM(s) IV Intermittent every 12 hours    MEDICATIONS  (PRN):  acetaminophen    Suspension .. 650 milliGRAM(s) Enteral Tube every 6 hours PRN Temp greater or equal to 38C (100.4F)      ALLERGIES:  Allergies    No Known Allergies    Intolerances        LABS:                        8.6    10.17 )-----------( 42       ( 15 Oct 2021 04:42 )             27.4     10-15    144  |  113<H>  |  28<H>  ----------------------------<  174<H>  4.2   |  20<L>  |  0.99    Ca    8.6      15 Oct 2021 04:42  Phos  3.7     10-15  Mg     2.0     10-15    TPro  7.5  /  Alb  2.0<L>  /  TBili  0.6  /  DBili  x   /  AST  34  /  ALT  7<L>  /  AlkPhos  71  10-15          RADIOLOGY & ADDITIONAL TESTS: Reviewed. Hospital Course:  56yo F w/ PMH AIDS (Sept 2021 CD4 57,  currently on Biktarvy and Bactrim ppx), Hepatitis B/C, COPD (not on home O2, actively smokes), rectal cancer s/p radiation therapy, tongue cancer s/p resection, h/o crack use (last use 8/2021) BIBEMS from [facility] due to altered mental status. Admitted for acute hypoxic respiratory failure secondary to severe sepsis likely secondary to HAP with suspicion for superimposed PCP pneumonia. Currently intubated and sedated requiring levophed, fentanyl, and propofol gtt. Admitted to MICU for further management from ED. Following admission patient required 1 unit of transfusion of pRBC and platelets. BCx obtained and growing MRSA. BAL performed and with MRSA. ID consulted and patient started on HAART and PCP ppx given CD4 count of <200. Multiple attempts made to wean patient off ventilator however patient failed trials due to tachypnea with low tidal volumes. MICU team discussed goals of care with patients son/HCP (Reed [phone]) and decision was made to keep her DNR and make her comfort measures only with no additional increase in pressors.    INTERVAL HPI/OVERNIGHT EVENTS:  Spoke to patient's son overnight and he would like us to continue pressor support to keep her alive until tomorrow.    SUBJECTIVE:   Patient seen and examined at bedside. Patient is intubated and sedated. Patient is more lethargic. Patient failed CPAP trial again. Pressor requirements increasing. Spoke to the son/HCP and decision was made to keep her DNR and make her comfort measures only with no additional increase in pressors./    VITAL SIGNS:  ICU Vital Signs Last 24 Hrs  T(C): 37.8 (16 Oct 2021 01:00), Max: 38.7 (15 Oct 2021 23:00)  T(F): 100 (16 Oct 2021 01:00), Max: 101.7 (15 Oct 2021 23:00)  HR: 104 (16 Oct 2021 05:00) (98 - 124)  BP: 83/50 (16 Oct 2021 04:00) (75/52 - 116/82)  BP(mean): 62 (16 Oct 2021 04:00) (54 - 96)  ABP: --  ABP(mean): --  RR: 24 (16 Oct 2021 05:00) (24 - 39)  SpO2: 89% (16 Oct 2021 05:00) (86% - 98%)    Mode: AC/ CMV (Assist Control/ Continuous Mandatory Ventilation), RR (machine): 16, TV (machine): 450, FiO2: 70, PEEP: 5, ITime: 1, MAP: 16, PIP: 25    10-14 @ 07:01  -  10-15 @ 07:00  --------------------------------------------------------  IN: 1138.1 mL / OUT: 765 mL / NET: 373.1 mL    10-15 @ 07:01  -  10-16 @ 05:37  --------------------------------------------------------  IN: 2630 mL / OUT: 510 mL / NET: 2120 mL      CAPILLARY BLOOD GLUCOSE      POCT Blood Glucose.: 80 mg/dL (15 Oct 2021 23:41)      PHYSICAL EXAM:  Constitutional: NAD  General: frail, cachectic female, intubated and sedated  HEENT:  NC/AT, sclera anicteric, PERRL, +ET tube, site in place, increased copious secretions    Lungs: coarse breath sounds b/L, no wheezing, thin chest  Cardiovascular: RRR; +s1, s2, no MGR   Gastrointestinal: thin abdomen soft, NT/ND, BS x4  Extremities: Thin extremities, no peripheral edema, no clubbing, no cyanosis  Skin: Warm, dry. Stage 4 sacral decub, packed and dressed, ulcerations on medial knees bilaterally, packed and dressed  Neurological: intubated and sedated    MEDICATIONS:  MEDICATIONS  (STANDING):  artificial  tears Solution 1 Drop(s) Both EYES two times a day  bictegravir 50 mG/emtricitabine 200 mG/tenofovir alafenamide 25 mG (BIKTARVY) 1 Tablet(s) Oral daily  chlorhexidine 0.12% Liquid 15 milliLiter(s) Oral Mucosa every 12 hours  chlorhexidine 2% Cloths 1 Application(s) Topical daily  dextrose 40% Gel 15 Gram(s) Oral once  dextrose 5%. 1000 milliLiter(s) (50 mL/Hr) IV Continuous <Continuous>  dextrose 5%. 1000 milliLiter(s) (100 mL/Hr) IV Continuous <Continuous>  dextrose 50% Injectable 25 Gram(s) IV Push once  dextrose 50% Injectable 12.5 Gram(s) IV Push once  dextrose 50% Injectable 25 Gram(s) IV Push once  enoxaparin Injectable 40 milliGRAM(s) SubCutaneous every 24 hours  fentaNYL   Infusion. 0.5 MICROgram(s)/kG/Hr (2.5 mL/Hr) IV Continuous <Continuous>  glucagon  Injectable 1 milliGRAM(s) IntraMuscular once  insulin lispro (ADMELOG) corrective regimen sliding scale   SubCutaneous every 6 hours  norepinephrine Infusion 0.05 MICROgram(s)/kG/Min (2.34 mL/Hr) IV Continuous <Continuous>  pantoprazole  Injectable 40 milliGRAM(s) IV Push daily  propofol Infusion 10 MICROgram(s)/kG/Min (2.99 mL/Hr) IV Continuous <Continuous>  trimethoprim  160 mG/sulfamethoxazole 800 mG 1 Tablet(s) Oral daily  vancomycin  IVPB 750 milliGRAM(s) IV Intermittent every 12 hours    MEDICATIONS  (PRN):  acetaminophen    Suspension .. 650 milliGRAM(s) Enteral Tube every 6 hours PRN Temp greater or equal to 38C (100.4F)      ALLERGIES:  Allergies    No Known Allergies    Intolerances        LABS:                        8.6    10.17 )-----------( 42       ( 15 Oct 2021 04:42 )             27.4     10-15    144  |  113<H>  |  28<H>  ----------------------------<  174<H>  4.2   |  20<L>  |  0.99    Ca    8.6      15 Oct 2021 04:42  Phos  3.7     10-15  Mg     2.0     10-15    TPro  7.5  /  Alb  2.0<L>  /  TBili  0.6  /  DBili  x   /  AST  34  /  ALT  7<L>  /  AlkPhos  71  10-15        RADIOLOGY & ADDITIONAL TESTS: Reviewed.

## 2021-10-16 NOTE — PROGRESS NOTE ADULT - ASSESSMENT
56yo F w/ PMH AIDS (Sept 2021 CD4 57,  currently on Biktarvy and Bactrim ppx), Hepatitis B/C, COPD (not on home O2, actively smokes), rectal cancer s/p radiation therapy, tongue cancer s/p resection, h/o crack use (last use 8/2021) BIBEMS from [facility] due to altered mental status. Admitted for acute hypoxic respiratory failure secondary to severe sepsis likely secondary to HAP with suspicion for superimposed PCP pneumonia. Currently intubated and sedated requiring levophed, fentanyl, and propofol gtt. Admitted to MICU for further management.      NEURO  Intubated and sedated  - currently on propofol and precedex gtt  - Patient failed CPAP trials and was started on sedation again as not ready for extubation  - Comfort measures only per son/HCP    CARDIOVASCULAR  #septic shock 2/2 HAP  Recent Hx with MRSA PNA treated w/ Vanc. Sepsis 2/2 HAP. s/p 2L NS in ED  - Currently on levophed gtt  - Per son/HCP, if pressor requirements increase, do not increase pressors   - Comfort measures only per son/HCP    #r/o Endocarditis  IC patient with MRSA+ in BCx and BAL Cx   - TTE: unable to visualize vegetations  - MARY: No LA/RA/LATRELL/RAA thrombus seen, no vegetations visualized     #sinus Tach  - EKG with , QTc 428ms. Trop-T negative. Likely elevated in setting of severe sepsis from PNA  - d-dimer 1086  -- bedside POCUS showing no right heart strain   - ID plan as below     PULMONARY  #Acute hypoxic respiratory failure 2/2 severe sepsis from likely HAP vs PCP i/s/o immunocompromised state  Intubated in the ED after trial of NRB due to increased work of breathing. Currently on VC with FiO2 set at 100%. Likely secondary to PNA. Recent hospitalization for MRSA for which she was on a 6 day regimen of vancomycin.   - FIO2 decreased to 40%  - c/w Vanc 750mg Qd, next vanc trough 9/15  - c/w PCP prophylaxis - Bactrim DS qD per ID  - c/w home Biktarvy per ID  - 2PC obtained for Bronchoscopy (10/9)  - BCx (10/8) - MRSA+  - BAL Cx (10/9) - MRSA+  - Surveillance BCx (10/12) - NGTD  - Comfort measures only per son/HCP    #COPD  Known h/o COPD. Current smoker (cut down to 6 cigarettes/day but has smoked since she was 13yo). Recently discharged on stiolto.   - c/w duonebs q6h   - resume nicotine patch 14mg/24h when clinically appropriate    RENAL  #ML   - BUN 32/Cr 1.38 on admission with baseline 9/0.8 per prior charts. Likely i/s/o severe sepsis.   - Repeat BUN/Cr 21/0.9, improving today  - per ID, need for Bactrim despite ML  - Jeter in place  - renally dose meds, avoid nephrotoxic agents  - monitor I/Os   - Comfort measures only per son/HCP    ENDO  - Required SS and 10U NPH overnight for hyperglycemia. Worsening sugars i/s/o steroids given with bactrim  - started regular insulin 5U subQ Q6  - c/w mISS    GI  #Feeds  - NGT replaced on 10/13  - Resume home meds via NGT    HEME/ONC  #Anemia  - Hgb 7.1/Hct 22.7 on admission with prior levels in 9.0 range. Possibly AoCD from known AIDS and cancer diagnoses. S/p 1 pRBC  - goal Hgb > 7, maintain active T&S    #thrombocytopenia  S/p 1U platelets during admission.   PLT today 51  - monitor for now  - D/c'd zosyn    #Tongue Cancer  Pt has h/o tongue cancer s/p resection +/- graft placement. Unable to assess throat on physical exam given intubation/sedation.  - no acute interventions at this time    ID  #Severe Sepsis 2/2 HAP and/or PCP PNA   Patient with copious amounts of thick yellow secretions. Recent hospitalization for acute hypoxic resp failure 2/2 MRSA pneumonia for which she completed a 6 day course of vancomycin.   - c/w Vanc 750mg Qd, next vanc trough 9/16 7AM  - c/w PCP ppx bactrim DS Qd  - bronchoscopy on 10/9  - BAL Cx - MRSA+, BCx (10/10) - MRSA+  - Surveillance BCx (10/12) - NGTD  - Palliative consulted  - Comfort measures only per son/HCP    #AIDS  - Latest CD4 59, . Takes Biktarvy and bactrim ppx at home  - Per ID, c/w home Biktarvy     DERM  #Known Stage 4 sacral ulcer  Pt has stage 4 pressure ulcer on R sacrum measuring 4cm x 2cm x 0.2cm - staged and managed last admission by wound care. Last admission wound care recs: Aquacel Extra, cut piece to fit over wound, cover with foam dressing  - Per wound care consult, probe to bone, recommended Aquacel and wound packing  - Comfort measures only per son/HCP    Right IJ (10/9)    Nutrition & Prophylaxis  F: s/p 2L NS bolus in ED  E: replete K<4, Mg<2  N: NPO   DVT ppx: hep SubQ  Code status: Full Code after discussion with ED provider      Dispo: MICU

## 2021-10-16 NOTE — DISCHARGE NOTE FOR THE EXPIRED PATIENT - NS EXPIRED FAMCONTACTED
Blood pressure was a little bit elevated today but recommend he continue with amlodipine, losartan and carvedilol  He will be having blood pressure recheck tomorrow with visiting nurse and I recommended that they call here with his levels 
Stool culture was ordered and he is seeing GI
yes

## 2021-10-16 NOTE — PROGRESS NOTE ADULT - PROVIDER SPECIALTY LIST ADULT
MICU
Palliative Care
Internal Medicine
MICU
Palliative Care
Infectious Disease
Infectious Disease
MICU
MICU
Infectious Disease

## 2021-10-16 NOTE — PROGRESS NOTE ADULT - ATTENDING COMMENTS
Patient seen and examined with house-staff during bedside rounds.  Resident note read, including vitals, physical findings, laboratory data, and radiological reports.   Revisions included below.  Direct personal management at bed side and extensive interpretation of the data.  Plan was outlined and discussed in details with the housestaff.  Decision making of high complexity  Action taken for acute disease activity to reflect the level of care provided:  - medication reconciliation  - review laboratory data  Admitted with acute hypoxic respiratory failure with septic shock secondary to hospital-acquired pneumonia cannot rule out underlying PCP complicated with pancytopenia and uncontrolled hyperglycemia.  The hyperglycemia most likely related to the steroid sepsis and the dextrose load and the Bactrim.  Bronchoscopy is indicated to rule out PCP.  No family is reachable and patient is sedated.  Two-physician consent as indicated to rule out to PCP to avoid steroids fluid overload and bulbar affect and renal effect of the Bactrim.  Continue antibiotics.  Follow-up platelet.  DIC work-up.  Might have to change Zosyn to a less thrombocytopenic agent.  Central line.  Start to feed.  Wean off the pressors.  Control blood sugar she is on sliding scale and will start maintenance insulin
Patient seen and examined with house-staff during bedside rounds.  Resident note read, including vitals, physical findings, laboratory data, and radiological reports.   Revisions included below.  Direct personal management at bed side and extensive interpretation of the data.  Plan was outlined and discussed in details with the housestaff.  Decision making of high complexity  Action taken for acute disease activity to reflect the level of care provided:  - medication reconciliation  - review laboratory data  Patient is stable compared to yesterday.  The patient.  Of agitation requiring sedation.  A trial of pressure support ventilation was unsuccessful that the patient got agitated and became tachypneic.  Secretions are minimal.  Adequate gas exchange.  Adequate complaints.  We will repeat chest x-ray to evaluate that the placement of the ET tube.  The tube feed was removed.  The blood culture and sputum culture were positive for MRSA.  Continue vancomycin bilevel.  The creatinine increasing could be related either to Bactrim and vancomycin and ATN from sepsis.  Patient adequately hydrated.  DC Zosyn.  She is off the steroids.  The blood sugar is better controlled.  Start tube feed.  Start either Precedex or Seroquel to start weaning the patient and possible extubation.  Pancytopenia is stable.  Patient will require her maintenance therapy for HIV AIDS
Progressive multiorgan failure with acute hypoxemia requiring mechanical ventilation in the setting of malignancy and AIDS. Grave prognosis. Son (HCP) made decision for comfort measures.
MRSA bacteremia with superimposed pneumonia with acute hypoxemic respiratory failure and septic shock. Bedside POCUS with ?vegetations versus thickened MV; will consider MARY but she has FTT and is a poor surgical candidate even if vegetations are found. CT chest pending to ensure no superimposed necrotizing process given recent hospitalization for MRSA pneumonia as well.

## 2021-10-17 LAB
-  CEFAZOLIN: SIGNIFICANT CHANGE UP
-  CEFAZOLIN: SIGNIFICANT CHANGE UP
-  CLINDAMYCIN: SIGNIFICANT CHANGE UP
-  CLINDAMYCIN: SIGNIFICANT CHANGE UP
-  DAPTOMYCIN: SIGNIFICANT CHANGE UP
-  DAPTOMYCIN: SIGNIFICANT CHANGE UP
-  ERYTHROMYCIN: SIGNIFICANT CHANGE UP
-  ERYTHROMYCIN: SIGNIFICANT CHANGE UP
-  LINEZOLID: SIGNIFICANT CHANGE UP
-  LINEZOLID: SIGNIFICANT CHANGE UP
-  OXACILLIN: SIGNIFICANT CHANGE UP
-  OXACILLIN: SIGNIFICANT CHANGE UP
-  RIFAMPIN: SIGNIFICANT CHANGE UP
-  RIFAMPIN: SIGNIFICANT CHANGE UP
-  TETRACYCLINE: SIGNIFICANT CHANGE UP
-  TETRACYCLINE: SIGNIFICANT CHANGE UP
-  TRIMETHOPRIM/SULFAMETHOXAZOLE: SIGNIFICANT CHANGE UP
-  TRIMETHOPRIM/SULFAMETHOXAZOLE: SIGNIFICANT CHANGE UP
-  VANCOMYCIN: SIGNIFICANT CHANGE UP
CULTURE RESULTS: SIGNIFICANT CHANGE UP
METHOD TYPE: SIGNIFICANT CHANGE UP
ORGANISM # SPEC MICROSCOPIC CNT: SIGNIFICANT CHANGE UP
SPECIMEN SOURCE: SIGNIFICANT CHANGE UP

## 2021-10-21 DIAGNOSIS — J44.0 CHRONIC OBSTRUCTIVE PULMONARY DISEASE WITH (ACUTE) LOWER RESPIRATORY INFECTION: ICD-10-CM

## 2021-10-21 DIAGNOSIS — Z92.3 PERSONAL HISTORY OF IRRADIATION: ICD-10-CM

## 2021-10-21 DIAGNOSIS — A41.9 SEPSIS, UNSPECIFIED ORGANISM: ICD-10-CM

## 2021-10-21 DIAGNOSIS — D63.0 ANEMIA IN NEOPLASTIC DISEASE: ICD-10-CM

## 2021-10-21 DIAGNOSIS — B20 HUMAN IMMUNODEFICIENCY VIRUS [HIV] DISEASE: ICD-10-CM

## 2021-10-21 DIAGNOSIS — E78.5 HYPERLIPIDEMIA, UNSPECIFIED: ICD-10-CM

## 2021-10-21 DIAGNOSIS — R73.9 HYPERGLYCEMIA, UNSPECIFIED: ICD-10-CM

## 2021-10-21 DIAGNOSIS — R65.21 SEVERE SEPSIS WITH SEPTIC SHOCK: ICD-10-CM

## 2021-10-21 DIAGNOSIS — E43 UNSPECIFIED SEVERE PROTEIN-CALORIE MALNUTRITION: ICD-10-CM

## 2021-10-21 DIAGNOSIS — C02.9 MALIGNANT NEOPLASM OF TONGUE, UNSPECIFIED: ICD-10-CM

## 2021-10-21 DIAGNOSIS — F14.99 COCAINE USE, UNSPECIFIED WITH UNSPECIFIED COCAINE-INDUCED DISORDER: ICD-10-CM

## 2021-10-21 DIAGNOSIS — J15.212 PNEUMONIA DUE TO METHICILLIN RESISTANT STAPHYLOCOCCUS AUREUS: ICD-10-CM

## 2021-10-21 DIAGNOSIS — R53.2 FUNCTIONAL QUADRIPLEGIA: ICD-10-CM

## 2021-10-21 DIAGNOSIS — L89.154 PRESSURE ULCER OF SACRAL REGION, STAGE 4: ICD-10-CM

## 2021-10-21 DIAGNOSIS — Z51.5 ENCOUNTER FOR PALLIATIVE CARE: ICD-10-CM

## 2021-10-21 DIAGNOSIS — F32.A DEPRESSION, UNSPECIFIED: ICD-10-CM

## 2021-10-21 DIAGNOSIS — F17.210 NICOTINE DEPENDENCE, CIGARETTES, UNCOMPLICATED: ICD-10-CM

## 2021-10-21 DIAGNOSIS — Z86.19 PERSONAL HISTORY OF OTHER INFECTIOUS AND PARASITIC DISEASES: ICD-10-CM

## 2021-10-21 DIAGNOSIS — J96.01 ACUTE RESPIRATORY FAILURE WITH HYPOXIA: ICD-10-CM

## 2021-10-21 DIAGNOSIS — C20 MALIGNANT NEOPLASM OF RECTUM: ICD-10-CM

## 2021-10-21 DIAGNOSIS — B59 PNEUMOCYSTOSIS: ICD-10-CM

## 2021-10-21 DIAGNOSIS — N17.0 ACUTE KIDNEY FAILURE WITH TUBULAR NECROSIS: ICD-10-CM

## 2021-10-21 DIAGNOSIS — Z66 DO NOT RESUSCITATE: ICD-10-CM

## 2021-11-10 LAB
CULTURE RESULTS: SIGNIFICANT CHANGE UP
SPECIMEN SOURCE: SIGNIFICANT CHANGE UP

## 2021-11-30 NOTE — ED ADULT NURSE NOTE - CAS DISCH BELONGINGS RETURNED
From: Marden Scheuermann Christmas  To: Dr. Luis Antonio Keller: 11/30/2021 11:04 AM EST  Subject: [place] 19 positive    Dr. Dillan Dillon,   I am writing to let you know that I have recently tested positive for [address]. I was told by my company health team that I should inform you of this result, and I wanted to make sure that I did that. I have started my isolation from others in may home. So far I am experiencing fatigue and headaches and a bit of congestion. I may have questions as this progresses if that's okay. I will let you know if things get worse. Thanks.   Marden Scheuermann Not applicable

## 2021-12-01 LAB
CULTURE RESULTS: SIGNIFICANT CHANGE UP
SPECIMEN SOURCE: SIGNIFICANT CHANGE UP

## 2021-12-30 PROCEDURE — 31500 INSERT EMERGENCY AIRWAY: CPT

## 2021-12-30 PROCEDURE — 86769 SARS-COV-2 COVID-19 ANTIBODY: CPT

## 2021-12-30 PROCEDURE — 71045 X-RAY EXAM CHEST 1 VIEW: CPT

## 2021-12-30 PROCEDURE — 87594 PNEUMCYSTS JIROVECII AMP PRB: CPT

## 2021-12-30 PROCEDURE — 83615 LACTATE (LD) (LDH) ENZYME: CPT

## 2021-12-30 PROCEDURE — 94640 AIRWAY INHALATION TREATMENT: CPT

## 2021-12-30 PROCEDURE — 80061 LIPID PANEL: CPT

## 2021-12-30 PROCEDURE — 89051 BODY FLUID CELL COUNT: CPT

## 2021-12-30 PROCEDURE — 85045 AUTOMATED RETICULOCYTE COUNT: CPT

## 2021-12-30 PROCEDURE — 80202 ASSAY OF VANCOMYCIN: CPT

## 2021-12-30 PROCEDURE — 87116 MYCOBACTERIA CULTURE: CPT

## 2021-12-30 PROCEDURE — 87186 SC STD MICRODIL/AGAR DIL: CPT

## 2021-12-30 PROCEDURE — U0003: CPT

## 2021-12-30 PROCEDURE — 83880 ASSAY OF NATRIURETIC PEPTIDE: CPT

## 2021-12-30 PROCEDURE — 82803 BLOOD GASES ANY COMBINATION: CPT

## 2021-12-30 PROCEDURE — 83735 ASSAY OF MAGNESIUM: CPT

## 2021-12-30 PROCEDURE — 36415 COLL VENOUS BLD VENIPUNCTURE: CPT

## 2021-12-30 PROCEDURE — P9037: CPT

## 2021-12-30 PROCEDURE — 85610 PROTHROMBIN TIME: CPT

## 2021-12-30 PROCEDURE — 71250 CT THORAX DX C-: CPT

## 2021-12-30 PROCEDURE — 87015 SPECIMEN INFECT AGNT CONCNTJ: CPT

## 2021-12-30 PROCEDURE — 87206 SMEAR FLUORESCENT/ACID STAI: CPT

## 2021-12-30 PROCEDURE — 83605 ASSAY OF LACTIC ACID: CPT

## 2021-12-30 PROCEDURE — 84100 ASSAY OF PHOSPHORUS: CPT

## 2021-12-30 PROCEDURE — 85025 COMPLETE CBC W/AUTO DIFF WBC: CPT

## 2021-12-30 PROCEDURE — 87150 DNA/RNA AMPLIFIED PROBE: CPT

## 2021-12-30 PROCEDURE — U0005: CPT

## 2021-12-30 PROCEDURE — 86923 COMPATIBILITY TEST ELECTRIC: CPT

## 2021-12-30 PROCEDURE — 81001 URINALYSIS AUTO W/SCOPE: CPT

## 2021-12-30 PROCEDURE — 83010 ASSAY OF HAPTOGLOBIN QUANT: CPT

## 2021-12-30 PROCEDURE — 86901 BLOOD TYPING SEROLOGIC RH(D): CPT

## 2021-12-30 PROCEDURE — 85730 THROMBOPLASTIN TIME PARTIAL: CPT

## 2021-12-30 PROCEDURE — 93306 TTE W/DOPPLER COMPLETE: CPT

## 2021-12-30 PROCEDURE — 93312 ECHO TRANSESOPHAGEAL: CPT

## 2021-12-30 PROCEDURE — 84132 ASSAY OF SERUM POTASSIUM: CPT

## 2021-12-30 PROCEDURE — 36430 TRANSFUSION BLD/BLD COMPNT: CPT

## 2021-12-30 PROCEDURE — 99291 CRITICAL CARE FIRST HOUR: CPT

## 2021-12-30 PROCEDURE — 94003 VENT MGMT INPAT SUBQ DAY: CPT

## 2021-12-30 PROCEDURE — 84484 ASSAY OF TROPONIN QUANT: CPT

## 2021-12-30 PROCEDURE — 96375 TX/PRO/DX INJ NEW DRUG ADDON: CPT

## 2021-12-30 PROCEDURE — 87181 SC STD AGAR DILUTION PER AGT: CPT

## 2021-12-30 PROCEDURE — 87102 FUNGUS ISOLATION CULTURE: CPT

## 2021-12-30 PROCEDURE — 84295 ASSAY OF SERUM SODIUM: CPT

## 2021-12-30 PROCEDURE — 82746 ASSAY OF FOLIC ACID SERUM: CPT

## 2021-12-30 PROCEDURE — 96374 THER/PROPH/DIAG INJ IV PUSH: CPT

## 2021-12-30 PROCEDURE — 83036 HEMOGLOBIN GLYCOSYLATED A1C: CPT

## 2021-12-30 PROCEDURE — 82330 ASSAY OF CALCIUM: CPT

## 2021-12-30 PROCEDURE — 82607 VITAMIN B-12: CPT

## 2021-12-30 PROCEDURE — 86850 RBC ANTIBODY SCREEN: CPT

## 2021-12-30 PROCEDURE — 82962 GLUCOSE BLOOD TEST: CPT

## 2021-12-30 PROCEDURE — 96376 TX/PRO/DX INJ SAME DRUG ADON: CPT

## 2021-12-30 PROCEDURE — 85027 COMPLETE CBC AUTOMATED: CPT

## 2021-12-30 PROCEDURE — 87040 BLOOD CULTURE FOR BACTERIA: CPT

## 2021-12-30 PROCEDURE — 87086 URINE CULTURE/COLONY COUNT: CPT

## 2021-12-30 PROCEDURE — P9040: CPT

## 2021-12-30 PROCEDURE — 80053 COMPREHEN METABOLIC PANEL: CPT

## 2021-12-30 PROCEDURE — 86900 BLOOD TYPING SEROLOGIC ABO: CPT

## 2021-12-30 PROCEDURE — 85379 FIBRIN DEGRADATION QUANT: CPT

## 2021-12-30 PROCEDURE — 93970 EXTREMITY STUDY: CPT

## 2021-12-30 PROCEDURE — 87070 CULTURE OTHR SPECIMN AEROBIC: CPT

## 2022-02-21 NOTE — ED ADULT TRIAGE NOTE - MEANS OF ARRIVAL
Care Due:                  Date            Visit Type   Department     Provider  --------------------------------------------------------------------------------                                EP -                              PRIMARY      [facility]  Last Visit: 01-      CARE ([facility])   MEDICINE       Tori Rosenberg  Next Visit: None Scheduled  None         None Found                                                            Last  Test          Frequency    Reason                     Performed    Due Date  --------------------------------------------------------------------------------    Office Visit  12 months..  albuterol................  01- 01-    Powered by [employer]. Reference number: [id number].   2/21/2022 3:30:09 PM CST   stretcher

## 2022-08-22 NOTE — DIETITIAN INITIAL EVALUATION ADULT. - ENTERAL
DISPLAY PLAN FREE TEXT DISPLAY PLAN FREE TEXT DISPLAY PLAN FREE TEXT If plan to start TF recommend Glucerna 1.2 via NGT @goal rate of 48ml/hr x24hrs (provides 1152ml TV, 1501kcal (at current propofol rate), 69gprotein, 927ml free water) +additional water per team

## 2022-09-16 NOTE — ED PROVIDER NOTE - NS_BEDUNITTYPES_ED_ALL_ED
ICU Nasalis-Muscle-Based Myocutaneous Island Pedicle Flap Text: Using a #15 blade, an incision was made around the donor flap to the level of the nasalis muscle. Wide lateral undermining was then performed in both the subcutaneous plane above the nasalis muscle, and in a submuscular plane just above periosteum. This allowed the formation of a free nasalis muscle axial pedicle (based on the angular artery) which was still attached to the actual cutaneous flap, increasing its mobility and vascular viability. Hemostasis was obtained with pinpoint electrocoagulation. The flap was mobilized into position and the pivotal anchor points positioned and stabilized with buried interrupted sutures. Subcutaneous and dermal tissues were closed in a multilayered fashion with sutures. Tissue redundancies were excised, and the epidermal edges were apposed without significant tension and sutured with sutures.

## 2023-06-01 NOTE — H&P ADULT - PROBLEM SELECTOR PLAN 11
Pt may have h/o Hep C (documented in ED note). Not currently on medication. Unknown at this time whether she was ever treated in the past. Liver panel wnl this admission.  -Can consider sending hepatitis panel to verify    #H/o Hep B  -Pt may have h/o Hep B (documented in ED note).  -Can consider sending hepatitis panel to verify Birth Control Pills Counseling: Birth Control Pill Counseling: I discussed with the patient the potential side effects of OCPs including but not limited to increased risk of stroke, heart attack, thrombophlebitis, deep venous thrombosis, hepatic adenomas, breast changes, GI upset, headaches, and depression.  The patient verbalized understanding of the proper use and possible adverse effects of OCPs. All of the patient's questions and concerns were addressed.

## 2024-01-01 NOTE — PATIENT PROFILE ADULT - NSPROHMSYMPCOND_GEN_A_NUR
11-year-old female presenting after ingestion of marijuana edibles.  Patient unable to give full history.  Unable to ascertain full ROS.  Mother and friend at bedside.  Per patient's friend at bedside patient took unknown amount of marijuana Gummies.  Total package containing 2000 mg of THC.  Patient did not finish the package but consumed most of it.  Patient is agitated and anxious about. glen - patient intubated and sedated

## 2024-04-22 NOTE — DISCHARGE NOTE NURSING/CASE MANAGEMENT/SOCIAL WORK - NSTRANSFERBELONGINGSDISPO_GEN_A_NUR
[facility] [place] Sr.  tolerated treatment well with no complications.  PAC remains accessed per pt request for tomorrow's treatment.     [facility] Sr. is aware of future appt on 4/23/24 @ 4635 at [facility].     AVS printed and given to Los [facility] Sr.:  No (Declined by [facility] Sr.)       
Pt offers no complaints on arrival, labs dated 4/15/24 reviewed no parameters for treatment ordered.   
with patient

## 2025-03-21 NOTE — ED ADULT NURSE NOTE - HIV CLINIC
"    [facility] Medicine Services  DISCHARGE SUMMARY    Patient Name: Emily Grove  : 1964  MRN: [id number]    Date of Admission: 3/20/2025 10:10 PM  Date of Discharge: 3/21/2025  Primary Care Physician: Marie Morales MD    Consults       No orders found for last 30 day(s).            Hospital Course     Presenting Problem: Hyperglycemia without DKA/HHS    Active Hospital Problems    Diagnosis  POA    **Hyperglycemia due to diabetes mellitus [E11.65]  Yes    Insulin pump mechanical complication [T85.694A]  Unknown    Hyperglycemia [R73.9]  Yes      Resolved Hospital Problems   No resolved problems to display.          Hospital Course:  Emily Grove is a 60 y.o. female with PMHx of T2DM (insulin dependent, dx late 30's, f/w  endocrinology), Afib, CVA (L thalmic 2023 and L parietal 1/15/2024) w/ residual weakness and aphasia, s/p PFO closure (2024) and loop recorder implant who presents to the ED for evaluation of high blood sugar (states was 600 at home tonight) and insulin pump malfunction (pt reports \"communication errors\" since ). She called her endocrine office and was told to give manual bolus doses until she could be seen later this week.      On arrival to the ED, glucose 485; now after fluids and insulin glucose is 178. A1c 9.00 tonight, previously 8.5 2025. She is also taking Ozempic, states she takes her shots on  but did not take this weeks shot and per her significant other at bedside she has \"lots of extra\" pens in the fridg. Per endocrine note 2025 planned to stop Ozempic and start Mounjaro (hoping for better blood sugar control and decreased appetite) - this has not yet been approved by insurance.      She denies any other acute complaints. No nausea, vomiting; no decreased appetite or weight loss. Vitals are stable. She will be admitted to hospital medicine for further management.     Hyperglycemia d/t T2DM w/ insulin " pump malfunction  glucose improved from 485 down to 178  start lantus 5 units daily (was previously on levemir 10 units 1/2024 prior to insulin pump)  fsbg achs w/ moderate dose ssi  A1c 9.00  diabetes educator to see  Blood sugars have been well-controlled at this time, diabetes educator has discussed with the patient provided education.  She did also communicate with  endocrinology with whom the patient follows, has an appointment on Thursday, 3/27/2025  Diabetes educator did discussed with  over the phone, they want her to put the insulin pump back on and start a Dexcom, she will watch and make sure that everything connects and will follow-up with endocrinology on Thursday.  She is otherwise good for discharge      Discharge Follow Up Recommendations for outpatient labs/diagnostics:  Follow-up with PCP in 1 week  Follow-up with endocrinology at  3/27/2025 on Thursday    Day of Discharge     HPI:   Patient is doing very well this a.m., no active complaints, walking ambulating, sugars have been fairly well-controlled.  Pump has been off, some issues with her pod, has been instructed to replace the pod, it appears that she did not follow the instructions despite the education being provided.  Our diabetes educator has provided her with all that information and she knows how to use her insulin pump and feels satisfied.  She can follow-up with her diabetes/endocrinology management at     Review of Systems  Pan negative review of system    Vital Signs:   Temp:  [96.5 °F (35.8 °C)-97.9 °F (36.6 °C)] 97.9 °F (36.6 °C)  Heart Rate:  [67-89] 67  Resp:  [16-18] 16  BP: (111-156)/(63-97) 126/79      Physical Exam:  Constitutional: Awake, alert  Eyes: PERRLA, sclerae anicteric, no conjunctival injection  HENT: NCAT, mucous membranes moist  Neck: Supple, no thyromegaly, no lymphadenopathy, trachea midline  Respiratory: Clear to auscultation bilaterally, nonlabored respirations   Cardiovascular: RRR, no murmurs, rubs,  or gallops, palpable pedal pulses bilaterally  Gastrointestinal: Positive bowel sounds, soft, nontender, nondistended  Musculoskeletal: No bilateral ankle edema, no clubbing or cyanosis to extremities  Psychiatric: Appropriate affect, cooperative  Neurologic: Oriented x 3, strength symmetric in all extremities, Cranial Nerves grossly intact to confrontation, speech clear  Skin: No rashes     Pertinent  and/or Most Recent Results     LAB RESULTS:      Lab 03/21/25  0555 03/20/25  2258   WBC 7.40 7.41   HEMOGLOBIN 12.5 12.9   HEMATOCRIT 38.9 40.0   PLATELETS 155 166   NEUTROS ABS 5.32 4.95   IMMATURE GRANS (ABS) 0.01 0.02   LYMPHS ABS 1.41 1.91   MONOS ABS 0.56 0.42   EOS ABS 0.08 0.09   MCV 89.0 88.5   LACTATE  --  1.9         Lab 03/21/25  0555 03/20/25  2341 03/20/25  2258   SODIUM 145 139  --    POTASSIUM 3.9 3.9  --    CHLORIDE 106 101  --    CO2 30.0* 26.0  --    ANION GAP 9.0 12.0  --    BUN 14 18  --    CREATININE 0.44* 0.58  --    EGFR 109.5 102.5  --    GLUCOSE 74 272*  --    CALCIUM 9.4 9.5  --    MAGNESIUM 2.0 2.0  --    HEMOGLOBIN A1C  --   --  9.00*   TSH  --  1.750  --          Lab 03/20/25  2341   TOTAL PROTEIN 6.8   ALBUMIN 4.1   GLOBULIN 2.7   ALT (SGPT) 34*   AST (SGOT) 26   BILIRUBIN 0.2   ALK PHOS 217*                     Lab 03/20/25  2309   FIO2 21   CARBOXYHEMOGLOBIN (VENOUS) 1.0     Brief Urine Lab Results  (Last result in the past 365 days)        Color   Clarity   Blood   Leuk Est   Nitrite   Protein   CREAT   Urine HCG        03/20/25 2322 Yellow   Clear   Trace   Negative   Negative   Negative                 Microbiology Results (last 10 days)       ** No results found for the last 240 hours. **            CT Head Without Contrast  Result Date: 3/20/2025  CT HEAD WO CONTRAST Date of Exam: 3/20/2025 10:40 PM EDT Indication: AMS. Comparison: 1/15/2024 Technique: Axial CT images were obtained of the head without contrast administration.  Automated exposure control and iterative construction  methods were used. Findings: No acute large territorial infarct. Known old/chronic left temporoparietal infarct. There is no evidence of hemorrhage. No mass effect, edema or midline shift Mild periventricular and subcortical white matter hypodensities, nonspecific but most likely represents chronic small vessel ischemic changes. No extra-axial fluid collection. The ventricles are normal in size and configuration. The visualized orbits are unremarkable. The visualized paranasal sinuses and mastoid air cells are clear. The visualized soft tissues are unremarkable. No acute osseous abnormality.     Impression: 1.No acute intracranial abnormality identified. 2.Known old/chronic left temporoparietal infarct. 3.Mild chronic small vessel ischemic changes. Electronically Signed: Helder Hernandez DO  3/20/2025 10:48 PM EDT  Workstation ID: [id number]      Results for orders placed during the hospital encounter of 01/14/24    Duplex Venous Lower Extremity - Bilateral CAR    Interpretation Summary    Normal bilateral lower extremity venous duplex scan.      Results for orders placed during the hospital encounter of 01/14/24    Duplex Venous Lower Extremity - Bilateral CAR    Interpretation Summary    Normal bilateral lower extremity venous duplex scan.      Results for orders placed during the hospital encounter of 01/14/24    Adult Transthoracic Echo Complete W/ Cont if Necessary Per Protocol    Interpretation Summary    Left ventricular systolic function is normal. Calculated left ventricular EF = 64.6% Normal left ventricular cavity size and wall thickness noted. All left ventricular wall segments contract normally. Left ventricular diastolic function was normal.    Normal right ventricular cavity size, wall thickness, systolic function and septal motion noted.    The aortic valve is grossly normal in structure. No significant aortic valve regurgitation is present. No hemodynamically significant aortic valve stenosis is  present.    The mitral valve is grossly normal in structure. Trace mitral valve regurgitation is present. No significant mitral valve stenosis is present.    The tricuspid valve is grossly normal in structure. Trace tricuspid valve regurgitation is present. Estimated right ventricular systolic pressure from tricuspid regurgitation is normal (<35 mmHg). No evidence of significant tricuspid valve stenosis is present.      Plan for Follow-up of Pending Labs/Results:     Discharge Details        Discharge Medications        Continue These Medications        Instructions Start Date   aspirin 325 MG EC tablet   325 mg, Every 6 Hours PRN      atorvastatin 20 MG tablet  Commonly known as: LIPITOR   20 mg, Nightly      clobetasol 0.05 % external solution  Commonly known as: TEMOVATE   Please see attached for detailed directions      clotrimazole 1 % external solution  Commonly known as: LOTRIMIN   Please see attached for detailed directions      Dexcom G6 Sensor   USE AS INSTRUCTED CHANGE EVERY 10 DAYS DX E11.9      Dexcom G6 Transmitter misc   USE AS DIRECTED CHANGE TRANSMITTER EVERY 90 DAYS DX E11.9      DULoxetine 30 MG capsule  Commonly known as: CYMBALTA   30 mg, Daily      gabapentin 100 MG capsule  Commonly known as: NEURONTIN   100 mg, 3 Times Daily      latanoprost 0.005 % ophthalmic solution  Commonly known as: XALATAN   INSTILL 1 DROP INTO RIGHT EYE AT BEDTIME      losartan 25 MG tablet  Commonly known as: COZAAR   50 mg, Daily      meloxicam 15 MG tablet  Commonly known as: MOBIC   TAKE 1 TABLET BY MOUTH EVERY DAY FOR LEG PAIN      metoprolol succinate XL 50 MG 24 hr tablet  Commonly known as: TOPROL-XL   50 mg, Oral, Daily      mupirocin 2 % ointment  Commonly known as: BACTROBAN   APPLY TO BIOPSY SITE TWICE DAILY UNTIL HEALED      Myrbetriq 50 MG tablet sustained-release 24 hour 24 hr tablet  Generic drug: Mirabegron ER   1 tablet, Daily      Omnipod 5 [id number] Pods Gen 5 misc   USE AS INSTRUCTED CHANGE POD  EVERY 2 TO 3 DAYS      Ozempic (0.25 or 0.5 MG/DOSE) 2 MG/1.5ML solution pen-injector  Generic drug: Semaglutide(0.25 or 0.5MG/DOS)   1 mg, Weekly      pantoprazole 40 MG EC tablet  Commonly known as: PROTONIX   40 mg, Daily      timolol 0.5 % ophthalmic solution  Commonly known as: TIMOPTIC   1 drop, Daily               Allergies   Allergen Reactions    Sulfa Antibiotics Anaphylaxis         Discharge Disposition:  Home or Self Care    Diet:  Hospital:  Diet Order   Procedures    Diet: Cardiac, Diabetic; Healthy Heart (2-3 Na+); Consistent Carbohydrate; Fluid Consistency: Thin (IDDSI 0)            Activity:      Restrictions or Other Recommendations:         CODE STATUS:    Code Status and Medical Interventions: CPR (Attempt to Resuscitate); Full Support   Ordered at: 03/21/25 0206     Code Status (Patient has no pulse and is not breathing):    CPR (Attempt to Resuscitate)     Medical Interventions (Patient has pulse or is breathing):    Full Support       Future Appointments   Date Time Provider Department Center   8/6/2025 10:00 AM Teri Johnson APRN [facility]   8/6/2025 11:30 AM Rik Riuz III, MD [facility]       Additional Instructions for the Follow-ups that You Need to Schedule       Discharge Follow-up with PCP   As directed       Currently Documented PCP:    Marie Morales MD    PCP Phone Number:    [phone]     Follow Up Details: 1 week        Discharge Follow-up with Specified Provider: Endocrinology, appointment on 3/27/2025; 1 Week   As directed      To: Endocrinology, appointment on 3/27/2025   Follow Up: 1 Week                      Adelso Cardenas MD  03/21/25      Time Spent on Discharge:  I spent  45  minutes on this discharge activity which included: face-to-face encounter with the patient, reviewing the data in the system, coordination of the care with the nursing staff as well as consultants, documentation, and entering orders.           Yes
